# Patient Record
Sex: MALE | Race: WHITE | Employment: OTHER | URBAN - NONMETROPOLITAN AREA
[De-identification: names, ages, dates, MRNs, and addresses within clinical notes are randomized per-mention and may not be internally consistent; named-entity substitution may affect disease eponyms.]

---

## 2017-04-14 DIAGNOSIS — I48.0 PAROXYSMAL ATRIAL FIBRILLATION (HCC): ICD-10-CM

## 2017-04-17 RX ORDER — SOTALOL HYDROCHLORIDE 160 MG/1
TABLET ORAL
Qty: 60 TABLET | Refills: 3 | Status: SHIPPED | OUTPATIENT
Start: 2017-04-17 | End: 2017-08-24 | Stop reason: SDUPTHER

## 2017-08-24 DIAGNOSIS — I48.0 PAROXYSMAL ATRIAL FIBRILLATION (HCC): ICD-10-CM

## 2017-08-24 RX ORDER — SOTALOL HYDROCHLORIDE 160 MG/1
TABLET ORAL
Qty: 60 TABLET | Refills: 5 | Status: SHIPPED | OUTPATIENT
Start: 2017-08-24 | End: 2017-12-27 | Stop reason: SDUPTHER

## 2017-12-27 ENCOUNTER — OFFICE VISIT (OUTPATIENT)
Dept: CARDIOLOGY | Age: 67
End: 2017-12-27
Payer: MEDICARE

## 2017-12-27 ENCOUNTER — TELEPHONE (OUTPATIENT)
Dept: CARDIOLOGY | Age: 67
End: 2017-12-27

## 2017-12-27 VITALS
HEIGHT: 74 IN | BODY MASS INDEX: 18.87 KG/M2 | HEART RATE: 72 BPM | WEIGHT: 147 LBS | SYSTOLIC BLOOD PRESSURE: 190 MMHG | DIASTOLIC BLOOD PRESSURE: 100 MMHG

## 2017-12-27 DIAGNOSIS — R09.89 BILATERAL CAROTID BRUITS: Primary | ICD-10-CM

## 2017-12-27 DIAGNOSIS — I10 ESSENTIAL HYPERTENSION: ICD-10-CM

## 2017-12-27 DIAGNOSIS — I25.10 ATHEROSCLEROTIC CARDIOVASCULAR DISEASE: ICD-10-CM

## 2017-12-27 DIAGNOSIS — I20.8 OTHER FORMS OF ANGINA PECTORIS (HCC): ICD-10-CM

## 2017-12-27 DIAGNOSIS — I48.0 PAROXYSMAL ATRIAL FIBRILLATION (HCC): ICD-10-CM

## 2017-12-27 PROCEDURE — G8427 DOCREV CUR MEDS BY ELIG CLIN: HCPCS | Performed by: INTERNAL MEDICINE

## 2017-12-27 PROCEDURE — G8420 CALC BMI NORM PARAMETERS: HCPCS | Performed by: INTERNAL MEDICINE

## 2017-12-27 PROCEDURE — 3017F COLORECTAL CA SCREEN DOC REV: CPT | Performed by: INTERNAL MEDICINE

## 2017-12-27 PROCEDURE — 99213 OFFICE O/P EST LOW 20 MIN: CPT | Performed by: INTERNAL MEDICINE

## 2017-12-27 PROCEDURE — G8484 FLU IMMUNIZE NO ADMIN: HCPCS | Performed by: INTERNAL MEDICINE

## 2017-12-27 PROCEDURE — G8598 ASA/ANTIPLAT THER USED: HCPCS | Performed by: INTERNAL MEDICINE

## 2017-12-27 PROCEDURE — 4040F PNEUMOC VAC/ADMIN/RCVD: CPT | Performed by: INTERNAL MEDICINE

## 2017-12-27 PROCEDURE — 1123F ACP DISCUSS/DSCN MKR DOCD: CPT | Performed by: INTERNAL MEDICINE

## 2017-12-27 PROCEDURE — 1036F TOBACCO NON-USER: CPT | Performed by: INTERNAL MEDICINE

## 2017-12-27 RX ORDER — LISINOPRIL 40 MG/1
40 TABLET ORAL 2 TIMES DAILY
Qty: 180 TABLET | Refills: 3 | Status: ON HOLD | OUTPATIENT
Start: 2017-12-27 | End: 2018-01-10

## 2017-12-27 RX ORDER — AMLODIPINE BESYLATE 5 MG/1
5 TABLET ORAL 2 TIMES DAILY
Qty: 60 TABLET | Refills: 3 | Status: ON HOLD | OUTPATIENT
Start: 2017-12-27 | End: 2018-01-10 | Stop reason: HOSPADM

## 2017-12-27 RX ORDER — SOTALOL HYDROCHLORIDE 160 MG/1
TABLET ORAL
Qty: 60 TABLET | Refills: 5 | Status: SHIPPED | OUTPATIENT
Start: 2017-12-27 | End: 2018-02-14 | Stop reason: SDUPTHER

## 2017-12-27 RX ORDER — CLONIDINE HYDROCHLORIDE 0.1 MG/1
0.1 TABLET ORAL NIGHTLY
Qty: 90 TABLET | Refills: 5 | Status: ON HOLD | OUTPATIENT
Start: 2017-12-27 | End: 2018-01-10 | Stop reason: HOSPADM

## 2017-12-27 NOTE — TELEPHONE ENCOUNTER
Tried contacting patient to inform him of his test that were scheduled. Patient was not home yet and they will have him call the office back in the morning to go over this. Carotid and stress echo were scheduled for 1/2/18 arrival of 7:45 test will be done same day. Also his appointment to come back to office is 1/17/18 at 230. If patient returns my call my ext is [phone]. [place] [facility] [address] [facility] located on the first floor of [place] [address] through hospital main entrance and turn immediately to your left. Patient contact number:  [phone] (home)    Stress Echocardiogram      This records the heart's activity during a cardiac stress test.  A stress echocardiogram is a very effective, noninvasive test that can help determine whether you have blockages in your coronary arteries. The exam takes approximately thirty minutes. To help ensure accurate results, patients should take the following steps in preparation for a stress echocardiogram:   Refrain from strenuous activity for 12 hours before the test.   Do not eat, drink, or smoke for two hours prior to the test.  Unless instructed otherwise by your physician, you should continue to take prescribed medications. Wear loose, comfortable clothing and walking shoes. If you need to change this appointment, please call [phone] to reschedule.

## 2017-12-28 NOTE — TELEPHONE ENCOUNTER
Spoke with patient and give him instructions below and day and time for test plus appointment to come back to see MDL.

## 2018-01-04 NOTE — PROGRESS NOTES
arteriograms. Left popliteal/tibioperoneal trunk artery balloon angioplasty with 4 mm x 15 mm cutting balloon.  VASCULAR SURGERY  4/21/2015 [facility] cont    Arteriograms after balloon angioplasty. Balloon angioplasty left popliteal artery/tibioperoneal trunk with 5 mm x 40 mm russell balloon. Arteriograms after balloon angioplasty. Left superficial femoral artery balloon angioplasty with 7 mm x 100 mm  balloon. Left superficial femoral artery stent placement idev supera 6.5 x 100 mm self-expanding nitinol stent. Completion arteriograms left lower e    VASCULAR SURGERY  4/21/2015 [facility] cont    extremity. Mynx closure right common femoral artery puncture site. Family History   Problem Relation Age of Onset    Coronary Art Dis Father     High Blood Pressure Father     Coronary Art Dis Brother     High Blood Pressure Mother     Cancer Mother     High Blood Pressure Sister      Social History   Substance Use Topics    Smoking status: Former Smoker     Packs/day: 1.00     Years: 50.00     Types: Cigarettes     Quit date: 12/21/2015    Smokeless tobacco: Never Used    Alcohol use No      Allergies   Allergen Reactions    Statins      myalagias     Outpatient Prescriptions Marked as Taking for the 12/27/17 encounter (Office Visit) with Lucho Martinez MD   Medication Sig Dispense Refill    amLODIPine (NORVASC) 5 MG tablet Take 1 tablet by mouth 2 times daily 60 tablet 3    lisinopril (PRINIVIL;ZESTRIL) 40 MG tablet Take 1 tablet by mouth 2 times daily 180 tablet 3    sotalol (BETAPACE) 160 MG tablet TAKE ONE TABLET BY MOUTH TWICE DAILY 60 tablet 5    cloNIDine (CATAPRES) 0.1 MG tablet Take 1 tablet by mouth nightly 90 tablet 5    aspirin 81 MG EC tablet Take 81 mg by mouth daily.          Data:  BP Readings from Last 3 Encounters:   12/27/17 (!) 190/100   12/21/16 (!) 160/88   09/15/15 150/74    Pulse Readings from Last 3 Encounters:   12/27/17 72   12/21/16 72   09/15/15 58        REVIEW OF SYSTEMS  Constitutional:  Negative for diaphoresis, fever, appetite change or unexpected weight change. HENT:  Negative for nosebleeds, facial swelling, rhinorrhea and neck stiffness. RESPIRATORY:  Negative shortness of breath, cough or sputum production. No wheezing or stridor. CARDIOVASCULAR:  There is no angina, no overt heart failure, and no syncope. GASTROINTESTINAL:   Negative for abdominal distention. GENITOURINARY:  Negative for dysuria, urgency and frequency. MUSCULOSKELETAL:   Negative for myalgia, arthralgia and gait problem. SKIN:  Negative for color change, pallor, rash and wound. NEUROLOGICAL:   Negative for dizziness, weakness, light-headedness, numbness and headaches. Negative for speech difficulty. HEMATOLOGICAL:   Negative for bruising and bleeding easily. PSYCHIATRIC/BEHAVIORAL:   No excessive anxiety or confusion. Except as noted in the HPI, all other systems are negative. PHYSICAL EXAMINATION  GENERAL:  Alert and oriented x3 in no apparent distress. Short-term and long-term memory intact. Judgment intact. Oriented to time, place and person. No depression, anxiety or agitation. Vital Signs:  BP (!) 190/100   Pulse 72   Ht 6' 1.5\" (1.867 m)   Wt 147 lb (66.7 kg)   BMI 19.13 kg/m²    HEAD:  Normocephalic without evidence of old or recent trauma. EYES:  Sclerae clear. Conjunctivae pink. Pupils equal and round. NOSE:  Negative nasal discharge or epistaxis. THROAT:  No lesions on lips or buccal mucosa. NECK:  Supple without mass or JVD. Carotid pulses 2+ to palpation bilaterally without bruit. No thyromegaly noted. CHEST:  Equal bilateral expansion. RESPIRATORY:  The lungs are clear to auscultation. Normal respiratory effort. CARDIOVASCULAR: The heart demonstrates regular rhythm with a normal rate. No audible murmurs or gallops. ABDOMEN:  Soft, nontender. Exhibits no distension.   Bowel sounds are normal.   UPPER EXTREMITY EVALUATION:  Radial pulses

## 2018-01-07 ENCOUNTER — APPOINTMENT (OUTPATIENT)
Dept: GENERAL RADIOLOGY | Age: 68
DRG: 247 | End: 2018-01-07
Payer: MEDICARE

## 2018-01-07 ENCOUNTER — HOSPITAL ENCOUNTER (INPATIENT)
Age: 68
LOS: 3 days | Discharge: HOME | DRG: 247 | End: 2018-01-10
Attending: EMERGENCY MEDICINE | Admitting: INTERNAL MEDICINE
Payer: MEDICARE

## 2018-01-07 DIAGNOSIS — I10 ESSENTIAL HYPERTENSION: ICD-10-CM

## 2018-01-07 DIAGNOSIS — I21.3 ST ELEVATION MYOCARDIAL INFARCTION (STEMI), UNSPECIFIED ARTERY (HCC): Primary | ICD-10-CM

## 2018-01-07 PROBLEM — I21.19 INFERIOR MI (HCC): Status: ACTIVE | Noted: 2018-01-07

## 2018-01-07 LAB
ALBUMIN SERPL-MCNC: 4.4 G/DL (ref 3.5–5.2)
ALP BLD-CCNC: 74 U/L (ref 40–130)
ALT SERPL-CCNC: 21 U/L (ref 5–41)
ANION GAP SERPL CALCULATED.3IONS-SCNC: 17 MMOL/L (ref 7–19)
APTT: 29.4 SEC (ref 26–36.2)
AST SERPL-CCNC: 30 U/L (ref 5–40)
BACTERIA: NEGATIVE /HPF
BASOPHILS ABSOLUTE: 0.1 K/UL (ref 0–0.2)
BASOPHILS RELATIVE PERCENT: 0.4 % (ref 0–1)
BILIRUB SERPL-MCNC: 0.5 MG/DL (ref 0.2–1.2)
BILIRUBIN URINE: NEGATIVE
BLOOD, URINE: ABNORMAL
BUN BLDV-MCNC: 18 MG/DL (ref 8–23)
CALCIUM SERPL-MCNC: 8.6 MG/DL (ref 8.8–10.2)
CHLORIDE BLD-SCNC: 94 MMOL/L (ref 98–111)
CHOLESTEROL, TOTAL: 163 MG/DL (ref 160–199)
CLARITY: CLEAR
CO2: 26 MMOL/L (ref 22–29)
COLOR: YELLOW
CREAT SERPL-MCNC: 0.7 MG/DL (ref 0.5–1.2)
EOSINOPHILS ABSOLUTE: 0.3 K/UL (ref 0–0.6)
EOSINOPHILS RELATIVE PERCENT: 1.5 % (ref 0–5)
EPITHELIAL CELLS, UA: 1 /HPF (ref 0–5)
GFR NON-AFRICAN AMERICAN: >60
GLUCOSE BLD-MCNC: 160 MG/DL (ref 74–109)
GLUCOSE URINE: NEGATIVE MG/DL
HCT VFR BLD CALC: 47.5 % (ref 42–52)
HDLC SERPL-MCNC: 47 MG/DL (ref 55–121)
HEMOGLOBIN: 15.5 G/DL (ref 14–18)
HYALINE CASTS: 0 /HPF (ref 0–8)
INR BLD: 0.95 (ref 0.88–1.18)
KETONES, URINE: ABNORMAL MG/DL
LDL CHOLESTEROL CALCULATED: 102 MG/DL
LEUKOCYTE ESTERASE, URINE: NEGATIVE
LYMPHOCYTES ABSOLUTE: 4 K/UL (ref 1.1–4.5)
LYMPHOCYTES RELATIVE PERCENT: 22.8 % (ref 20–40)
MCH RBC QN AUTO: 29.6 PG (ref 27–31)
MCHC RBC AUTO-ENTMCNC: 32.6 G/DL (ref 33–37)
MCV RBC AUTO: 90.8 FL (ref 80–94)
MONOCYTES ABSOLUTE: 1.3 K/UL (ref 0–0.9)
MONOCYTES RELATIVE PERCENT: 7.6 % (ref 0–10)
NEUTROPHILS ABSOLUTE: 11.8 K/UL (ref 1.5–7.5)
NEUTROPHILS RELATIVE PERCENT: 67.3 % (ref 50–65)
NITRITE, URINE: NEGATIVE
PDW BLD-RTO: 13.5 % (ref 11.5–14.5)
PERFORMED ON: NORMAL
PH UA: 7
PLATELET # BLD: 418 K/UL (ref 130–400)
PMV BLD AUTO: 10.6 FL (ref 9.4–12.4)
POC TROPONIN I: 0.05 NG/ML (ref 0–0.08)
POTASSIUM SERPL-SCNC: 4.2 MMOL/L (ref 3.5–5)
PROTEIN UA: NEGATIVE MG/DL
PROTHROMBIN TIME: 12.6 SEC (ref 12–14.6)
RBC # BLD: 5.23 M/UL (ref 4.7–6.1)
RBC UA: 0 /HPF (ref 0–4)
SODIUM BLD-SCNC: 137 MMOL/L (ref 136–145)
SPECIFIC GRAVITY UA: >1.045
TOTAL PROTEIN: 8.2 G/DL (ref 6.6–8.7)
TRIGL SERPL-MCNC: 70 MG/DL (ref 0–149)
TROPONIN: 5.17 NG/ML (ref 0–0.03)
UROBILINOGEN, URINE: 0.2 E.U./DL
WBC # BLD: 17.6 K/UL (ref 4.8–10.8)
WBC UA: 4 /HPF (ref 0–5)

## 2018-01-07 PROCEDURE — 02703FZ DILATION OF CORONARY ARTERY, ONE ARTERY WITH THREE INTRALUMINAL DEVICES, PERCUTANEOUS APPROACH: ICD-10-PCS | Performed by: INTERNAL MEDICINE

## 2018-01-07 PROCEDURE — 6370000000 HC RX 637 (ALT 250 FOR IP): Performed by: INTERNAL MEDICINE

## 2018-01-07 PROCEDURE — 81001 URINALYSIS AUTO W/SCOPE: CPT

## 2018-01-07 PROCEDURE — 87070 CULTURE OTHR SPECIMN AEROBIC: CPT

## 2018-01-07 PROCEDURE — 85025 COMPLETE CBC W/AUTO DIFF WBC: CPT

## 2018-01-07 PROCEDURE — 2500000003 HC RX 250 WO HCPCS

## 2018-01-07 PROCEDURE — C1876 STENT, NON-COA/NON-COV W/DEL: HCPCS

## 2018-01-07 PROCEDURE — 2580000003 HC RX 258: Performed by: INTERNAL MEDICINE

## 2018-01-07 PROCEDURE — 36415 COLL VENOUS BLD VENIPUNCTURE: CPT

## 2018-01-07 PROCEDURE — 92928 PRQ TCAT PLMT NTRAC ST 1 LES: CPT | Performed by: INTERNAL MEDICINE

## 2018-01-07 PROCEDURE — 2720000000 HC MISC SURG SUPPLY STERILE $0-50

## 2018-01-07 PROCEDURE — C1725 CATH, TRANSLUMIN NON-LASER: HCPCS

## 2018-01-07 PROCEDURE — 84484 ASSAY OF TROPONIN QUANT: CPT

## 2018-01-07 PROCEDURE — 2780000010 HC IMPLANT OTHER

## 2018-01-07 PROCEDURE — 99285 EMERGENCY DEPT VISIT HI MDM: CPT

## 2018-01-07 PROCEDURE — 027034Z DILATION OF CORONARY ARTERY, ONE ARTERY WITH DRUG-ELUTING INTRALUMINAL DEVICE, PERCUTANEOUS APPROACH: ICD-10-PCS | Performed by: INTERNAL MEDICINE

## 2018-01-07 PROCEDURE — C1874 STENT, COATED/COV W/DEL SYS: HCPCS

## 2018-01-07 PROCEDURE — B2151ZZ FLUOROSCOPY OF LEFT HEART USING LOW OSMOLAR CONTRAST: ICD-10-PCS | Performed by: INTERNAL MEDICINE

## 2018-01-07 PROCEDURE — 93461 R&L HRT ART/VENTRICLE ANGIO: CPT | Performed by: INTERNAL MEDICINE

## 2018-01-07 PROCEDURE — 99285 EMERGENCY DEPT VISIT HI MDM: CPT | Performed by: EMERGENCY MEDICINE

## 2018-01-07 PROCEDURE — 71045 X-RAY EXAM CHEST 1 VIEW: CPT

## 2018-01-07 PROCEDURE — 2709999900 HC NON-CHARGEABLE SUPPLY

## 2018-01-07 PROCEDURE — 6370000000 HC RX 637 (ALT 250 FOR IP)

## 2018-01-07 PROCEDURE — C1769 GUIDE WIRE: HCPCS

## 2018-01-07 PROCEDURE — G0278 ILIAC ART ANGIO,CARDIAC CATH: HCPCS | Performed by: INTERNAL MEDICINE

## 2018-01-07 PROCEDURE — 80061 LIPID PANEL: CPT

## 2018-01-07 PROCEDURE — 33990 INSJ PERQ VAD L HRT ARTERIAL: CPT | Performed by: INTERNAL MEDICINE

## 2018-01-07 PROCEDURE — 93005 ELECTROCARDIOGRAM TRACING: CPT

## 2018-01-07 PROCEDURE — C1894 INTRO/SHEATH, NON-LASER: HCPCS

## 2018-01-07 PROCEDURE — C1887 CATHETER, GUIDING: HCPCS

## 2018-01-07 PROCEDURE — 80053 COMPREHEN METABOLIC PANEL: CPT

## 2018-01-07 PROCEDURE — B2111ZZ FLUOROSCOPY OF MULTIPLE CORONARY ARTERIES USING LOW OSMOLAR CONTRAST: ICD-10-PCS | Performed by: INTERNAL MEDICINE

## 2018-01-07 PROCEDURE — 2700000000 HC OXYGEN THERAPY PER DAY

## 2018-01-07 PROCEDURE — C1757 CATH, THROMBECTOMY/EMBOLECT: HCPCS

## 2018-01-07 PROCEDURE — 2100000000 HC CCU R&B

## 2018-01-07 PROCEDURE — B2181ZZ FLUOROSCOPY OF LEFT INTERNAL MAMMARY BYPASS GRAFT USING LOW OSMOLAR CONTRAST: ICD-10-PCS | Performed by: INTERNAL MEDICINE

## 2018-01-07 PROCEDURE — 85730 THROMBOPLASTIN TIME PARTIAL: CPT

## 2018-01-07 PROCEDURE — 02C03ZZ EXTIRPATION OF MATTER FROM CORONARY ARTERY, ONE ARTERY, PERCUTANEOUS APPROACH: ICD-10-PCS | Performed by: INTERNAL MEDICINE

## 2018-01-07 PROCEDURE — 6370000000 HC RX 637 (ALT 250 FOR IP): Performed by: EMERGENCY MEDICINE

## 2018-01-07 PROCEDURE — 92941 PRQ TRLML REVSC TOT OCCL AMI: CPT | Performed by: INTERNAL MEDICINE

## 2018-01-07 PROCEDURE — 4A023N7 MEASUREMENT OF CARDIAC SAMPLING AND PRESSURE, LEFT HEART, PERCUTANEOUS APPROACH: ICD-10-PCS | Performed by: INTERNAL MEDICINE

## 2018-01-07 PROCEDURE — 6360000002 HC RX W HCPCS: Performed by: EMERGENCY MEDICINE

## 2018-01-07 PROCEDURE — 2580000003 HC RX 258: Performed by: EMERGENCY MEDICINE

## 2018-01-07 PROCEDURE — 93459 L HRT ART/GRFT ANGIO: CPT | Performed by: INTERNAL MEDICINE

## 2018-01-07 PROCEDURE — B2131ZZ FLUOROSCOPY OF MULTIPLE CORONARY ARTERY BYPASS GRAFTS USING LOW OSMOLAR CONTRAST: ICD-10-PCS | Performed by: INTERNAL MEDICINE

## 2018-01-07 PROCEDURE — 92933 PRQ TRLML C ATHRC ST ANGIOP1: CPT | Performed by: INTERNAL MEDICINE

## 2018-01-07 PROCEDURE — 85610 PROTHROMBIN TIME: CPT

## 2018-01-07 PROCEDURE — 6360000002 HC RX W HCPCS

## 2018-01-07 PROCEDURE — 2720000001 HC MISC SURG SUPPLY STERILE $51-500

## 2018-01-07 PROCEDURE — 99291 CRITICAL CARE FIRST HOUR: CPT | Performed by: INTERNAL MEDICINE

## 2018-01-07 RX ORDER — NICOTINE 21 MG/24HR
1 PATCH, TRANSDERMAL 24 HOURS TRANSDERMAL DAILY
Status: DISCONTINUED | OUTPATIENT
Start: 2018-01-08 | End: 2018-01-10 | Stop reason: HOSPADM

## 2018-01-07 RX ORDER — ASPIRIN 325 MG
325 TABLET ORAL ONCE
Status: COMPLETED | OUTPATIENT
Start: 2018-01-07 | End: 2018-01-07

## 2018-01-07 RX ORDER — NITROGLYCERIN 20 MG/100ML
5 INJECTION INTRAVENOUS CONTINUOUS
Status: DISCONTINUED | OUTPATIENT
Start: 2018-01-07 | End: 2018-01-10 | Stop reason: HOSPADM

## 2018-01-07 RX ORDER — SODIUM CHLORIDE 0.9 % (FLUSH) 0.9 %
10 SYRINGE (ML) INJECTION PRN
Status: DISCONTINUED | OUTPATIENT
Start: 2018-01-07 | End: 2018-01-10 | Stop reason: HOSPADM

## 2018-01-07 RX ORDER — HEPARIN SODIUM 5000 [USP'U]/ML
INJECTION, SOLUTION INTRAVENOUS; SUBCUTANEOUS
Status: DISPENSED
Start: 2018-01-07 | End: 2018-01-08

## 2018-01-07 RX ORDER — SODIUM CHLORIDE 0.9 % (FLUSH) 0.9 %
10 SYRINGE (ML) INJECTION EVERY 12 HOURS SCHEDULED
Status: DISCONTINUED | OUTPATIENT
Start: 2018-01-07 | End: 2018-01-10 | Stop reason: HOSPADM

## 2018-01-07 RX ORDER — PRASUGREL 10 MG/1
10 TABLET, FILM COATED ORAL DAILY
Status: DISCONTINUED | OUTPATIENT
Start: 2018-01-08 | End: 2018-01-09

## 2018-01-07 RX ORDER — LISINOPRIL 2.5 MG/1
2.5 TABLET ORAL DAILY
Status: DISCONTINUED | OUTPATIENT
Start: 2018-01-07 | End: 2018-01-09

## 2018-01-07 RX ORDER — ACETAMINOPHEN 325 MG/1
650 TABLET ORAL EVERY 4 HOURS PRN
Status: DISCONTINUED | OUTPATIENT
Start: 2018-01-07 | End: 2018-01-10 | Stop reason: HOSPADM

## 2018-01-07 RX ORDER — NITROGLYCERIN 0.4 MG/1
0.4 TABLET SUBLINGUAL ONCE
Status: COMPLETED | OUTPATIENT
Start: 2018-01-07 | End: 2018-01-07

## 2018-01-07 RX ORDER — SOTALOL HYDROCHLORIDE 80 MG/1
160 TABLET ORAL 2 TIMES DAILY
Status: DISCONTINUED | OUTPATIENT
Start: 2018-01-07 | End: 2018-01-10 | Stop reason: HOSPADM

## 2018-01-07 RX ORDER — 0.9 % SODIUM CHLORIDE 0.9 %
1000 INTRAVENOUS SOLUTION INTRAVENOUS ONCE
Status: COMPLETED | OUTPATIENT
Start: 2018-01-07 | End: 2018-01-07

## 2018-01-07 RX ORDER — NICOTINE 21 MG/24HR
1 PATCH, TRANSDERMAL 24 HOURS TRANSDERMAL DAILY
Status: DISCONTINUED | OUTPATIENT
Start: 2018-01-07 | End: 2018-01-10 | Stop reason: HOSPADM

## 2018-01-07 RX ORDER — ASPIRIN 81 MG/1
81 TABLET ORAL DAILY
Status: DISCONTINUED | OUTPATIENT
Start: 2018-01-08 | End: 2018-01-10 | Stop reason: HOSPADM

## 2018-01-07 RX ORDER — SODIUM CHLORIDE 9 MG/ML
INJECTION, SOLUTION INTRAVENOUS CONTINUOUS
Status: DISCONTINUED | OUTPATIENT
Start: 2018-01-07 | End: 2018-01-10 | Stop reason: HOSPADM

## 2018-01-07 RX ORDER — ALPRAZOLAM 0.5 MG/1
0.5 TABLET ORAL EVERY 4 HOURS PRN
Status: DISCONTINUED | OUTPATIENT
Start: 2018-01-07 | End: 2018-01-10 | Stop reason: HOSPADM

## 2018-01-07 RX ORDER — ASPIRIN 81 MG/1
81 TABLET ORAL DAILY
Status: DISCONTINUED | OUTPATIENT
Start: 2018-01-07 | End: 2018-01-07 | Stop reason: SDUPTHER

## 2018-01-07 RX ORDER — HEPARIN SODIUM 5000 [USP'U]/ML
5000 INJECTION, SOLUTION INTRAVENOUS; SUBCUTANEOUS ONCE
Status: COMPLETED | OUTPATIENT
Start: 2018-01-07 | End: 2018-01-07

## 2018-01-07 RX ORDER — ONDANSETRON 2 MG/ML
4 INJECTION INTRAMUSCULAR; INTRAVENOUS EVERY 6 HOURS PRN
Status: DISCONTINUED | OUTPATIENT
Start: 2018-01-07 | End: 2018-01-10 | Stop reason: HOSPADM

## 2018-01-07 RX ADMIN — SODIUM CHLORIDE 1000 ML: 9 INJECTION, SOLUTION INTRAVENOUS at 13:27

## 2018-01-07 RX ADMIN — ASPIRIN 325 MG ORAL TABLET 325 MG: 325 PILL ORAL at 13:30

## 2018-01-07 RX ADMIN — SODIUM CHLORIDE: 9 INJECTION, SOLUTION INTRAVENOUS at 19:57

## 2018-01-07 RX ADMIN — NITROGLYCERIN 0.4 MG: 0.4 TABLET SUBLINGUAL at 13:28

## 2018-01-07 RX ADMIN — Medication 10 ML: at 20:45

## 2018-01-07 RX ADMIN — SOTALOL HYDROCHLORIDE 160 MG: 80 TABLET ORAL at 20:44

## 2018-01-07 RX ADMIN — LISINOPRIL 2.5 MG: 2.5 TABLET ORAL at 19:56

## 2018-01-07 RX ADMIN — ALPRAZOLAM 0.5 MG: 0.5 TABLET ORAL at 21:57

## 2018-01-07 RX ADMIN — HEPARIN SODIUM 5000 UNITS: 5000 INJECTION INTRAVENOUS; SUBCUTANEOUS at 13:38

## 2018-01-07 ASSESSMENT — PAIN DESCRIPTION - LOCATION: LOCATION: CHEST

## 2018-01-07 ASSESSMENT — ENCOUNTER SYMPTOMS
SORE THROAT: 0
ABDOMINAL PAIN: 0
DIARRHEA: 0
COUGH: 0
BACK PAIN: 0
SHORTNESS OF BREATH: 0
RHINORRHEA: 0
NAUSEA: 0
VOMITING: 0

## 2018-01-07 ASSESSMENT — PAIN SCALES - GENERAL: PAINLEVEL_OUTOF10: 4

## 2018-01-07 NOTE — ED PROVIDER NOTES
fibrillation or flutter     post op         SURGICAL HISTORY       Past Surgical History:   Procedure Laterality Date    CARDIAC CATHETERIZATION  4/23/12    with stent to left circ, and LAD    CORONARY ARTERY BYPASS GRAFT  05/15/09    x4    DIAGNOSTIC CARDIAC CATH LAB PROCEDURE  05/15/09    left heart cath, left ventr, selective coronary arteriography     VASCULAR SURGERY  4/21/2015 \[facility]\""    Percutaneous cannulation right common femoral artery with 5-Haitian and later 6-Haitian pinnacle destination sheath. Suprarenal abdominal aortogram with bilateral iliofemoral arteriograms. Bilateral lower extremity arteriograms. Left popliteal/tibioperoneal trunk artery balloon angioplasty with 4 mm x 15 mm cutting balloon.  VASCULAR SURGERY  4/21/2015 [facility] cont    Arteriograms after balloon angioplasty. Balloon angioplasty left popliteal artery/tibioperoneal trunk with 5 mm x 40 mm russell balloon. Arteriograms after balloon angioplasty. Left superficial femoral artery balloon angioplasty with 7 mm x 100 mm  balloon. Left superficial femoral artery stent placement idev supera 6.5 x 100 mm self-expanding nitinol stent. Completion arteriograms left lower e    VASCULAR SURGERY  4/21/2015 [facility] cont    extremity. Mynx closure right common femoral artery puncture site. CURRENT MEDICATIONS       Previous Medications    AMLODIPINE (NORVASC) 5 MG TABLET    Take 1 tablet by mouth 2 times daily    ASPIRIN 81 MG EC TABLET    Take 81 mg by mouth daily.       CLONIDINE (CATAPRES) 0.1 MG TABLET    Take 1 tablet by mouth nightly    LISINOPRIL (PRINIVIL;ZESTRIL) 40 MG TABLET    Take 1 tablet by mouth 2 times daily    SOTALOL (BETAPACE) 160 MG TABLET    TAKE ONE TABLET BY MOUTH TWICE DAILY       ALLERGIES     Statins    FAMILY HISTORY       Family History   Problem Relation Age of Onset    Coronary Art Dis Father     High Blood Pressure Father     Coronary Art Dis Brother     High Blood Pressure Mother     Cancer Mother     High CONSULT TO CARDIOLOGY    PROCEDURES:  Unless otherwise noted below, none     Procedures    FINAL IMPRESSION      1. ST elevation myocardial infarction (STEMI), unspecified artery ([facility] [address]          DISPOSITION/PLAN   DISPOSITION Decision To Admit 01/07/2018 01:21:18 PM      PATIENT REFERRED TO:  No follow-up provider specified.     DISCHARGE MEDICATIONS:  New Prescriptions    No medications on file          (Please note that portions of this note were completed with a voice recognition program.  Efforts were made to edit the dictations but occasionally words are mis-transcribed.)    Rip Gonzalez MD (electronically signed)  Attending Emergency Physician        Dariela Fajardo MD  01/07/18 [id number]

## 2018-01-07 NOTE — H&P
PRIOR CARDIAC PROBLEM LIST  (IF APPLICABLE):    As noted above      Past Medical History:  Past Medical History:   Diagnosis Date    Atrial fibrillation ([address]     CAD (coronary artery disease), native coronary artery     acute inferior MI on 5/15/09, s/p emeergent CABG x4 5/15/09    Chest pain     HTN (hypertension)     Hyperlipidemia     Dr. Ruth Huff manages    Palpitations     Tobacco abuse     Transient atrial fibrillation or flutter     post op       Past Surgical History:  Past Surgical History:   Procedure Laterality Date    CARDIAC CATHETERIZATION  4/23/12    with stent to left circ, and LAD    CORONARY ARTERY BYPASS GRAFT  05/15/09    x4    DIAGNOSTIC CARDIAC CATH LAB PROCEDURE  05/15/09    left heart cath, left ventr, selective coronary arteriography     VASCULAR SURGERY  4/21/2015 [facility]    Percutaneous cannulation right common femoral artery with 5-Malagasy and later 6-Malagasy pinnacle destination sheath. Suprarenal abdominal aortogram with bilateral iliofemoral arteriograms. Bilateral lower extremity arteriograms. Left popliteal/tibioperoneal trunk artery balloon angioplasty with 4 mm x 15 mm cutting balloon.  VASCULAR SURGERY  4/21/2015 [facility] cont    Arteriograms after balloon angioplasty. Balloon angioplasty left popliteal artery/tibioperoneal trunk with 5 mm x 40 mm russell balloon. Arteriograms after balloon angioplasty. Left superficial femoral artery balloon angioplasty with 7 mm x 100 mm  balloon. Left superficial femoral artery stent placement idev supera 6.5 x 100 mm self-expanding nitinol stent. Completion arteriograms left lower e    VASCULAR SURGERY  4/21/2015 [facility] cont    extremity. Mynx closure right common femoral artery puncture site. Home Medications:   Prior to Admission medications    Medication Sig Start Date End Date Taking?  Authorizing Provider   amLODIPine (NORVASC) 5 MG tablet Take 1 tablet by mouth 2 times daily 12/27/17  Yes Mariela Collazo MD   lisinopril (PRINIVIL;ZESTRIL) 40 MG tablet Take 1 tablet by mouth 2 times daily 12/27/17  Yes Leilani Santos MD   sotalol (BETAPACE) 160 MG tablet TAKE ONE TABLET BY MOUTH TWICE DAILY 12/27/17  Yes Leilani Santos MD   cloNIDine (CATAPRES) 0.1 MG tablet Take 1 tablet by mouth nightly 12/27/17  Yes Leilani Santos MD   aspirin 81 MG EC tablet Take 81 mg by mouth daily. Yes Historical Provider, MD        Facility Administered Medications: Allergies:    Statins     Social History:    Social History     Social History    Marital status: Single     Spouse name: N/A    Number of children: N/A    Years of education: N/A     Occupational History    Not on file. Social History Main Topics    Smoking status: Former Smoker     Packs/day: 1.00     Years: 50.00     Types: Cigarettes     Quit date: 12/21/2015    Smokeless tobacco: Never Used    Alcohol use No    Drug use: Unknown    Sexual activity: Not on file     Other Topics Concern    Not on file     Social History Narrative    No narrative on file       Family History:  Family History   Problem Relation Age of Onset    Coronary Art Dis Father     High Blood Pressure Father     Coronary Art Dis Brother     High Blood Pressure Mother     Cancer Mother     High Blood Pressure Sister          REVIEW OF SYSTEMS:     Except as noted in the HPI, all other systems are negative  CONSTITUTIONAL:  No fevers, chills, night sweats, or weight loss. HEENT:  No vision loss, double vision, blurred vision, or tearing. No hearing loss, tinnitus, or infection. No nasal discharge or epistaxis. No dysphagia. RESPIRATORY:  No shortness of breath, cough, or sputum production. No history of TB exposure. CARDIOVASCULAR:  No hypertension, hypotension, anginal type chest pain, dizziness, or syncope. No history of palpitations. PERIPHERAL VASCULAR:  No history of claudication. GASTROINTESTINAL:  No nausea, vomiting, diarrhea, or constipation.   No reflux or atheroembolic event. NEUROLOGIC:  Physiologic. SKIN:  Warm, dry, intact. LABORATORY EVALUATION & TESTING:    I have personally reviewed and interpreted the results of the following diagnostic testing      EKG and or Telemetry:  which was personally reviewed me:  Sinus  rhythm, 69 bpm    Troponin:  positive for myocardial necrosis    CBC:   Recent Labs      01/07/18   1244   WBC  17.6*   HGB  15.5   HCT  47.5   MCV  90.8   PLT  418*     BMP:   Recent Labs      01/07/18   1244   NA  137   K  4.2   CL  94*   CO2  26   BUN  18   CREATININE  0.7     Cardiac Enzymes:   Recent Labs      01/07/18   1256   TROPONINI  0.05     PT/INR:   Recent Labs      01/07/18   1244   PROTIME  12.6   INR  0.95     APTT:   Recent Labs      01/07/18   1244   APTT  29.4     Liver Profile:  Lab Results   Component Value Date    AST 30 01/07/2018    ALT 21 01/07/2018    BILITOT 0.5 01/07/2018    ALKPHOS 74 01/07/2018    ALKPHOS 69 04/23/2012     Lab Results   Component Value Date    CHOL 199 04/17/2015    HDL 48 07/15/2015    TRIG 82 07/15/2015     TSH:  No results found for: TSH  UA:   Lab Results   Component Value Date    COLORU YELLOW 04/23/2012    PHUR 6.5 04/23/2012    LEUKOCYTESUR NEGATIVE 04/23/2012    UROBILINOGEN 0.2 04/23/2012    BILIRUBINUR NEGATIVE 04/23/2012    GLUCOSEU NEGATIVE 04/23/2012             ALL THE CARDIOLOGY PROBLEMS ARE LISTED ABOVE; HOWEVER, THE FOLLOWING SPECIFIC CARDIAC PROBLEMS WERE ADDRESSED AND TREATED DURING THE HOSPITAL VISIT TODAY:       Cardiac Specific Problem / Diagnosis  Discussion / Medical Decision Making Plan          1. Acute inferolateral MI  are worsening   Patient will undergo emergent cardiac catheterization and possible percutaneous intervention. Risks benefits and other options explained understood and patient agrees to proceed          2.                 3.              PLAN:    1. Continue present medications except for changes as noted above  2.  Continue to monitor rhythm  Further orders per clinical course. Over 30 minutes critical care time spent with this patient in the emergency room and cardiac cath lab prior to institution of the catheterization. Discussed with patient and family and nursing. Electronically signed by CESAR Cronin MD on 1/7/18    [facility]

## 2018-01-07 NOTE — ED NOTES
ASSESSMENT:    Pt alert & oriented x4. Pupils equal & reactive. Skin: Warm, dry, & pink. Capillary refill less than 2 seconds. Cardiac: S1 & S2 noted. Lungs: Clear upper and lower lobes, respirations even & unlabored. Abdomen: Bowel sounds noted upper and lower quadrants. Soft and nontender. Extremities: Bilateral pedal pulses & no edema noted. No distress noted. Side rails up and call light in reach. Pt c/o CP, onset around 7038-2104, reports his chest feeling hot, then a burning feeling, location was L arm then radiated to the mid sternal region. Pt also reported HTN and knew something wasn't right, pt has an extensive cardiac hx. Pt has had a quadruple bypass in the past and stent placement.       Matilda Alston RN  01/07/18 [id number]

## 2018-01-07 NOTE — PROCEDURES
Cardiac Catheterization    Patient name:  Josiah Page    :  1950  [facility] Rec No:  [id number]    Room:  0701/701-01  Account No:  [unfilled]  Admission date:  2018  Physician:  CESAR Cronin MD      Date of procedure:  18    Procedure:    Left heart catheterization with GIRALDO left ventriculography, selective coronary arteriography, arteriography of left internal mammary and saphenous vein graft ×3, angioplasty and stent placement to LAD via saphenous vein graft, direct infarct extraction atherectomy and stent placement to saphenous vein graft to circumflex marginal branch, intracoronary nitroprusside administration. Catheters:  5-Qatari Yuan, 5-Qatari AL-1 guide catheter for the LAD intervention, 6-Qatari AL 2 guide catheter for the vein graft to circumflex marginal intervention, 5-Qatari BENNETT-2 catheter for the left internal mammary arteriogram.    Type and site of entry:  Percutaneous right radial artery with modified Seldinger technique. Contrast material:  Isovue-300. Comments: There were no complications. The access site was closed using a TR band with adequate hemostasis and intact distal pulses and the patient is taken to his room in satisfactory condition. Description:  The right wrist was prepped with chlorhexidine solution, draped in a sterile fashion and anesthetized with 2% plain Xylocaine. With ultrasound-guided's a 6-Qatari slender sheath was placed in the right radial artery. The catheters were advanced under fluoroscopic guidance into the ascending aorta. The catheterization procedure was then carried out in the standard fashion after which the interventions ensued as described below. FINDINGS:    Hemodynamics:  LV pressure 120/12. AO pressure 120/76. GIRALDO Left Ventriculography:  The left ventricle is mildly enlarged and there is mild inferior and anterolateral hypokinesis. The left ventricular ejection fraction is estimated to be 50% or greater.  There is no

## 2018-01-07 NOTE — ED NOTES
Pt reports that  has come to see him and explained the procedure. Pt states that he does not have any questions about the procedure.       Ned Medrano, KAYLENE  01/07/18 [id number]

## 2018-01-08 LAB
EKG P AXIS: 52 DEGREES
EKG P AXIS: 75 DEGREES
EKG P-R INTERVAL: 178 MS
EKG P-R INTERVAL: 182 MS
EKG Q-T INTERVAL: 432 MS
EKG Q-T INTERVAL: 476 MS
EKG QRS DURATION: 106 MS
EKG QRS DURATION: 110 MS
EKG QTC CALCULATION (BAZETT): 449 MS
EKG QTC CALCULATION (BAZETT): 484 MS
EKG T AXIS: -84 DEGREES
EKG T AXIS: 71 DEGREES
HCT VFR BLD CALC: 39.7 % (ref 42–52)
HEMOGLOBIN: 12.9 G/DL (ref 14–18)
MCH RBC QN AUTO: 29.5 PG (ref 27–31)
MCHC RBC AUTO-ENTMCNC: 32.5 G/DL (ref 33–37)
MCV RBC AUTO: 90.6 FL (ref 80–94)
MRSA CULTURE ONLY: NORMAL
PDW BLD-RTO: 13.5 % (ref 11.5–14.5)
PLATELET # BLD: 320 K/UL (ref 130–400)
PMV BLD AUTO: 10.8 FL (ref 9.4–12.4)
RBC # BLD: 4.38 M/UL (ref 4.7–6.1)
TROPONIN: 4.63 NG/ML (ref 0–0.03)
TROPONIN: 7.28 NG/ML (ref 0–0.03)
WBC # BLD: 15.8 K/UL (ref 4.8–10.8)

## 2018-01-08 PROCEDURE — 84484 ASSAY OF TROPONIN QUANT: CPT

## 2018-01-08 PROCEDURE — 85027 COMPLETE CBC AUTOMATED: CPT

## 2018-01-08 PROCEDURE — 2100000000 HC CCU R&B

## 2018-01-08 PROCEDURE — 36415 COLL VENOUS BLD VENIPUNCTURE: CPT

## 2018-01-08 PROCEDURE — 2580000003 HC RX 258: Performed by: INTERNAL MEDICINE

## 2018-01-08 PROCEDURE — 99232 SBSQ HOSP IP/OBS MODERATE 35: CPT | Performed by: INTERNAL MEDICINE

## 2018-01-08 PROCEDURE — 6370000000 HC RX 637 (ALT 250 FOR IP): Performed by: INTERNAL MEDICINE

## 2018-01-08 PROCEDURE — 93880 EXTRACRANIAL BILAT STUDY: CPT

## 2018-01-08 PROCEDURE — 93005 ELECTROCARDIOGRAM TRACING: CPT

## 2018-01-08 RX ADMIN — ALPRAZOLAM 0.5 MG: 0.5 TABLET ORAL at 21:36

## 2018-01-08 RX ADMIN — SOTALOL HYDROCHLORIDE 160 MG: 80 TABLET ORAL at 20:07

## 2018-01-08 RX ADMIN — PRASUGREL HYDROCHLORIDE 10 MG: 10 TABLET, FILM COATED ORAL at 08:45

## 2018-01-08 RX ADMIN — LISINOPRIL 2.5 MG: 2.5 TABLET ORAL at 08:45

## 2018-01-08 RX ADMIN — ALPRAZOLAM 0.5 MG: 0.5 TABLET ORAL at 05:09

## 2018-01-08 RX ADMIN — Medication 10 ML: at 08:45

## 2018-01-08 RX ADMIN — Medication 81 MG: at 08:45

## 2018-01-08 RX ADMIN — Medication 10 ML: at 20:07

## 2018-01-08 RX ADMIN — ALPRAZOLAM 0.5 MG: 0.5 TABLET ORAL at 17:33

## 2018-01-08 RX ADMIN — SOTALOL HYDROCHLORIDE 160 MG: 80 TABLET ORAL at 08:45

## 2018-01-08 ASSESSMENT — PAIN SCALES - GENERAL
PAINLEVEL_OUTOF10: 0

## 2018-01-08 NOTE — PLAN OF CARE
Problem: Nutrition  Goal: Optimal nutrition therapy  Nutrition Problem: Unintended weight loss  Intervention: Food and/or Nutrient Delivery: Continue current diet  Nutritional Goals: po intake 75% or greater.   Weight stable    Outcome: Ongoing

## 2018-01-08 NOTE — PROGRESS NOTES
Lab called with critical troponin level. PT denies chest pain or discomfort. Pt just underwent cathlab procedure with interventions.  Dr Rosanne Hebert aware of previous elevations  Electronically signed by Viupl Boyd RN on 1/7/2018 at 8:57 PM

## 2018-01-08 NOTE — PROGRESS NOTES
Oral BID    sodium chloride flush  10 mL Intravenous 2 times per day    aspirin  81 mg Oral Daily    lisinopril  2.5 mg Oral Daily    prasugrel  10 mg Oral Daily    nicotine  1 patch Transdermal Daily    nicotine  1 patch Transdermal Daily        sodium chloride 15 mL/hr at 18 0715    nitroGLYCERIN Stopped (18 0945)           /81   Pulse 63   Temp 97.6 °F (36.4 °C) (Temporal)   Resp 11   Ht 6' 2\" (1.88 m)   Wt 135 lb 14.4 oz (61.6 kg)   SpO2 99%   BMI 17.45 kg/m²     Intake/Output Summary (Last 24 hours) at 18 1129  Last data filed at 18 1000   Gross per 24 hour   Intake          1879.11 ml   Output             2050 ml   Net          -170.89 ml           Labs:   CBC:   Recent Labs      18   1244  18   0500   WBC  17.6*  15.8*   HGB  15.5  12.9*   HCT  47.5  39.7*   PLT  418*  320     BMP: Recent Labs      18   1244   NA  137   K  4.2   CO2  26   BUN  18   CREATININE  0.7   LABGLOM  >60   GLUCOSE  160*     BNP: No results for input(s): BNP in the last 72 hours. PT/INR:   Recent Labs      18   1244   PROTIME  12.6   INR  0.95     APTT:  Recent Labs      18   1244   APTT  29.4     CARDIAC ENZYMES:  Recent Labs      18   1825  18   2355  18   0500   TROPONINI  5.17*  7.28*  4.63*     FASTING LIPID PANEL:  Lab Results   Component Value Date    HDL 47 2018    LDLDIRECT 141 2015    LDLCALC 102 2018    TRIG 70 2018     LIVER PROFILE:  Recent Labs      18   1244   AST  30   ALT  21   LABALBU  4.4       NURSE:  KAYLENE Medina : 1950, Male, 69 y.o. History: This is a 61-year-old white male admitted with acute myocardial infarction    Nursing note and diagnostics above reviewed and evaluated    [Allergies/Contraindications:  Statins]     Todays status:  The patient describes no chest pain shortness of breath or symptomatic arrhythmia    Physical

## 2018-01-09 LAB
ANION GAP SERPL CALCULATED.3IONS-SCNC: 14 MMOL/L (ref 7–19)
BUN BLDV-MCNC: 11 MG/DL (ref 8–23)
CALCIUM SERPL-MCNC: 8.7 MG/DL (ref 8.8–10.2)
CHLORIDE BLD-SCNC: 101 MMOL/L (ref 98–111)
CO2: 29 MMOL/L (ref 22–29)
CREAT SERPL-MCNC: 0.6 MG/DL (ref 0.5–1.2)
EKG P AXIS: 66 DEGREES
EKG P-R INTERVAL: 178 MS
EKG Q-T INTERVAL: 458 MS
EKG QRS DURATION: 108 MS
EKG QTC CALCULATION (BAZETT): 470 MS
EKG T AXIS: -98 DEGREES
GFR NON-AFRICAN AMERICAN: >60
GLUCOSE BLD-MCNC: 72 MG/DL (ref 74–109)
HCT VFR BLD CALC: 42.1 % (ref 42–52)
HEMOGLOBIN: 13.9 G/DL (ref 14–18)
MCH RBC QN AUTO: 30 PG (ref 27–31)
MCHC RBC AUTO-ENTMCNC: 33 G/DL (ref 33–37)
MCV RBC AUTO: 90.9 FL (ref 80–94)
PDW BLD-RTO: 13.6 % (ref 11.5–14.5)
PLATELET # BLD: 337 K/UL (ref 130–400)
PMV BLD AUTO: 10.5 FL (ref 9.4–12.4)
POTASSIUM SERPL-SCNC: 4.1 MMOL/L (ref 3.5–5)
RBC # BLD: 4.63 M/UL (ref 4.7–6.1)
SODIUM BLD-SCNC: 144 MMOL/L (ref 136–145)
TROPONIN: 1.78 NG/ML (ref 0–0.03)
WBC # BLD: 13.2 K/UL (ref 4.8–10.8)

## 2018-01-09 PROCEDURE — 80048 BASIC METABOLIC PNL TOTAL CA: CPT

## 2018-01-09 PROCEDURE — 2700000000 HC OXYGEN THERAPY PER DAY

## 2018-01-09 PROCEDURE — 84484 ASSAY OF TROPONIN QUANT: CPT

## 2018-01-09 PROCEDURE — 99232 SBSQ HOSP IP/OBS MODERATE 35: CPT | Performed by: INTERNAL MEDICINE

## 2018-01-09 PROCEDURE — 6370000000 HC RX 637 (ALT 250 FOR IP): Performed by: INTERNAL MEDICINE

## 2018-01-09 PROCEDURE — 2580000003 HC RX 258: Performed by: INTERNAL MEDICINE

## 2018-01-09 PROCEDURE — 36415 COLL VENOUS BLD VENIPUNCTURE: CPT

## 2018-01-09 PROCEDURE — 85027 COMPLETE CBC AUTOMATED: CPT

## 2018-01-09 PROCEDURE — 2500000003 HC RX 250 WO HCPCS: Performed by: INTERNAL MEDICINE

## 2018-01-09 PROCEDURE — 2140000000 HC CCU INTERMEDIATE R&B

## 2018-01-09 PROCEDURE — 93005 ELECTROCARDIOGRAM TRACING: CPT

## 2018-01-09 RX ORDER — LABETALOL HYDROCHLORIDE 5 MG/ML
20 INJECTION, SOLUTION INTRAVENOUS EVERY 4 HOURS PRN
Status: DISCONTINUED | OUTPATIENT
Start: 2018-01-09 | End: 2018-01-10 | Stop reason: HOSPADM

## 2018-01-09 RX ORDER — CLOPIDOGREL BISULFATE 75 MG/1
75 TABLET ORAL DAILY
Status: DISCONTINUED | OUTPATIENT
Start: 2018-01-10 | End: 2018-01-10 | Stop reason: HOSPADM

## 2018-01-09 RX ORDER — LABETALOL HYDROCHLORIDE 5 MG/ML
10 INJECTION, SOLUTION INTRAVENOUS EVERY 4 HOURS PRN
Status: DISCONTINUED | OUTPATIENT
Start: 2018-01-09 | End: 2018-01-10 | Stop reason: HOSPADM

## 2018-01-09 RX ORDER — LISINOPRIL 20 MG/1
20 TABLET ORAL 2 TIMES DAILY
Status: DISCONTINUED | OUTPATIENT
Start: 2018-01-09 | End: 2018-01-10 | Stop reason: HOSPADM

## 2018-01-09 RX ORDER — LABETALOL HYDROCHLORIDE 5 MG/ML
INJECTION, SOLUTION INTRAVENOUS
Status: DISPENSED
Start: 2018-01-09 | End: 2018-01-10

## 2018-01-09 RX ADMIN — LABETALOL HYDROCHLORIDE 10 MG: 5 INJECTION INTRAVENOUS at 14:43

## 2018-01-09 RX ADMIN — Medication 81 MG: at 08:07

## 2018-01-09 RX ADMIN — SOTALOL HYDROCHLORIDE 160 MG: 80 TABLET ORAL at 08:07

## 2018-01-09 RX ADMIN — ALPRAZOLAM 0.5 MG: 0.5 TABLET ORAL at 14:16

## 2018-01-09 RX ADMIN — ALPRAZOLAM 0.5 MG: 0.5 TABLET ORAL at 20:06

## 2018-01-09 RX ADMIN — PRASUGREL HYDROCHLORIDE 10 MG: 10 TABLET, FILM COATED ORAL at 08:07

## 2018-01-09 RX ADMIN — LISINOPRIL 20 MG: 20 TABLET ORAL at 20:05

## 2018-01-09 RX ADMIN — ALPRAZOLAM 0.5 MG: 0.5 TABLET ORAL at 05:10

## 2018-01-09 RX ADMIN — Medication 10 ML: at 20:06

## 2018-01-09 RX ADMIN — SOTALOL HYDROCHLORIDE 160 MG: 80 TABLET ORAL at 20:06

## 2018-01-09 RX ADMIN — Medication 10 ML: at 08:10

## 2018-01-09 RX ADMIN — LISINOPRIL 2.5 MG: 2.5 TABLET ORAL at 08:07

## 2018-01-09 ASSESSMENT — PAIN SCALES - GENERAL
PAINLEVEL_OUTOF10: 0

## 2018-01-09 NOTE — PROGRESS NOTES
Nutrition Assessment    Type and Reason for Visit: Reassess    Nutrition Recommendations: continue current POC    Malnutrition Assessment:  · Malnutrition Status: Meets the criteria for moderate malnutrition  · Context: Social or environmental circumstances  · Findings of the 6 clinical characteristics of malnutrition (Minimum of 2 out of 6 clinical characteristics is required to make the diagnosis of moderate or severe Protein Calorie Malnutrition based on AND/ASPEN Guidelines):  1. Energy Intake-Less than or equal to 75%, greater than or equal to 1 month    2. Weight Loss-5% loss or greater, in 1 month  3. Fat Loss-Moderate subcutaneous fat loss, Orbital, Triceps  4. Muscle Loss-Moderate muscle mass loss, Clavicles (pectoralis and deltoids)  5. Fluid Accumulation- ,    6.  Strength-Not measured    Nutrition Diagnosis:   · Problem: Unintended weight loss  · Etiology: related to Acute injury/trauma     Signs and symptoms:  as evidenced by BMI    Nutrition Assessment:  · Subjective Assessment: New eight obtained--139.5#.  Discussed this with pt. Did not realize he was \"not eating enough. \"  Talked about restarting \"proteinpowder\". Suggest [employer] instead. Pt very particular about what he will change and won't  · Nutrition-Focused Physical Findings: thin appearing gentleman  · Wound Type: None  · Current Nutrition Therapies:  · Oral Diet Orders: Cardiac   · Oral Diet intake: 51-75%  · Oral Nutrition Supplement (ONS) Orders: None    · Anthropometric Measures:  · Ht: 6' 2\" (188 cm)   · Current Body Wt: 139 lb 5 oz (63.2 kg)  · Admission Body Wt: 150 lb (68 kg) (stated)  · Usual Body Wt: 150 lb (68 kg)  · deal Body Wt: 190 lb (86.2 kg)   · BMI Classification: BMI <18.5 Underweight    Estimated Intake vs Estimated Needs: Intake Improving    Nutrition Risk Level:  Moderate    Nutrition Interventions:   Continue current diet  Continued Inpatient Monitoring    Nutrition Evaluation: · Evaluation: Progressing toward goals   · Goals: po intake 75% or greater. Weight stable    · Monitoring: Meal Intake, Diet Tolerance, Skin Integrity, Weight, Pertinent Labs    See Adult Nutrition Doc Flowsheet for more detail.      Electronically signed by Justin Granado MS, RD, LD on 1/9/18 at 12:52 PM    Contact Number: [phone]

## 2018-01-10 VITALS
SYSTOLIC BLOOD PRESSURE: 122 MMHG | BODY MASS INDEX: 17.74 KG/M2 | HEIGHT: 74 IN | TEMPERATURE: 97.4 F | HEART RATE: 77 BPM | RESPIRATION RATE: 16 BRPM | OXYGEN SATURATION: 99 % | DIASTOLIC BLOOD PRESSURE: 88 MMHG | WEIGHT: 138.2 LBS

## 2018-01-10 LAB
EKG P AXIS: 48 DEGREES
EKG P-R INTERVAL: 184 MS
EKG Q-T INTERVAL: 398 MS
EKG QRS DURATION: 114 MS
EKG QTC CALCULATION (BAZETT): 424 MS
EKG T AXIS: 81 DEGREES

## 2018-01-10 PROCEDURE — 6370000000 HC RX 637 (ALT 250 FOR IP): Performed by: INTERNAL MEDICINE

## 2018-01-10 PROCEDURE — 99238 HOSP IP/OBS DSCHRG MGMT 30/<: CPT | Performed by: INTERNAL MEDICINE

## 2018-01-10 PROCEDURE — 2580000003 HC RX 258: Performed by: INTERNAL MEDICINE

## 2018-01-10 RX ORDER — NITROGLYCERIN 0.4 MG/1
0.4 TABLET SUBLINGUAL EVERY 5 MIN PRN
Qty: 25 TABLET | Refills: 3 | Status: SHIPPED | OUTPATIENT
Start: 2018-01-10 | End: 2021-06-08 | Stop reason: SDUPTHER

## 2018-01-10 RX ORDER — CLOPIDOGREL BISULFATE 75 MG/1
75 TABLET ORAL DAILY
Qty: 30 TABLET | Refills: 11 | Status: SHIPPED | OUTPATIENT
Start: 2018-01-11 | End: 2019-01-14

## 2018-01-10 RX ORDER — LISINOPRIL 40 MG/1
20 TABLET ORAL 2 TIMES DAILY
Qty: 180 TABLET | Refills: 3 | Status: SHIPPED | OUTPATIENT
Start: 2018-01-10 | End: 2019-01-14 | Stop reason: DRUGHIGH

## 2018-01-10 RX ADMIN — CLOPIDOGREL BISULFATE 75 MG: 75 TABLET ORAL at 09:28

## 2018-01-10 RX ADMIN — Medication 81 MG: at 09:28

## 2018-01-10 RX ADMIN — Medication 10 ML: at 09:31

## 2018-01-10 RX ADMIN — ALPRAZOLAM 0.5 MG: 0.5 TABLET ORAL at 09:30

## 2018-01-10 RX ADMIN — LISINOPRIL 20 MG: 20 TABLET ORAL at 09:28

## 2018-01-10 RX ADMIN — SOTALOL HYDROCHLORIDE 160 MG: 80 TABLET ORAL at 09:28

## 2018-01-10 ASSESSMENT — PAIN SCALES - GENERAL: PAINLEVEL_OUTOF10: 0

## 2018-01-10 NOTE — PLAN OF CARE
Problem: Falls - Risk of  Goal: Absence of falls  Outcome: Ongoing      Problem: Risk for Impaired Skin Integrity  Goal: Tissue integrity - skin and mucous membranes  Structural intactness and normal physiological function of skin and  mucous membranes. Outcome: Ongoing      Problem: Nutrition  Goal: Optimal nutrition therapy  Nutrition Problem: Unintended weight loss  Intervention: Food and/or Nutrient Delivery: Continue current diet  Nutritional Goals: po intake 75% or greater.   Weight stable     Outcome: Ongoing

## 2018-01-10 NOTE — DISCHARGE SUMMARY
artery.     6. Widely patent saphenous vein graft to the right coronary artery.     7. Successful percutaneous coronary intervention with balloon angioplasty and placement of a single drug-eluting stent to the distal vein graft to the LAD including the anastomotic site and portions of the LAD just beyond the insertion of the graft.     8. Successful percutaneous coronary intervention with direct infarct extraction atherectomy and stent placement to the vein graft to the 1st circumflex marginal branch using 3 bare-metal stents.     9. Intracoronary nitroprusside administration.     Electronically signed: CESAR Gold MD, 1/7/2018  [facility], [place]. The patient had a troponin high of 5.17.. He had no further chest discomfort and not complications from the radial site. He did have a period of hypertension however it has been normal since that time. He is now felt ready to be discharged. Consults:   IP CONSULT TO CARDIOLOGY        Disposition:  Home with adjustment of bp meds. He is to take his bp regularly and let me know if it states to elevate once he is back up and going. He will  Be seen in our office in 6 weeks. Discharge Instructions/Follow-up:  See separate instructions. Activity and Diet: as directed per discharge instructions. Labs:  For convenience and continuity at follow-up the following most recent labs are provided:  CBC:    Lab Results   Component Value Date    WBC 13.2 01/09/2018    HGB 13.9 01/09/2018    HCT 42.1 01/09/2018    HCT 39.4 04/25/2012     01/09/2018     04/25/2012       Renal:    Lab Results   Component Value Date     01/09/2018     04/25/2012    K 4.1 01/09/2018    K 4.0 04/25/2012     01/09/2018     04/25/2012    CO2 29 01/09/2018    BUN 11 01/09/2018    CREATININE 0.6 01/09/2018    CREATININE 0.9 04/25/2012    CALCIUM 8.7 01/09/2018       Discharge Medications:   Discharge Medication List as of 1/10/2018 10:57 AM           Details   clopidogrel (PLAVIX) 75 MG tablet Take 1 tablet by mouth daily, Disp-30 tablet, R-11Normal      nitroGLYCERIN (NITROSTAT) 0.4 MG SL tablet Place 1 tablet under the tongue every 5 minutes as needed for Chest pain up to max of 3 total doses. If no relief after 1 dose, call 911., Disp-25 tablet, R-3Normal              Details   lisinopril (PRINIVIL;ZESTRIL) 40 MG tablet Take 0.5 tablets by mouth 2 times daily, Disp-180 tablet, R-3Normal              Details   sotalol (BETAPACE) 160 MG tablet TAKE ONE TABLET BY MOUTH TWICE DAILY, Disp-60 tablet, R-5Normal      aspirin 81 MG EC tablet Take 81 mg by mouth daily. Isidra Sevilla.  Fe Martini MD

## 2018-01-11 ENCOUNTER — TELEPHONE (OUTPATIENT)
Dept: CARDIOLOGY | Age: 68
End: 2018-01-11

## 2018-01-11 LAB
LV EF: 50 %
LVEF MODALITY: NORMAL

## 2018-02-14 ENCOUNTER — OFFICE VISIT (OUTPATIENT)
Dept: CARDIOLOGY | Age: 68
End: 2018-02-14
Payer: MEDICARE

## 2018-02-14 VITALS
WEIGHT: 146.8 LBS | HEART RATE: 66 BPM | DIASTOLIC BLOOD PRESSURE: 80 MMHG | BODY MASS INDEX: 18.84 KG/M2 | SYSTOLIC BLOOD PRESSURE: 168 MMHG | HEIGHT: 74 IN

## 2018-02-14 DIAGNOSIS — Z78.9 STATIN INTOLERANCE: ICD-10-CM

## 2018-02-14 DIAGNOSIS — I25.10 CORONARY ARTERY DISEASE INVOLVING NATIVE CORONARY ARTERY OF NATIVE HEART WITHOUT ANGINA PECTORIS: Primary | ICD-10-CM

## 2018-02-14 DIAGNOSIS — I10 ESSENTIAL HYPERTENSION: ICD-10-CM

## 2018-02-14 DIAGNOSIS — I21.02 ST ELEVATION MYOCARDIAL INFARCTION INVOLVING LEFT ANTERIOR DESCENDING (LAD) CORONARY ARTERY (HCC): ICD-10-CM

## 2018-02-14 DIAGNOSIS — E78.2 MIXED HYPERLIPIDEMIA: ICD-10-CM

## 2018-02-14 PROCEDURE — 4040F PNEUMOC VAC/ADMIN/RCVD: CPT | Performed by: CLINICAL NURSE SPECIALIST

## 2018-02-14 PROCEDURE — 99214 OFFICE O/P EST MOD 30 MIN: CPT | Performed by: CLINICAL NURSE SPECIALIST

## 2018-02-14 PROCEDURE — 3017F COLORECTAL CA SCREEN DOC REV: CPT | Performed by: CLINICAL NURSE SPECIALIST

## 2018-02-14 PROCEDURE — 1036F TOBACCO NON-USER: CPT | Performed by: CLINICAL NURSE SPECIALIST

## 2018-02-14 PROCEDURE — G8484 FLU IMMUNIZE NO ADMIN: HCPCS | Performed by: CLINICAL NURSE SPECIALIST

## 2018-02-14 PROCEDURE — G8427 DOCREV CUR MEDS BY ELIG CLIN: HCPCS | Performed by: CLINICAL NURSE SPECIALIST

## 2018-02-14 PROCEDURE — G8598 ASA/ANTIPLAT THER USED: HCPCS | Performed by: CLINICAL NURSE SPECIALIST

## 2018-02-14 PROCEDURE — 1123F ACP DISCUSS/DSCN MKR DOCD: CPT | Performed by: CLINICAL NURSE SPECIALIST

## 2018-02-14 PROCEDURE — G8420 CALC BMI NORM PARAMETERS: HCPCS | Performed by: CLINICAL NURSE SPECIALIST

## 2018-02-14 RX ORDER — EZETIMIBE 10 MG/1
10 TABLET ORAL DAILY
Qty: 30 TABLET | Refills: 5 | Status: SHIPPED | OUTPATIENT
Start: 2018-02-14 | End: 2018-03-07 | Stop reason: ALTCHOICE

## 2018-02-14 RX ORDER — SOTALOL HYDROCHLORIDE 160 MG/1
TABLET ORAL
Qty: 60 TABLET | Refills: 5 | Status: SHIPPED | OUTPATIENT
Start: 2018-02-14 | End: 2019-01-14 | Stop reason: SDUPTHER

## 2018-02-14 ASSESSMENT — ENCOUNTER SYMPTOMS
ORTHOPNEA: 0
HEARTBURN: 0
NAUSEA: 0
COUGH: 0
VOMITING: 0
BLURRED VISION: 0
SHORTNESS OF BREATH: 0

## 2018-02-14 NOTE — PROGRESS NOTES
[facility], [address] [place] [address], [address] [id number]  Phone: [phone]  Fax: [phone]    OFFICE VISIT:  2018    Jim Nesbitt - : 1950    Reason For Visit:  Rosemary Barragan is a 69 y.o. male who is here for follow-up status post STEMI and heart cath    HPI   Patient is here for follow-up after admission for an acute STEMI on 18. Patient had a heart catheterization and received several stents. Patient denies any chest pain, unusual dyspnea, orthopnea, PND, edema, or palpitations. He is slowly trying to resume his normal activity. He does not want to go to cardiac rehab. He is very argumentative about his blood pressure and states \"no one can never get it under control. \" He is presently not on a statin. His allergy list shows myalgias with statins     Jason Nichols MD is PCP.   Jim Nesbitt has the following history as recorded in [facility]:    Patient Active Problem List    Diagnosis Date Noted    Statin intolerance 2018    Inferior MI ([address] 2018    Acute ST elevation myocardial infarction (STEMI) of inferior wall (HCC)     ST elevation myocardial infarction (STEMI) ([address]     Atherosclerosis of native artery of extremity with intermittent claudication (HCC) 04/15/2015    Paroxysmal a-fib (HCC)     Tobacco abuse     Chest pain     Hyperlipidemia     Palpitations     HTN (hypertension)     Coronary artery disease involving native coronary artery of native heart without angina pectoris     A-fib [facility])      Past Medical History:   Diagnosis Date    Atrial fibrillation ([address]     CAD (coronary artery disease), native coronary artery     acute inferior MI on 5/15/09, s/p emeergent CABG x4 5/15/09    Chest pain     HTN (hypertension)     Hyperlipidemia     Dr. Florence Arenas manages    Palpitations     Statin intolerance 2018    Tobacco abuse     Transient atrial fibrillation or flutter     post op     Past Surgical History:   Procedure by mouth daily 30 tablet 5    lisinopril (PRINIVIL;ZESTRIL) 40 MG tablet Take 0.5 tablets by mouth 2 times daily 180 tablet 3    clopidogrel (PLAVIX) 75 MG tablet Take 1 tablet by mouth daily 30 tablet 11    nitroGLYCERIN (NITROSTAT) 0.4 MG SL tablet Place 1 tablet under the tongue every 5 minutes as needed for Chest pain up to max of 3 total doses. If no relief after 1 dose, call 911. 25 tablet 3    aspirin 81 MG EC tablet Take 81 mg by mouth daily. No current facility-administered medications for this visit. Allergies: Statins    Review of Systems  Review of Systems   Constitutional: Positive for malaise/fatigue. Negative for chills and fever. HENT: Negative for nosebleeds. Eyes: Negative for blurred vision. Respiratory: Negative for cough and shortness of breath. Cardiovascular: Negative for chest pain, palpitations, orthopnea, leg swelling and PND. Gastrointestinal: Negative for heartburn, nausea and vomiting. Musculoskeletal: Negative for falls and myalgias. Neurological: Negative for dizziness, sensory change, speech change and focal weakness. Endo/Heme/Allergies: Does not bruise/bleed easily. Psychiatric/Behavioral: Negative for depression. The patient is not nervous/anxious. Objective  Vital Signs - BP (!) 168/80   Pulse 66   Ht 6' 2\" (1.88 m)   Wt 146 lb 12.8 oz (66.6 kg)   BMI 18.85 kg/m²   Physical Exam   Constitutional: He is oriented to person, place, and time. He appears well-developed and well-nourished. HENT:   Head: Normocephalic and atraumatic. Eyes: Pupils are equal, round, and reactive to light. Right eye exhibits no discharge. Left eye exhibits no discharge. Neck: No JVD present. No tracheal deviation present. Cardiovascular: Normal rate, regular rhythm, normal heart sounds and intact distal pulses. Exam reveals no gallop and no friction rub. No murmur heard.   No carotid bruit   Pulmonary/Chest: Effort normal and breath sounds normal. cath and also records. Patient is very defensive about his blood pressure stating no one can help him get it under control. He is upset because several of his blood pressure medicines were discontinued during his hospital stay. I explained to him this is likely due to low blood pressure following his MI. I will restart amlodipine 5 mg daily  which she has at home. I have encouraged him to monitor his blood pressure daily at home and bring to next appointment    We had a long discussion about statins. Pravachol has caused myalgias in the past. His LDL cholesterol is checked in the hospital was 102. We discussed the goal with CAD would be less than 70. He would consider trying Zetia 10mg daily if it is not too expensive. We will run this under his insurance and check costs    He continues to \"vape. \"  I have encouraged him to stop smoking completely    He is refusing cardiac rehab    Stable cardiovascular status. No evidence of overt heart failure, angina or dysrhythmia. Plan    Followup With APRN 2-3 weeks  Restart Amlodipine 5mg daily  Continue BP at home and bring readings to next visit  Consider Cardiac Rehab  Zetia 10mg daily for cholesterol    Call with any questions or concerns  Follow up with Yusuf Michel MD for non cardiac problems  Report any new problems  Cardiovascular Fitness-Exercise as tolerated. Strive for 15 minutes of exercise most days of the week. Cardiac / Healthy Diet  Continue current medications as directed  Continue plan of treatment  It is always recommended that you bring your medications bottles with you to each visit - this is for your safety!        LUIS Quiñonez

## 2018-03-07 ENCOUNTER — OFFICE VISIT (OUTPATIENT)
Dept: CARDIOLOGY | Age: 68
End: 2018-03-07
Payer: MEDICARE

## 2018-03-07 VITALS
HEART RATE: 60 BPM | BODY MASS INDEX: 20.01 KG/M2 | SYSTOLIC BLOOD PRESSURE: 140 MMHG | WEIGHT: 151 LBS | HEIGHT: 73 IN | DIASTOLIC BLOOD PRESSURE: 80 MMHG

## 2018-03-07 DIAGNOSIS — Z78.9 STATIN INTOLERANCE: ICD-10-CM

## 2018-03-07 DIAGNOSIS — I10 ESSENTIAL HYPERTENSION: Primary | ICD-10-CM

## 2018-03-07 DIAGNOSIS — E78.2 MIXED HYPERLIPIDEMIA: ICD-10-CM

## 2018-03-07 DIAGNOSIS — I48.0 PAROXYSMAL A-FIB (HCC): ICD-10-CM

## 2018-03-07 DIAGNOSIS — I25.10 CORONARY ARTERY DISEASE INVOLVING NATIVE CORONARY ARTERY OF NATIVE HEART WITHOUT ANGINA PECTORIS: ICD-10-CM

## 2018-03-07 PROCEDURE — 3017F COLORECTAL CA SCREEN DOC REV: CPT | Performed by: CLINICAL NURSE SPECIALIST

## 2018-03-07 PROCEDURE — 1123F ACP DISCUSS/DSCN MKR DOCD: CPT | Performed by: CLINICAL NURSE SPECIALIST

## 2018-03-07 PROCEDURE — G8427 DOCREV CUR MEDS BY ELIG CLIN: HCPCS | Performed by: CLINICAL NURSE SPECIALIST

## 2018-03-07 PROCEDURE — G8420 CALC BMI NORM PARAMETERS: HCPCS | Performed by: CLINICAL NURSE SPECIALIST

## 2018-03-07 PROCEDURE — G8484 FLU IMMUNIZE NO ADMIN: HCPCS | Performed by: CLINICAL NURSE SPECIALIST

## 2018-03-07 PROCEDURE — G8598 ASA/ANTIPLAT THER USED: HCPCS | Performed by: CLINICAL NURSE SPECIALIST

## 2018-03-07 PROCEDURE — 4040F PNEUMOC VAC/ADMIN/RCVD: CPT | Performed by: CLINICAL NURSE SPECIALIST

## 2018-03-07 PROCEDURE — 1036F TOBACCO NON-USER: CPT | Performed by: CLINICAL NURSE SPECIALIST

## 2018-03-07 PROCEDURE — 99213 OFFICE O/P EST LOW 20 MIN: CPT | Performed by: CLINICAL NURSE SPECIALIST

## 2018-03-07 RX ORDER — EZETIMIBE 10 MG/1
10 TABLET ORAL DAILY
Qty: 30 TABLET | Refills: 5 | Status: SHIPPED | OUTPATIENT
Start: 2018-03-07 | End: 2019-01-14

## 2018-03-07 RX ORDER — AMLODIPINE BESYLATE 5 MG/1
5 TABLET ORAL DAILY
COMMUNITY
End: 2019-01-14 | Stop reason: SDUPTHER

## 2018-03-07 ASSESSMENT — ENCOUNTER SYMPTOMS
SHORTNESS OF BREATH: 0
HEARTBURN: 0
NAUSEA: 0
ORTHOPNEA: 0
BLURRED VISION: 0
VOMITING: 0
COUGH: 0

## 2018-03-07 NOTE — PROGRESS NOTES
160 MG tablet TAKE ONE TABLET BY MOUTH TWICE DAILY 60 tablet 5    lisinopril (PRINIVIL;ZESTRIL) 40 MG tablet Take 0.5 tablets by mouth 2 times daily (Patient taking differently: Take 40 mg by mouth 2 times daily ) 180 tablet 3    clopidogrel (PLAVIX) 75 MG tablet Take 1 tablet by mouth daily 30 tablet 11    nitroGLYCERIN (NITROSTAT) 0.4 MG SL tablet Place 1 tablet under the tongue every 5 minutes as needed for Chest pain up to max of 3 total doses. If no relief after 1 dose, call 911. 25 tablet 3    aspirin 81 MG EC tablet Take 81 mg by mouth daily. No current facility-administered medications for this visit. Allergies: Statins    Review of Systems  Review of Systems   Constitutional: Positive for malaise/fatigue. Negative for chills and fever. HENT: Negative for nosebleeds. Eyes: Negative for blurred vision. Respiratory: Negative for cough and shortness of breath. Cardiovascular: Negative for chest pain, palpitations, orthopnea, leg swelling and PND. Gastrointestinal: Negative for heartburn, nausea and vomiting. Musculoskeletal: Negative for falls and myalgias. Neurological: Negative for dizziness, sensory change, speech change and focal weakness. Endo/Heme/Allergies: Does not bruise/bleed easily. Psychiatric/Behavioral: Negative for depression. The patient is not nervous/anxious. Objective  Vital Signs - BP (!) 140/80   Pulse 60   Ht 6' 1\" (1.854 m)   Wt 151 lb (68.5 kg)   BMI 19.92 kg/m²   Physical Exam   Constitutional: He is oriented to person, place, and time. He appears well-developed and well-nourished. HENT:   Head: Normocephalic and atraumatic. Eyes: Pupils are equal, round, and reactive to light. Right eye exhibits no discharge. Left eye exhibits no discharge. Neck: No JVD present. No tracheal deviation present. Cardiovascular: Normal rate, regular rhythm, normal heart sounds and intact distal pulses. Exam reveals no gallop and no friction rub.

## 2018-03-07 NOTE — PATIENT INSTRUCTIONS
heart-healthy foods. Eat more fruits and vegetables and less foods that contain saturated and trans fats. Limit alcohol, sodium, and sweets. · Stay at a healthy weight. Lose weight if you need to. · Manage other health problems such as diabetes, high blood pressure, and high cholesterol. · Manage stress. Stress can hurt your heart. To keep stress low, talk about your problems and feelings. Don't keep your feelings hidden. · If you have talked about it with your doctor, take a low-dose aspirin every day. Aspirin can help certain people lower their risk of a heart attack or stroke. But taking aspirin isn't right for everyone, because it can cause serious bleeding. Do not start taking daily aspirin unless your doctor knows about it. How is coronary artery disease treated? · Your doctor will suggest that you make lifestyle changes. For example, your doctor may ask you to eat healthy foods, quit smoking, lose extra weight, and be more active. · You will have to take medicines. · Your doctor may suggest a procedure to open narrowed or blocked arteries. This is called angioplasty. Or your doctor may suggest using healthy blood vessels to create detours around narrowed or blocked arteries. This is called bypass surgery. Follow-up care is a key part of your treatment and safety. Be sure to make and go to all appointments, and call your doctor if you are having problems. It's also a good idea to know your test results and keep a list of the medicines you take. Where can you learn more? Go to https://[employer]. org and sign in to your [employer] account. Enter [phone] in the [facility] box to learn more about \"Learning About Coronary Artery Disease (CAD). \"     If you do not have an account, please click on the \"Sign Up Now\" link. Current as of: September 21, 2016  Content Version: 11.5  © 9576-1590 Healthwise, Incorporated. Care instructions adapted under license by [facility] ([facility]).  If you have questions about a medical condition or this instruction, always ask your healthcare professional. [place] [address] any warranty or liability for your use of this information.

## 2018-07-20 ENCOUNTER — TELEPHONE (OUTPATIENT)
Dept: CARDIOLOGY | Age: 68
End: 2018-07-20

## 2018-07-20 NOTE — TELEPHONE ENCOUNTER
Called pt about rescheduling appointment on 12/27/18 with MDL due to him retiring.  Appointment was cancelled due to pt already having an appointment with Farooq Late in Oct.

## 2019-01-14 ENCOUNTER — OFFICE VISIT (OUTPATIENT)
Dept: CARDIOLOGY | Age: 69
End: 2019-01-14
Payer: MEDICARE

## 2019-01-14 VITALS
HEART RATE: 72 BPM | DIASTOLIC BLOOD PRESSURE: 74 MMHG | SYSTOLIC BLOOD PRESSURE: 136 MMHG | BODY MASS INDEX: 19.61 KG/M2 | HEIGHT: 73 IN | WEIGHT: 148 LBS

## 2019-01-14 DIAGNOSIS — E78.2 MIXED HYPERLIPIDEMIA: ICD-10-CM

## 2019-01-14 DIAGNOSIS — I48.0 PAROXYSMAL A-FIB (HCC): ICD-10-CM

## 2019-01-14 DIAGNOSIS — I25.10 CORONARY ARTERY DISEASE INVOLVING NATIVE CORONARY ARTERY OF NATIVE HEART WITHOUT ANGINA PECTORIS: Primary | ICD-10-CM

## 2019-01-14 DIAGNOSIS — Z78.9 STATIN INTOLERANCE: ICD-10-CM

## 2019-01-14 DIAGNOSIS — I10 ESSENTIAL HYPERTENSION: ICD-10-CM

## 2019-01-14 PROCEDURE — 1101F PT FALLS ASSESS-DOCD LE1/YR: CPT | Performed by: CLINICAL NURSE SPECIALIST

## 2019-01-14 PROCEDURE — G8427 DOCREV CUR MEDS BY ELIG CLIN: HCPCS | Performed by: CLINICAL NURSE SPECIALIST

## 2019-01-14 PROCEDURE — G8598 ASA/ANTIPLAT THER USED: HCPCS | Performed by: CLINICAL NURSE SPECIALIST

## 2019-01-14 PROCEDURE — G8420 CALC BMI NORM PARAMETERS: HCPCS | Performed by: CLINICAL NURSE SPECIALIST

## 2019-01-14 PROCEDURE — 99213 OFFICE O/P EST LOW 20 MIN: CPT | Performed by: CLINICAL NURSE SPECIALIST

## 2019-01-14 PROCEDURE — 1123F ACP DISCUSS/DSCN MKR DOCD: CPT | Performed by: CLINICAL NURSE SPECIALIST

## 2019-01-14 PROCEDURE — 4040F PNEUMOC VAC/ADMIN/RCVD: CPT | Performed by: CLINICAL NURSE SPECIALIST

## 2019-01-14 PROCEDURE — 3017F COLORECTAL CA SCREEN DOC REV: CPT | Performed by: CLINICAL NURSE SPECIALIST

## 2019-01-14 PROCEDURE — 1036F TOBACCO NON-USER: CPT | Performed by: CLINICAL NURSE SPECIALIST

## 2019-01-14 PROCEDURE — G8484 FLU IMMUNIZE NO ADMIN: HCPCS | Performed by: CLINICAL NURSE SPECIALIST

## 2019-01-14 RX ORDER — CLONIDINE HYDROCHLORIDE 0.1 MG/1
0.1 TABLET ORAL DAILY
COMMUNITY
End: 2019-01-14 | Stop reason: SDUPTHER

## 2019-01-14 RX ORDER — AMLODIPINE BESYLATE 5 MG/1
5 TABLET ORAL DAILY
Qty: 90 TABLET | Refills: 3 | Status: SHIPPED | OUTPATIENT
Start: 2019-01-14 | End: 2020-03-09

## 2019-01-14 RX ORDER — LISINOPRIL 20 MG/1
20 TABLET ORAL 2 TIMES DAILY
COMMUNITY
End: 2019-01-14 | Stop reason: SDUPTHER

## 2019-01-14 RX ORDER — LISINOPRIL 20 MG/1
20 TABLET ORAL 2 TIMES DAILY
Qty: 180 TABLET | Refills: 3 | Status: SHIPPED | OUTPATIENT
Start: 2019-01-14 | End: 2020-03-09

## 2019-01-14 RX ORDER — EZETIMIBE 10 MG/1
10 TABLET ORAL DAILY
Qty: 90 TABLET | Refills: 3 | Status: SHIPPED | OUTPATIENT
Start: 2019-01-14 | End: 2020-01-06

## 2019-01-14 RX ORDER — CLONIDINE HYDROCHLORIDE 0.1 MG/1
0.1 TABLET ORAL DAILY
Qty: 90 TABLET | Refills: 3 | Status: SHIPPED
Start: 2019-01-14 | End: 2020-03-04 | Stop reason: ALTCHOICE

## 2019-01-14 RX ORDER — SOTALOL HYDROCHLORIDE 160 MG/1
TABLET ORAL
Qty: 180 TABLET | Refills: 3 | Status: SHIPPED | OUTPATIENT
Start: 2019-01-14 | End: 2020-05-06

## 2019-01-14 ASSESSMENT — ENCOUNTER SYMPTOMS
WHEEZING: 0
EYE REDNESS: 0
ABDOMINAL PAIN: 0
SHORTNESS OF BREATH: 0
NAUSEA: 0
COUGH: 0
VOMITING: 0
FACIAL SWELLING: 0
CHEST TIGHTNESS: 0

## 2019-10-01 ENCOUNTER — TELEPHONE (OUTPATIENT)
Dept: CARDIOLOGY | Age: 69
End: 2019-10-01

## 2019-12-30 ENCOUNTER — TELEPHONE (OUTPATIENT)
Dept: CARDIOLOGY | Age: 69
End: 2019-12-30

## 2020-01-06 ENCOUNTER — OFFICE VISIT (OUTPATIENT)
Dept: CARDIOLOGY | Age: 70
End: 2020-01-06
Payer: MEDICARE

## 2020-01-06 VITALS
HEART RATE: 63 BPM | DIASTOLIC BLOOD PRESSURE: 62 MMHG | SYSTOLIC BLOOD PRESSURE: 130 MMHG | WEIGHT: 158 LBS | BODY MASS INDEX: 20.94 KG/M2 | HEIGHT: 73 IN

## 2020-01-06 DIAGNOSIS — E78.2 MIXED HYPERLIPIDEMIA: ICD-10-CM

## 2020-01-06 PROBLEM — I65.23 BILATERAL CAROTID ARTERY STENOSIS: Status: ACTIVE | Noted: 2020-01-06

## 2020-01-06 LAB
ALT SERPL-CCNC: 24 U/L (ref 5–41)
AST SERPL-CCNC: 24 U/L (ref 5–40)
CHOLESTEROL, TOTAL: 158 MG/DL (ref 160–199)
HDLC SERPL-MCNC: 51 MG/DL (ref 55–121)
LDL CHOLESTEROL CALCULATED: 92 MG/DL
TRIGL SERPL-MCNC: 77 MG/DL (ref 0–149)

## 2020-01-06 PROCEDURE — 99214 OFFICE O/P EST MOD 30 MIN: CPT | Performed by: CLINICAL NURSE SPECIALIST

## 2020-01-06 PROCEDURE — G8428 CUR MEDS NOT DOCUMENT: HCPCS | Performed by: CLINICAL NURSE SPECIALIST

## 2020-01-06 PROCEDURE — G8484 FLU IMMUNIZE NO ADMIN: HCPCS | Performed by: CLINICAL NURSE SPECIALIST

## 2020-01-06 PROCEDURE — 93000 ELECTROCARDIOGRAM COMPLETE: CPT | Performed by: CLINICAL NURSE SPECIALIST

## 2020-01-06 PROCEDURE — 1036F TOBACCO NON-USER: CPT | Performed by: CLINICAL NURSE SPECIALIST

## 2020-01-06 PROCEDURE — 3017F COLORECTAL CA SCREEN DOC REV: CPT | Performed by: CLINICAL NURSE SPECIALIST

## 2020-01-06 PROCEDURE — 4040F PNEUMOC VAC/ADMIN/RCVD: CPT | Performed by: CLINICAL NURSE SPECIALIST

## 2020-01-06 PROCEDURE — G8420 CALC BMI NORM PARAMETERS: HCPCS | Performed by: CLINICAL NURSE SPECIALIST

## 2020-01-06 PROCEDURE — 1123F ACP DISCUSS/DSCN MKR DOCD: CPT | Performed by: CLINICAL NURSE SPECIALIST

## 2020-01-06 ASSESSMENT — ENCOUNTER SYMPTOMS
FACIAL SWELLING: 0
EYE REDNESS: 0
WHEEZING: 0
VOMITING: 0
CHEST TIGHTNESS: 0
NAUSEA: 0
SHORTNESS OF BREATH: 0
ABDOMINAL PAIN: 0
COUGH: 0

## 2020-01-06 NOTE — PROGRESS NOTES
 Statin intolerance 2/14/2018    Tobacco abuse     Transient atrial fibrillation or flutter     post op     Past Surgical History:   Procedure Laterality Date    CARDIAC CATHETERIZATION  4/23/12    with stent to left circ, and LAD    CARDIAC CATHETERIZATION  01/07/2018    PTCA/BJ to VG to LAD;  atherectomy and 3 BMS to VG to 1st Circ    CORONARY ARTERY BYPASS GRAFT  05/15/09    x4    DIAGNOSTIC CARDIAC CATH LAB PROCEDURE  05/15/09    left heart cath, left ventr, selective coronary arteriography     FRACTURE SURGERY Left     VASCULAR SURGERY  4/21/2015 [facility]    Percutaneous cannulation right common femoral artery with 5-Marshallese and later 6-Marshallese pinnacle destination sheath. Suprarenal abdominal aortogram with bilateral iliofemoral arteriograms. Bilateral lower extremity arteriograms. Left popliteal/tibioperoneal trunk artery balloon angioplasty with 4 mm x 15 mm cutting balloon.  VASCULAR SURGERY  4/21/2015 [facility] cont    Arteriograms after balloon angioplasty. Balloon angioplasty left popliteal artery/tibioperoneal trunk with 5 mm x 40 mm russell balloon. Arteriograms after balloon angioplasty. Left superficial femoral artery balloon angioplasty with 7 mm x 100 mm  balloon. Left superficial femoral artery stent placement idev supera 6.5 x 100 mm self-expanding nitinol stent. Completion arteriograms left lower e    VASCULAR SURGERY  4/21/2015 [facility] cont    extremity. Mynx closure right common femoral artery puncture site.      Family History   Problem Relation Age of Onset    Coronary Art Dis Father     High Blood Pressure Father     Coronary Art Dis Brother     High Blood Pressure Mother     Cancer Mother     High Blood Pressure Sister      Social History     Tobacco Use    Smoking status: Former Smoker     Packs/day: 0.00     Types: Cigarettes    Smokeless tobacco: Never Used   Substance Use Topics    Alcohol use: No      Current Outpatient Medications   Medication Sig Dispense Refill    amLODIPine (NORVASC) 5 MG tablet Take 1 tablet by mouth daily 90 tablet 3    lisinopril (PRINIVIL;ZESTRIL) 20 MG tablet Take 1 tablet by mouth 2 times daily 180 tablet 3    cloNIDine (CATAPRES) 0.1 MG tablet Take 1 tablet by mouth daily 90 tablet 3    sotalol (BETAPACE) 160 MG tablet TAKE ONE TABLET BY MOUTH TWICE DAILY 180 tablet 3    nitroGLYCERIN (NITROSTAT) 0.4 MG SL tablet Place 1 tablet under the tongue every 5 minutes as needed for Chest pain up to max of 3 total doses. If no relief after 1 dose, call 911. 25 tablet 3    aspirin 81 MG EC tablet Take 81 mg by mouth daily. No current facility-administered medications for this visit. Allergies: Statins    Review of Systems  Review of Systems   Constitutional: Negative for activity change, diaphoresis, fatigue, fever and unexpected weight change. HENT: Negative for facial swelling and nosebleeds. Eyes: Negative for redness and visual disturbance. Respiratory: Negative for cough, chest tightness, shortness of breath and wheezing. Cardiovascular: Positive for leg swelling (chronic). Negative for chest pain and palpitations. Gastrointestinal: Negative for abdominal pain, nausea and vomiting. Endocrine: Negative for cold intolerance and heat intolerance. Genitourinary: Negative for dysuria and hematuria. Musculoskeletal: Negative for arthralgias and myalgias. Skin: Negative for pallor and rash. Neurological: Negative for dizziness, seizures, syncope, weakness and light-headedness. Hematological: Does not bruise/bleed easily. Psychiatric/Behavioral: Negative for agitation. The patient is not nervous/anxious. Objective  Vital Signs - /62   Pulse 63   Ht 6' 1\" (1.854 m)   Wt 158 lb (71.7 kg)   BMI 20.85 kg/m²   Physical Exam  Vitals signs and nursing note reviewed. Constitutional:       General: He is not in acute distress. Appearance: He is well-developed. He is not diaphoretic.    HENT:      Head: Normocephalic and atraumatic. Right Ear: Hearing and external ear normal.      Left Ear: Hearing and external ear normal.      Nose: Nose normal.   Eyes:      General:         Right eye: No discharge. Left eye: No discharge. Pupils: Pupils are equal, round, and reactive to light. Neck:      Musculoskeletal: Neck supple. No muscular tenderness. Thyroid: No thyromegaly. Vascular: Carotid bruit (right) present. No JVD. Trachea: No tracheal deviation. Cardiovascular:      Rate and Rhythm: Normal rate and regular rhythm. Heart sounds: Normal heart sounds. No murmur. No friction rub. No gallop. Comments: No carotid bruit  Pulmonary:      Effort: Pulmonary effort is normal. No respiratory distress. Breath sounds: Normal breath sounds. No wheezing or rales. Abdominal:      Palpations: Abdomen is soft. Tenderness: There is no tenderness. Musculoskeletal:         General: Swelling and deformity present. Right lower leg: Edema (1+) present. Left lower leg: Edema (1+) present. Comments: Normal gait and station   Skin:     General: Skin is warm and dry. Findings: No rash. Neurological:      General: No focal deficit present. Mental Status: He is alert and oriented to person, place, and time. Cranial Nerves: No cranial nerve deficit. Psychiatric:         Mood and Affect: Mood normal.         Behavior: Behavior normal.         Judgment: Judgment normal.         Data:  Heart Cath 1/11/18    1.  Left ventricular dysfunction with mild left ventricular   enlargement and relatively mild anterolateral and inferior   hypokinesis with a reduced ejection fraction of 50% or greater. 2.  Extremely severe diffuse native triple-vessel coronary artery   disease as described above    3.  Nonfunctioning left internal mammary graft to the diagonal   branch of the LAD with this branch having been stented   previously.     4.  Acutely occluded saphenous

## 2020-01-06 NOTE — PATIENT INSTRUCTIONS
Return in about 6 months (around 7/6/2020) for APRN. Quit smoking. Call the [address] [place] [address] [phone]  Check Carotid studies- refer back to Vascular surgeon  Work on approval for [address]    Call with any questionsor concerns  Follow up with Ambrose Vu MD for non cardiac problems  Report any new problems  Cardiovascular Fitness-Exercise as tolerated. Strive for 15 minutes of exercise most days of the week. Cardiac / HealthyDiet  Continue current medications as directed  Continue plan of treatment  It is always recommended that you bring your medicationsbottles with you to each visit - this is for your safety!

## 2020-02-03 ENCOUNTER — OFFICE VISIT (OUTPATIENT)
Dept: VASCULAR SURGERY | Age: 70
End: 2020-02-03
Payer: MEDICARE

## 2020-02-03 VITALS — SYSTOLIC BLOOD PRESSURE: 113 MMHG | HEART RATE: 62 BPM | OXYGEN SATURATION: 98 % | DIASTOLIC BLOOD PRESSURE: 66 MMHG

## 2020-02-03 PROCEDURE — 4040F PNEUMOC VAC/ADMIN/RCVD: CPT | Performed by: NURSE PRACTITIONER

## 2020-02-03 PROCEDURE — G8484 FLU IMMUNIZE NO ADMIN: HCPCS | Performed by: NURSE PRACTITIONER

## 2020-02-03 PROCEDURE — G8420 CALC BMI NORM PARAMETERS: HCPCS | Performed by: NURSE PRACTITIONER

## 2020-02-03 PROCEDURE — G8427 DOCREV CUR MEDS BY ELIG CLIN: HCPCS | Performed by: NURSE PRACTITIONER

## 2020-02-03 PROCEDURE — 3017F COLORECTAL CA SCREEN DOC REV: CPT | Performed by: NURSE PRACTITIONER

## 2020-02-03 PROCEDURE — 1036F TOBACCO NON-USER: CPT | Performed by: NURSE PRACTITIONER

## 2020-02-03 PROCEDURE — 1123F ACP DISCUSS/DSCN MKR DOCD: CPT | Performed by: NURSE PRACTITIONER

## 2020-02-03 PROCEDURE — 99203 OFFICE O/P NEW LOW 30 MIN: CPT | Performed by: NURSE PRACTITIONER

## 2020-02-03 NOTE — PROGRESS NOTES
Patient Care Team:  Ron Lim MD as PCP - General (Pediatrics)  Ana Maria Padron MD (Cardiology)  Nba Benton DO as Consulting Physician (Vascular Surgery)      He who has a history of peripheral vascular disease. Risk factors for atherosclerosis hyperlipidemia, coronary artery disease, hypertension and peripheral occlusive disease. Current treatment includes ASA EC daily. He has had previous angioplasty or stent placement. Patient has not had new wounds. Recently, patient reports claudication at a distance of  100 yards. He reports that right leg is more signifcant than the left . He reports claudication is worsened and is mostly in the form of generalized weakness starting in the thighs. He has a short recovery time. This is reproducible in nature. He reports ischemic rest pain 0 times per night. He reports walking with cart does not help. He presents for follow up of carotid artery stenosis. He has a known history of carotid artery stenosis for 1 - 5 years. His current treatment includes ASA EC daily. He denies a history of CVA. He reports no TIA's, episodes of lateralizing weakness and episodes of amaurosis fugax. Old records have been obtained, reviewed, and summarized.     Soledad Boyer is a 68 y.o. male with the following history reviewed and recorded in [facility]:  Patient Active Problem List    Diagnosis Date Noted    Bilateral carotid artery stenosis 01/06/2020    Statin intolerance 02/14/2018    Inferior [facility] 01/07/2018    ST elevation myocardial infarction (STEMI) ([facility]     Atherosclerosis of native artery of extremity with intermittent claudication (HCC) 04/15/2015    Paroxysmal a-fib (HCC)      post op      Tobacco abuse     Chest pain     Hyperlipidemia     Palpitations     HTN (hypertension)     Coronary artery disease involving native coronary artery of native heart without angina pectoris      Current Outpatient Medications   Medication Sig Dispense Refill    No difficulty urinating, dysuria, frequency, or urgency. No flank pain or hematuria. Musculoskeletal - no back pain, gait disturbance, or myalgia. Skin - no color change, rash, pallor, or new wound. Neurologic - no dizziness, facial asymmetry, or light headedness. No seizures. No speech difficulty or lateralizing weakness. Hematologic - no easy bruising or excessive bleeding. Psychiatric - no severe anxiety or nervousness. No confusion. All other review of systems are negative. Physical Exam    /66 (Site: Right Upper Arm, Position: Sitting, Cuff Size: Large Adult)   Pulse 62   SpO2 98%     Constitutional - well developed, well nourished. No diaphoresis or acute distress. HENT - head normocephalic. Right external ear canal appears normal.  Left external ear canal appears normal.  Septum appears midline. Eyes - conjunctiva normal.  EOMS normal.  No exudate. No icterus. Neck- ROM appears normal, no tracheal deviation. Cardiovascular - Regular rate and rhythm. Heart sounds are normal.  No murmur, rub, or gallop. Carotid pulses are 2+ to palpation bilaterally without bruit. Extremities - Radial and brachial pulses are 2+ to palpation bilaterally. Right femoral pulse: absent; Right popliteal pulse: absent Right DP: absent; Right PT absent; Left femoral pulse: present 1+; Left popliteal pulse: present 1+; Left DP: absent; Left PT: absent No cyanosis, clubbing, or significant edema. No signs atheroembolic event. Pulmonary - effort appears normal.  No respiratory distress. Lungs - Breath sounds normal. No wheezes or rales. GI - Abdomen - soft, non tender, bowel sounds X 4 quadrants. No guarding or rebound tenderness. No distension or palpable mass. Genitourinary - deferred. Musculoskeletal - ROM appears normal.  No significant edema. Neurologic - alert and oriented X 3. Physiologic. Skin - warm, dry, and intact. No rash, erythema, or pallor.   Psychiatric - mood, affect, and right leg stenosis. Patient has opted to proceed with angiography. Risks have been discussed with the patient including but not limited to MI, death, CVA, bleed, nerve injury, infection, contrast nephropathy, possible need for dialysis, and need for further surgery.

## 2020-02-11 ENCOUNTER — HOSPITAL ENCOUNTER (OUTPATIENT)
Dept: VASCULAR LAB | Age: 70
Discharge: HOME | End: 2020-02-11
Payer: MEDICARE

## 2020-02-11 PROCEDURE — 93926 LOWER EXTREMITY STUDY: CPT

## 2020-02-11 PROCEDURE — 93923 UPR/LXTR ART STDY 3+ LVLS: CPT

## 2020-02-11 PROCEDURE — 93880 EXTRACRANIAL BILAT STUDY: CPT

## 2020-02-13 ENCOUNTER — TELEPHONE (OUTPATIENT)
Dept: VASCULAR SURGERY | Age: 70
End: 2020-02-13

## 2020-02-13 NOTE — TELEPHONE ENCOUNTER
Unable to reach patient by phone in regards to scheduling angiogram with Dr. Merrianne Nageotte, left message for patient to call the office.

## 2020-02-18 ENCOUNTER — TELEPHONE (OUTPATIENT)
Dept: VASCULAR SURGERY | Age: 70
End: 2020-02-18

## 2020-02-18 NOTE — TELEPHONE ENCOUNTER
Spoke with patient, details/instructions and medications were addressed (see letter) for angiogram at [facility] on 3/4/20 with Dr. Joseluis Rapp. He will need to arrive at the [address] [place] [address] at 6:00am. He will need a  to take him home. He will need to register at the [facility] on Wed. 2/26/20 for labs. Patient verbalizes understanding. Written instructions were mailed to the patient.

## 2020-02-18 NOTE — TELEPHONE ENCOUNTER
----- Message from LUIS Martin sent at 2/13/2020  7:55 AM CST -----  sjs - angio with runoff   Needs labws

## 2020-02-26 ENCOUNTER — TELEPHONE (OUTPATIENT)
Dept: VASCULAR SURGERY | Age: 70
End: 2020-02-26

## 2020-02-26 DIAGNOSIS — Z01.818 PRE-OP TESTING: ICD-10-CM

## 2020-02-26 LAB
ANION GAP SERPL CALCULATED.3IONS-SCNC: 15 MMOL/L (ref 7–19)
BUN BLDV-MCNC: 19 MG/DL (ref 8–23)
CALCIUM SERPL-MCNC: 9 MG/DL (ref 8.8–10.2)
CHLORIDE BLD-SCNC: 96 MMOL/L (ref 98–111)
CO2: 26 MMOL/L (ref 22–29)
CREAT SERPL-MCNC: 0.8 MG/DL (ref 0.5–1.2)
GFR NON-AFRICAN AMERICAN: >60
GLUCOSE BLD-MCNC: 68 MG/DL (ref 74–109)
HCT VFR BLD CALC: 43.7 % (ref 42–52)
HEMOGLOBIN: 13.7 G/DL (ref 14–18)
MCH RBC QN AUTO: 28.8 PG (ref 27–31)
MCHC RBC AUTO-ENTMCNC: 31.4 G/DL (ref 33–37)
MCV RBC AUTO: 91.8 FL (ref 80–94)
PDW BLD-RTO: 14.5 % (ref 11.5–14.5)
PLATELET # BLD: 294 K/UL (ref 130–400)
PMV BLD AUTO: 11.6 FL (ref 9.4–12.4)
POTASSIUM SERPL-SCNC: 4.4 MMOL/L (ref 3.5–5)
RBC # BLD: 4.76 M/UL (ref 4.7–6.1)
SODIUM BLD-SCNC: 137 MMOL/L (ref 136–145)
WBC # BLD: 10.9 K/UL (ref 4.8–10.8)

## 2020-03-03 ENCOUNTER — PREP FOR PROCEDURE (OUTPATIENT)
Dept: VASCULAR SURGERY | Age: 70
End: 2020-03-03

## 2020-03-03 RX ORDER — ASPIRIN 81 MG/1
81 TABLET ORAL ONCE
Status: CANCELLED | OUTPATIENT
Start: 2020-03-03 | End: 2020-03-03

## 2020-03-03 RX ORDER — SODIUM CHLORIDE 0.9 % (FLUSH) 0.9 %
10 SYRINGE (ML) INJECTION PRN
Status: CANCELLED | OUTPATIENT
Start: 2020-03-03

## 2020-03-03 RX ORDER — CLONIDINE HYDROCHLORIDE 0.1 MG/1
0.1 TABLET ORAL PRN
Status: CANCELLED | OUTPATIENT
Start: 2020-03-03

## 2020-03-03 RX ORDER — SODIUM CHLORIDE 9 MG/ML
INJECTION, SOLUTION INTRAVENOUS CONTINUOUS
Status: CANCELLED | OUTPATIENT
Start: 2020-03-03

## 2020-03-03 NOTE — H&P
Current Outpatient Medications   Medication Sig Dispense Refill    amLODIPine (NORVASC) 5 MG tablet Take 1 tablet by mouth daily 90 tablet 3    lisinopril (PRINIVIL;ZESTRIL) 20 MG tablet Take 1 tablet by mouth 2 times daily 180 tablet 3    sotalol (BETAPACE) 160 MG tablet TAKE ONE TABLET BY MOUTH TWICE DAILY 180 tablet 3    nitroGLYCERIN (NITROSTAT) 0.4 MG SL tablet Place 1 tablet under the tongue every 5 minutes as needed for Chest pain up to max of 3 total doses.  If no relief after 1 dose, call 911. 25 tablet 3    aspirin 81 MG EC tablet Take 81 mg by mouth daily.          Evolocumab (REPATHA SURECLICK) 334 MG/ML SOAJ Inject 1 mL into the skin every 14 days (Patient not taking: Reported on 2/3/2020) 2 pen 11    cloNIDine (CATAPRES) 0.1 MG tablet Take 1 tablet by mouth daily (Patient not taking: Reported on 2/3/2020) 90 tablet 3      No current facility-administered medications for this visit.          Allergies: Statins  Past Medical History        Past Medical History:   Diagnosis Date    Atrial fibrillation ([facility] [address]      Bilateral carotid artery stenosis 1/6/2020    CAD (coronary artery disease), native coronary artery       acute inferior MI on 5/15/09, s/p emeergent CABG x4 5/15/09    Chest pain      HTN (hypertension)      Hyperlipidemia       Dr. Kelly Chirinos manages    Palpitations      Statin intolerance 2/14/2018    Tobacco abuse      Transient atrial fibrillation or flutter       post op         Past Surgical History         Past Surgical History:   Procedure Laterality Date    CARDIAC CATHETERIZATION   4/23/12     with stent to left circ, and LAD    CARDIAC CATHETERIZATION   01/07/2018     PTCA/BJ to VG to LAD;  atherectomy and 3 BMS to VG to 1st Circ    CORONARY ARTERY BYPASS GRAFT   05/15/09     x4    DIAGNOSTIC CARDIAC CATH LAB PROCEDURE   05/15/09     left heart cath, left ventr, selective coronary arteriography     FRACTURE SURGERY Left      VASCULAR SURGERY   4/21/2015 [facility]     Percutaneous cannulation right common femoral artery with 5-[place] and later 6-[place] pinnacle destination sheath. Suprarenal abdominal aortogram with bilateral iliofemoral arteriograms. Bilateral lower extremity arteriograms. Left popliteal/tibioperoneal trunk artery balloon angioplasty with 4 mm x 15 mm cutting balloon.  VASCULAR SURGERY   4/21/2015 [facility] cont     Arteriograms after balloon angioplasty. Balloon angioplasty left popliteal artery/tibioperoneal trunk with 5 mm x 40 mm russell balloon. Arteriograms after balloon angioplasty. Left superficial femoral artery balloon angioplasty with 7 mm x 100 mm  balloon. Left superficial femoral artery stent placement idev supera 6.5 x 100 mm self-expanding nitinol stent. Completion arteriograms left lower e    VASCULAR SURGERY   4/21/2015 [facility] cont     extremity. Mynx closure right common femoral artery puncture site.         Family History         Family History   Problem Relation Age of Onset    Coronary Art Dis Father      High Blood Pressure Father      Coronary Art Dis Brother      High Blood Pressure Mother      Cancer Mother      High Blood Pressure Sister           Social History            Tobacco Use    Smoking status: Former Smoker       Packs/day: 0.00       Years: 45.00       Pack years: 0.00       Types: Cigarettes       Start date: 1964    Smokeless tobacco: Never Used   Substance Use Topics    Alcohol use: No            Review of Systems     Constitutional - no significant activity change, appetite change, or unexpected weight change. No fever or chills. No diaphoresis or significant fatigue. HENT - no significant rhinorrhea or epistaxis. No tinnitus or significant hearing loss. Eyes - no sudden vision change or amaurosis. Respiratory - no significant shortness of breath, wheezing, or stridor. No apnea, cough, or chest tightness associated with shortness of breath. Cardiovascular - no chest pain, syncope, or significant dizziness.   No palpitations or significant leg swelling. Patient reports has claudication. Gastrointestinal - no abdominal swelling or pain. No blood in stool. No severe constipation, diarrhea, nausea, or vomiting. Genitourinary - No difficulty urinating, dysuria, frequency, or urgency. No flank pain or hematuria. Musculoskeletal - no back pain, gait disturbance, or myalgia. Skin - no color change, rash, pallor, or new wound. Neurologic - no dizziness, facial asymmetry, or light headedness. No seizures. No speech difficulty or lateralizing weakness. Hematologic - no easy bruising or excessive bleeding. Psychiatric - no severe anxiety or nervousness. No confusion. All other review of systems are negative.     Physical Exam     /66 (Site: Right Upper Arm, Position: Sitting, Cuff Size: Large Adult)   Pulse 62   SpO2 98%      Constitutional - well developed, well nourished. No diaphoresis or acute distress. HENT - head normocephalic. Right external ear canal appears normal.  Left external ear canal appears normal.  Septum appears midline. Eyes - conjunctiva normal.  EOMS normal.  No exudate. No icterus. Neck- ROM appears normal, no tracheal deviation. Cardiovascular - Regular rate and rhythm. Heart sounds are normal.  No murmur, rub, or gallop. Carotid pulses are 2+ to palpation bilaterally without bruit. Extremities - Radial and brachial pulses are 2+ to palpation bilaterally. Right femoral pulse: absent; Right popliteal pulse: absent Right DP: absent; Right PT absent; Left femoral pulse: present 1+; Left popliteal pulse: present 1+; Left DP: absent; Left PT: absent No cyanosis, clubbing, or significant edema. No signs atheroembolic event. Pulmonary - effort appears normal.  No respiratory distress. Lungs - Breath sounds normal. No wheezes or rales. GI - Abdomen - soft, non tender, bowel sounds X 4 quadrants. No guarding or rebound tenderness.   No distension or palpable mass.  Genitourinary - deferred. Musculoskeletal - ROM appears normal.  No significant edema. Neurologic - alert and oriented X 3. Physiologic. Skin - warm, dry, and intact. No rash, erythema, or pallor. Psychiatric - mood, affect, and behavior appear normal.  Judgment and thought processes appear normal.     Risk factors for atherosclerosis of all vascular beds have been reviewed with the patient including:  Family history, tobacco abuse in all forms, elevated cholesterol, hyperlipidemia, and diabetes.           Options have been discussed with the patient including continued medical management vs. proceeding with cmm. Patient has opted to proceed with continued medical management.     Assessment     1. Atherosclerosis of native artery of both lower extremities with intermittent claudication ([address]    2.  Bilateral carotid artery stenosis             Plan        Recommend no smoking  Strongly encourage statin therapy  Needs left ascan and carolyn with full lower of right leg  Needs carotid u/s  Continue asa     ascan -   There is mild stenosis in the left superficial femoral artery stent.   Ben Neighbours is a moderate stenosis noted in the proximal segment of the left    superficial femoral artery.    Left mid anterior tibial artery occluded   Ashley -   The patient has moderately diminished ankle-brachial indices right lower    extremity(ies) which would be consistent with claudication level symptoms.    Based on segmental pressures and doppler waveforms, the patient likely has    disease in the right iliofemoral, superficial femoral, tibial arterial    segments.    Based on ankle brachial indices and doppler waveforms, the patient has    mildly diminished flow to the left lower extremity arterial system at   [facility]      Doppler results:     Right CCA/ICA <50% stenotic  Left CCA/ICA <50% stenotic  Right verterbral artery flow is antegrade  Left verterbral artery flow is antegrade  Individual velocities reviewed: Yes.  Results were reviewed with the patient. Disease process is stable  Options were discussed with the patient including continued medical management versus proceeding with angiography and potential intervention for possible left sfa in-stent stenosis as well as right leg stenosis. Patient has opted to proceed with angiography.   Risks have been discussed with the patient including but not limited to MI, death, CVA, bleed, nerve injury, infection, contrast nephropathy, possible need for dialysis, and need for further surgery.

## 2020-03-03 NOTE — H&P (VIEW-ONLY)
Patient Care Team:  Afshin Phoenix MD as PCP - General (Pediatrics)  Kimberly Freitas MD (Cardiology)  Sanford Gooden DO as Consulting Physician (Vascular Surgery)        He who has a history of peripheral vascular disease. Risk factors for atherosclerosis hyperlipidemia, coronary artery disease, hypertension and peripheral occlusive disease. Current treatment includes ASA EC daily. He has had previous angioplasty or stent placement. Patient has not had new wounds. Recently, patient reports claudication at a distance of  100 yards. He reports that right leg is more signifcant than the left . He reports claudication is worsened and is mostly in the form of generalized weakness starting in the thighs. He has a short recovery time. This is reproducible in nature. He reports ischemic rest pain 0 times per night. He reports walking with cart does not help.     He presents for follow up of carotid artery stenosis. He has a known history of carotid artery stenosis for 1 - 5 years. His current treatment includes ASA EC daily. He denies a history of CVA.   He reports no TIA's, episodes of lateralizing weakness and episodes of amaurosis fugax.     Old records have been obtained, reviewed, and summarized.     Sandra Hall is a 68 y.o. male with the following history reviewed and recorded in [employer]:        Patient Active Problem List     Diagnosis Date Noted    Bilateral carotid artery stenosis 01/06/2020    Statin intolerance 02/14/2018    Inferior MI ([facility] [address] 01/07/2018    ST elevation myocardial infarction (STEMI) ([facility] [address]      Atherosclerosis of native artery of extremity with intermittent claudication ([facility] [address] 04/15/2015    Paroxysmal a-fib ([facility])         post op       Tobacco abuse      Chest pain      Hyperlipidemia      Palpitations      HTN (hypertension)      Coronary artery disease involving native coronary artery of native heart without angina pectoris        Current Facility-Administered Medications Current Outpatient Medications   Medication Sig Dispense Refill    amLODIPine (NORVASC) 5 MG tablet Take 1 tablet by mouth daily 90 tablet 3    lisinopril (PRINIVIL;ZESTRIL) 20 MG tablet Take 1 tablet by mouth 2 times daily 180 tablet 3    sotalol (BETAPACE) 160 MG tablet TAKE ONE TABLET BY MOUTH TWICE DAILY 180 tablet 3    nitroGLYCERIN (NITROSTAT) 0.4 MG SL tablet Place 1 tablet under the tongue every 5 minutes as needed for Chest pain up to max of 3 total doses.  If no relief after 1 dose, call 911. 25 tablet 3    aspirin 81 MG EC tablet Take 81 mg by mouth daily.          Evolocumab (REPATHA SURECLICK) 481 MG/ML SOAJ Inject 1 mL into the skin every 14 days (Patient not taking: Reported on 2/3/2020) 2 pen 11    cloNIDine (CATAPRES) 0.1 MG tablet Take 1 tablet by mouth daily (Patient not taking: Reported on 2/3/2020) 90 tablet 3      No current facility-administered medications for this visit.          Allergies: Statins  Past Medical History        Past Medical History:   Diagnosis Date    Atrial fibrillation ([facility] [address]      Bilateral carotid artery stenosis 1/6/2020    CAD (coronary artery disease), native coronary artery       acute inferior MI on 5/15/09, s/p emeergent CABG x4 5/15/09    Chest pain      HTN (hypertension)      Hyperlipidemia       Dr. Lee Rocha manages    Palpitations      Statin intolerance 2/14/2018    Tobacco abuse      Transient atrial fibrillation or flutter       post op         Past Surgical History         Past Surgical History:   Procedure Laterality Date    CARDIAC CATHETERIZATION   4/23/12     with stent to left circ, and LAD    CARDIAC CATHETERIZATION   01/07/2018     PTCA/BJ to VG to LAD;  atherectomy and 3 BMS to VG to 1st Circ    CORONARY ARTERY BYPASS GRAFT   05/15/09     x4    DIAGNOSTIC CARDIAC CATH LAB PROCEDURE   05/15/09     left heart cath, left ventr, selective coronary arteriography     FRACTURE SURGERY Left      VASCULAR SURGERY   4/21/2015 [facility]     palpitations or significant leg swelling. Patient reports has claudication. Gastrointestinal - no abdominal swelling or pain. No blood in stool. No severe constipation, diarrhea, nausea, or vomiting. Genitourinary - No difficulty urinating, dysuria, frequency, or urgency. No flank pain or hematuria. Musculoskeletal - no back pain, gait disturbance, or myalgia. Skin - no color change, rash, pallor, or new wound. Neurologic - no dizziness, facial asymmetry, or light headedness. No seizures. No speech difficulty or lateralizing weakness. Hematologic - no easy bruising or excessive bleeding. Psychiatric - no severe anxiety or nervousness. No confusion. All other review of systems are negative.     Physical Exam     /66 (Site: Right Upper Arm, Position: Sitting, Cuff Size: Large Adult)   Pulse 62   SpO2 98%      Constitutional - well developed, well nourished. No diaphoresis or acute distress. HENT - head normocephalic. Right external ear canal appears normal.  Left external ear canal appears normal.  Septum appears midline. Eyes - conjunctiva normal.  EOMS normal.  No exudate. No icterus. Neck- ROM appears normal, no tracheal deviation. Cardiovascular - Regular rate and rhythm. Heart sounds are normal.  No murmur, rub, or gallop. Carotid pulses are 2+ to palpation bilaterally without bruit. Extremities - Radial and brachial pulses are 2+ to palpation bilaterally. Right femoral pulse: absent; Right popliteal pulse: absent Right DP: absent; Right PT absent; Left femoral pulse: present 1+; Left popliteal pulse: present 1+; Left DP: absent; Left PT: absent No cyanosis, clubbing, or significant edema. No signs atheroembolic event. Pulmonary - effort appears normal.  No respiratory distress. Lungs - Breath sounds normal. No wheezes or rales. GI - Abdomen - soft, non tender, bowel sounds X 4 quadrants. No guarding or rebound tenderness.   No distension or palpable

## 2020-03-04 ENCOUNTER — HOSPITAL ENCOUNTER (OUTPATIENT)
Dept: INTERVENTIONAL RADIOLOGY/VASCULAR | Age: 70
Discharge: HOME | End: 2020-03-04
Payer: MEDICARE

## 2020-03-04 VITALS
TEMPERATURE: 98.7 F | RESPIRATION RATE: 15 BRPM | SYSTOLIC BLOOD PRESSURE: 152 MMHG | HEIGHT: 73 IN | BODY MASS INDEX: 19.88 KG/M2 | DIASTOLIC BLOOD PRESSURE: 71 MMHG | OXYGEN SATURATION: 98 % | WEIGHT: 150 LBS | HEART RATE: 63 BPM

## 2020-03-04 PROCEDURE — 6360000004 HC RX CONTRAST MEDICATION: Performed by: SURGERY

## 2020-03-04 PROCEDURE — 6360000002 HC RX W HCPCS: Performed by: NURSE PRACTITIONER

## 2020-03-04 PROCEDURE — 75625 CONTRAST EXAM ABDOMINL AORTA: CPT | Performed by: SURGERY

## 2020-03-04 PROCEDURE — 36200 PLACE CATHETER IN AORTA: CPT | Performed by: SURGERY

## 2020-03-04 PROCEDURE — G0269 OCCLUSIVE DEVICE IN VEIN ART: HCPCS | Performed by: SURGERY

## 2020-03-04 PROCEDURE — 2500000003 HC RX 250 WO HCPCS: Performed by: SURGERY

## 2020-03-04 PROCEDURE — 2709999900 IR AORTAGRAM ABDOMINAL SERIALOGRAM

## 2020-03-04 PROCEDURE — 2580000003 HC RX 258: Performed by: NURSE PRACTITIONER

## 2020-03-04 PROCEDURE — 6360000002 HC RX W HCPCS: Performed by: SURGERY

## 2020-03-04 PROCEDURE — 75716 ARTERY X-RAYS ARMS/LEGS: CPT | Performed by: SURGERY

## 2020-03-04 RX ORDER — SODIUM CHLORIDE 9 MG/ML
INJECTION, SOLUTION INTRAVENOUS CONTINUOUS
Status: DISCONTINUED | OUTPATIENT
Start: 2020-03-04 | End: 2020-03-06 | Stop reason: HOSPADM

## 2020-03-04 RX ORDER — ONDANSETRON 2 MG/ML
4 INJECTION INTRAMUSCULAR; INTRAVENOUS EVERY 8 HOURS PRN
Status: DISCONTINUED | OUTPATIENT
Start: 2020-03-04 | End: 2020-03-06 | Stop reason: HOSPADM

## 2020-03-04 RX ORDER — ACETAMINOPHEN 325 MG/1
650 TABLET ORAL EVERY 4 HOURS PRN
Status: DISCONTINUED | OUTPATIENT
Start: 2020-03-04 | End: 2020-03-06 | Stop reason: HOSPADM

## 2020-03-04 RX ORDER — SODIUM CHLORIDE 0.9 % (FLUSH) 0.9 %
10 SYRINGE (ML) INJECTION PRN
Status: DISCONTINUED | OUTPATIENT
Start: 2020-03-04 | End: 2020-03-06 | Stop reason: HOSPADM

## 2020-03-04 RX ORDER — HEPARIN SODIUM 5000 [USP'U]/ML
INJECTION, SOLUTION INTRAVENOUS; SUBCUTANEOUS
Status: COMPLETED | OUTPATIENT
Start: 2020-03-04 | End: 2020-03-04

## 2020-03-04 RX ORDER — ASPIRIN 81 MG/1
81 TABLET ORAL ONCE
Status: DISCONTINUED | OUTPATIENT
Start: 2020-03-04 | End: 2020-03-06 | Stop reason: HOSPADM

## 2020-03-04 RX ORDER — LIDOCAINE HYDROCHLORIDE 20 MG/ML
INJECTION, SOLUTION INFILTRATION; PERINEURAL
Status: COMPLETED | OUTPATIENT
Start: 2020-03-04 | End: 2020-03-04

## 2020-03-04 RX ORDER — MIDAZOLAM HYDROCHLORIDE 1 MG/ML
INJECTION INTRAMUSCULAR; INTRAVENOUS
Status: COMPLETED | OUTPATIENT
Start: 2020-03-04 | End: 2020-03-04

## 2020-03-04 RX ORDER — CLONIDINE HYDROCHLORIDE 0.1 MG/1
0.1 TABLET ORAL PRN
Status: DISCONTINUED | OUTPATIENT
Start: 2020-03-04 | End: 2020-03-06 | Stop reason: HOSPADM

## 2020-03-04 RX ORDER — HYDROCODONE BITARTRATE AND ACETAMINOPHEN 5; 325 MG/1; MG/1
1 TABLET ORAL EVERY 4 HOURS PRN
Status: DISCONTINUED | OUTPATIENT
Start: 2020-03-04 | End: 2020-03-06 | Stop reason: HOSPADM

## 2020-03-04 RX ORDER — ALPRAZOLAM 0.5 MG/1
0.5 TABLET ORAL 2 TIMES DAILY PRN
Status: DISCONTINUED | OUTPATIENT
Start: 2020-03-04 | End: 2020-03-06 | Stop reason: HOSPADM

## 2020-03-04 RX ORDER — FENTANYL CITRATE 50 UG/ML
INJECTION, SOLUTION INTRAMUSCULAR; INTRAVENOUS
Status: COMPLETED | OUTPATIENT
Start: 2020-03-04 | End: 2020-03-04

## 2020-03-04 RX ORDER — HYDROCODONE BITARTRATE AND ACETAMINOPHEN 5; 325 MG/1; MG/1
2 TABLET ORAL EVERY 4 HOURS PRN
Status: DISCONTINUED | OUTPATIENT
Start: 2020-03-04 | End: 2020-03-06 | Stop reason: HOSPADM

## 2020-03-04 RX ORDER — IODIXANOL 320 MG/ML
INJECTION, SOLUTION INTRAVASCULAR
Status: COMPLETED | OUTPATIENT
Start: 2020-03-04 | End: 2020-03-04

## 2020-03-04 RX ADMIN — Medication 2 G: at 08:56

## 2020-03-04 RX ADMIN — HEPARIN SODIUM 5000 UNITS: 5000 INJECTION, SOLUTION INTRAVENOUS; SUBCUTANEOUS at 09:13

## 2020-03-04 RX ADMIN — MIDAZOLAM 0.5 MG: 1 INJECTION INTRAMUSCULAR; INTRAVENOUS at 09:11

## 2020-03-04 RX ADMIN — SODIUM CHLORIDE: 9 INJECTION, SOLUTION INTRAVENOUS at 07:30

## 2020-03-04 RX ADMIN — FENTANYL CITRATE 25 MCG: 50 INJECTION INTRAMUSCULAR; INTRAVENOUS at 09:11

## 2020-03-04 RX ADMIN — IODIXANOL 120 ML: 320 INJECTION, SOLUTION INTRAVASCULAR at 09:26

## 2020-03-04 RX ADMIN — LIDOCAINE HYDROCHLORIDE 10 ML: 20 INJECTION, SOLUTION INFILTRATION; PERINEURAL at 09:13

## 2020-03-04 NOTE — PROGRESS NOTES
Pt tolerated his post arteriogram procedure without issues. Pt insisted on sitting up in bed immediately upon arrival to room. Thanh Alonso., in room. Pt remained stable in left groin site,  Pt did void x 2 in urinal. V/S remained stable. Ingested food and coffee brought to him by Misael Mims. Discharge instructions were explained to patient with voiced understanding, and a copy of those instructions were given to him, his upcoming appointment and the [employer] pamphlet.

## 2020-03-04 NOTE — PROGRESS NOTES
Pt arrived to specials holding area. During assessment , pt was asked when the last time he had anything to eat or drink. Pt states he had coffee. When asked what time, pt stated \"he began around 4 AM and stopped drinking coffee right before 6 AM\". I informed patient I would need to discuss with Dr Regan Bailey. Pt became agitated and speaking aggressively and stated no one told him he had to be NPO. He was only told not to eat, but told he could drink anything he wanted. I discussed patients NPO status with Dr Regan Bailey and Dr Regan Bailey says to proceed with case and sedation. Pt informed that Dr Regan Bailey gave the [place]. Pt becomes agitated again and states \"I should know patients are allowed to drink and this is my problem\" He further states \"He does not want to be told anything else about food or drink and we either need to get it done or he was going home\". I finish assessment and proceed with case.

## 2020-03-04 NOTE — OP NOTE
Preprocedure diagnosis:  1. Atherosclerosis of native arteries bilateral lower extremities with right greater than left lower extremity claudication symptoms    Post procedure diagnosis: Same    Procedure:  1. Percutaneous cannulation left common femoral artery with 5 Japanese glide sheath  2. Suprarenal abdominal aortogram with bilateral iliofemoral arteriogram  3. Bilateral lower extremity arteriogram  4. Minx closure left common femoral artery puncture site    Surgeon: Cinthya Lee MD    Anesthesia: Intravenous/moderate sedation plus local    Estimated blood loss: Less than 50 mL    Findings:  1. The bilateral renal arteries are fairly widely patent. 2.  The infrarenal abdominal aorta is slightly ectatic. There is no stenosis. The bilateral common iliac arteries also appear to be ectatic. There is no stenosis in the common iliac arteries. There is a 80 to 90% stenosis at the origin of the right internal iliac artery. The right external iliac artery is fairly widely patent. The left internal iliac artery is fairly widely patent. There is a 30 to 40% stenosis of the left external iliac artery. 3.  The right common femoral artery is fairly widely patent. There is a moderate stenosis at the origin of the right profunda femoris artery. There is a flush occlusion of the right superficial femoral artery. It does reconstitute distally via collaterals from the profunda femoris. The right anterior tibial artery is occluded in the mid and distal calf with reconstitution at the foot. The right tibial peroneal trunk, peroneal, and posterior double arteries are all fairly widely patent. 4.  The left common femoral profunda femoris arteries are fairly widely patent. There is a 40 to 50% stenosis in the mid left superficial femoral artery. The previously placed left superficial femoral artery stent distally is widely patent. The left popliteal artery is widely patent.   The left anterior tibial artery is occluded. There is reconstitution of the dorsalis pedis artery via collaterals. The tibial peroneal trunk is widely patent. The peroneal artery is occluded distally. The left posterior tibial artery is widely patent with good flow into the foot from the posterior tibial artery. Procedure in detail:    After the patient was consented and given intravenous antibiotics, he was brought to angiography and placed on the angiography suite table supine position. Intravenous/moderate sedation was administered in both groins were prepped and draped in usual sterile procedure. Skin and subcutaneous tissues in the left groin were anesthetized lidocaine. Micropuncture needle was used to cannulate left common femoral artery over the femoral head without difficulty. Seldinger technique was utilized to place a 5 [place] glide sheath. Over an 035 wire, an Omni Flush catheter was placed up into the suprarenal abdominal aorta. Suprarenal abdominal aortogram with bilateral iliofemoral arteriograms were performed with the above findings. The catheter was withdrawn to the distal abdominal aorta. Distal abdominal aortogram with bilateral iliofemoral arteriograms were performed in both oblique views with the above findings. Finally, bilateral lower extremity arteriograms were performed with the above findings. The Omni Flush catheter was removed over a wire and then the sheath was removed and the left common femoral artery puncture site was closed with minx device. Additional pressure was applied for hemostasis. Sterile dressings were then applied. The patient tolerated the procedure well. He is brought back to cath holding in good condition.

## 2020-03-04 NOTE — PROGRESS NOTES
I discussed the findings of arteriography with the patient and his wife after the procedure in cath holding. I told them the left leg intervention that I performed in 2015 (stent left SFA and TPT balloon angioplasty) is still patent without any significant restenosis. There is some native left SFA stenosis and tibial artery disease. I also told him that the right SFA occlusion noted 2015 is still present and I would like to see him in the office to discuss surgery vs. medical therapy. I don't see a reason for hurting worse than he did after intervention and suggested he might have other etiologies for worsening pain like neurologic (pinched nerve) vs. Orthopedic (arthritis). The patient told me he does not have symptoms that could be attributed to orthopedic/neurologic problems. His wife then told me his left leg is the one bothering him and not the right (the patient told me his right leg is the one hurting). I felt the patient and his wife were both very upset and aggressive toward me for no obvious reason, and I tried to again explain the findings and treatment plan. They both acted very upset. I felt that leaving the room before any additional excalation occurred was the best course of action.

## 2020-03-09 RX ORDER — LISINOPRIL 20 MG/1
TABLET ORAL
Qty: 180 TABLET | Refills: 3 | Status: SHIPPED | OUTPATIENT
Start: 2020-03-09 | End: 2021-06-08 | Stop reason: SDUPTHER

## 2020-03-09 RX ORDER — AMLODIPINE BESYLATE 5 MG/1
TABLET ORAL
Qty: 90 TABLET | Refills: 3 | Status: SHIPPED | OUTPATIENT
Start: 2020-03-09 | End: 2021-06-08 | Stop reason: SDUPTHER

## 2020-05-06 RX ORDER — SOTALOL HYDROCHLORIDE 160 MG/1
TABLET ORAL
Qty: 180 TABLET | Refills: 3 | Status: SHIPPED | OUTPATIENT
Start: 2020-05-06 | End: 2020-05-12 | Stop reason: SDUPTHER

## 2020-05-12 RX ORDER — SOTALOL HYDROCHLORIDE 160 MG/1
TABLET ORAL
Qty: 180 TABLET | Refills: 3 | Status: SHIPPED | OUTPATIENT
Start: 2020-05-12 | End: 2021-06-08 | Stop reason: SDUPTHER

## 2021-04-28 RX ORDER — LISINOPRIL 20 MG/1
TABLET ORAL
Qty: 180 TABLET | Refills: 0 | OUTPATIENT
Start: 2021-04-28

## 2021-06-01 ENCOUNTER — TELEPHONE (OUTPATIENT)
Dept: CARDIOLOGY CLINIC | Age: 71
End: 2021-06-01

## 2021-06-01 NOTE — TELEPHONE ENCOUNTER
Leopold Gu requests an appt with Dr. Bina Roberts to establish care and discuss feet swelling. Please contact patient to discuss. Thank you.

## 2021-06-01 NOTE — TELEPHONE ENCOUNTER
Called and left message for patient trying to schedule an appointment. If patient calls back please schedule with any APRN per prior note. If patient then needs to see Dr. Bina Roberts they will set him up with an appointment.

## 2021-06-08 ENCOUNTER — OFFICE VISIT (OUTPATIENT)
Dept: CARDIOLOGY CLINIC | Age: 71
End: 2021-06-08
Payer: MEDICARE

## 2021-06-08 VITALS
BODY MASS INDEX: 19.22 KG/M2 | HEART RATE: 68 BPM | SYSTOLIC BLOOD PRESSURE: 166 MMHG | WEIGHT: 145 LBS | DIASTOLIC BLOOD PRESSURE: 100 MMHG | HEIGHT: 73 IN

## 2021-06-08 DIAGNOSIS — I48.0 PAF (PAROXYSMAL ATRIAL FIBRILLATION) (HCC): ICD-10-CM

## 2021-06-08 DIAGNOSIS — R94.31 ABNORMAL EKG: ICD-10-CM

## 2021-06-08 DIAGNOSIS — I65.23 BILATERAL CAROTID ARTERY STENOSIS: ICD-10-CM

## 2021-06-08 DIAGNOSIS — R06.02 SHORTNESS OF BREATH: ICD-10-CM

## 2021-06-08 DIAGNOSIS — T46.6X5A MYALGIA DUE TO STATIN: ICD-10-CM

## 2021-06-08 DIAGNOSIS — E78.2 MIXED HYPERLIPIDEMIA: ICD-10-CM

## 2021-06-08 DIAGNOSIS — I73.9 PVD (PERIPHERAL VASCULAR DISEASE) (HCC): ICD-10-CM

## 2021-06-08 DIAGNOSIS — M79.10 MYALGIA DUE TO STATIN: ICD-10-CM

## 2021-06-08 DIAGNOSIS — I25.10 CORONARY ARTERY DISEASE INVOLVING NATIVE CORONARY ARTERY OF NATIVE HEART WITHOUT ANGINA PECTORIS: Primary | ICD-10-CM

## 2021-06-08 DIAGNOSIS — I10 ESSENTIAL HYPERTENSION: ICD-10-CM

## 2021-06-08 PROCEDURE — 99214 OFFICE O/P EST MOD 30 MIN: CPT | Performed by: CLINICAL NURSE SPECIALIST

## 2021-06-08 PROCEDURE — 99406 BEHAV CHNG SMOKING 3-10 MIN: CPT | Performed by: CLINICAL NURSE SPECIALIST

## 2021-06-08 PROCEDURE — 1123F ACP DISCUSS/DSCN MKR DOCD: CPT | Performed by: CLINICAL NURSE SPECIALIST

## 2021-06-08 PROCEDURE — G8420 CALC BMI NORM PARAMETERS: HCPCS | Performed by: CLINICAL NURSE SPECIALIST

## 2021-06-08 PROCEDURE — 3017F COLORECTAL CA SCREEN DOC REV: CPT | Performed by: CLINICAL NURSE SPECIALIST

## 2021-06-08 PROCEDURE — 93000 ELECTROCARDIOGRAM COMPLETE: CPT | Performed by: CLINICAL NURSE SPECIALIST

## 2021-06-08 PROCEDURE — G8427 DOCREV CUR MEDS BY ELIG CLIN: HCPCS | Performed by: CLINICAL NURSE SPECIALIST

## 2021-06-08 PROCEDURE — 4040F PNEUMOC VAC/ADMIN/RCVD: CPT | Performed by: CLINICAL NURSE SPECIALIST

## 2021-06-08 PROCEDURE — 1036F TOBACCO NON-USER: CPT | Performed by: CLINICAL NURSE SPECIALIST

## 2021-06-08 RX ORDER — NITROGLYCERIN 0.4 MG/1
0.4 TABLET SUBLINGUAL EVERY 5 MIN PRN
Qty: 25 TABLET | Refills: 3 | Status: SHIPPED | OUTPATIENT
Start: 2021-06-08

## 2021-06-08 RX ORDER — LISINOPRIL 20 MG/1
20 TABLET ORAL DAILY
Qty: 180 TABLET | Refills: 3 | Status: ON HOLD | OUTPATIENT
Start: 2021-06-08 | End: 2022-02-08 | Stop reason: HOSPADM

## 2021-06-08 RX ORDER — SOTALOL HYDROCHLORIDE 160 MG/1
TABLET ORAL
Qty: 180 TABLET | Refills: 3 | Status: ON HOLD | OUTPATIENT
Start: 2021-06-08 | End: 2022-03-10 | Stop reason: HOSPADM

## 2021-06-08 RX ORDER — AMLODIPINE BESYLATE 5 MG/1
5 TABLET ORAL DAILY
Qty: 90 TABLET | Refills: 3 | Status: SHIPPED | OUTPATIENT
Start: 2021-06-08 | End: 2021-12-21 | Stop reason: SDUPTHER

## 2021-06-08 ASSESSMENT — ENCOUNTER SYMPTOMS
NAUSEA: 0
COUGH: 0
SHORTNESS OF BREATH: 1
CHEST TIGHTNESS: 0
FACIAL SWELLING: 0
WHEEZING: 0
VOMITING: 0
EYE REDNESS: 0
ABDOMINAL PAIN: 0

## 2021-06-08 NOTE — PROGRESS NOTES
[facility], [address] [place] [address], [address] [id number]  Phone: [phone]  Fax: [phone]    OFFICE VISIT:  6/8/2021    [address]: 1950    Reason For Visit:  Yennifer Ratliff is a 65 y.o. male who is here for follow-up for CAD    HPI  Patient is here for follow-up today for CAD with a history of  STEMI on 1/7/18 with 3 bare metal stents, and a previous history of CABG. There is also history of paroxysmal atrial fibrillation, hypertension, hyperlipidemia, myalgia due to statin, tobacco abuse. He has no complaints of chest pain, unusual dyspnea, palpitations, or edema. I had a long discussion in the past about his issues with statins. He has been unwilling to try Zetia or Lawerance Baas. He has run out of his amlodipine recently and blood pressure has been elevated    Mei Taylor MD is PCP.   Almas Guerra has the following history as recorded in [facility]:    Patient Active Problem List    Diagnosis Date Noted    PVD (peripheral vascular disease) ([facility]     Bilateral carotid artery stenosis 01/06/2020    Statin intolerance 02/14/2018    Inferior MI ([address] 01/07/2018    ST elevation myocardial infarction (STEMI) ([address]     Atherosclerosis of native artery of extremity with intermittent claudication ([address] 04/15/2015    Paroxysmal a-fib ([facility])     Tobacco abuse     Chest pain     Hyperlipidemia     Palpitations     HTN (hypertension)     Coronary artery disease involving native coronary artery of native heart without angina pectoris      Past Medical History:   Diagnosis Date    Atrial fibrillation ([facility]     Bilateral carotid artery stenosis 1/6/2020    CAD (coronary artery disease), native coronary artery     acute inferior MI on 5/15/09, s/p emeergent CABG x4 5/15/09    Chest pain     HTN (hypertension)     Hyperlipidemia     Dr. Fab Boyce manages    Palpitations     Statin intolerance 2/14/2018    Tobacco abuse     Transient atrial fibrillation or flutter     post op     Past Surgical History:   Procedure Laterality Date    CARDIAC CATHETERIZATION  4/23/12    with stent to left circ, and LAD    CARDIAC CATHETERIZATION  01/07/2018    PTCA/BJ to VG to LAD;  atherectomy and 3 BMS to VG to 1st Circ    CORONARY ARTERY BYPASS GRAFT  05/15/09    x4    DIAGNOSTIC CARDIAC CATH LAB PROCEDURE  05/15/09    left heart cath, left ventr, selective coronary arteriography     FRACTURE SURGERY Left     Leg    VASCULAR SURGERY  4/21/2015 [facility]    Percutaneous cannulation right common femoral artery with 5-[place] and later 6-[place] pinnacle destination sheath. Suprarenal abdominal aortogram with bilateral iliofemoral arteriograms. Bilateral lower extremity arteriograms. Left popliteal/tibioperoneal trunk artery balloon angioplasty with 4 mm x 15 mm cutting balloon.  VASCULAR SURGERY  4/21/2015 [facility] cont    Arteriograms after balloon angioplasty. Balloon angioplasty left popliteal artery/tibioperoneal trunk with 5 mm x 40 mm russell balloon. Arteriograms after balloon angioplasty. Left superficial femoral artery balloon angioplasty with 7 mm x 100 mm  balloon. Left superficial femoral artery stent placement idev supera 6.5 x 100 mm self-expanding nitinol stent. Completion arteriograms left lower e    VASCULAR SURGERY  4/21/2015 [facility] cont    extremity. Mynx closure right common femoral artery puncture site.  VASCULAR SURGERY  03/04/2020    [facility]. Percutaneous cannulation left common femoral artery with 5 [place] glide sheath. Suprarenal abdominal aortogram with bilateral iliofemoral arteriogram.Bilateral lower extremity arteriogram.Minx closure left common femoral artery puncture site.      Family History   Problem Relation Age of Onset    Coronary Art Dis Father     High Blood Pressure Father     Coronary Art Dis Brother     High Blood Pressure Mother     Cancer Mother     High Blood Pressure Sister      Social History     Tobacco Use    Smoking status: Former Smoker Packs/day: 0.00     Years: 45.00     Pack years: 0.00     Types: Cigarettes     Start date: 1964    Smokeless tobacco: Never Used    Tobacco comment: STATES,\"I VAP\"   Substance Use Topics    Alcohol use: No      Current Outpatient Medications   Medication Sig Dispense Refill    nitroGLYCERIN (NITROSTAT) 0.4 MG SL tablet Place 1 tablet under the tongue every 5 minutes as needed for Chest pain up to max of 3 total doses. If no relief after 1 dose, call 911. 25 tablet 3    amLODIPine (NORVASC) 5 MG tablet Take 1 tablet by mouth daily 90 tablet 3    lisinopril (PRINIVIL;ZESTRIL) 20 MG tablet Take 1 tablet by mouth daily 180 tablet 3    sotalol (BETAPACE) 160 MG tablet Take 1 tablet by mouth twice daily 180 tablet 3    nitroGLYCERIN (NITROSTAT) 0.4 MG SL tablet Place 1 tablet under the tongue every 5 minutes as needed for Chest pain 25 tablet 3    aspirin 81 MG EC tablet Take 81 mg by mouth daily. No current facility-administered medications for this visit. Allergies: Statins    Review of Systems  Review of Systems   Constitutional: Negative for activity change, diaphoresis, fatigue, fever and unexpected weight change. HENT: Negative for facial swelling and nosebleeds. Eyes: Negative for redness and visual disturbance. Respiratory: Positive for shortness of breath (chronic). Negative for cough, chest tightness and wheezing. Cardiovascular: Negative for chest pain, palpitations and leg swelling (chronic). Gastrointestinal: Negative for abdominal pain, nausea and vomiting. Endocrine: Negative for cold intolerance and heat intolerance. Genitourinary: Negative for dysuria and hematuria. Musculoskeletal: Negative for arthralgias and myalgias. Skin: Negative for pallor and rash. Neurological: Negative for dizziness, seizures, syncope, weakness and light-headedness. Hematological: Does not bruise/bleed easily. Psychiatric/Behavioral: Negative for agitation.  The patient is not nervous/anxious. Objective  Vital Signs - BP (!) 166/100   Pulse 68   Ht 6' 1\" (1.854 m)   Wt 145 lb (65.8 kg)   BMI 19.13 kg/m²    Vitals:    06/08/21 1528 06/08/21 1535   BP: (!) 152/102 (!) 166/100   Pulse: 68 68   Weight: 145 lb (65.8 kg)    Height: 6' 1\" (1.854 m)      Physical Exam  Vitals and nursing note reviewed. Constitutional:       General: He is not in acute distress. Appearance: He is well-developed. He is not diaphoretic. HENT:      Head: Normocephalic and atraumatic. Right Ear: Hearing and external ear normal.      Left Ear: Hearing and external ear normal.      Nose: Nose normal.   Eyes:      General:         Right eye: No discharge. Left eye: No discharge. Pupils: Pupils are equal, round, and reactive to light. Neck:      Thyroid: No thyromegaly. Vascular: Carotid bruit (right) present. No JVD. Trachea: No tracheal deviation. Cardiovascular:      Rate and Rhythm: Normal rate and regular rhythm. Heart sounds: Normal heart sounds. No murmur heard. No friction rub. No gallop. Comments: No carotid bruit  Pulmonary:      Effort: Pulmonary effort is normal. No respiratory distress. Breath sounds: Normal breath sounds. No wheezing or rales. Abdominal:      Palpations: Abdomen is soft. Tenderness: There is no abdominal tenderness. Musculoskeletal:         General: No swelling or deformity. Cervical back: Neck supple. No muscular tenderness. Right lower leg: Edema (1+) present. Left lower leg: Edema (1+) present. Comments: Wearing compression hose   Skin:     General: Skin is warm and dry. Findings: No rash. Neurological:      General: No focal deficit present. Mental Status: He is alert and oriented to person, place, and time. Cranial Nerves: No cranial nerve deficit.    Psychiatric:         Mood and Affect: Mood normal.         Behavior: Behavior normal.         Judgment: Judgment normal. Data:  Heart Cath 1/11/18    1.  Left ventricular dysfunction with mild left ventricular   enlargement and relatively mild anterolateral and inferior   hypokinesis with a reduced ejection fraction of 50% or greater. 2.  Extremely severe diffuse native triple-vessel coronary artery   disease as described above    3.  Nonfunctioning left internal mammary graft to the diagonal   branch of the LAD with this branch having been stented   previously. 4.  Acutely occluded saphenous vein graft to the 1st circumflex   marginal branch. 5.  Severe disease near the anastomotic site of the vein graft to   the left anterior descending coronary artery. 6. Widely patent saphenous vein graft to the right coronary   artery. 7. Successful percutaneous coronary intervention with balloon   angioplasty and placement of a single drug-eluting stent to the   distal vein graft to the LAD including the anastomotic site and   portions of the LAD just beyond the insertion of the graft. 8.  Successful percutaneous coronary intervention with direct   infarct extraction atherectomy and stent placement to the vein   graft to the 1st circumflex marginal branch using 3 bare-metal   stents. 9.  Intracoronary nitroprusside administration. Lab Results   Component Value Date    CHOL 158 (L) 01/06/2020    TRIG 77 01/06/2020    HDL 51 (L) 01/06/2020    LDLCALC 92 01/06/2020    LDLDIRECT 141 (H) 04/17/2015     Lab Results   Component Value Date    ALT 24 01/06/2020    AST 24 01/06/2020   Carotids 2/11/2020    Impression       Francoise Minus is mixed plaque visualized in the bilateral internal carotid    artery(ies).    There is less than 50% stenosis in the right internal carotid artery.    There is less than 50% stenosis of the left internal carotid artery.    There is normal antegrade flow in the bilateral vertebral artery(ies).        EKG shows normal sinus rhythm rate 68 with some ST depression and nonspecific T wave abnormality,

## 2021-06-08 NOTE — PATIENT INSTRUCTIONS
Return in about 3 months (around 9/8/2021) for APRN. Quit smoking. Call the [address] [place] [address] [phone]  Dobutamine Stress Echo- hold Sotalol night before and morning of stress test  Monitor BP at home      [place] at the [facility] and [address] located on the first floor of [place] [address] through hospital main entrance and turn immediately to your left. Patient's contact number:  [phone] (home)     Date/Time:     Dobutamine Stress Test    A chemical stress test uses chemical agents injected into the body through the vein. These chemicals make the heart function as if it were under stress. A chemical stress test is used when a traditional stress test (called a cardiac stress test) cannot be done. Testing will take approximately one hour. Dobutamine Stress Echocardiogram Instructions:   No caffeine 24 hours prior to the testing. This includes: coffee, pop/soda, chocolate, cold medications, etc.  Any product that might contain caffeine. Do not eat or drink anything, except water, eight (8) hours before the test.   No alcohol or nicotine twelve (12) hours prior to your test.   Wear comfortable clothing. Hold sotalol night before and morning of test     If you need to change this appointment, please call outpatient scheduling at [phone].

## 2021-10-14 ENCOUNTER — TELEPHONE (OUTPATIENT)
Dept: CARDIOLOGY CLINIC | Age: 71
End: 2021-10-14

## 2021-10-14 NOTE — TELEPHONE ENCOUNTER
Dr. Anthony Edmond office called stating they got a fax requesting labs. They said that he is not their patient.

## 2021-12-17 ENCOUNTER — TELEPHONE (OUTPATIENT)
Dept: CARDIOLOGY CLINIC | Age: 71
End: 2021-12-17

## 2021-12-17 NOTE — TELEPHONE ENCOUNTER
Aissatou Hardy called this afternoon to move his appt in January forward with Roberto Briggs. Patient advised that he has had shortness of breath since last Thursday, no other symptoms. I offered a same day appt and RN Triage which the patient declined. Could someone please give the patient a quick call back as I am anxious for him to feel this way until his appt next Tuesday. Thank you.

## 2021-12-21 ENCOUNTER — OFFICE VISIT (OUTPATIENT)
Dept: CARDIOLOGY CLINIC | Age: 71
End: 2021-12-21
Payer: MEDICARE

## 2021-12-21 VITALS
BODY MASS INDEX: 20.54 KG/M2 | HEART RATE: 61 BPM | HEIGHT: 73 IN | SYSTOLIC BLOOD PRESSURE: 126 MMHG | DIASTOLIC BLOOD PRESSURE: 78 MMHG | WEIGHT: 155 LBS

## 2021-12-21 DIAGNOSIS — I48.0 PAROXYSMAL A-FIB (HCC): ICD-10-CM

## 2021-12-21 DIAGNOSIS — I25.119 CORONARY ARTERY DISEASE INVOLVING NATIVE CORONARY ARTERY OF NATIVE HEART WITH ANGINA PECTORIS (HCC): Primary | ICD-10-CM

## 2021-12-21 DIAGNOSIS — I10 PRIMARY HYPERTENSION: ICD-10-CM

## 2021-12-21 DIAGNOSIS — R06.02 SHORTNESS OF BREATH: ICD-10-CM

## 2021-12-21 DIAGNOSIS — R94.31 ABNORMAL EKG: ICD-10-CM

## 2021-12-21 DIAGNOSIS — Z78.9 STATIN INTOLERANCE: ICD-10-CM

## 2021-12-21 DIAGNOSIS — I48.0 PAF (PAROXYSMAL ATRIAL FIBRILLATION) (HCC): ICD-10-CM

## 2021-12-21 DIAGNOSIS — I73.9 PVD (PERIPHERAL VASCULAR DISEASE) (HCC): ICD-10-CM

## 2021-12-21 DIAGNOSIS — E78.2 MIXED HYPERLIPIDEMIA: ICD-10-CM

## 2021-12-21 PROCEDURE — 1036F TOBACCO NON-USER: CPT | Performed by: CLINICAL NURSE SPECIALIST

## 2021-12-21 PROCEDURE — 93000 ELECTROCARDIOGRAM COMPLETE: CPT | Performed by: CLINICAL NURSE SPECIALIST

## 2021-12-21 PROCEDURE — 99214 OFFICE O/P EST MOD 30 MIN: CPT | Performed by: CLINICAL NURSE SPECIALIST

## 2021-12-21 PROCEDURE — 1123F ACP DISCUSS/DSCN MKR DOCD: CPT | Performed by: CLINICAL NURSE SPECIALIST

## 2021-12-21 PROCEDURE — G8427 DOCREV CUR MEDS BY ELIG CLIN: HCPCS | Performed by: CLINICAL NURSE SPECIALIST

## 2021-12-21 PROCEDURE — G8484 FLU IMMUNIZE NO ADMIN: HCPCS | Performed by: CLINICAL NURSE SPECIALIST

## 2021-12-21 PROCEDURE — 3017F COLORECTAL CA SCREEN DOC REV: CPT | Performed by: CLINICAL NURSE SPECIALIST

## 2021-12-21 PROCEDURE — G8420 CALC BMI NORM PARAMETERS: HCPCS | Performed by: CLINICAL NURSE SPECIALIST

## 2021-12-21 PROCEDURE — 4040F PNEUMOC VAC/ADMIN/RCVD: CPT | Performed by: CLINICAL NURSE SPECIALIST

## 2021-12-21 RX ORDER — AMLODIPINE BESYLATE 5 MG/1
5 TABLET ORAL DAILY
Qty: 90 TABLET | Refills: 3 | Status: ON HOLD | OUTPATIENT
Start: 2021-12-21 | End: 2022-02-08 | Stop reason: HOSPADM

## 2021-12-21 ASSESSMENT — ENCOUNTER SYMPTOMS
ABDOMINAL PAIN: 0
VOMITING: 0
CHEST TIGHTNESS: 0
SHORTNESS OF BREATH: 1
EYE REDNESS: 0
COUGH: 0
FACIAL SWELLING: 0
WHEEZING: 0
NAUSEA: 0

## 2021-12-21 NOTE — PATIENT INSTRUCTIONS
Return in about 6 months (around 6/21/2022) for APRN. Stay smoke free. Call the [address] [place] [address] [phone]  Lubertha Grant Nuclear Stress Test  Check cholesterol fasting    [place] at the [facility] and [address] located on the first floor of [place] [address] through hospital main entrance and turn immediately to your left. Patient's contact number:  [phone] (home)      Lexiscan Stress Test   Date: 01/03/22 Monday 7:00 am     Lexiscan (regadenoson injection) is a prescription drug given through an IV line that increases blood flow through the arteries of the heart during a cardiac nuclear stress test.     There are two parts to a Lexiscan stress test: the rest portion and the exercise portion. For the rest portion, a radioactive tracer is injected into your arm through the IV. After 30 to 60 minutes, the process of imaging will begin. A nuclear camera will be placed on your chest area and images are taken for the next 15 to 20 minutes. For the exercise portion, a nurse will attach EKG electrodes to your chest to monitor your heart rate. The drug Lubertha Grant is administered to simulate stress on the heart. Your heart rhythm will then be monitored for the next few minutes. Your blood pressure will also be monitored throughout the exercise portion. [place] through the exercise portion, a second round of radioactive tracer is injected into your body. Your heart rate and EKG will be monitored for another few minutes after administering the drug. Test Preparation:     Bring a list of your current medications. Do not take any of your medications the morning of the test, but bring all morning medications with you as you will take them after the stress portion of the test is completed.  Do not eat Bananas 12 hours prior to test.     No caffeine 12 hours prior to the testing.   This includes: coffee, pop/soda, chocolate, cold medications, etc.  Any product that might contain caffeine.  No nicotine or alcohol 12 hours prior to your test.    Nothing to eat or drink 6-8 hours prior to appointment time. It is okay to drink small amounts of water during the four hours prior to the test.   Nitroglycerin patches must be taken off 1 hour before testing.  Wear comfortable clothing.  Please refrain from any strenuous exercise or activities the day before your test, or the day of your test.   The Nuclear Lexiscan Stress test takes about 2 ½ to 3 hours to complete. If for any reason you are unable to keep this appointment, please contact Outpatient Scheduling, [phone], as soon as possible to reschedule.

## 2022-02-02 ENCOUNTER — HOSPITAL ENCOUNTER (INPATIENT)
Age: 72
LOS: 7 days | Discharge: HOME | DRG: 246 | End: 2022-02-09
Attending: EMERGENCY MEDICINE | Admitting: FAMILY MEDICINE
Payer: MEDICARE

## 2022-02-02 ENCOUNTER — APPOINTMENT (OUTPATIENT)
Dept: GENERAL RADIOLOGY | Age: 72
DRG: 246 | End: 2022-02-02
Payer: MEDICARE

## 2022-02-02 DIAGNOSIS — I50.9 CONGESTIVE HEART FAILURE, UNSPECIFIED HF CHRONICITY, UNSPECIFIED HEART FAILURE TYPE (HCC): ICD-10-CM

## 2022-02-02 DIAGNOSIS — J96.01 ACUTE RESPIRATORY FAILURE WITH HYPOXIA (HCC): Primary | ICD-10-CM

## 2022-02-02 PROBLEM — I50.21 ACUTE SYSTOLIC (CONGESTIVE) HEART FAILURE (HCC): Status: ACTIVE | Noted: 2022-02-02

## 2022-02-02 PROBLEM — A41.9 SEPSIS (HCC): Status: ACTIVE | Noted: 2022-02-02

## 2022-02-02 PROBLEM — J96.02 ACUTE RESPIRATORY FAILURE WITH HYPOXIA AND HYPERCARBIA (HCC): Status: ACTIVE | Noted: 2022-02-02

## 2022-02-02 LAB
ALBUMIN SERPL-MCNC: 3.9 G/DL (ref 3.5–5.2)
ALP BLD-CCNC: 142 U/L (ref 40–130)
ALT SERPL-CCNC: 36 U/L (ref 5–41)
ANION GAP SERPL CALCULATED.3IONS-SCNC: 18 MMOL/L (ref 7–19)
AST SERPL-CCNC: 26 U/L (ref 5–40)
BASE EXCESS ARTERIAL: -6.2 MMOL/L (ref -2–2)
BASE EXCESS ARTERIAL: 1.4 MMOL/L (ref -2–2)
BILIRUB SERPL-MCNC: 0.5 MG/DL (ref 0.2–1.2)
BUN BLDV-MCNC: 18 MG/DL (ref 8–23)
CALCIUM SERPL-MCNC: 8.7 MG/DL (ref 8.8–10.2)
CARBOXYHEMOGLOBIN ARTERIAL: 1.6 % (ref 0–5)
CARBOXYHEMOGLOBIN ARTERIAL: 2.7 % (ref 0–5)
CHLORIDE BLD-SCNC: 102 MMOL/L (ref 98–111)
CO2: 19 MMOL/L (ref 22–29)
CREAT SERPL-MCNC: 0.9 MG/DL (ref 0.5–1.2)
EKG P AXIS: 79 DEGREES
EKG P-R INTERVAL: 194 MS
EKG Q-T INTERVAL: 346 MS
EKG QRS DURATION: 130 MS
EKG QTC CALCULATION (BAZETT): 424 MS
EKG T AXIS: 116 DEGREES
ETHANOL: <10 MG/DL (ref 0–0.08)
GFR AFRICAN AMERICAN: >59
GFR NON-AFRICAN AMERICAN: >60
GLUCOSE BLD-MCNC: 230 MG/DL (ref 74–109)
HCO3 ARTERIAL: 19 MMOL/L (ref 22–26)
HCO3 ARTERIAL: 24.7 MMOL/L (ref 22–26)
HCT VFR BLD CALC: 44.3 % (ref 42–52)
HEMOGLOBIN, ART, EXTENDED: 13.4 G/DL (ref 14–18)
HEMOGLOBIN, ART, EXTENDED: 14 G/DL (ref 14–18)
HEMOGLOBIN: 14.4 G/DL (ref 14–18)
MCH RBC QN AUTO: 31.9 PG (ref 27–31)
MCHC RBC AUTO-ENTMCNC: 32.5 G/DL (ref 33–37)
MCV RBC AUTO: 98 FL (ref 80–94)
METHEMOGLOBIN ARTERIAL: 1.1 %
METHEMOGLOBIN ARTERIAL: 1.1 %
O2 CONTENT ARTERIAL: 17.7 ML/DL
O2 CONTENT ARTERIAL: 18.8 ML/DL
O2 SAT, ARTERIAL: 89.8 %
O2 SAT, ARTERIAL: 97 %
O2 THERAPY: ABNORMAL
O2 THERAPY: ABNORMAL
PCO2 ARTERIAL: 34 MMHG (ref 35–45)
PCO2 ARTERIAL: 36 MMHG (ref 35–45)
PDW BLD-RTO: 14.5 % (ref 11.5–14.5)
PH ARTERIAL: 7.33 (ref 7.35–7.45)
PH ARTERIAL: 7.47 (ref 7.35–7.45)
PLATELET # BLD: 236 K/UL (ref 130–400)
PMV BLD AUTO: 12.4 FL (ref 9.4–12.4)
PO2 ARTERIAL: 236 MMHG (ref 80–100)
PO2 ARTERIAL: 60 MMHG (ref 80–100)
POTASSIUM REFLEX MAGNESIUM: 4.5 MMOL/L (ref 3.5–5)
POTASSIUM, WHOLE BLOOD: 3.9
POTASSIUM, WHOLE BLOOD: 4.3
PRO-BNP: 3275 PG/ML (ref 0–900)
RBC # BLD: 4.52 M/UL (ref 4.7–6.1)
REASON FOR REJECTION: NORMAL
REJECTED TEST: NORMAL
SARS-COV-2, NAAT: NOT DETECTED
SODIUM BLD-SCNC: 139 MMOL/L (ref 136–145)
TOTAL PROTEIN: 7.1 G/DL (ref 6.6–8.7)
TROPONIN: 0.27 NG/ML (ref 0–0.03)
TROPONIN: 0.35 NG/ML (ref 0–0.03)
TROPONIN: <0.01 NG/ML (ref 0–0.03)
WBC # BLD: 19.7 K/UL (ref 4.8–10.8)

## 2022-02-02 PROCEDURE — 2580000003 HC RX 258: Performed by: EMERGENCY MEDICINE

## 2022-02-02 PROCEDURE — 2580000003 HC RX 258: Performed by: FAMILY MEDICINE

## 2022-02-02 PROCEDURE — 96365 THER/PROPH/DIAG IV INF INIT: CPT

## 2022-02-02 PROCEDURE — 6370000000 HC RX 637 (ALT 250 FOR IP): Performed by: INTERNAL MEDICINE

## 2022-02-02 PROCEDURE — 99285 EMERGENCY DEPT VISIT HI MDM: CPT

## 2022-02-02 PROCEDURE — 87040 BLOOD CULTURE FOR BACTERIA: CPT

## 2022-02-02 PROCEDURE — 93005 ELECTROCARDIOGRAM TRACING: CPT | Performed by: INTERNAL MEDICINE

## 2022-02-02 PROCEDURE — 84484 ASSAY OF TROPONIN QUANT: CPT

## 2022-02-02 PROCEDURE — 84132 ASSAY OF SERUM POTASSIUM: CPT

## 2022-02-02 PROCEDURE — 83880 ASSAY OF NATRIURETIC PEPTIDE: CPT

## 2022-02-02 PROCEDURE — 2140000000 HC CCU INTERMEDIATE R&B

## 2022-02-02 PROCEDURE — 2580000003 HC RX 258: Performed by: INTERNAL MEDICINE

## 2022-02-02 PROCEDURE — 71045 X-RAY EXAM CHEST 1 VIEW: CPT

## 2022-02-02 PROCEDURE — 93010 ELECTROCARDIOGRAM REPORT: CPT | Performed by: INTERNAL MEDICINE

## 2022-02-02 PROCEDURE — 51702 INSERT TEMP BLADDER CATH: CPT

## 2022-02-02 PROCEDURE — 2500000003 HC RX 250 WO HCPCS

## 2022-02-02 PROCEDURE — A4216 STERILE WATER/SALINE, 10 ML: HCPCS | Performed by: EMERGENCY MEDICINE

## 2022-02-02 PROCEDURE — 6360000002 HC RX W HCPCS: Performed by: EMERGENCY MEDICINE

## 2022-02-02 PROCEDURE — 80053 COMPREHEN METABOLIC PANEL: CPT

## 2022-02-02 PROCEDURE — 96375 TX/PRO/DX INJ NEW DRUG ADDON: CPT

## 2022-02-02 PROCEDURE — 94640 AIRWAY INHALATION TREATMENT: CPT

## 2022-02-02 PROCEDURE — 2700000000 HC OXYGEN THERAPY PER DAY

## 2022-02-02 PROCEDURE — 6360000002 HC RX W HCPCS: Performed by: INTERNAL MEDICINE

## 2022-02-02 PROCEDURE — 82803 BLOOD GASES ANY COMBINATION: CPT

## 2022-02-02 PROCEDURE — 87635 SARS-COV-2 COVID-19 AMP PRB: CPT

## 2022-02-02 PROCEDURE — 2500000003 HC RX 250 WO HCPCS: Performed by: INTERNAL MEDICINE

## 2022-02-02 PROCEDURE — 2500000003 HC RX 250 WO HCPCS: Performed by: EMERGENCY MEDICINE

## 2022-02-02 PROCEDURE — 36415 COLL VENOUS BLD VENIPUNCTURE: CPT

## 2022-02-02 PROCEDURE — 85027 COMPLETE CBC AUTOMATED: CPT

## 2022-02-02 PROCEDURE — 94660 CPAP INITIATION&MGMT: CPT

## 2022-02-02 PROCEDURE — 36600 WITHDRAWAL OF ARTERIAL BLOOD: CPT

## 2022-02-02 PROCEDURE — 6370000000 HC RX 637 (ALT 250 FOR IP): Performed by: FAMILY MEDICINE

## 2022-02-02 PROCEDURE — 6360000002 HC RX W HCPCS: Performed by: FAMILY MEDICINE

## 2022-02-02 PROCEDURE — 93005 ELECTROCARDIOGRAM TRACING: CPT | Performed by: EMERGENCY MEDICINE

## 2022-02-02 PROCEDURE — 82077 ASSAY SPEC XCP UR&BREATH IA: CPT

## 2022-02-02 RX ORDER — OXYBUTYNIN CHLORIDE 5 MG/1
5 TABLET, EXTENDED RELEASE ORAL NIGHTLY
Status: DISCONTINUED | OUTPATIENT
Start: 2022-02-02 | End: 2022-02-09

## 2022-02-02 RX ORDER — SODIUM CHLORIDE 0.9 % (FLUSH) 0.9 %
5-40 SYRINGE (ML) INJECTION EVERY 12 HOURS SCHEDULED
Status: DISCONTINUED | OUTPATIENT
Start: 2022-02-02 | End: 2022-02-03 | Stop reason: SDUPTHER

## 2022-02-02 RX ORDER — METHYLPREDNISOLONE SODIUM SUCCINATE 40 MG/ML
40 INJECTION, POWDER, LYOPHILIZED, FOR SOLUTION INTRAMUSCULAR; INTRAVENOUS EVERY 12 HOURS
Status: DISCONTINUED | OUTPATIENT
Start: 2022-02-02 | End: 2022-02-06

## 2022-02-02 RX ORDER — ACETAMINOPHEN 325 MG/1
650 TABLET ORAL EVERY 6 HOURS PRN
Status: DISCONTINUED | OUTPATIENT
Start: 2022-02-02 | End: 2022-02-09 | Stop reason: HOSPADM

## 2022-02-02 RX ORDER — ACETAMINOPHEN 650 MG/1
650 SUPPOSITORY RECTAL EVERY 6 HOURS PRN
Status: DISCONTINUED | OUTPATIENT
Start: 2022-02-02 | End: 2022-02-09 | Stop reason: HOSPADM

## 2022-02-02 RX ORDER — POLYETHYLENE GLYCOL 3350 17 G/17G
17 POWDER, FOR SOLUTION ORAL DAILY PRN
Status: DISCONTINUED | OUTPATIENT
Start: 2022-02-02 | End: 2022-02-09 | Stop reason: HOSPADM

## 2022-02-02 RX ORDER — NITROGLYCERIN 0.4 MG/1
0.4 TABLET SUBLINGUAL EVERY 5 MIN PRN
Status: DISCONTINUED | OUTPATIENT
Start: 2022-02-02 | End: 2022-02-09 | Stop reason: HOSPADM

## 2022-02-02 RX ORDER — SODIUM CHLORIDE 0.9 % (FLUSH) 0.9 %
5-40 SYRINGE (ML) INJECTION PRN
Status: DISCONTINUED | OUTPATIENT
Start: 2022-02-02 | End: 2022-02-03 | Stop reason: SDUPTHER

## 2022-02-02 RX ORDER — FUROSEMIDE 10 MG/ML
40 INJECTION INTRAMUSCULAR; INTRAVENOUS 2 TIMES DAILY
Status: DISCONTINUED | OUTPATIENT
Start: 2022-02-02 | End: 2022-02-05

## 2022-02-02 RX ORDER — ONDANSETRON 4 MG/1
4 TABLET, ORALLY DISINTEGRATING ORAL EVERY 8 HOURS PRN
Status: DISCONTINUED | OUTPATIENT
Start: 2022-02-02 | End: 2022-02-09 | Stop reason: HOSPADM

## 2022-02-02 RX ORDER — SODIUM CHLORIDE 9 MG/ML
25 INJECTION, SOLUTION INTRAVENOUS PRN
Status: DISCONTINUED | OUTPATIENT
Start: 2022-02-02 | End: 2022-02-03 | Stop reason: SDUPTHER

## 2022-02-02 RX ORDER — FUROSEMIDE 10 MG/ML
80 INJECTION INTRAMUSCULAR; INTRAVENOUS ONCE
Status: COMPLETED | OUTPATIENT
Start: 2022-02-02 | End: 2022-02-02

## 2022-02-02 RX ORDER — SOTALOL HYDROCHLORIDE 80 MG/1
160 TABLET ORAL EVERY 12 HOURS SCHEDULED
Status: DISCONTINUED | OUTPATIENT
Start: 2022-02-02 | End: 2022-02-09 | Stop reason: HOSPADM

## 2022-02-02 RX ORDER — IPRATROPIUM BROMIDE AND ALBUTEROL SULFATE 2.5; .5 MG/3ML; MG/3ML
1 SOLUTION RESPIRATORY (INHALATION) EVERY 4 HOURS
Status: DISCONTINUED | OUTPATIENT
Start: 2022-02-02 | End: 2022-02-06

## 2022-02-02 RX ORDER — NITROGLYCERIN 20 MG/100ML
5-200 INJECTION INTRAVENOUS CONTINUOUS
Status: DISCONTINUED | OUTPATIENT
Start: 2022-02-02 | End: 2022-02-06

## 2022-02-02 RX ORDER — AMLODIPINE BESYLATE 5 MG/1
5 TABLET ORAL DAILY
Status: DISCONTINUED | OUTPATIENT
Start: 2022-02-02 | End: 2022-02-06

## 2022-02-02 RX ORDER — DILTIAZEM HYDROCHLORIDE 5 MG/ML
INJECTION INTRAVENOUS
Status: COMPLETED
Start: 2022-02-02 | End: 2022-02-02

## 2022-02-02 RX ORDER — ONDANSETRON 2 MG/ML
4 INJECTION INTRAMUSCULAR; INTRAVENOUS EVERY 6 HOURS PRN
Status: DISCONTINUED | OUTPATIENT
Start: 2022-02-02 | End: 2022-02-05

## 2022-02-02 RX ORDER — ASPIRIN 81 MG/1
81 TABLET ORAL DAILY
Status: DISCONTINUED | OUTPATIENT
Start: 2022-02-02 | End: 2022-02-09 | Stop reason: HOSPADM

## 2022-02-02 RX ORDER — LISINOPRIL 10 MG/1
20 TABLET ORAL DAILY
Status: DISCONTINUED | OUTPATIENT
Start: 2022-02-02 | End: 2022-02-05

## 2022-02-02 RX ORDER — NITROGLYCERIN 20 MG/100ML
INJECTION INTRAVENOUS
Status: COMPLETED
Start: 2022-02-02 | End: 2022-02-02

## 2022-02-02 RX ORDER — FUROSEMIDE 10 MG/ML
40 INJECTION INTRAMUSCULAR; INTRAVENOUS DAILY
Status: DISCONTINUED | OUTPATIENT
Start: 2022-02-02 | End: 2022-02-02

## 2022-02-02 RX ADMIN — SODIUM CHLORIDE, PRESERVATIVE FREE 10 ML: 5 INJECTION INTRAVENOUS at 21:51

## 2022-02-02 RX ADMIN — OXYBUTYNIN CHLORIDE 5 MG: 5 TABLET, EXTENDED RELEASE ORAL at 16:13

## 2022-02-02 RX ADMIN — ASPIRIN 81 MG: 81 TABLET, COATED ORAL at 16:13

## 2022-02-02 RX ADMIN — SOTALOL HYDROCHLORIDE 160 MG: 80 TABLET ORAL at 21:50

## 2022-02-02 RX ADMIN — IPRATROPIUM BROMIDE AND ALBUTEROL SULFATE 1 AMPULE: 2.5; .5 SOLUTION RESPIRATORY (INHALATION) at 22:49

## 2022-02-02 RX ADMIN — SODIUM CHLORIDE, PRESERVATIVE FREE 20 ML: 5 INJECTION INTRAVENOUS at 21:51

## 2022-02-02 RX ADMIN — DILTIAZEM HYDROCHLORIDE 5 MG/HR: 5 INJECTION INTRAVENOUS at 09:23

## 2022-02-02 RX ADMIN — NITROGLYCERIN 5 MCG/MIN: 20 INJECTION INTRAVENOUS at 03:40

## 2022-02-02 RX ADMIN — DILTIAZEM HYDROCHLORIDE 15 MG/HR: 5 INJECTION INTRAVENOUS at 16:50

## 2022-02-02 RX ADMIN — FUROSEMIDE 80 MG: 10 INJECTION, SOLUTION INTRAMUSCULAR; INTRAVENOUS at 03:48

## 2022-02-02 RX ADMIN — ENOXAPARIN SODIUM 70 MG: 100 INJECTION SUBCUTANEOUS at 05:33

## 2022-02-02 RX ADMIN — ENOXAPARIN SODIUM 70 MG: 100 INJECTION SUBCUTANEOUS at 21:50

## 2022-02-02 RX ADMIN — AMLODIPINE BESYLATE 5 MG: 5 TABLET ORAL at 16:13

## 2022-02-02 RX ADMIN — SODIUM CHLORIDE 1000 MG: 9 INJECTION INTRAMUSCULAR; INTRAVENOUS; SUBCUTANEOUS at 05:33

## 2022-02-02 RX ADMIN — IPRATROPIUM BROMIDE AND ALBUTEROL SULFATE 1 AMPULE: 2.5; .5 SOLUTION RESPIRATORY (INHALATION) at 20:14

## 2022-02-02 RX ADMIN — LISINOPRIL 20 MG: 10 TABLET ORAL at 16:12

## 2022-02-02 RX ADMIN — NITROGLYCERIN 15 MCG/MIN: 20 INJECTION INTRAVENOUS at 04:22

## 2022-02-02 RX ADMIN — DILTIAZEM HYDROCHLORIDE 10 MG: 5 INJECTION INTRAVENOUS at 08:58

## 2022-02-02 RX ADMIN — AZITHROMYCIN 500 MG: 500 INJECTION, POWDER, LYOPHILIZED, FOR SOLUTION INTRAVENOUS at 05:27

## 2022-02-02 RX ADMIN — IPRATROPIUM BROMIDE AND ALBUTEROL SULFATE 1 AMPULE: 2.5; .5 SOLUTION RESPIRATORY (INHALATION) at 16:20

## 2022-02-02 RX ADMIN — FUROSEMIDE 40 MG: 10 INJECTION, SOLUTION INTRAMUSCULAR; INTRAVENOUS at 18:39

## 2022-02-02 RX ADMIN — METHYLPREDNISOLONE SODIUM SUCCINATE 40 MG: 40 INJECTION, POWDER, FOR SOLUTION INTRAMUSCULAR; INTRAVENOUS at 16:13

## 2022-02-02 ASSESSMENT — ENCOUNTER SYMPTOMS
ABDOMINAL PAIN: 0
DIARRHEA: 0
EYE PAIN: 0
VOMITING: 0
BACK PAIN: 0
SHORTNESS OF BREATH: 1

## 2022-02-02 NOTE — H&P
[place], [place], [address]    DEPARTMENT OF HOSPITALIST MEDICINE    HISTORY AND PHYSICAL             Date: 2/2/2022        Patient Name: Yadira Wheat     YOB: 1950      Age:  66 y.o. Chief Complaint     Chief Complaint   Patient presents with    Respiratory Distress     initial 02 sat at home 65%         History Obtained From   Patient, nursing, providers and electronic health record. History of Present Illness   66 M w h/o CAD, atrial fibrillation and hyperlipidemia who presents to the emergency department in acute respiratory failure. Arrives from home via EMS. They administered Solu-Medrol and DuoNeb and attempted CPAP which he did not tolerate. Patient not able to give history due to respiratory distress. Placed on BiPAP soon after arrival.  ER provider reports patient states increasing shortness of breath over the last several days and over the past couple weeks has developed significant swelling of bilateral lower extremities. No prior history of congestive heart failure. No history of DVT or PE. Patient apparently denied chest pain, fevers, cough, abdominal pain, vomiting or diarrhea.       Past Medical History     Past Medical History:   Diagnosis Date    Acute exacerbation of CHF (congestive heart failure) ([address] 2/2/2022    Atrial fibrillation (HCC)     Bilateral carotid artery stenosis 1/6/2020    CAD (coronary artery disease), native coronary artery     acute inferior MI on 5/15/09, s/p emeergent CABG x4 5/15/09    Chest pain     HTN (hypertension)     Hyperlipidemia     Dr. Luisa Rodriguez manages    Palpitations     Statin intolerance 2/14/2018    Tobacco abuse     Transient atrial fibrillation or flutter     post op        Past Surgical History     Past Surgical History:   Procedure Laterality Date    CARDIAC CATHETERIZATION  4/23/12    with stent to left circ, and LAD    CARDIAC CATHETERIZATION  01/07/2018    PTCA/BJ to VG to LAD;  atherectomy and 3 BMS to VG to 1st Circ    CORONARY ARTERY BYPASS GRAFT  05/15/09    x4    DIAGNOSTIC CARDIAC CATH LAB PROCEDURE  05/15/09    left heart cath, left ventr, selective coronary arteriography     FRACTURE SURGERY Left     Leg    VASCULAR SURGERY  4/21/2015 \[facility] [place] [facility]\""    Percutaneous cannulation right common femoral artery with 5-french and later 6-french pinnacle destination sheath. Suprarenal abdominal aortogram with bilateral iliofemoral arteriograms. Bilateral lower extremity arteriograms. Left popliteal/tibioperoneal trunk artery balloon angioplasty with 4 mm x 15 mm cutting balloon.  VASCULAR SURGERY  4/21/2015 S cont    Arteriograms after balloon angioplasty. Balloon angioplasty left popliteal artery/tibioperoneal trunk with 5 mm x 40 mm russell balloon. Arteriograms after balloon angioplasty. Left superficial femoral artery balloon angioplasty with 7 mm x 100 mm  balloon. Left superficial femoral artery stent placement idev supera 6.5 x 100 mm self-expanding nitinol stent. Completion arteriograms left lower e    VASCULAR SURGERY  4/21/2015 S cont    extremity. Mynx closure right common femoral artery puncture site.  VASCULAR SURGERY  03/04/2020    \[facility]\"". Percutaneous cannulation left common femoral artery with 5 french glide sheath. Suprarenal abdominal aortogram with bilateral iliofemoral arteriogram.Bilateral lower extremity arteriogram.Minx closure left common femoral artery puncture site. Medications     Prior to Admission medications    Medication Sig Start Date End Date Taking? Authorizing Provider   amLODIPine (NORVASC) 5 MG tablet Take 1 tablet by mouth daily 12/21/21   LUIS Carrington   nitroGLYCERIN (NITROSTAT) 0.4 MG SL tablet Place 1 tablet under the tongue every 5 minutes as needed for Chest pain up to max of 3 total doses.  If no relief after 1 dose, call 911. 6/8/21   LUIS Carrington   lisinopril (PRINIVIL;ZESTRIL) 20 MG tablet Take 1 tablet by mouth daily 6/8/21   LUIS Carrington sotalol (BETAPACE) 160 MG tablet Take 1 tablet by mouth twice daily 6/8/21   LUIS Brian   nitroGLYCERIN (NITROSTAT) 0.4 MG SL tablet Place 1 tablet under the tongue every 5 minutes as needed for Chest pain 6/8/21   LUIS Brian   aspirin 81 MG EC tablet Take 81 mg by mouth daily. Historical Provider, MD        Allergies   Statins    Social History     Social History     Tobacco History     Smoking Status  Former Smoker Smoking Start Date  1/1/1964 Smoking Frequency  0 packs/day for 45 years (0 pk yrs) Smoking Tobacco Type  Cigarettes    Smokeless Tobacco Use  Never Used    Tobacco Comment  STATES,\"I VAP\"          Alcohol History     Alcohol Use Status  No          Drug Use     Drug Use Status  No          Sexual Activity     Sexually Active  Not Asked                Family History     Family History   Problem Relation Age of Onset    Coronary Art Dis Father     High Blood Pressure Father     Coronary Art Dis Brother     High Blood Pressure Mother     Cancer Mother     High Blood Pressure Sister        Review of Systems   Review of Systems   Unable to perform ROS: Acuity of condition       Physical Exam   BP (!) 172/102   Pulse 107   Temp 97.9 °F (36.6 °C) (Axillary)   Resp (!) 31   Wt 150 lb (68 kg)   SpO2 (!) 86%   BMI 19.79 kg/m²     Physical Exam  Vitals and nursing note reviewed. Exam conducted with a chaperone present. Constitutional:       General: He is in acute distress. Appearance: He is diaphoretic. HENT:      Head: Normocephalic and atraumatic. Nose: Nose normal.   Eyes:      Extraocular Movements: Extraocular movements intact. Conjunctiva/sclera: Conjunctivae normal.      Pupils: Pupils are equal, round, and reactive to light. Cardiovascular:      Rate and Rhythm: Regular rhythm. Tachycardia present. Pulses: Normal pulses. Heart sounds: Normal heart sounds. Pulmonary:      Breath sounds: Rales present.       Comments: Decreased breath sounds bilaterally   Tachypnea, accessory muscle usage, respiratory distress and retractions present. Musculoskeletal:      Right lower leg: Edema present. Left lower leg: Edema present. Skin:     General: Skin is warm. Capillary Refill: Capillary refill takes less than 2 seconds. Neurological:      General: No focal deficit present. Mental Status: He is alert.          Labs      Recent Results (from the past 24 hour(s))   CBC    Collection Time: 02/02/22  3:11 AM   Result Value Ref Range    WBC 19.7 (H) 4.8 - 10.8 K/uL    RBC 4.52 (L) 4.70 - 6.10 M/uL    Hemoglobin 14.4 14.0 - 18.0 g/dL    Hematocrit 44.3 42.0 - 52.0 %    MCV 98.0 (H) 80.0 - 94.0 fL    MCH 31.9 (H) 27.0 - 31.0 pg    MCHC 32.5 (L) 33.0 - 37.0 g/dL    RDW 14.5 11.5 - 14.5 %    Platelets 555 175 - 187 K/uL    MPV 12.4 9.4 - 12.4 fL   BLOOD GAS, ARTERIAL    Collection Time: 02/02/22  3:17 AM   Result Value Ref Range    pH, Arterial 7.330 (L) 7.350 - 7.450    pCO2, Arterial 36.0 35.0 - 45.0 mmHg    pO2, Arterial 60.0 (L) 80.0 - 100.0 mmHg    HCO3, Arterial 19.0 (L) 22.0 - 26.0 mmol/L    Base Excess, Arterial -6.2 (L) -2.0 - 2.0 mmol/L    Hemoglobin, Art, Extended 14.0 14.0 - 18.0 g/dL    O2 Sat, Arterial 89.8 (L) >92 %    Carboxyhgb, Arterial 2.7 0.0 - 5.0 %    Methemoglobin, Arterial 1.1 <1.5 %    O2 Content, Arterial 17.7 Not Established mL/dL    O2 Therapy Unknown    Potassium, Whole Blood    Collection Time: 02/02/22  3:17 AM   Result Value Ref Range    Potassium, Whole Blood 4.3    SPECIMEN REJECTION    Collection Time: 02/02/22  3:23 AM   Result Value Ref Range    Rejected Test [id number]     Reason for Rejection see below    COVID-19, Rapid    Collection Time: 02/02/22  3:38 AM    Specimen: Nasopharyngeal Swab   Result Value Ref Range    SARS-CoV-2, NAAT Not Detected Not Detected   Comprehensive Metabolic Panel w/ Reflex to MG    Collection Time: 02/02/22  3:38 AM   Result Value Ref Range    Sodium 139 136 - 145 mmol/L Potassium reflex Magnesium 4.5 3.5 - 5.0 mmol/L    Chloride 102 98 - 111 mmol/L    CO2 19 (L) 22 - 29 mmol/L    Anion Gap 18 7 - 19 mmol/L    Glucose 230 (H) 74 - 109 mg/dL    BUN 18 8 - 23 mg/dL    CREATININE 0.9 0.5 - 1.2 mg/dL    GFR Non-African American >60 >60    GFR African American >59 >59    Calcium 8.7 (L) 8.8 - 10.2 mg/dL    Total Protein 7.1 6.6 - 8.7 g/dL    Albumin 3.9 3.5 - 5.2 g/dL    Total Bilirubin 0.5 0.2 - 1.2 mg/dL    Alkaline Phosphatase 142 (H) 40 - 130 U/L    ALT 36 5 - 41 U/L    AST 26 5 - 40 U/L   Brain Natriuretic Peptide    Collection Time: 02/02/22  3:38 AM   Result Value Ref Range    Pro-BNP 3,275 (H) 0 - 900 pg/mL   Ethanol    Collection Time: 02/02/22  3:38 AM   Result Value Ref Range    Ethanol Lvl <10 mg/dL   Troponin    Collection Time: 02/02/22  3:38 AM   Result Value Ref Range    Troponin <0.01 0.00 - 0.03 ng/mL        Imaging/Diagnostics Last 24 Hours   EKG: Sinus tachycardia. Normal QT. Borderline ST changes in several leads     RADIOLOGY:   Chest: diffuse infiltrates concerning for CHF      XR CHEST PORTABLE    (Results Pending)       CRITICAL CARE TIME   Total Critical Care time was 55 minutes, excluding separately reportable procedures. There was a high probability of clinically significant/life threatening deterioration in the patient's condition which required my urgent intervention. Patient required my immediate presence at the bedside. Multiple reexaminations were undertaken throughout the course of care. Time was spent reviewing plan of care with nursing staff. Time is inclusive of  and clinical documentation. Assessment    Active Problems:    Acute respiratory failure with hypoxia and hypercarbia (HCC)    Acute exacerbation of CHF (congestive heart failure) (HCC)    Sepsis (HCC)    Acute systolic (congestive) heart failure (HCC)  Resolved Problems:    * No resolved hospital problems.  *      Plan   [facility]  Patient started on Bipap and high flow oxygen via nasal cannula to keep O2 sats between 92% and 94%  Patient started on IV dexamethasone  Lovenox ordered as per protocol  Sepsis treatment to include mild IV fluids  IV Rocephin and Zithromax  Blood cultures  May consider pulmonary/ID evaluation for further treatment recommendations regarding treatment as needed  PT/OT consult given for evaluation and treatment  RT consult given for evaluation and treatment  Case management consult given for DC planning  Echo  Diuresis    Serial troponins and EKGs    Consultations Ordered:  IP CONSULT TO HEART FAILURE NURSE/COORDINATOR  IP CONSULT TO DIETITIAN  IP CONSULT TO CARDIOLOGY    Electronically signed by     Juan C Hamilton MD, FAAFP  Hospitalist    on 2/2/2022 4:44 AM

## 2022-02-02 NOTE — ED NOTES
Patient placed on cardiac monitor, continuous pulse oximeter, and NIBP monitor. Monitor alarms on.          Willis Zurita RN  02/02/22 [id number]

## 2022-02-02 NOTE — PROGRESS NOTES
x 100 mm self-expanding nitinol stent. Completion arteriograms left lower e    VASCULAR SURGERY  4/21/2015 [facility] cont    extremity. Mynx closure right common femoral artery puncture site.  VASCULAR SURGERY  03/04/2020    [facility]. Percutaneous cannulation left common femoral artery with 5 Czech glide sheath. Suprarenal abdominal aortogram with bilateral iliofemoral arteriogram.Bilateral lower extremity arteriogram.Minx closure left common femoral artery puncture site. Family History   Problem Relation Age of Onset    Coronary Art Dis Father     High Blood Pressure Father     Coronary Art Dis Brother     High Blood Pressure Mother     Cancer Mother     High Blood Pressure Sister        Social History     Socioeconomic History    Marital status: Single     Spouse name: Not on file    Number of children: Not on file    Years of education: Not on file    Highest education level: Not on file   Occupational History    Not on file   Tobacco Use    Smoking status: Former Smoker     Packs/day: 0.00     Years: 45.00     Pack years: 0.00     Types: Cigarettes     Start date: 1964    Smokeless tobacco: Never Used    Tobacco comment: STATES,\"I VAP\"   Vaping Use    Vaping Use: Every day   Substance and Sexual Activity    Alcohol use: No    Drug use: No    Sexual activity: Not on file   Other Topics Concern    Not on file   Social History Narrative    Not on file     Social Determinants of Health     Financial Resource Strain:     Difficulty of Paying Living Expenses: Not on file   Food Insecurity:     Worried About Running Out of Food in the Last Year: Not on file    Eve of Food in the Last Year: Not on file   Transportation Needs:     Lack of Transportation (Medical): Not on file    Lack of Transportation (Non-Medical):  Not on file   Physical Activity:     Days of Exercise per Week: Not on file    Minutes of Exercise per Session: Not on file   Stress:     Feeling of Stress : Not on file   Social Connections:     Frequency of Communication with Friends and Family: Not on file    Frequency of Social Gatherings with Friends and Family: Not on file    Attends Confucianist Services: Not on file    Active Member of Clubs or Organizations: Not on file    Attends Club or Organization Meetings: Not on file    Marital Status: Not on file   Intimate Partner Violence:     Fear of Current or Ex-Partner: Not on file    Emotionally Abused: Not on file    Physically Abused: Not on file    Sexually Abused: Not on file   Housing Stability:     Unable to Pay for Housing in the Last Year: Not on file    Number of [place] in the Last Year: Not on file    Unstable Housing in the Last Year: Not on file       Current Facility-Administered Medications   Medication Dose Route Frequency Provider Last Rate Last Admin    nitroGLYCERIN 50 mg in dextrose 5% 250 mL infusion  5-200 mcg/min IntraVENous Continuous Juliana Olivarez MD 6 mL/hr at 02/02/22 0915 20 mcg/min at 02/02/22 0915    aspirin EC tablet 81 mg  81 mg Oral Daily Brigitte Lockett MD        nitroGLYCERIN (NITROSTAT) SL tablet 0.4 mg  0.4 mg SubLINGual Q5 Min PRN Brigitte Lockett MD        lisinopril (PRINIVIL;ZESTRIL) tablet 20 mg  20 mg Oral Daily Norbert Kraus MD        sotalol (BETAPACE) tablet 160 mg  160 mg Oral 2 times per day Brigitte Lockett MD        nitroGLYCERIN (NITROSTAT) SL tablet 0.4 mg  0.4 mg SubLINGual Q5 Min PRN Brigitte Lockett MD        amLODIPine (NORVASC) tablet 5 mg  5 mg Oral Daily Norbert Kraus MD        sodium chloride flush 0.9 % injection 5-40 mL  5-40 mL IntraVENous 2 times per day Brigitte Lockett MD        sodium chloride flush 0.9 % injection 5-40 mL  5-40 mL IntraVENous PRN Brigitte Lockett MD        0.9 % sodium chloride infusion  25 mL IntraVENous PRN Brigitte Lockett MD        ondansetron (ZOFRAN-ODT) disintegrating tablet 4 mg  4 mg Oral Q8H PRN Brigitte Lockett MD        Or    ondansetron [facility]) injection 4 mg  4 mg IntraVENous Q6H PRN Matti Rdz MD        polyethylene glycol [facility]) packet 17 g  17 g Oral Daily PRN Matti Rdz MD        acetaminophen (TYLENOL) tablet 650 mg  650 mg Oral Q6H PRN Matti Rdz MD        Or    acetaminophen (TYLENOL) suppository 650 mg  650 mg Rectal Q6H PRN Matti Rdz MD        enoxaparin (LOVENOX) injection 40 mg  40 mg SubCUTAneous Daily Matti Rdz MD        sodium chloride flush 0.9 % injection 5-40 mL  5-40 mL IntraVENous 2 times per day Matti Rdz MD        sodium chloride flush 0.9 % injection 5-40 mL  5-40 mL IntraVENous PRN Matti Rdz MD        0.9 % sodium chloride infusion  25 mL IntraVENous PRN Matti Rdz MD        perflutren lipid microspheres (DEFINITY) injection 1.65 mg  1.5 mL IntraVENous ONCE PRN Matti Rdz MD        furosemide (LASIX) injection 40 mg  40 mg IntraVENous BID Matti Rdz MD        cefTRIAXone (ROCEPHIN) 1,000 mg in sterile water 10 mL IV syringe  1,000 mg IntraVENous Q24H Matti Rdz MD        And    azithromycin (ZITHROMAX) 500 mg in D5W 250ml Vial Mate  500 mg IntraVENous Q24H Matti Rdz MD        dilTIAZem 125 mg in dextrose 5 % 125 mL infusion  5-15 mg/hr IntraVENous Continuous Renard Moreno MD 15 mL/hr at 02/02/22 1034 15 mg/hr at 02/02/22 1034    oxybutynin (DITROPAN-XL) extended release tablet 5 mg  5 mg Oral Nightly Renard Moreno MD         Current Outpatient Medications   Medication Sig Dispense Refill    amLODIPine (NORVASC) 5 MG tablet Take 1 tablet by mouth daily 90 tablet 3    nitroGLYCERIN (NITROSTAT) 0.4 MG SL tablet Place 1 tablet under the tongue every 5 minutes as needed for Chest pain up to max of 3 total doses.  If no relief after 1 dose, call 911. 25 tablet 3    lisinopril (PRINIVIL;ZESTRIL) 20 MG tablet Take 1 tablet by mouth daily 180 tablet 3    sotalol (BETAPACE) 160 MG tablet Take 1 tablet by mouth twice daily 180 tablet 3    nitroGLYCERIN (NITROSTAT) 0.4 MG SL tablet Place 1 tablet under the tongue every 5 minutes as needed for Chest pain 25 tablet 3    aspirin 81 MG EC tablet Take 81 mg by mouth daily.  nitroGLYCERIN 20 mcg/min (02/02/22 0915)    sodium chloride      sodium chloride      dilTIAZem (CARDIZEM) 125 mg in dextrose 5% 125 mL infusion 15 mg/hr (02/02/22 1034)        Objective:   BP (!) 136/99   Pulse 136   Temp 97.9 °F (36.6 °C) (Axillary)   Resp 20   Wt 150 lb (68 kg)   SpO2 99%   BMI 19.79 kg/m²     General: no acute distress  HEENT: normocephalic, atraumatic  Neck: supple, symmetrical, trachea midline   Lungs: bilateral crackles/rhonchi  Cardiovascular: s1 and s2 normal, irregularly irregular rhythm  Abdomen: soft, positive bowel sounds  Extremities: 2+ pitting edema lower extremities bilaterally  Neuro: aaox3, no focal deficits  Skin: normal color and texture     Recent Labs     02/02/22 0311   WBC 19.7*   RBC 4.52*   HGB 14.4   HCT 44.3   MCV 98.0*   MCH 31.9*   MCHC 32.5*        Recent Labs     02/02/22 0317 02/02/22 0338   NA  --  139   K 4.3 4.5   ANIONGAP  --  18   CL  --  102   CO2  --  19*   BUN  --  18   CREATININE  --  0.9   GLUCOSE  --  230*   CALCIUM  --  8.7*     No results for input(s): MG, PHOS in the last 72 hours. Recent Labs     02/02/22 0338   AST 26   ALT 36   BILITOT 0.5   ALKPHOS 142*     No results for input(s): PH, PO2, PCO2, HCO3, BE, O2SAT in the last 72 hours. Recent Labs     02/02/22 0338   TROPONINI <0.01     No results for input(s): INR in the last 72 hours. No results for input(s): LACTA in the last 72 hours. Intake/Output Summary (Last 24 hours) at 2/2/2022 1432  Last data filed at 2/2/2022 1427  Gross per 24 hour   Intake --   Output 1725 ml   Net -1725 ml       XR CHEST PORTABLE    Result Date: 2/2/2022  Examination. XR CHEST PORTABLE 2/2/2022 3:21 AM History: Shortness of breath.  Frontal portable upright view of the chest is compared with the previous study dated 1/7/2018. There is extensive interstitial infiltrate in the lungs bilaterally more severely in the mid and lower lungs. There is bilateral lower lobar consolidation. There is a small bibasal pleural effusion. There is no pneumothorax. The heart size is not evaluated due to complete obliteration of the cardiac border by the adjacent infiltrate. Atheromatous changes thoracic aorta are noted. There is evidence of prior cardiac surgery. No acute bony abnormality. 1. The extensive bilateral lung infiltrate. Bilateral lower lobar consolidation and bibasilar pleural effusion.  Signed by Dr Dante Rivero and Plan:   Acute decompensated congestive heart failure  TTE ordered to classify  Diuretics  Strict I's and O's  Daily weights  Serial troponin  History of remote CABG  Cardiology consulted    Chronic atrial fibrillation with RVR  Rate control  [id number] 2 score > 2  Patient states he is not on [facility]  Therapeutic Lovenox for now  Cardiology consulted    COPD exacerbation  Steroids  Nebs  Antibiotics    Concern for CAP  Empiric broad-spectrum antibiotics  Follow cultures    Sepsis  Suspect secondary to above  Agents as above  Follow cultures  Judicious IVF    Acute hypoxemic respiratory failure  Secondary to above processes  BiPAP 12/6 with 15 L  Wean as tolerated  Follow ABG as warranted    DVT prophylaxis  Lovenox    Shanice Pruitt MD   2/2/2022 2:32 PM

## 2022-02-02 NOTE — ED NOTES
Pt moved to 10L on Bipap, pt receiving breathing treatment at this time     KAYLENE Clements  02/02/22 [id number]

## 2022-02-02 NOTE — ED TRIAGE NOTES
Pt brought in by [facility]. EMS states that pts initial 02 sat was 65% on RA. Pt was placed on 15L NRB sat 91. Pt was given neb treatment and 125 mg Solumedrol by EMS.

## 2022-02-02 NOTE — ED NOTES
PT moved from room 10 to room 2 emergently due to worsening condition. Respiratory at the bedside. MD at the bedside.       [facility]  02/02/22 [id number]

## 2022-02-02 NOTE — PROGRESS NOTES
BLOOD GAS, ARTERIAL     Status: Final result    Component Ref Range & Units 02/02/22 0317   pH, Arterial 7.350 - 7.450 7.330 Low     pCO2, Arterial 35.0 - 45.0 mmHg 36.0    pO2, Arterial 80.0 - 100.0 mmHg 60.0 Low     HCO3, Arterial 22.0 - 26.0 mmol/L 19.0 Low     Base Excess, Arterial -2.0 - 2.0 mmol/L -6.2 Low     Hemoglobin, Art, Extended 14.0 - 18.0 g/dL 14.0    O2 Sat, Arterial >92 % 89.8 Low     Carboxyhgb, Arterial 0.0 - 5.0 % 2.7    Comment:      0.0-1.5   (Smokers 1.5-5.0)    Methemoglobin, Arterial <1.5 % 1.1    O2 Content, Arterial Not Established mL/dL 17.7    O2 Therapy  Unknown    Resulting Agency  [facility]        Specimen Collected: 02/02/22 0317 Last Resulted: 02/02/22 0318 View Other Order Details        BIPAP 12/6 with 15 L/M of oxygen  RR site choice

## 2022-02-02 NOTE — ED NOTES
Patient states that he wants to be a full code at this time. Patient states that he would also like to be put on the ventilator if applicable.       Aung Lopes RN  02/02/22 [id number]

## 2022-02-02 NOTE — ED NOTES
Spoke with Aneta from [facility] , they will preform 2D in the morning early reported      KAYLENE Clements  02/02/22 [address]

## 2022-02-02 NOTE — ED NOTES
Lab contacted for Troponin re draw, Dr. Mony Kennedy contacted about pt having bladder spasms     [facility], KAYLENE  02/02/22 6029

## 2022-02-02 NOTE — LETTER
[facility]  Cardiac Rehab Department  [address] [place] [address], [address]  [phone]  Toll Free [phone]          February 10, 2022    Dear Veto Garcia,    Please find this informational packet that has been sent to you on heart disease and the guidelines you are to follow concerning your present cardiac condition and immediate recovery. Due to your recent cardiac diagnosis and coronary stent placement you now qualify for participation in a Phase II Outpatient Cardiac Rehab Program.  This elective service has been shown to significantly reduce cardiac mortality by 26-31% and increase longevity by as much as 5 years among patients such as yourself! A brochure and info sheet have been included in this mailing for the purpose of providing you with a brief overview of program components. The [facility] is currently caring for patients in accordance with all mandated regulatory COVID-19 guidelines and practices. You should contact us at [phone] to set up a free, no obligation \"orientation & assessment\" appointment to learn more and take full advantage of this life changing opportunity. Furthermore, feel free to reach out to us with any questions or concerns and our staff will be more than pleased to assist you. Thank you. To the betterment of your health,        [facility] Cardiac Rehab Staff    WANDA Payne, MA            Maureen Ni RN  Registered Nurse  Exercise Physiologist  Registered Nurse

## 2022-02-02 NOTE — ED NOTES
Bed: 10  Expected date:   Expected time:   Means of arrival:   Comments:  Jacklyn Bliss  02/02/22 0444

## 2022-02-02 NOTE — ED PROVIDER NOTES
[facility] EMERGENCY DEPT  eMERGENCY dEPARTMENT eNCOUnter      Pt Name: Abdi Machuca  MRN: [id number]  [place] 1950  Date of evaluation: 2/2/2022  Provider: Axel Orona MD    CHIEF COMPLAINT       Chief Complaint   Patient presents with    Respiratory Distress     initial 02 sat at home 65%          HISTORY OF PRESENT ILLNESS   (Location/Symptom, Timing/Onset,Context/Setting, Quality, Duration, Modifying Factors, Severity)  Note limiting factors. Abdi Machuca is a 66 y.o. male who presents to the emergency department due to respiratory distress. Brought from home. Patient not able to give a full history at this time because of his respiratory distress. Roselie Bustle that he is having increasing shortness of breath over the last several days and over the past couple weeks has developed significant swelling of bilateral lower extremities. No history of congestive heart failure. No history of DVT or PE. No chest pain. No fevers. Roselie Bustle he has not been coughing much. No abdominal pain vomiting or diarrhea. EMS gave patient Solu-Medrol and DuoNeb en route. Patient started on BiPAP soon after arrival here and seems to be tolerating it pretty well. Decreased breath sounds diffusely and significant pitting edema to bilateral lower extremities. HPI    NursingNotes were reviewed. REVIEW OF SYSTEMS    (2-9 systems for level 4, 10 or more for level 5)     Review of Systems   Constitutional: Negative for fever. Eyes: Negative for pain. Respiratory: Positive for shortness of breath. Cardiovascular: Positive for leg swelling. Negative for chest pain and palpitations. Gastrointestinal: Negative for abdominal pain, diarrhea and vomiting. Genitourinary: Negative for dysuria. Musculoskeletal: Negative for back pain and neck pain. Skin: Negative for rash. Neurological: Negative for weakness and headaches. All other systems reviewed and are negative.       A complete review of systems was performed and is negative except as noted above in the HPI. 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 Phoenix Iraheta manages    Palpitations     Statin intolerance 2/14/2018    Tobacco abuse     Transient atrial fibrillation or flutter     post op         SURGICAL HISTORY       Past Surgical History:   Procedure Laterality Date    CARDIAC CATHETERIZATION  4/23/12    with stent to left circ, and LAD    CARDIAC CATHETERIZATION  01/07/2018    PTCA/BJ to VG to LAD;  atherectomy and 3 BMS to VG to 1st Circ    CORONARY ARTERY BYPASS GRAFT  05/15/09    x4    DIAGNOSTIC CARDIAC CATH LAB PROCEDURE  05/15/09    left heart cath, left ventr, selective coronary arteriography     FRACTURE SURGERY Left     Leg    VASCULAR SURGERY  4/21/2015 [facility]    Percutaneous cannulation right common femoral artery with 5-Romansh and later 6-Romansh pinnacle destination sheath. Suprarenal abdominal aortogram with bilateral iliofemoral arteriograms. Bilateral lower extremity arteriograms. Left popliteal/tibioperoneal trunk artery balloon angioplasty with 4 mm x 15 mm cutting balloon.  VASCULAR SURGERY  4/21/2015 [facility] cont    Arteriograms after balloon angioplasty. Balloon angioplasty left popliteal artery/tibioperoneal trunk with 5 mm x 40 mm russell balloon. Arteriograms after balloon angioplasty. Left superficial femoral artery balloon angioplasty with 7 mm x 100 mm  balloon. Left superficial femoral artery stent placement idev supera 6.5 x 100 mm self-expanding nitinol stent. Completion arteriograms left lower e    VASCULAR SURGERY  4/21/2015 [facility] cont    extremity. Mynx closure right common femoral artery puncture site.  VASCULAR SURGERY  03/04/2020    [facility]. Percutaneous cannulation left common femoral artery with 5 french glide sheath. Suprarenal abdominal aortogram with bilateral iliofemoral arteriogram.Bilateral lower extremity arteriogram.Minx closure left common femoral artery puncture site. CURRENT MEDICATIONS       Previous Medications    AMLODIPINE (NORVASC) 5 MG TABLET    Take 1 tablet by mouth daily    ASPIRIN 81 MG EC TABLET    Take 81 mg by mouth daily. LISINOPRIL (PRINIVIL;ZESTRIL) 20 MG TABLET    Take 1 tablet by mouth daily    NITROGLYCERIN (NITROSTAT) 0.4 MG SL TABLET    Place 1 tablet under the tongue every 5 minutes as needed for Chest pain up to max of 3 total doses. If no relief after 1 dose, call 911.     NITROGLYCERIN (NITROSTAT) 0.4 MG SL TABLET    Place 1 tablet under the tongue every 5 minutes as needed for Chest pain    SOTALOL (BETAPACE) 160 MG TABLET    Take 1 tablet by mouth twice daily       ALLERGIES     Statins    FAMILY HISTORY       Family History   Problem Relation Age of Onset    Coronary Art Dis Father     High Blood Pressure Father     Coronary Art Dis Brother     High Blood Pressure Mother     Cancer Mother     High Blood Pressure Sister           SOCIAL HISTORY       Social History     Socioeconomic History    Marital status: Single     Spouse name: None    Number of children: None    Years of education: None    Highest education level: None   Occupational History    None   Tobacco Use    Smoking status: Former Smoker     Packs/day: 0.00     Years: 45.00     Pack years: 0.00     Types: Cigarettes     Start date: 1964    Smokeless tobacco: Never Used    Tobacco comment: STATES,\"I VAP\"   Vaping Use    Vaping Use: Every day   Substance and Sexual Activity    Alcohol use: No    Drug use: No    Sexual activity: None   Other Topics Concern    None   Social History Narrative    None     Social Determinants of Health     Financial Resource Strain:     Difficulty of Paying Living Expenses: Not on file   Food Insecurity:     Worried About Running Out of Food in the Last Year: Not on file    Eve of Food in the Last Year: Not on file   Transportation Needs:     Lack of Transportation (Medical): Not on file    Lack of Transportation (Non-Medical): Not on file   Physical Activity:     Days of Exercise per Week: Not on file    Minutes of Exercise per Session: Not on file   Stress:     Feeling of Stress : Not on file   Social Connections:     Frequency of Communication with Friends and Family: Not on file    Frequency of Social Gatherings with Friends and Family: Not on file    Attends Baptism Services: Not on file    Active Member of [address] [place] [address] [employer] or Organizations: Not on file    Attends Club or Organization Meetings: Not on file    Marital Status: Not on file   Intimate Partner Violence:     Fear of Current or Ex-Partner: Not on file    Emotionally Abused: Not on file    Physically Abused: Not on file    Sexually Abused: Not on file   Housing Stability:     Unable to Pay for Housing in the Last Year: Not on file    Number of [place] in the Last Year: Not on file    Unstable Housing in the Last Year: Not on file       SCREENINGS    Arun Coma Scale  Eye Opening: Spontaneous  Best Verbal Response: Oriented  Best Motor Response: Obeys commands  Arun Coma Scale Score: 15        PHYSICAL EXAM    (up to 7 for level 4, 8 or more for level 5)     ED Triage Vitals   BP Temp Temp Source Pulse Resp SpO2 Height Weight   02/02/22 0306 02/02/22 0307 02/02/22 0307 02/02/22 0306 02/02/22 0306 02/02/22 0306 -- --   (!) 153/96 97.9 °F (36.6 °C) Axillary 103 (!) 34 (!) 87 %         Physical Exam  Vitals reviewed. Constitutional:       General: He is in acute distress. Appearance: He is well-developed. He is ill-appearing. HENT:      Head: Normocephalic and atraumatic.       Mouth/Throat:      Mouth: Mucous membranes are moist.   Eyes: General: No scleral icterus. Pupils: Pupils are equal, round, and reactive to light. Neck:      Vascular: No JVD. Cardiovascular:      Rate and Rhythm: Regular rhythm. Tachycardia present. Pulses: Normal pulses. Heart sounds: Normal heart sounds. Pulmonary:      Effort: Tachypnea, accessory muscle usage, respiratory distress and retractions present. Breath sounds: Decreased breath sounds (diffusely) present. Abdominal:      General: There is no distension. Palpations: Abdomen is soft. Tenderness: There is no abdominal tenderness. There is no guarding or rebound. Musculoskeletal:         General: No tenderness. Normal range of motion. Cervical back: Normal range of motion and neck supple. Right lower leg: Edema present. Left lower leg: Edema present. Skin:     General: Skin is warm and dry. Capillary Refill: Capillary refill takes less than 2 seconds. Neurological:      General: No focal deficit present. Mental Status: He is alert and oriented to person, place, and time. Psychiatric:         Mood and Affect: Mood normal.         Behavior: Behavior normal. Behavior is cooperative. DIAGNOSTIC RESULTS     EKG: All EKG's are interpreted by the Emergency Department Physician who either signs or Co-signs this chart in the absence of a cardiologist.    Sinus tachycardia. Normal QT.   Borderline ST changes in several leads    RADIOLOGY:   Non-plain film images such as CT, Ultrasound and MRI are read by the radiologist. Andreas Divers images are visualized and preliminarily interpreted by the emergency physician with the below findings:    Chest: diffuse infiltrates concerning for chf and/or covid    Interpretation per the Radiologist below, if available at the time of this note:    XR CHEST PORTABLE    (Results Pending)         ED BEDSIDE ULTRASOUND:   Performed by ED Physician - none    LABS:  Labs Reviewed   CBC - Abnormal; Notable for the following components:       Result Value    WBC 19.7 (*)     RBC 4.52 (*)     MCV 98.0 (*)     MCH 31.9 (*)     MCHC 32.5 (*)     All other components within normal limits   BLOOD GAS, ARTERIAL - Abnormal; Notable for the following components:    pH, Arterial 7.330 (*)     pO2, Arterial 60.0 (*)     HCO3, Arterial 19.0 (*)     Base Excess, Arterial -6.2 (*)     O2 Sat, Arterial 89.8 (*)     All other components within normal limits   COMPREHENSIVE METABOLIC PANEL W/ REFLEX TO MG FOR LOW K - Abnormal; Notable for the following components:    CO2 19 (*)     Glucose 230 (*)     Calcium 8.7 (*)     Alkaline Phosphatase 142 (*)     All other components within normal limits   BRAIN NATRIURETIC PEPTIDE - Abnormal; Notable for the following components:    Pro-BNP 3,275 (*)     All other components within normal limits   COVID-19, RAPID   CULTURE, BLOOD 1   CULTURE, BLOOD 2   POTASSIUM, WHOLE BLOOD   SPECIMEN REJECTION   ETHANOL   TROPONIN   TROPONIN       All other labs were within normal range or not returned as of this dictation. EMERGENCY DEPARTMENT COURSE and DIFFERENTIALDIAGNOSIS/MDM:   Vitals:    Vitals:    02/02/22 0421 02/02/22 0440 02/02/22 0443 02/02/22 0449   BP: (!) 172/102 (!) 162/101 (!) 158/104 (!) 164/97   Pulse: 107 111 109 99   Resp: (!) 31 (!) 34 23 26   Temp:       TempSrc:       SpO2: (!) 86% (!) 83% (!) 87% 92%   Weight:           MDM  Number of Diagnoses or Management Options  Critical Care  Total time providing critical care: 30-74 minutes    Patient Progress  Patient progress: improved    Elevated white count. Patient denies fever or cough. I think this is probably more of a stress response but will empirically treat with antibiotics. Considered PE which I think is unlikely. I feel pretty confident his symptoms are probably due to CHF but he cannot tolerate a CT at this time because of his respiratory distress so we'll treat empirically with Lovenox in case he has a PE.   Patient currently on nitro drip and BP is gradually improving. Lasix given. Initially, patient's respiratory status wasn't improving very much but now patient is breathing much more easily on Bipap. Had considered intubation but his symptoms are significant improving so I think that we can hold off on this for now. Patient and family updated about plan. Case discussed with hospitalist, Dr. Carlisle Bosworth, who is agreeable plan of care and admission and has seen and evaluated the patient in the ER. CONSULTS:  IP CONSULT TO HEART FAILURE NURSE/COORDINATOR  IP CONSULT TO DIETITIAN  IP CONSULT TO CARDIOLOGY    PROCEDURES:  Unless otherwise notedbelow, none     Procedures    FINAL IMPRESSION     1. Acute respiratory failure with hypoxia ([address]    2.  Congestive heart failure, unspecified HF chronicity, unspecified heart failure type ([address]          DISPOSITION/PLAN   DISPOSITION Admitted 02/02/2022 04:42:20 AM      PATIENT REFERRED TO:  @FUP@    DISCHARGE MEDICATIONS:  New Prescriptions    No medications on file          (Please note that portions of this note were completed with a voice recognition program.  Efforts were made to edit the dictations butoccasionally words are mis-transcribed.)    Sara Hoyt MD (electronically signed)  AttendingEmergency Physician          Sara Hoyt MD  02/02/22 0820

## 2022-02-03 LAB
ALBUMIN SERPL-MCNC: 2.9 G/DL (ref 3.5–5.2)
ALP BLD-CCNC: 97 U/L (ref 40–130)
ALT SERPL-CCNC: 21 U/L (ref 5–41)
ANION GAP SERPL CALCULATED.3IONS-SCNC: 11 MMOL/L (ref 7–19)
AST SERPL-CCNC: 25 U/L (ref 5–40)
BASOPHILS ABSOLUTE: 0 K/UL (ref 0–0.2)
BASOPHILS RELATIVE PERCENT: 0.1 % (ref 0–1)
BILIRUB SERPL-MCNC: 0.5 MG/DL (ref 0.2–1.2)
BUN BLDV-MCNC: 21 MG/DL (ref 8–23)
CALCIUM SERPL-MCNC: 8 MG/DL (ref 8.8–10.2)
CHLORIDE BLD-SCNC: 101 MMOL/L (ref 98–111)
CHOLESTEROL, TOTAL: 145 MG/DL (ref 160–199)
CHOLESTEROL, TOTAL: 152 MG/DL (ref 160–199)
CO2: 27 MMOL/L (ref 22–29)
CREAT SERPL-MCNC: 0.7 MG/DL (ref 0.5–1.2)
D DIMER: 1.01 UG/ML FEU (ref 0–0.48)
EOSINOPHILS ABSOLUTE: 0 K/UL (ref 0–0.6)
EOSINOPHILS RELATIVE PERCENT: 0 % (ref 0–5)
GFR AFRICAN AMERICAN: >59
GFR NON-AFRICAN AMERICAN: >60
GLUCOSE BLD-MCNC: 144 MG/DL (ref 70–99)
GLUCOSE BLD-MCNC: 147 MG/DL (ref 74–109)
HCT VFR BLD CALC: 37.4 % (ref 42–52)
HDLC SERPL-MCNC: 53 MG/DL (ref 55–121)
HDLC SERPL-MCNC: 54 MG/DL (ref 55–121)
HEMOGLOBIN: 12.2 G/DL (ref 14–18)
IMMATURE GRANULOCYTES #: 0.1 K/UL
LDL CHOLESTEROL CALCULATED: 81 MG/DL
LDL CHOLESTEROL CALCULATED: 86 MG/DL
LV EF: 20 %
LVEF MODALITY: NORMAL
LYMPHOCYTES ABSOLUTE: 1.1 K/UL (ref 1.1–4.5)
LYMPHOCYTES RELATIVE PERCENT: 6.2 % (ref 20–40)
MAGNESIUM: 1.7 MG/DL (ref 1.6–2.4)
MCH RBC QN AUTO: 31 PG (ref 27–31)
MCHC RBC AUTO-ENTMCNC: 32.6 G/DL (ref 33–37)
MCV RBC AUTO: 95.2 FL (ref 80–94)
MONOCYTES ABSOLUTE: 0.3 K/UL (ref 0–0.9)
MONOCYTES RELATIVE PERCENT: 2 % (ref 0–10)
NEUTROPHILS ABSOLUTE: 15.6 K/UL (ref 1.5–7.5)
NEUTROPHILS RELATIVE PERCENT: 91.1 % (ref 50–65)
PDW BLD-RTO: 14.2 % (ref 11.5–14.5)
PERFORMED ON: ABNORMAL
PLATELET # BLD: 307 K/UL (ref 130–400)
PMV BLD AUTO: 10.9 FL (ref 9.4–12.4)
POTASSIUM SERPL-SCNC: 3.9 MMOL/L (ref 3.5–5)
RBC # BLD: 3.93 M/UL (ref 4.7–6.1)
SODIUM BLD-SCNC: 139 MMOL/L (ref 136–145)
T4 FREE: 1.21 NG/DL (ref 0.93–1.7)
TOTAL PROTEIN: 5.5 G/DL (ref 6.6–8.7)
TRIGL SERPL-MCNC: 49 MG/DL (ref 0–149)
TRIGL SERPL-MCNC: 65 MG/DL (ref 0–149)
TROPONIN: 0.15 NG/ML (ref 0–0.03)
TROPONIN: 0.22 NG/ML (ref 0–0.03)
TSH SERPL DL<=0.05 MIU/L-ACNC: 2.18 UIU/ML (ref 0.27–4.2)
VITAMIN D 25-HYDROXY: 25.4 NG/ML
WBC # BLD: 17.1 K/UL (ref 4.8–10.8)

## 2022-02-03 PROCEDURE — 6370000000 HC RX 637 (ALT 250 FOR IP): Performed by: FAMILY MEDICINE

## 2022-02-03 PROCEDURE — 2140000000 HC CCU INTERMEDIATE R&B

## 2022-02-03 PROCEDURE — 99223 1ST HOSP IP/OBS HIGH 75: CPT | Performed by: INTERNAL MEDICINE

## 2022-02-03 PROCEDURE — 6360000004 HC RX CONTRAST MEDICATION: Performed by: FAMILY MEDICINE

## 2022-02-03 PROCEDURE — 84484 ASSAY OF TROPONIN QUANT: CPT

## 2022-02-03 PROCEDURE — 85025 COMPLETE CBC W/AUTO DIFF WBC: CPT

## 2022-02-03 PROCEDURE — 93459 L HRT ART/GRFT ANGIO: CPT | Performed by: INTERNAL MEDICINE

## 2022-02-03 PROCEDURE — 2500000003 HC RX 250 WO HCPCS

## 2022-02-03 PROCEDURE — 6360000002 HC RX W HCPCS: Performed by: FAMILY MEDICINE

## 2022-02-03 PROCEDURE — 84443 ASSAY THYROID STIM HORMONE: CPT

## 2022-02-03 PROCEDURE — 82306 VITAMIN D 25 HYDROXY: CPT

## 2022-02-03 PROCEDURE — 6360000002 HC RX W HCPCS

## 2022-02-03 PROCEDURE — 80053 COMPREHEN METABOLIC PANEL: CPT

## 2022-02-03 PROCEDURE — 2709999900 HC NON-CHARGEABLE SUPPLY

## 2022-02-03 PROCEDURE — 83735 ASSAY OF MAGNESIUM: CPT

## 2022-02-03 PROCEDURE — 93005 ELECTROCARDIOGRAM TRACING: CPT | Performed by: FAMILY MEDICINE

## 2022-02-03 PROCEDURE — 85379 FIBRIN DEGRADATION QUANT: CPT

## 2022-02-03 PROCEDURE — 84403 ASSAY OF TOTAL TESTOSTERONE: CPT

## 2022-02-03 PROCEDURE — 94640 AIRWAY INHALATION TREATMENT: CPT

## 2022-02-03 PROCEDURE — 2580000003 HC RX 258: Performed by: FAMILY MEDICINE

## 2022-02-03 PROCEDURE — 82947 ASSAY GLUCOSE BLOOD QUANT: CPT

## 2022-02-03 PROCEDURE — 6370000000 HC RX 637 (ALT 250 FOR IP): Performed by: INTERNAL MEDICINE

## 2022-02-03 PROCEDURE — B2131ZZ FLUOROSCOPY OF MULTIPLE CORONARY ARTERY BYPASS GRAFTS USING LOW OSMOLAR CONTRAST: ICD-10-PCS | Performed by: INTERNAL MEDICINE

## 2022-02-03 PROCEDURE — 80061 LIPID PANEL: CPT

## 2022-02-03 PROCEDURE — 36415 COLL VENOUS BLD VENIPUNCTURE: CPT

## 2022-02-03 PROCEDURE — 99152 MOD SED SAME PHYS/QHP 5/>YRS: CPT

## 2022-02-03 PROCEDURE — 94660 CPAP INITIATION&MGMT: CPT

## 2022-02-03 PROCEDURE — 6370000000 HC RX 637 (ALT 250 FOR IP): Performed by: HOSPITALIST

## 2022-02-03 PROCEDURE — B2151ZZ FLUOROSCOPY OF LEFT HEART USING LOW OSMOLAR CONTRAST: ICD-10-PCS | Performed by: INTERNAL MEDICINE

## 2022-02-03 PROCEDURE — 6360000002 HC RX W HCPCS: Performed by: INTERNAL MEDICINE

## 2022-02-03 PROCEDURE — C1769 GUIDE WIRE: HCPCS

## 2022-02-03 PROCEDURE — 2700000000 HC OXYGEN THERAPY PER DAY

## 2022-02-03 PROCEDURE — C1894 INTRO/SHEATH, NON-LASER: HCPCS

## 2022-02-03 PROCEDURE — C8929 TTE W OR WO FOL WCON,DOPPLER: HCPCS

## 2022-02-03 PROCEDURE — 99152 MOD SED SAME PHYS/QHP 5/>YRS: CPT | Performed by: INTERNAL MEDICINE

## 2022-02-03 PROCEDURE — 2580000003 HC RX 258: Performed by: INTERNAL MEDICINE

## 2022-02-03 PROCEDURE — 93459 L HRT ART/GRFT ANGIO: CPT

## 2022-02-03 PROCEDURE — 4A023N7 MEASUREMENT OF CARDIAC SAMPLING AND PRESSURE, LEFT HEART, PERCUTANEOUS APPROACH: ICD-10-PCS | Performed by: INTERNAL MEDICINE

## 2022-02-03 PROCEDURE — B2111ZZ FLUOROSCOPY OF MULTIPLE CORONARY ARTERIES USING LOW OSMOLAR CONTRAST: ICD-10-PCS | Performed by: INTERNAL MEDICINE

## 2022-02-03 PROCEDURE — 84439 ASSAY OF FREE THYROXINE: CPT

## 2022-02-03 PROCEDURE — 6360000004 HC RX CONTRAST MEDICATION: Performed by: HOSPITALIST

## 2022-02-03 RX ORDER — ATORVASTATIN CALCIUM 40 MG/1
80 TABLET, FILM COATED ORAL NIGHTLY
Status: DISCONTINUED | OUTPATIENT
Start: 2022-02-03 | End: 2022-02-09 | Stop reason: HOSPADM

## 2022-02-03 RX ORDER — VITAMIN B COMPLEX
5000 TABLET ORAL DAILY
Status: DISCONTINUED | OUTPATIENT
Start: 2022-02-03 | End: 2022-02-09 | Stop reason: HOSPADM

## 2022-02-03 RX ORDER — HYDRALAZINE HYDROCHLORIDE 20 MG/ML
10 INJECTION INTRAMUSCULAR; INTRAVENOUS EVERY 10 MIN PRN
Status: DISCONTINUED | OUTPATIENT
Start: 2022-02-03 | End: 2022-02-09 | Stop reason: HOSPADM

## 2022-02-03 RX ORDER — LORAZEPAM 1 MG/1
1 TABLET ORAL EVERY 8 HOURS PRN
Status: DISCONTINUED | OUTPATIENT
Start: 2022-02-03 | End: 2022-02-09 | Stop reason: HOSPADM

## 2022-02-03 RX ORDER — SODIUM CHLORIDE 0.9 % (FLUSH) 0.9 %
5-40 SYRINGE (ML) INJECTION EVERY 12 HOURS SCHEDULED
Status: DISCONTINUED | OUTPATIENT
Start: 2022-02-03 | End: 2022-02-09 | Stop reason: HOSPADM

## 2022-02-03 RX ORDER — SODIUM CHLORIDE 9 MG/ML
1000 INJECTION, SOLUTION INTRAVENOUS CONTINUOUS
Status: DISCONTINUED | OUTPATIENT
Start: 2022-02-03 | End: 2022-02-06

## 2022-02-03 RX ORDER — ASPIRIN 81 MG/1
81 TABLET ORAL DAILY
Status: DISCONTINUED | OUTPATIENT
Start: 2022-02-04 | End: 2022-02-03 | Stop reason: SDUPTHER

## 2022-02-03 RX ORDER — ACETAMINOPHEN 325 MG/1
650 TABLET ORAL EVERY 4 HOURS PRN
Status: DISCONTINUED | OUTPATIENT
Start: 2022-02-03 | End: 2022-02-09 | Stop reason: HOSPADM

## 2022-02-03 RX ORDER — SODIUM CHLORIDE 0.9 % (FLUSH) 0.9 %
5-40 SYRINGE (ML) INJECTION PRN
Status: DISCONTINUED | OUTPATIENT
Start: 2022-02-03 | End: 2022-02-09 | Stop reason: HOSPADM

## 2022-02-03 RX ORDER — SODIUM CHLORIDE 9 MG/ML
25 INJECTION, SOLUTION INTRAVENOUS PRN
Status: DISCONTINUED | OUTPATIENT
Start: 2022-02-03 | End: 2022-02-09 | Stop reason: HOSPADM

## 2022-02-03 RX ORDER — SODIUM CHLORIDE 9 MG/ML
INJECTION, SOLUTION INTRAVENOUS CONTINUOUS
Status: DISCONTINUED | OUTPATIENT
Start: 2022-02-03 | End: 2022-02-03 | Stop reason: SDUPTHER

## 2022-02-03 RX ORDER — ONDANSETRON 2 MG/ML
4 INJECTION INTRAMUSCULAR; INTRAVENOUS EVERY 6 HOURS PRN
Status: DISCONTINUED | OUTPATIENT
Start: 2022-02-03 | End: 2022-02-03 | Stop reason: SDUPTHER

## 2022-02-03 RX ADMIN — IPRATROPIUM BROMIDE AND ALBUTEROL SULFATE 1 AMPULE: 2.5; .5 SOLUTION RESPIRATORY (INHALATION) at 06:54

## 2022-02-03 RX ADMIN — AMLODIPINE BESYLATE 5 MG: 5 TABLET ORAL at 08:36

## 2022-02-03 RX ADMIN — LORAZEPAM 1 MG: 1 TABLET ORAL at 17:37

## 2022-02-03 RX ADMIN — IPRATROPIUM BROMIDE AND ALBUTEROL SULFATE 1 AMPULE: 2.5; .5 SOLUTION RESPIRATORY (INHALATION) at 10:12

## 2022-02-03 RX ADMIN — IPRATROPIUM BROMIDE AND ALBUTEROL SULFATE 1 AMPULE: 2.5; .5 SOLUTION RESPIRATORY (INHALATION) at 18:58

## 2022-02-03 RX ADMIN — METHYLPREDNISOLONE SODIUM SUCCINATE 40 MG: 40 INJECTION, POWDER, FOR SOLUTION INTRAMUSCULAR; INTRAVENOUS at 04:23

## 2022-02-03 RX ADMIN — IPRATROPIUM BROMIDE AND ALBUTEROL SULFATE 1 AMPULE: 2.5; .5 SOLUTION RESPIRATORY (INHALATION) at 22:38

## 2022-02-03 RX ADMIN — OXYBUTYNIN CHLORIDE 5 MG: 5 TABLET, EXTENDED RELEASE ORAL at 21:24

## 2022-02-03 RX ADMIN — ASPIRIN 81 MG: 81 TABLET, COATED ORAL at 08:36

## 2022-02-03 RX ADMIN — AZITHROMYCIN MONOHYDRATE 500 MG: 500 INJECTION, POWDER, LYOPHILIZED, FOR SOLUTION INTRAVENOUS at 04:32

## 2022-02-03 RX ADMIN — PERFLUTREN 1.5 ML: 6.52 INJECTION, SUSPENSION INTRAVENOUS at 09:25

## 2022-02-03 RX ADMIN — SODIUM CHLORIDE, PRESERVATIVE FREE 10 ML: 5 INJECTION INTRAVENOUS at 21:24

## 2022-02-03 RX ADMIN — ENOXAPARIN SODIUM 70 MG: 100 INJECTION SUBCUTANEOUS at 21:24

## 2022-02-03 RX ADMIN — SOTALOL HYDROCHLORIDE 160 MG: 80 TABLET ORAL at 08:36

## 2022-02-03 RX ADMIN — SODIUM CHLORIDE, PRESERVATIVE FREE 10 ML: 5 INJECTION INTRAVENOUS at 08:37

## 2022-02-03 RX ADMIN — FUROSEMIDE 40 MG: 10 INJECTION, SOLUTION INTRAMUSCULAR; INTRAVENOUS at 17:13

## 2022-02-03 RX ADMIN — Medication 5000 UNITS: at 17:13

## 2022-02-03 RX ADMIN — ATORVASTATIN CALCIUM 80 MG: 40 TABLET, FILM COATED ORAL at 21:24

## 2022-02-03 RX ADMIN — LISINOPRIL 20 MG: 10 TABLET ORAL at 08:36

## 2022-02-03 RX ADMIN — IPRATROPIUM BROMIDE AND ALBUTEROL SULFATE 1 AMPULE: 2.5; .5 SOLUTION RESPIRATORY (INHALATION) at 02:43

## 2022-02-03 RX ADMIN — SOTALOL HYDROCHLORIDE 160 MG: 80 TABLET ORAL at 21:24

## 2022-02-03 RX ADMIN — METHYLPREDNISOLONE SODIUM SUCCINATE 40 MG: 40 INJECTION, POWDER, FOR SOLUTION INTRAMUSCULAR; INTRAVENOUS at 17:13

## 2022-02-03 RX ADMIN — WATER 1000 MG: 1 INJECTION INTRAMUSCULAR; INTRAVENOUS; SUBCUTANEOUS at 04:24

## 2022-02-03 RX ADMIN — IOPAMIDOL 200 ML: 612 INJECTION, SOLUTION INTRAVENOUS at 14:19

## 2022-02-03 RX ADMIN — ENOXAPARIN SODIUM 70 MG: 100 INJECTION SUBCUTANEOUS at 08:36

## 2022-02-03 RX ADMIN — SODIUM CHLORIDE 1000 ML: 9 INJECTION, SOLUTION INTRAVENOUS at 17:16

## 2022-02-03 RX ADMIN — FUROSEMIDE 40 MG: 10 INJECTION, SOLUTION INTRAMUSCULAR; INTRAVENOUS at 08:37

## 2022-02-03 ASSESSMENT — PAIN SCALES - GENERAL: PAINLEVEL_OUTOF10: 0

## 2022-02-03 ASSESSMENT — ENCOUNTER SYMPTOMS
VOMITING: 0
DIARRHEA: 0
EYES NEGATIVE: 1
COUGH: 1
ALLERGIC/IMMUNOLOGIC NEGATIVE: 1
NAUSEA: 0
RESPIRATORY NEGATIVE: 1
GASTROINTESTINAL NEGATIVE: 1
SHORTNESS OF BREATH: 1
SHORTNESS OF BREATH: 0

## 2022-02-03 NOTE — PLAN OF CARE
Nutrition Problem #1: Inadequate oral intake,Altered nutrition-related lab values  Intervention: Food and/or Nutrient Delivery: Continue NPO  Nutritional Goals: po intake 50% or greater. Weight stable.   proBNP decreased prior to discharge

## 2022-02-03 NOTE — PROGRESS NOTES
Erin Breaux arrived to room #485. Presented with: Acute resp. Failure,Acute heart failure  Mental Status: Patient is oriented and alert. Vitals:    02/03/22 0330   BP:    Pulse: 61   Resp:    Temp:    SpO2:      Patient safety contract and falls prevention contract reviewed with patient Yes. Oriented Patient to room. Call light within reach. Yes.   Needs, issues or concerns expressed at this time: no.      Electronically signed by Bao Sal RN on 2/3/2022 at 4:10 AM

## 2022-02-03 NOTE — CONSULTS
[id number] [facility]  Cardiology Consult      Requesting MD:  Quentin Shirley MD   Admit Status:  Inpatient [101]       History obtained from:   [] Patient  [] Other (specify):     Patient:  Sury Beck  [id number]     Chief Complaint:   Chief Complaint   Patient presents with    Respiratory Distress     initial 02 sat at home 65%          HPI: Mr. Hilaria Jackson is a 66 y.o. male with a history of extensive coronary artery disease previous CABG previous multiple stents now comes in several week history worsening shortness of breath both at rest and with exertion no definite chest discomfort. Admitted yesterday. Troponin initially negative went up to 0.35 cardiology consulted. proBNP elevated 3275 initial chest x-ray extensive bilateral lung infiltrates bilateral lower lobar consolidation and bilateral basilar pleural effusions noted. Review of Systems:  Review of Systems   Constitutional: Negative. Negative for chills, fever and unexpected weight change. HENT: Negative. Eyes: Negative. Respiratory: Negative. Negative for shortness of breath. Cardiovascular: Negative. Negative for chest pain. Gastrointestinal: Negative. Negative for diarrhea, nausea and vomiting. Endocrine: Negative. Genitourinary: Negative. Musculoskeletal: Negative. Skin: Negative. Neurological: Negative. All other systems reviewed and are negative.       Cardiac Specific Data:  Specialty Problems        Cardiology Problems    Coronary artery disease involving native coronary artery of native heart with angina pectoris (HCC)        HTN (hypertension)        Chest pain        Hyperlipidemia        Paroxysmal a-fib (HCC)        Atherosclerosis of native artery of extremity with intermittent claudication (HCC)        Inferior MI (HCC)        ST elevation myocardial infarction (STEMI) ([facility] [address]        Bilateral carotid artery stenosis        PVD (peripheral vascular disease) ([facility] [address]        Acute exacerbation of CHF (congestive heart failure) (HCC)        Acute systolic (congestive) heart failure (HCC)              Past Medical History:  Past Medical History:   Diagnosis Date    Acute exacerbation of CHF (congestive heart failure) ([address] 2/2/2022    Atrial fibrillation (HCC)     Bilateral carotid artery stenosis 1/6/2020    CAD (coronary artery disease), native coronary artery     acute inferior MI on 5/15/09, s/p emeergent CABG x4 5/15/09    Chest pain     HTN (hypertension)     Hyperlipidemia     Dr. Fidel Good manages    Palpitations     Statin intolerance 2/14/2018    Tobacco abuse     Transient atrial fibrillation or flutter     post op        Past Surgical History:  Past Surgical History:   Procedure Laterality Date    CARDIAC CATHETERIZATION  4/23/12    with stent to left circ, and LAD    CARDIAC CATHETERIZATION  01/07/2018    PTCA/BJ to VG to LAD;  atherectomy and 3 BMS to VG to 1st Circ    CORONARY ARTERY BYPASS GRAFT  05/15/09    x4    DIAGNOSTIC CARDIAC CATH LAB PROCEDURE  05/15/09    left heart cath, left ventr, selective coronary arteriography     FRACTURE SURGERY Left     Leg    VASCULAR SURGERY  4/21/2015 [facility]    Percutaneous cannulation right common femoral artery with 5-Ivorian and later 6-Ivorian pinnacle destination sheath. Suprarenal abdominal aortogram with bilateral iliofemoral arteriograms. Bilateral lower extremity arteriograms. Left popliteal/tibioperoneal trunk artery balloon angioplasty with 4 mm x 15 mm cutting balloon.  VASCULAR SURGERY  4/21/2015 [facility] cont    Arteriograms after balloon angioplasty. Balloon angioplasty left popliteal artery/tibioperoneal trunk with 5 mm x 40 mm russell balloon. Arteriograms after balloon angioplasty. Left superficial femoral artery balloon angioplasty with 7 mm x 100 mm  balloon. Left superficial femoral artery stent placement idev supera 6.5 x 100 mm self-expanding nitinol stent. Completion arteriograms left lower e    VASCULAR SURGERY  4/21/2015 [facility] cont    extremity. Mynx closure right common femoral artery puncture site.  VASCULAR SURGERY  03/04/2020    [facility]. Percutaneous cannulation left common femoral artery with 5 Citizen of [place] glide sheath. Suprarenal abdominal aortogram with bilateral iliofemoral arteriogram.Bilateral lower extremity arteriogram.Minx closure left common femoral artery puncture site. Past Family History:  Family History   Problem Relation Age of Onset    Coronary Art Dis Father     High Blood Pressure Father     Coronary Art Dis Brother     High Blood Pressure Mother     Cancer Mother     High Blood Pressure Sister        Past Social History:  Social History     Socioeconomic History    Marital status: Single     Spouse name: Not on file    Number of children: Not on file    Years of education: Not on file    Highest education level: Not on file   Occupational History    Not on file   Tobacco Use    Smoking status: Former Smoker     Packs/day: 0.00     Years: 45.00     Pack years: 0.00     Types: Cigarettes     Start date: 1964    Smokeless tobacco: Never Used    Tobacco comment: STATES,\"I VAP\"   Vaping Use    Vaping Use: Every day   Substance and Sexual Activity    Alcohol use: No    Drug use: No    Sexual activity: Not on file   Other Topics Concern    Not on file   Social History Narrative    Not on file     Social Determinants of Health     Financial Resource Strain:     Difficulty of Paying Living Expenses: Not on file   Food Insecurity:     Worried About Running Out of Food in the Last Year: Not on file    Eve of Food in the Last Year: Not on file   Transportation Needs:     Lack of Transportation (Medical): Not on file    Lack of Transportation (Non-Medical):  Not on file   Physical Activity:     Days of Exercise per Week: Not on file    Minutes of Exercise per Session: Not on file   Stress:     Feeling of Stress : Not on file   Social Connections:     Frequency of Communication with Friends and Family: Not on file    Frequency of Social Gatherings with Friends and Family: Not on file    Attends Mandaen Services: Not on file    Active Member of Clubs or Organizations: Not on file    Attends Club or Organization Meetings: Not on file    Marital Status: Not on file   Intimate Partner Violence:     Fear of Current or Ex-Partner: Not on file    Emotionally Abused: Not on file    Physically Abused: Not on file    Sexually Abused: Not on file   Housing Stability:     Unable to Pay for Housing in the Last Year: Not on file    Number of [place] in the Last Year: Not on file    Unstable Housing in the Last Year: Not on file       Allergies: Allergies   Allergen Reactions    Statins Itching     myalagias       Home Meds:  Prior to Admission medications    Medication Sig Start Date End Date Taking? Authorizing Provider   amLODIPine (NORVASC) 5 MG tablet Take 1 tablet by mouth daily 12/21/21  Yes LUIS Martínez   nitroGLYCERIN (NITROSTAT) 0.4 MG SL tablet Place 1 tablet under the tongue every 5 minutes as needed for Chest pain up to max of 3 total doses. If no relief after 1 dose, call 911. 6/8/21  Yes LUIS Martínez   lisinopril (PRINIVIL;ZESTRIL) 20 MG tablet Take 1 tablet by mouth daily 6/8/21  Yes LUIS Martínez   sotalol (BETAPACE) 160 MG tablet Take 1 tablet by mouth twice daily 6/8/21  Yes Eldonisaiah Gomez APRCHRISTINA   nitroGLYCERIN (NITROSTAT) 0.4 MG SL tablet Place 1 tablet under the tongue every 5 minutes as needed for Chest pain 6/8/21  Yes LUIS Martínez   aspirin 81 MG EC tablet Take 81 mg by mouth daily.      Yes Historical Provider, MD       Current Meds:   Vitamin D  5,000 Units Oral Daily    aspirin  81 mg Oral Daily    lisinopril  20 mg Oral Daily    sotalol  160 mg Oral 2 times per day    amLODIPine  5 mg Oral Daily    sodium chloride flush  5-40 mL IntraVENous 2 times per day    sodium chloride flush  5-40 mL IntraVENous 2 times per day  furosemide  40 mg IntraVENous BID    cefTRIAXone (ROCEPHIN) IV  1,000 mg IntraVENous Q24H    And    azithromycin  500 mg IntraVENous Q24H    oxybutynin  5 mg Oral Nightly    enoxaparin  1 mg/kg SubCUTAneous BID    methylPREDNISolone  40 mg IntraVENous Q12H    ipratropium-albuterol  1 ampule Inhalation Q4H       Current Infused Meds:   nitroGLYCERIN 20 mcg/min (02/02/22 0915)    sodium chloride      sodium chloride      dilTIAZem (CARDIZEM) 125 mg in dextrose 5% 125 mL infusion Stopped (02/03/22 0115)       Physical Exam:  Vitals:    02/03/22 0553   BP: 114/77   Pulse: 67   Resp: 18   Temp: 97 °F (36.1 °C)   SpO2: 100%       Intake/Output Summary (Last 24 hours) at 2/3/2022 1020  Last data filed at 2/3/2022 0232  Gross per 24 hour   Intake --   Output 3475 ml   Net -3475 ml     Estimated body mass index is 20.87 kg/m² as calculated from the following:    Height as of this encounter: 6' (1.829 m). Weight as of this encounter: 153 lb 14.4 oz (69.8 kg). Physical Exam  Vitals reviewed. Constitutional:       General: He is not in acute distress. Appearance: He is well-developed. He is obese. He is not ill-appearing, toxic-appearing or diaphoretic. HENT:      Head: Normocephalic and atraumatic. Nose: Nose normal.      Mouth/Throat:      Mouth: Mucous membranes are moist.   Eyes:      General: No scleral icterus. Extraocular Movements: Extraocular movements intact. Pupils: Pupils are equal, round, and reactive to light. Neck:      Vascular: No carotid bruit or JVD. Cardiovascular:      Rate and Rhythm: Normal rate and regular rhythm. Heart sounds: Normal heart sounds. No murmur heard. No friction rub. No gallop. Pulmonary:      Effort: Pulmonary effort is normal. No respiratory distress. Breath sounds: Normal breath sounds. No stridor. No wheezing, rhonchi or rales. Chest:      Chest wall: No tenderness. Abdominal:      General: There is no distension. Palpations: There is no mass. Tenderness: There is no abdominal tenderness. There is no right CVA tenderness, left CVA tenderness, guarding or rebound. Hernia: No hernia is present. Musculoskeletal:         General: Swelling present. No tenderness, deformity or signs of injury. Cervical back: Normal range of motion and neck supple. No rigidity or tenderness. Right lower leg: No edema. Left lower leg: No edema. Lymphadenopathy:      Cervical: No cervical adenopathy. Skin:     General: Skin is warm and dry. Neurological:      General: No focal deficit present. Mental Status: He is alert and oriented to person, place, and time. Mental status is at baseline. Cranial Nerves: No cranial nerve deficit. Sensory: No sensory deficit. Motor: No weakness. Coordination: Coordination normal.   Psychiatric:         Mood and Affect: Mood normal.         Behavior: Behavior normal.         Thought Content: Thought content normal.         Judgment: Judgment normal.         Labs:  Recent Labs     02/02/22  0311 02/03/22  0753   WBC 19.7* 17.1*   HGB 14.4 12.2*    307       Recent Labs     02/02/22  0338 02/02/22  1445 02/03/22  0753     --  139   K 4.5 3.9 3.9     --  101   CO2 19*  --  27   BUN 18  --  21   CREATININE 0.9  --  0.7   LABGLOM >60  --  >60   MG  --   --  1.7   CALCIUM 8.7*  --  8.0*       CK, CKMB, Troponin: @LABRCNT (CKTOTAL:3, CKMB:3, TROPONINI:3)@    Last 3 BNP:  No results for input(s): BNP in the last 72 hours. IMAGING:  XR CHEST PORTABLE    Result Date: 2/2/2022  Examination. XR CHEST PORTABLE 2/2/2022 3:21 AM History: Shortness of breath. Frontal portable upright view of the chest is compared with the previous study dated 1/7/2018. There is extensive interstitial infiltrate in the lungs bilaterally more severely in the mid and lower lungs. There is bilateral lower lobar consolidation. There is a small bibasal pleural effusion. There is no pneumothorax. The heart size is not evaluated due to complete obliteration of the cardiac border by the adjacent infiltrate. Atheromatous changes thoracic aorta are noted. There is evidence of prior cardiac surgery. No acute bony abnormality. 1. The extensive bilateral lung infiltrate. Bilateral lower lobar consolidation and bibasilar pleural effusion. Signed by Dr Onofre Gooden:  1. Complaints of several week history of worsening shortness of breath  2. Coronary artery disease  3. Status post CABG times 4/2011  4. History of cardiac stent 1/7/2018 MultiLink vision 3.5 x 28  5. Acute respiratory failure with hypoxia and hypercarbia  6. Possible sepsis  7. Acute and chronic systolic diastolic congestive heart failure  8. Hypertension  9. Tobacco abuse  10. Hypertension  11. Peripheral arterial disease with previous stent placement  12. Paroxysmal atrial fibrillation  13. History of inferior STEMI  14. Statin intolerance  15. Bilateral carotid artery stenosis  16. Cardiac cath 1/7/2018 mild LV enlargement mild inferior and anterolateral hypokinesis EF 50% or greater occluded right coronary artery first OM occluded mid LAD occluded stent in the first diagonal however 75% narrowing distal to that, LIMA graft to diagonal patent vein graft to circumflex marginal occluded felt to be likely the infarct vessel, vein graft LAD patent however 90% stenosis distal portion of the vein graft, vein graft to right coronary artery widely patent, status post PCI with placement of a 2.75 x 18 mm drug-eluting stent vein graft to LAD, status post PCI of the vein graft to the OM with thrombectomy placement of a 3.5 x 12 mm bare-metal stent in the ostium for a 75% lesion and a 3.5 x 28 mm bare-metal stent in the mid segment for a dissection with residual 75 to 80% stenosis and an 80 to 90% distal lesion stented with a 3.5 x 18 bare-metal stent  17. Elevated D-dimer 1.01  18.  Elevated troponins initial negative of 2.35 now down to 0.27 possible non-STEMI  19. Elevated proBNP 3275      Recommendations:  1. Recommend diagnostic cardiac catheterization for definitive assessment advise indication alternatives benefits and risk patient agreeable plan today. I have discussed with the patient regarding indications for the proposed procedure LEFT HEART CATHETERIZATION AND POSSIBLE PERCUTANEOUS INTERVENTION  along with possible alternatives benefits and risks including but not limited to risks of death, myocardial infarction, stroke, contrast induced nephropathy which in some cases may lead to acute kidney failure requiring dialysis, allergic reactions, bleeding requiring blood transfusion,  cardiac arrhthymias, respiratory failure which may require placing the patient on respiratory support such as a ventilator or breathing machine,risk of complications which may require vascular surgery, and if coronary intervention is performed emergency CABG may be required in less than 1% of cases. The patient is awake and alert and understands the issues involved and indicates willingness to proceed as ordered. The patient does not have any contraindications to dual antiplatelet therapy. The patient does not have any known  pending surgical procedures in the next 12 months at this time. The patient is  a reasonable candidate for moderate conscious sedation.     ASA score:  ASA 3 - Patient with moderate systemic disease with functional limitations    Mallampati: I (soft palate, uvula, fauces, tonsillar pillars visible)    Preferred vascular access site will be: left radial artery

## 2022-02-03 NOTE — PROGRESS NOTES
4 Eyes Skin Assessment    Catherine Teixeira is being assessed upon: Admission    I agree that I, Salvatore Rodriguez RN, along with Johanna Kumar RN have performed a thorough Head to Toe Skin Assessment on the patient. ALL assessment sites listed below have been assessed. Areas assessed by both nurses:     [x]   Head, Face, and Ears   [x]   Shoulders, Back, and Chest  [x]   Arms, Elbows, and Hands   [x]   Coccyx, Sacrum, and Ischium  [x]   Legs, Feet, and Heels    Does the Patient have Skin Breakdown?  No    Dilan Prevention initiated: No  Wound Care Orders initiated: No    [facility] nurse consulted for Pressure Injury (Stage 3,4, Unstageable, DTI, NWPT, and Complex wounds) and New or Established Ostomies: No        Primary Nurse eSignature: Salvatore Rodriguez RN on 2/3/2022 at 4:12 AM      Co-Signer eSignature: Electronically signed by Johanna Kumar RN on 2/3/22 at 4:15 AM CST

## 2022-02-03 NOTE — PROGRESS NOTES
[facility]      [facility] Note    Patient:  Thomas Severino  YOB: 1950  Date of Service: 2/3/2022  MRN: [id number]   Acct: [de-identified]   Primary Care Physician: Donna Da Silva MD  Advance Directive: Full Code  Admit Date: 2/2/2022       Hospital Day: 1    Portions of this note have been copied forward, however, updated to reflect the most current clinical status of this patient. CHIEF COMPLAINT Shortness of breath  SUBJECTIVE:  \" I am so much better\"  I can breathe and talk\"  Sitting up in bed      Isidra 10:     66 M w h/o CAD, atrial fibrillation and hyperlipidemia who presents to the emergency department in acute respiratory failure. Arrives from home via EMS. They administered Solu-Medrol and DuoNeb and attempted CPAP which he did not tolerate. Patient not able to give history due to respiratory distress. Placed on BiPAP soon after arrival. [facility] provider reports patient states increasing shortness of breath over the last several days and over the past couple weeks has developed significant swelling of bilateral lower extremities.  No prior history of congestive heart failure.  No history of DVT or PE. Patient apparently denied chest pain, fevers, cough, abdominal pain, vomiting or diarrhea.    Review of Systems   Constitutional: Negative. Eyes: Negative. Respiratory: Positive for cough and shortness of breath. Cardiovascular: Positive for palpitations. Gastrointestinal: Negative. Endocrine: Negative. Genitourinary: Negative. Musculoskeletal: Negative. Skin: Negative. Allergic/Immunologic: Negative. Neurological: Negative. Hematological: Negative. Psychiatric/Behavioral: Negative.          Objective:   VITALS:  /68   Pulse 68   Temp 97 °F (36.1 °C) (Temporal)   Resp 20   Ht 6' (1.829 m)   Wt 153 lb 14.4 oz (69.8 kg)   SpO2 97%   BMI 20.87 kg/m²   24HR INTAKE/OUTPUT:    Intake/Output Summary (Last 24 hours) at 2/3/2022 9 [facility] filed at 2/3/2022 0232  Gross per 24 hour   Intake --   Output 1750 ml   Net -1750 ml           Physical Exam  Vitals and nursing note reviewed. Constitutional:       General: He is not in acute distress. HENT:      Head: Normocephalic. Mouth/Throat:      Mouth: Mucous membranes are moist.   Cardiovascular:      Rate and Rhythm: Normal rate. Rhythm irregular. Heart sounds: Normal heart sounds. Pulmonary:      Breath sounds: Wheezing present. Abdominal:      General: Bowel sounds are normal.      Palpations: Abdomen is soft. Musculoskeletal:         General: Normal range of motion. Skin:     General: Skin is warm and dry. Coloration: Skin is pale. Neurological:      General: No focal deficit present. Mental Status: He is alert. Psychiatric:         Mood and Affect: Mood normal.            Medications:      sodium chloride      sodium chloride      nitroGLYCERIN 20 mcg/min (02/02/22 0915)    dilTIAZem (CARDIZEM) 125 mg in dextrose 5% 125 mL infusion Stopped (02/03/22 0115)      Vitamin D  5,000 Units Oral Daily    sodium chloride flush  5-40 mL IntraVENous 2 times per day    atorvastatin  80 mg Oral Nightly    aspirin  81 mg Oral Daily    lisinopril  20 mg Oral Daily    sotalol  160 mg Oral 2 times per day    amLODIPine  5 mg Oral Daily    furosemide  40 mg IntraVENous BID    cefTRIAXone (ROCEPHIN) IV  1,000 mg IntraVENous Q24H    And    azithromycin  500 mg IntraVENous Q24H    oxybutynin  5 mg Oral Nightly    enoxaparin  1 mg/kg SubCUTAneous BID    methylPREDNISolone  40 mg IntraVENous Q12H    ipratropium-albuterol  1 ampule Inhalation Q4H     sodium chloride flush, sodium chloride, acetaminophen, hydrALAZINE, nitroGLYCERIN, nitroGLYCERIN, ondansetron **OR** ondansetron, polyethylene glycol, acetaminophen **OR** acetaminophen  ADULT DIET; Regular; 3 carb choices (45 gm/meal);  No Added Salt (3-4 gm)     Lab and other Data:     Recent Labs 02/02/22  0311 02/03/22  0753   WBC 19.7* 17.1*   HGB 14.4 12.2*    307     Recent Labs     02/02/22  0338 02/02/22  1445 02/03/22  0753     --  139   K 4.5 3.9 3.9     --  101   CO2 19*  --  27   BUN 18  --  21   CREATININE 0.9  --  0.7   GLUCOSE 230*  --  147*     Recent Labs     02/02/22  0338 02/03/22  0753   AST 26 25   ALT 36 21   BILITOT 0.5 0.5   ALKPHOS 142* 97     Troponin T:   Recent Labs     02/02/22  0338 02/02/22  1440 02/02/22  2235   TROPONINI <0.01 0.35* 0.27*     Pro-BNP: No results for input(s): BNP in the last 72 hours. INR: No results for input(s): INR in the last 72 hours. UA:No results for input(s): NITRITE, COLORU, PHUR, LABCAST, WBCUA, RBCUA, MUCUS, TRICHOMONAS, YEAST, BACTERIA, CLARITYU, SPECGRAV, LEUKOCYTESUR, UROBILINOGEN, BILIRUBINUR, BLOODU, GLUCOSEU, AMORPHOUS in the last 72 hours. Invalid input(s): [place]  A1C: No results for input(s): LABA1C in the last 72 hours. ABG:  Recent Labs     02/02/22  1445   PHART 7.470*   IND5WYR 34.0*   PO2ART 236.0*   VSD0KNA 24.7   BEART 1.4   HGBAE 13.4*   Q8XULYKT 97.0   CARBOXHGBART 1.6       RAD:   ECHO Complete 2D W Doppler W Color    Result Date: 2/3/2022  Transthoracic Echocardiography Report (TTE)  Demographics   Patient Name  CASI Gupta Date of Study         02/03/2022   MRN           [id number]          Gender                Male   Date of Birth 1950      Room Number           MHL-0712   Age           66 year(s)   Height:       72 inches       Referring Physician   Deidre Zamarripa   Weight:       150 pounds      Sonographer           Luly Donaldson, [facility]   BSA:          1.89 m^2        Interpreting          Juan Carlos Villa MD                                Physician   BMI:          20.34 kg/m^2  Procedure Type of Study   TTE procedure:ECHO 2D W/DOPPLER/COLOR/CONTRAST. Study Location: Echo Lab Technical Quality: Adequate visualization Patient Status: Inpatient Contrast Medium: Definity.  Amount - 8 ml BP: 114/77 mmHg Indications:Congestive heart failure. 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  0.79 cm, ERO more than 0.4 cm^2, jet width more than 50% of the left  atrial area  Central jet of the mitral regurgitation suggestive of functional secondary  mitral regurgitation  Moderately to severely dilated left atrium  Moderately dilated right atrium  Moderate aortic insufficiency  Moderate tricuspid regurgitation with estimated right ventricular systolic  pressure of 40 to 45 mm which is mildly elevated  Dilated IVC with impaired respiratory variation  Aortic sclerosis   Signature   ----------------------------------------------------------------  Electronically signed by Stephany Juares MD(Interpreting physician)  on 02/03/2022 12:31 PM  ----------------------------------------------------------------  Allergies   - Statins. M-Mode Measurements (cm)   LVIDd: 5.76 cm                         LVIDs: 5.18 cm  IVSd: 0.98 cm  LVPWd: 1.23 cm                         AO Root Dimension: 2.4 cm  Rt. Vent. Dimension: 3.38 cm           LA: 4.6 cm  % Ejection Fraction: 20 %              LVOT: 2 cm  Doppler Measurements:   AV Peak Velocity:145 cm/s            MV Peak E-Wave: 102 cm/s  AV Peak Gradient: 8.41 mmHg          MV Peak A-Wave: 38.9 cm/s  AV Mean Gradient: 5 mmHg             MV E/A Ratio: 2.62 %  AV Area (Continuity):2.34 cm^2       MV Peak Gradient: 4.16 mmHg  TR Velocity:269 cm/s  TR Gradient:28.94 mmHg  Estimated RAP:10 mmHg  RVSP:40 mmHg      XR CHEST PORTABLE    Result Date: 2/2/2022  Examination. XR CHEST PORTABLE 2/2/2022 3:21 AM History: Shortness of breath.  Frontal portable upright view of the chest is compared with the previous study dated 1/7/2018. There is extensive interstitial infiltrate in the lungs bilaterally more severely in the mid and lower lungs. There is bilateral lower lobar consolidation. There is a small bibasal pleural effusion. There is no pneumothorax. The heart size is not evaluated due to complete obliteration of the cardiac border by the adjacent infiltrate. Atheromatous changes thoracic aorta are noted. There is evidence of prior cardiac surgery. No acute bony abnormality. 1. The extensive bilateral lung infiltrate. Bilateral lower lobar consolidation and bibasilar pleural effusion. Signed by Dr Angelica Becker            Assessment/Plan   Active Problems:    Acute respiratory failure with hypoxia and hypercarbia (HCC)   Improved   4  Liters current O2 requirement\  Elevated Troponin   Cardiology took to cath labs see note    Sepsis ([address]   Cultures pending   SERIAL labs    Acute exacerbation of CHF (congestive heart failure) (HCC)   Diuretics   Strict I and O   O2 to keep spo2 above 90    Acute systolic (congestive) heart failure [facility])   Cardiology on case and appreciated. Resolved Problems:    * No resolved hospital problems. *      Antibiotic: Rocephin zithromax    DVT Prophylaxis: lovenox        Discharge planning: TBD    Further Orders per Clinical course/attending. Electronically signed by LUIS Pedro CNP on 2/3/2022 at 4:34 PM       EMR Dragon/Transcription disclaimer:   Much of this encounter note is an electronic transcription/translation of spoken language to printed text.  The electronic translation of spoken language may permit erroneous, or at times, nonsensical words or phrases to be inadvertently transcribed; although attempts have made to review the note for such errors, some may still exist.

## 2022-02-03 NOTE — ED NOTES
Spoke with Dr. Lalit Evans about down grading pt to PCU. Per Dr. Lalit Evans pt okay to go to PCU.      Krista Altman RN  02/03/22 [id number]

## 2022-02-03 NOTE — ED NOTES
Dr. Akira Ayala told this RN to add Cardiologist to Treatment Team (via voalte ), [facility] paged on call Cardiologist     [facility], [place]  02/02/22 2041

## 2022-02-03 NOTE — CONSULTS
Comprehensive Nutrition Assessment    Type and Reason for Visit:  Initial,Consult    Nutrition Recommendations/Plan: follow for diet hx and possible     Nutrition Assessment:  Pt is thin, meets criteria for moderate malnutrition AEB fat and muscle mass depletion. Received consult for CHF education. At present time pt is NPO on PaP    Malnutrition Assessment:  Malnutrition Status: Moderate malnutrition    Context:  Acute Illness     Findings of the 6 clinical characteristics of malnutrition:  Energy Intake:  Unable to assess  Weight Loss:  No significant weight loss     Body Fat Loss:  1 - Mild body fat loss     Muscle Mass Loss:  No significant muscle mass loss    Fluid Accumulation:  7 - Moderate to Severe Extremities   Strength:  Not Performed    Estimated Daily Nutrient Needs:  Energy (kcal):  3170-0903 kcals (25-30 kcals/kg);  Weight Used for Energy Requirements:  Current     Protein (g):   g; Weight Used for Protein Requirements:  Current        Fluid (ml/day):  8445-5713 ml; Method Used for Fluid Requirements:  1 ml/kcal      Nutrition Related Findings:  +1, +4 edema      Wounds:  None       Current Nutrition Therapies:    Diet NPO    Anthropometric Measures:  · Height: 6' (182.9 cm)  · Current Body Weight: 153 lb 14.4 oz (69.8 kg)   · Admission Body Weight: 150 lb (68 kg)    · Usual Body Weight: 155 lb (70.3 kg) (12/2021)     · Ideal Body Weight: 178 lbs; % Ideal Body Weight 86.5 %   · BMI: 20.9  · Adjusted Body Weight:  ; No Adjustment   · BMI Categories: Underweight (BMI less than 22) age over 72       Nutrition Diagnosis:   · Inadequate oral intake,Altered nutrition-related lab values related to acute injury/trauma,cardiac dysfunction as evidenced by NPO or clear liquid status due to medical condition,lab values      Nutrition Interventions:   Food and/or Nutrient Delivery:  Continue NPO  Nutrition Education/Counseling:  Education needed   Coordination of Nutrition Care:  Continue to monitor while inpatient    Goals:  po intake 50% or greater. Weight stable. proBNP decreased prior to discharge       Nutrition Monitoring and Evaluation:   Behavioral-Environmental Outcomes:  None Identified   Food/Nutrient Intake Outcomes:  Diet Advancement/Tolerance  Physical Signs/Symptoms Outcomes:  Biochemical Data,Fluid Status or Edema,Weight,Skin     Discharge Planning:     Too soon to determine     Electronically signed by Nicci Black MS, RD, LD on 2/3/22 at 10:10 AM CST    Contact: [phone]

## 2022-02-03 NOTE — PROGRESS NOTES
Cardiac cath preliminary left radial artery approach  Left ventricular function severely enlarged left ventricle with severe left ventricular systolic dysfunction  LIMA graft atretic  2 vein grafts occluded 1 appears recent  Saphenous vein graft to the right coronary artery has a moderate 70 to 80% focal stenosis in the proximal to mid segment also significant ostial PDA lesion severe mid LAD  Recommend cardiovascular surgical consultation

## 2022-02-04 LAB
ALBUMIN SERPL-MCNC: 2.8 G/DL (ref 3.5–5.2)
ALP BLD-CCNC: 83 U/L (ref 40–130)
ALT SERPL-CCNC: 18 U/L (ref 5–41)
ANION GAP SERPL CALCULATED.3IONS-SCNC: 11 MMOL/L (ref 7–19)
AST SERPL-CCNC: 20 U/L (ref 5–40)
BILIRUB SERPL-MCNC: 0.3 MG/DL (ref 0.2–1.2)
BUN BLDV-MCNC: 28 MG/DL (ref 8–23)
CALCIUM SERPL-MCNC: 7.9 MG/DL (ref 8.8–10.2)
CHLORIDE BLD-SCNC: 102 MMOL/L (ref 98–111)
CO2: 26 MMOL/L (ref 22–29)
CREAT SERPL-MCNC: 0.7 MG/DL (ref 0.5–1.2)
GFR AFRICAN AMERICAN: >59
GFR NON-AFRICAN AMERICAN: >60
GLUCOSE BLD-MCNC: 125 MG/DL (ref 74–109)
HCT VFR BLD CALC: 34.5 % (ref 42–52)
HEMOGLOBIN: 11.3 G/DL (ref 14–18)
MAGNESIUM: 1.8 MG/DL (ref 1.6–2.4)
MCH RBC QN AUTO: 31.2 PG (ref 27–31)
MCHC RBC AUTO-ENTMCNC: 32.8 G/DL (ref 33–37)
MCV RBC AUTO: 95.3 FL (ref 80–94)
PDW BLD-RTO: 14.4 % (ref 11.5–14.5)
PLATELET # BLD: 282 K/UL (ref 130–400)
PMV BLD AUTO: 10.9 FL (ref 9.4–12.4)
POTASSIUM REFLEX MAGNESIUM: 3.6 MMOL/L (ref 3.5–5)
RBC # BLD: 3.62 M/UL (ref 4.7–6.1)
SODIUM BLD-SCNC: 139 MMOL/L (ref 136–145)
TOTAL PROTEIN: 4.9 G/DL (ref 6.6–8.7)
WBC # BLD: 16.4 K/UL (ref 4.8–10.8)

## 2022-02-04 PROCEDURE — 6370000000 HC RX 637 (ALT 250 FOR IP): Performed by: FAMILY MEDICINE

## 2022-02-04 PROCEDURE — 94640 AIRWAY INHALATION TREATMENT: CPT

## 2022-02-04 PROCEDURE — 2580000003 HC RX 258: Performed by: FAMILY MEDICINE

## 2022-02-04 PROCEDURE — 2700000000 HC OXYGEN THERAPY PER DAY

## 2022-02-04 PROCEDURE — 2140000000 HC CCU INTERMEDIATE R&B

## 2022-02-04 PROCEDURE — 6370000000 HC RX 637 (ALT 250 FOR IP): Performed by: INTERNAL MEDICINE

## 2022-02-04 PROCEDURE — 36415 COLL VENOUS BLD VENIPUNCTURE: CPT

## 2022-02-04 PROCEDURE — 80053 COMPREHEN METABOLIC PANEL: CPT

## 2022-02-04 PROCEDURE — 94660 CPAP INITIATION&MGMT: CPT

## 2022-02-04 PROCEDURE — 6370000000 HC RX 637 (ALT 250 FOR IP): Performed by: HOSPITALIST

## 2022-02-04 PROCEDURE — 2580000003 HC RX 258: Performed by: INTERNAL MEDICINE

## 2022-02-04 PROCEDURE — 99999 PR OFFICE/OUTPT VISIT,PROCEDURE ONLY: CPT | Performed by: THORACIC SURGERY (CARDIOTHORACIC VASCULAR SURGERY)

## 2022-02-04 PROCEDURE — 93005 ELECTROCARDIOGRAM TRACING: CPT | Performed by: INTERNAL MEDICINE

## 2022-02-04 PROCEDURE — 85027 COMPLETE CBC AUTOMATED: CPT

## 2022-02-04 PROCEDURE — 2500000003 HC RX 250 WO HCPCS: Performed by: EMERGENCY MEDICINE

## 2022-02-04 PROCEDURE — 6360000002 HC RX W HCPCS: Performed by: FAMILY MEDICINE

## 2022-02-04 PROCEDURE — 83735 ASSAY OF MAGNESIUM: CPT

## 2022-02-04 PROCEDURE — 99232 SBSQ HOSP IP/OBS MODERATE 35: CPT | Performed by: INTERNAL MEDICINE

## 2022-02-04 PROCEDURE — 6360000002 HC RX W HCPCS: Performed by: INTERNAL MEDICINE

## 2022-02-04 RX ORDER — DOXYCYCLINE HYCLATE 100 MG/1
100 CAPSULE ORAL EVERY 12 HOURS SCHEDULED
Status: DISCONTINUED | OUTPATIENT
Start: 2022-02-05 | End: 2022-02-05

## 2022-02-04 RX ORDER — AZITHROMYCIN 250 MG/1
500 TABLET, FILM COATED ORAL DAILY
Status: DISCONTINUED | OUTPATIENT
Start: 2022-02-05 | End: 2022-02-04

## 2022-02-04 RX ADMIN — LORAZEPAM 1 MG: 1 TABLET ORAL at 20:27

## 2022-02-04 RX ADMIN — IPRATROPIUM BROMIDE AND ALBUTEROL SULFATE 1 AMPULE: 2.5; .5 SOLUTION RESPIRATORY (INHALATION) at 19:22

## 2022-02-04 RX ADMIN — LISINOPRIL 20 MG: 10 TABLET ORAL at 10:39

## 2022-02-04 RX ADMIN — IPRATROPIUM BROMIDE AND ALBUTEROL SULFATE 1 AMPULE: 2.5; .5 SOLUTION RESPIRATORY (INHALATION) at 22:59

## 2022-02-04 RX ADMIN — FUROSEMIDE 40 MG: 10 INJECTION, SOLUTION INTRAMUSCULAR; INTRAVENOUS at 17:20

## 2022-02-04 RX ADMIN — SODIUM CHLORIDE, PRESERVATIVE FREE 10 ML: 5 INJECTION INTRAVENOUS at 10:34

## 2022-02-04 RX ADMIN — WATER 1000 MG: 1 INJECTION INTRAMUSCULAR; INTRAVENOUS; SUBCUTANEOUS at 04:20

## 2022-02-04 RX ADMIN — SOTALOL HYDROCHLORIDE 160 MG: 80 TABLET ORAL at 10:38

## 2022-02-04 RX ADMIN — METHYLPREDNISOLONE SODIUM SUCCINATE 40 MG: 40 INJECTION, POWDER, FOR SOLUTION INTRAMUSCULAR; INTRAVENOUS at 17:20

## 2022-02-04 RX ADMIN — IPRATROPIUM BROMIDE AND ALBUTEROL SULFATE 1 AMPULE: 2.5; .5 SOLUTION RESPIRATORY (INHALATION) at 10:26

## 2022-02-04 RX ADMIN — Medication 5000 UNITS: at 10:38

## 2022-02-04 RX ADMIN — OXYBUTYNIN CHLORIDE 5 MG: 5 TABLET, EXTENDED RELEASE ORAL at 20:26

## 2022-02-04 RX ADMIN — IPRATROPIUM BROMIDE AND ALBUTEROL SULFATE 1 AMPULE: 2.5; .5 SOLUTION RESPIRATORY (INHALATION) at 06:29

## 2022-02-04 RX ADMIN — METHYLPREDNISOLONE SODIUM SUCCINATE 40 MG: 40 INJECTION, POWDER, FOR SOLUTION INTRAMUSCULAR; INTRAVENOUS at 04:20

## 2022-02-04 RX ADMIN — SODIUM CHLORIDE, PRESERVATIVE FREE 10 ML: 5 INJECTION INTRAVENOUS at 20:27

## 2022-02-04 RX ADMIN — ENOXAPARIN SODIUM 70 MG: 100 INJECTION SUBCUTANEOUS at 10:37

## 2022-02-04 RX ADMIN — NITROGLYCERIN 15 MCG/MIN: 20 INJECTION INTRAVENOUS at 04:21

## 2022-02-04 RX ADMIN — AMLODIPINE BESYLATE 5 MG: 5 TABLET ORAL at 10:38

## 2022-02-04 RX ADMIN — FUROSEMIDE 40 MG: 10 INJECTION, SOLUTION INTRAMUSCULAR; INTRAVENOUS at 10:40

## 2022-02-04 RX ADMIN — ASPIRIN 81 MG: 81 TABLET, COATED ORAL at 10:39

## 2022-02-04 RX ADMIN — ENOXAPARIN SODIUM 70 MG: 100 INJECTION SUBCUTANEOUS at 20:27

## 2022-02-04 RX ADMIN — ATORVASTATIN CALCIUM 80 MG: 40 TABLET, FILM COATED ORAL at 20:26

## 2022-02-04 RX ADMIN — SOTALOL HYDROCHLORIDE 160 MG: 80 TABLET ORAL at 20:26

## 2022-02-04 RX ADMIN — LORAZEPAM 1 MG: 1 TABLET ORAL at 11:02

## 2022-02-04 RX ADMIN — IPRATROPIUM BROMIDE AND ALBUTEROL SULFATE 1 AMPULE: 2.5; .5 SOLUTION RESPIRATORY (INHALATION) at 14:05

## 2022-02-04 RX ADMIN — AZITHROMYCIN MONOHYDRATE 500 MG: 500 INJECTION, POWDER, LYOPHILIZED, FOR SOLUTION INTRAVENOUS at 04:25

## 2022-02-04 ASSESSMENT — PAIN SCALES - GENERAL: PAINLEVEL_OUTOF10: 0

## 2022-02-04 NOTE — PROGRESS NOTES
Lab reported troponin level of 0.15. Patient is alert. This level is trending down.   Electronically signed by Naveen Zuniga RN on 2/3/2022 at 11:22 PM

## 2022-02-04 NOTE — PROGRESS NOTES
Pharmacy Intravenous to Oral Protocol    Medication changed per [facility] IV to PO protocol: Azithromycin    Patient meets the following inclusion criteria and none of the exclusion criteria:    Inclusion criteria:  - IV therapy > 24 hours (antibiotics only)  - Tolerating diet more advanced than clear liquids  - Tolerating PO medications  - No vasopressor blood pressure support (ie no signs of shock)  - Patient hasn't had a seizure for 72 hrs (antiepileptic medications only)    Exclusion criteria:  - Infections requiring IV therapy (ie meningitis, endocarditis, osteomyelitis, pancreatitis)   - Nausea and/or vomiting or severe diarrhea within past 24 hours   - Has gastrectomy, ileus, gastric outlet or bowel obstruction, or malabsorption syndromes   - Has significant painful oral ulceration   - TPN with an NPO order   - Active GI bleed   - Unable to swallow   - NPO   - Febrile in the last 24 hours (antibiotics only)   - Clinical deteriorating or unstable (antibiotics only)   - Pediatric patients and patients who are not euthyroid (not on oral levothyroxine/not stabilized on oral levothyroxine)- Levothyroxine only     Electronically signed by YOAN Bond [facility] on 2/4/2022 at 1:43 PM

## 2022-02-04 NOTE — CONSULTS
Dr. Mallory Fontanez, Cardiology, was consulted and evaluated patient. Performed cardiac catheterization yesterday. Found to have severe global hypokinesis with EF 10-15%. Injection of native coronary arteries demonstrated severe multivessel CAD. LIMA - diaganol is non-functional. SVG - LAD is occluded. SVG - CX is occluded. SVG - RCA is patent with an 80% stenosis in mid portion. Because of the presence of severe dilated ischemic cardiomyopathy with congestive heart failure pulmonary edema we were asked to see him for evaluation of possible redo multivessel coronary artery bypass grafting. Past Medical History:    Past Medical History:   Diagnosis Date    Acute exacerbation of CHF (congestive heart failure) ([address] 2/2/2022    Atrial fibrillation (HCC)     Bilateral carotid artery stenosis 1/6/2020    CAD (coronary artery disease), native coronary artery     acute inferior MI on 5/15/09, s/p emeergent CABG x4 5/15/09    Chest pain     HTN (hypertension)     Hyperlipidemia     Dr. Rupesh Pruitt manages    Palpitations     Statin intolerance 2/14/2018    Tobacco abuse     Transient atrial fibrillation or flutter     post op       Past Surgical History:    Past Surgical History:   Procedure Laterality Date    CARDIAC CATHETERIZATION  4/23/12    with stent to left circ, and LAD    CARDIAC CATHETERIZATION  01/07/2018    PTCA/BJ to VG to LAD;  atherectomy and 3 BMS to VG to 1st Circ    CORONARY ARTERY BYPASS GRAFT  05/15/09    x4    DIAGNOSTIC CARDIAC CATH LAB PROCEDURE  05/15/09    left heart cath, left ventr, selective coronary arteriography     FRACTURE SURGERY Left     Leg    VASCULAR SURGERY  4/21/2015 [facility]    Percutaneous cannulation right common femoral artery with 5-Bolivian and later 6-Bolivian pinnacle destination sheath. Suprarenal abdominal aortogram with bilateral iliofemoral arteriograms. Bilateral lower extremity arteriograms. Left popliteal/tibioperoneal trunk artery balloon angioplasty with 4 mm x 15 mm cutting balloon.  VASCULAR SURGERY  4/21/2015 [facility] cont    Arteriograms after balloon angioplasty. Balloon angioplasty left popliteal artery/tibioperoneal trunk with 5 mm x 40 mm russell balloon. Arteriograms after balloon angioplasty. Left superficial femoral artery balloon angioplasty with 7 mm x 100 mm  balloon. Left superficial femoral artery stent placement idev supera 6.5 x 100 mm self-expanding nitinol stent. Completion arteriograms left lower e    VASCULAR SURGERY  4/21/2015 [facility] cont    extremity. Mynx closure right common femoral artery puncture site.  VASCULAR SURGERY  03/04/2020    [facility]. Percutaneous cannulation left common femoral artery with 5 french glide sheath. Suprarenal abdominal aortogram with bilateral iliofemoral arteriogram.Bilateral lower extremity arteriogram.Minx closure left common femoral artery puncture site. Home Medications:   Prior to Admission medications    Medication Sig Start Date End Date Taking? Authorizing Provider   amLODIPine (NORVASC) 5 MG tablet Take 1 tablet by mouth daily 12/21/21  Yes LUIS Navarro   nitroGLYCERIN (NITROSTAT) 0.4 MG SL tablet Place 1 tablet under the tongue every 5 minutes as needed for Chest pain up to max of 3 total doses. If no relief after 1 dose, call 911. 6/8/21  Yes LUIS Navarro   lisinopril (PRINIVIL;ZESTRIL) 20 MG tablet Take 1 tablet by mouth daily 6/8/21  Yes LUIS Navarro   sotalol (BETAPACE) 160 MG tablet Take 1 tablet by mouth twice daily 6/8/21  Yes LUIS Navarro   nitroGLYCERIN (NITROSTAT) 0.4 MG SL tablet Place 1 tablet under the tongue every 5 minutes as needed for Chest pain 6/8/21  Yes LUIS Navarro   aspirin 81 MG EC tablet Take 81 mg by mouth daily.      Yes Historical Provider, MD        Facility Administered Medications:    Vitamin D  5,000 Units Oral Daily    sodium chloride flush  5-40 mL IntraVENous 2 times per day    atorvastatin  80 mg Oral Nightly    aspirin  81 mg Oral Daily    lisinopril  20 mg Oral Daily    sotalol  160 mg Oral 2 times per day    amLODIPine  5 mg Oral Daily    furosemide  40 mg IntraVENous BID    cefTRIAXone (ROCEPHIN) IV  1,000 mg IntraVENous Q24H    And    azithromycin  500 mg IntraVENous Q24H    oxybutynin  5 mg Oral Nightly    enoxaparin  1 mg/kg SubCUTAneous BID    methylPREDNISolone  40 mg IntraVENous Q12H    ipratropium-albuterol  1 ampule Inhalation Q4H       Allergies:  Statins     Social History:       Social History     Socioeconomic History    Marital status: Single     Spouse name: Not on file    Number of children: Not on file    Years of education: Not on file    Highest education level: Not on file   Occupational History    Not on file   Tobacco Use    Smoking status: Former Smoker     Packs/day: 0.00     Years: 45.00     Pack years: 0.00     Types: Cigarettes     Start date: 1964    Smokeless tobacco: Never Used    Tobacco comment: STATES,\"I VAP\"   Vaping Use    Vaping Use: Every day   Substance and Sexual Activity    Alcohol use: No    Drug use: No    Sexual activity: Not on file   Other Topics Concern    Not on file   Social History Narrative    Not on file     Social Determinants of Health     Financial Resource Strain:     Difficulty of Paying Living Expenses: Not on file   Food Insecurity:     Worried About Running Out of Food in the Last Year: Not on file    Eve of Food in the Last Year: Not on file   Transportation Needs:     Lack of Transportation (Medical): Not on file    Lack of Transportation (Non-Medical):  Not on file   Physical Activity:     Days of Exercise per Week: Not on file    Minutes of Exercise per Session: Not on file   Stress:     Feeling of Stress : Not on file   Social Connections:     Frequency of Communication with Friends and Family: Not on file    Frequency of Social Gatherings with Friends and Family: Not on file    Attends [employer] Services: Not on file   Melanie Manual Active Member of Clubs or Organizations: Not on file    Attends Club or Organization Meetings: Not on file    Marital Status: Not on file   Intimate Partner Violence:     Fear of Current or Ex-Partner: Not on file    Emotionally Abused: Not on file    Physically Abused: Not on file    Sexually Abused: Not on file   Housing Stability:     Unable to Pay for Housing in the Last Year: Not on file    Number of [place] in the Last Year: Not on file    Unstable Housing in the Last Year: Not on file       Family History:     Family History   Problem Relation Age of Onset    Coronary Art Dis Father     High Blood Pressure Father     Coronary Art Dis Brother     High Blood Pressure Mother     Cancer Mother     High Blood Pressure Sister          Review of Systems:  Constitutional:  No fever, chills, night sweats, or weight loss  HEENT: No vision loss, double vision, blurred vision, or tearing. No hearing loss, tinnitus, or infection. No nasal discharge or epistaxis. No dysphagia. Respiratory:  No cough, or sputum production. No history of TB exposure. Progressive shortness of breath for several days prior to presentation to the ER. Cardiovascular: No hypertension, hypotension, anginal type chest pain, dizziness, or syncopal spells. No history of palpitations. Leg swelling. Peripheral Vascular:  No history of claudication. Gastrointestinal:  No nausea, vomiting, diarrhea, or constipation. No reflux or gastroesophageal reflux disease. Genitourinary:  No dysuria, urgency, frequency, or history of urinary tract infections. No history of nephrolithiasis or renal insufficiency. Neurological:  No history of cerebrovascular accident, transient ischemic attack, or amaurosis fugax. No history of seizure disorder. Integumentary: No rash, history of nonhealing wounds or skin cancer removal.   Psychiatric:  No anxiety or depression. Endocrine: No polyuria, polydipsia, or significant weight gain.   No heat or cold intolerance. Musculoskeletal: No limit to range of motion of joints or swelling of limbs. Hematologic: No history of DVT, PE, or anemia. Physical Examination:    /75   Pulse 71   Temp 97.1 °F (36.2 °C) (Temporal)   Resp 18   Ht 6' (1.829 m)   Wt 150 lb 14.4 oz (68.4 kg)   SpO2 100%   BMI 20.47 kg/m²       General:  Alert & Oriented x 3 in no apparent distress. HEENT:  Normocephalic. Atraumatic. MEGAN. Neck: No JVD, no lymphadenopathy. Supple. Trachea midline. Thyroid normal.   Respiratory: Effort is unlabored. Fairly clear with scattered rhonchi. No wheezes. Still requiring 2 lpm NC. Cardiovascular: Normal HT with RRR. Grade 2/6 systolic murmur that radiates into the left axilla., gallops or rubs. No carotid bruits. Trace to 1+ BLE. His prior sternotomy incision is healed well the sternum appears to be stable. GI: Abdomen soft, nondistended, no organomegaly. BS x 4  Musculoskeletal: Strength and tone normal  Extremities: PPP, trace to 1+ BLE edema previous saphenous vein taken for the patient was right leg.   Skin: Warm & Dry  Neuro/psychiatric: Grossly intact    MEDICAL DECISION MAKING/TESTING    2D Echocardiogram:   Left ventricle is markedly dilated with normal thickness   Marked impairment of left ventricular systolic wall motion with estimated ejection fraction of 20%   Severe global hypokinesis   Grade 3 diastolic dysfunction   Right ventricle is dilated with severe global hypokinesis   Severe biventricular failure   Severe mitral regurgitation with the following criteria: Vena contractor 0.79 cm, ERO more than 0.4 cm^2, jet width more than 50% of the left atrial area   Central jet of the mitral regurgitation suggestive of functional secondary mitral regurgitation   Moderately to severely dilated left atrium   Moderately dilated right atrium   Moderate aortic insufficiency   Moderate tricuspid regurgitation with estimated right ventricular systolic pressure of 40 to 45 mm which is mildly elevated   Dilated IVC with impaired respiratory variation   Aortic sclerosis     CXR:   1. The extensive bilateral lung infiltrate. Bilateral lower lobar consolidation and bibasilar pleural effusion. Labs:   CBC:   Recent Labs     02/02/22  0311 02/03/22  0753 02/04/22 0222   WBC 19.7* 17.1* 16.4*   HGB 14.4 12.2* 11.3*   HCT 44.3 37.4* 34.5*   MCV 98.0* 95.2* 95.3*    307 282     BMP:   Recent Labs     02/02/22  0317 02/02/22  0338 02/02/22  1445 02/03/22  0753 02/04/22 0222   NA  --  139  --  139 139   K   < > 4.5 3.9 3.9 3.6   CL  --  102  --  101 102   CO2  --  19*  --  27 26   BUN  --  18  --  21 28*   CREATININE  --  0.9  --  0.7 0.7    < > = values in this interval not displayed. Cardiac Enzymes:   Recent Labs     02/02/22  2235 02/03/22  1553 02/03/22 2239   TROPONINI 0.27* 0.22* 0.15*     Liver Profile:  Lab Results   Component Value Date    AST 20 02/04/2022    ALT 18 02/04/2022    BILITOT 0.3 02/04/2022    ALKPHOS 83 02/04/2022     Lab Results   Component Value Date    CHOL 145 02/03/2022    HDL 54 02/03/2022    TRIG 49 02/03/2022     TSH:  Lab Results   Component Value Date    TSH 2.180 02/03/2022       Diagnosis:      1. Acute Systolic CHF  2. Acute Hypoxic Respiratory Failure 2' to Acute CHF  3. CAD - Hx CABG 2009  4. Essential HTN  5. Mixed Hyperlipidemia - Statin Intolerance  6. Paroxsymal Atrial Fibrillation - Currently SR on Betapace      Plan:     Patient with severe global LV systolic function, multivessel CAD with failed bypass grafts. Dr. Rachele Salcido to review records/ images. He will discuss recommendations as well as R/B/A with patient and family. Further recommendations per Dr. Rachele Salcido. Sydney Hudson, APRN  2/4/2022  10:48 AM  I have seen and examined the patient myself and the above was discussed with the nurse practitioner.   This is a chronically ill 69-year-old gentleman who is known to me as I performed his emergency four-vessel coronary bypass grafting 13 years ago. Unfortunately, he resumed cigarette smoking shortly thereafter. He is going on for follow-up paroxysmal atrial fibrillation in addition to severe peripheral arterial disease. He has undergone multiple peripheral vascular procedures. He finally stopped smoking in 2015. He now presents to the emergency room with profound hypoxia and respiratory distress he has a chest x-ray which shows profound pulmonary edema evidence of congestive heart failure and a 2D echo he appears to have severe biventricular failure with reduced ejection fraction of only 15 to 20%. In addition, he has pan valvular heart disease with severe functional mitral valve regurgitation moderate to severe aortic valve insufficiency and least moderate tricuspid valve regurgitation as well. He likely has pulmonary hypertension in both right and left atria are markedly dilated. Cardiac catheterization demonstrated severe multivessel coronary disease that is quite advanced. The native RCA is totally occluded the large obtuse marginal branch coming off the circumflex system is totally occluded the LAD is subtotally occluded the left internal mammary artery to the diagonal branch is now a \"string sign\" with a very poor diagonal native vessel. The bypass graft to the LAD and obtuse marginal branch are now totally occluded and there is a high-grade stenosis of the vein graft going to the RCA. The only graftable vessel on the left side would be the LAD the remaining vessels are inoperable. This unfortunate gentleman has a constellation of severe cardiac pathology. This includes a severely dilated ischemic cardiomyopathy with reduced ejection fraction of only 15 to 20%. He also has biventricular failure seen on echo. He has essentially inoperable coronary disease as well. The LAD is operable on the left side. There is a patent vein graft going to the RCA with a high-grade saphenous vein graft stenosis.   He also has severe pan valvular heart disease with severe functional mitral valve regurgitation,  aortic insufficiency and tricuspid valve regurgitation as well. Redo cardiac surgery on this patient will require at least 2 vessel bypass grafting with remaining vessels inoperable. In addition he would require a least aortic and mitral valve replacements as well. Considering his severe dilated cardiomyopathy this operation would carry a greater than 50% mortality with a 90% major morbidity mortality. Therefore, the surgical risk is prohibitive. I believe this patient's best option is to institute maximal  guideline directed medical therapy for his heart failure with reduced ejection fraction. It would also be reasonable to offer PCI and stenting of  the RCA vein graft as well as the LAD. In addition he may be a candidate for MitraClip [id number] repair as palliation for severe mitral valve regurgitation. I discussed this at length with the patient and he agrees with this plan.  Jose Dennis, MD2/4/2022  1030

## 2022-02-04 NOTE — PROGRESS NOTES
Cardiology Daily Note Felicitas Dow MD      Patient:  Guillaume Weiner  [id number]    Patient Active Problem List    Diagnosis Date Noted    Acute respiratory failure with hypoxia and hypercarbia ([address] 02/02/2022    Sepsis ([address] 02/02/2022    Acute exacerbation of CHF (congestive heart failure) ([address] 84/94/1466    Acute systolic (congestive) heart failure ([address] 02/02/2022    PVD (peripheral vascular disease) ([address]     Bilateral carotid artery stenosis 01/06/2020    Statin intolerance 02/14/2018    Inferior MI ([address] 01/07/2018    ST elevation myocardial infarction (STEMI) ([address]     Atherosclerosis of native artery of extremity with intermittent claudication (HCC) 04/15/2015    Paroxysmal a-fib (HCC)     Tobacco abuse     Chest pain     Hyperlipidemia     Palpitations     HTN (hypertension)     Coronary artery disease involving native coronary artery of native heart with angina pectoris ([address]        Admit Date:  2/2/2022    Admission Problem List: Present on Admission:   Acute respiratory failure with hypoxia and hypercarbia (HCC)   Sepsis ([address]   Acute exacerbation of CHF (congestive heart failure) (HCC)   Acute systolic (congestive) heart failure [facility])      Cardiac Specific Data:  Specialty Problems        Cardiology Problems    Coronary artery disease involving native coronary artery of native heart with angina pectoris (HCC)        HTN (hypertension)        Chest pain        Hyperlipidemia        Paroxysmal a-fib (HCC)        Atherosclerosis of native artery of extremity with intermittent claudication (HCC)        Inferior MI ([address]        ST elevation myocardial infarction (STEMI) ([address]        Bilateral carotid artery stenosis        PVD (peripheral vascular disease) (HCC)        Acute exacerbation of CHF (congestive heart failure) (HCC)        Acute systolic (congestive) heart failure (HCC)              Subjective:  Mr. Sarwat Ramirez seen today underwent cardiac catheterization yesterday 3 out of 4 grafts occluded remaining right coronary artery graft moderately severe stenosis 1 vein graft appeared to be recently occluded. Cardiovascular surgery consulted. Await their input denies chest pain or dyspnea at the present time overall feels well. Blood pressure 114 or 75 heart 71. Objective:   /75   Pulse 71   Temp 97.1 °F (36.2 °C) (Temporal)   Resp 18   Ht 6' (1.829 m)   Wt 150 lb 14.4 oz (68.4 kg)   SpO2 100%   BMI 20.47 kg/m²       Intake/Output Summary (Last 24 hours) at 2/4/2022 0902  Last data filed at 2/4/2022 0539  Gross per 24 hour   Intake 760 ml   Output 1650 ml   Net -890 ml       Prior to Admission medications    Medication Sig Start Date End Date Taking? Authorizing Provider   amLODIPine (NORVASC) 5 MG tablet Take 1 tablet by mouth daily 12/21/21  Yes LUIS Cruz   nitroGLYCERIN (NITROSTAT) 0.4 MG SL tablet Place 1 tablet under the tongue every 5 minutes as needed for Chest pain up to max of 3 total doses. If no relief after 1 dose, call 911. 6/8/21  Yes Modesta Christine APRCHRISTINA   lisinopril (PRINIVIL;ZESTRIL) 20 MG tablet Take 1 tablet by mouth daily 6/8/21  Yes LUIS Cruz   sotalol (BETAPACE) 160 MG tablet Take 1 tablet by mouth twice daily 6/8/21  Yes Rachelriajerafaela Christine APRCHRISTINA   nitroGLYCERIN (NITROSTAT) 0.4 MG SL tablet Place 1 tablet under the tongue every 5 minutes as needed for Chest pain 6/8/21  Yes RadhaaLUIS Olson   aspirin 81 MG EC tablet Take 81 mg by mouth daily.      Yes Historical Provider, MD        Vitamin D  5,000 Units Oral Daily    sodium chloride flush  5-40 mL IntraVENous 2 times per day    atorvastatin  80 mg Oral Nightly    aspirin  81 mg Oral Daily    lisinopril  20 mg Oral Daily    sotalol  160 mg Oral 2 times per day    amLODIPine  5 mg Oral Daily    furosemide  40 mg IntraVENous BID    cefTRIAXone (ROCEPHIN) IV  1,000 mg IntraVENous Q24H    And    azithromycin  500 mg IntraVENous Q24H    oxybutynin  5 mg Oral Nightly    enoxaparin  1 mg/kg SubCUTAneous BID    methylPREDNISolone  40 mg IntraVENous Q12H    ipratropium-albuterol  1 ampule Inhalation Q4H       TELEMETRY: Sinus     Physical Exam:      Physical Exam      General:  Awake, alert, NAD  Skin:  Warm and dry  Neck:  no jvd , no carotid bruits  Chest:  Clear to auscultation, no wheezing or rales  Cardiovascular:  RRR [id number] no murmurs, clicks, gallups, or rubs  Abdomen:  Soft nontender, nondistended, bowel sounds present  Extremities:  Edema: none        Lab Data:  CBC:   Recent Labs     02/02/22 0311 02/03/22  0753 02/04/22 0222   WBC 19.7* 17.1* 16.4*   HGB 14.4 12.2* 11.3*   HCT 44.3 37.4* 34.5*   MCV 98.0* 95.2* 95.3*    307 282     BMP:   Recent Labs     02/02/22 0317 02/02/22 0338 02/02/22  1445 02/03/22  0753 02/04/22  0222   NA  --  139  --  139 139   K   < > 4.5 3.9 3.9 3.6   CL  --  102  --  101 102   CO2  --  19*  --  27 26   BUN  --  18  --  21 28*   CREATININE  --  0.9  --  0.7 0.7    < > = values in this interval not displayed. LIVER PROFILE:   Recent Labs     02/02/22 0338 02/03/22 0753 02/04/22 0222   AST 26 25 20   ALT 36 21 18   BILITOT 0.5 0.5 0.3   ALKPHOS 142* 97 83     PT/INR: No results for input(s): PROTIME, INR in the last 72 hours. APTT: No results for input(s): APTT in the last 72 hours. BNP:  No results for input(s): BNP in the last 72 hours.   CK, CKMB, Troponin: @LABRCNT (CKTOTAL:3, CKMB:3, TROPONINI:3)@    IMAGING:  ECHO Complete 2D W Doppler W Color    Result Date: 2/3/2022  Transthoracic Echocardiography Report (TTE)  Demographics   Patient Name  CASI Yarbrough Date of Study         02/03/2022   MRN           [id number]          Gender                Male   Date of Birth 1950      Room Number           MHL-0712   Age           66 year(s)   Height:       72 inches       Referring Physician   Janeth Enriquez   Weight:       150 pounds      Sonographer           Luly Donaldson RDCS   BSA:          1.89 m^2        Interpreting Heber Epley, MD                                Physician   BMI:          20.34 kg/m^2  Procedure Type of Study   TTE procedure:ECHO 2D W/DOPPLER/COLOR/CONTRAST. Study Location: Echo Lab Technical Quality: Adequate visualization Patient Status: Inpatient Contrast Medium: Definity. Amount - 8 ml BP: 114/77 mmHg Indications:Congestive heart failure. 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  0.79 cm, ERO more than 0.4 cm^2, jet width more than 50% of the left  atrial area  Central jet of the mitral regurgitation suggestive of functional secondary  mitral regurgitation  Moderately to severely dilated left atrium  Moderately dilated right atrium  Moderate aortic insufficiency  Moderate tricuspid regurgitation with estimated right ventricular systolic  pressure of 40 to 45 mm which is mildly elevated  Dilated IVC with impaired respiratory variation  Aortic sclerosis   Signature   ----------------------------------------------------------------  Electronically signed by Heber Epley, MD(Interpreting physician)  on 02/03/2022 12:31 PM  ----------------------------------------------------------------  Allergies   - Statins. M-Mode Measurements (cm)   LVIDd: 5.76 cm                         LVIDs: 5.18 cm  IVSd: 0.98 cm  LVPWd: 1.23 cm                         AO Root Dimension: 2.4 cm  Rt. Vent.  Dimension: 3.38 cm           LA: 4.6 cm  % Ejection Fraction: 20 %              LVOT: 2 cm  Doppler Measurements:   AV Peak Velocity:145 cm/s            MV Peak E-Wave: 102 cm/s  AV Peak Gradient: 8.41 mmHg          MV Peak A-Wave: 38.9 cm/s  AV Mean Gradient: 5 mmHg             MV E/A Ratio: 2.62 %  AV Area (Continuity):2.34 cm^2       MV Peak Gradient: 4.16 mmHg  TR Velocity:269 cm/s  TR Gradient:28.94 mmHg  Estimated RAP:10 mmHg  RVSP:40 mmHg      XR CHEST PORTABLE    Result Date: 2/2/2022  Examination. XR CHEST PORTABLE 2/2/2022 3:21 AM History: Shortness of breath. Frontal portable upright view of the chest is compared with the previous study dated 1/7/2018. There is extensive interstitial infiltrate in the lungs bilaterally more severely in the mid and lower lungs. There is bilateral lower lobar consolidation. There is a small bibasal pleural effusion. There is no pneumothorax. The heart size is not evaluated due to complete obliteration of the cardiac border by the adjacent infiltrate. Atheromatous changes thoracic aorta are noted. There is evidence of prior cardiac surgery. No acute bony abnormality. 1. The extensive bilateral lung infiltrate. Bilateral lower lobar consolidation and bibasilar pleural effusion. Signed by Dr Harika Dowell:  1. Complaints of several week history of worsening shortness of breath  2. Coronary artery disease  3. Status post CABG times 4/2011  4. History of cardiac stent 1/7/2018 MultiLink vision 3.5 x 28  5. Acute respiratory failure with hypoxia and hypercarbia  6. Possible sepsis  7. Acute and chronic systolic diastolic congestive heart failure  8. Hypertension  9. Tobacco abuse  10. Hypertension  11. Peripheral arterial disease with previous stent placement  12. Paroxysmal atrial fibrillation  13. History of inferior STEMI  14. Statin intolerance  15. Bilateral carotid artery stenosis  16.  Cardiac cath 1/7/2018 mild LV enlargement mild inferior and anterolateral hypokinesis EF 50% or greater occluded right coronary artery first OM occluded mid LAD occluded stent in the first diagonal however 75% narrowing distal to that, LIMA graft to diagonal patent vein graft to circumflex marginal occluded felt to be likely the infarct vessel, vein graft LAD patent however 90% stenosis distal portion of the vein graft, vein graft to right coronary artery widely patent, status post PCI with placement of a 2.75 x 18 mm drug-eluting stent vein graft to LAD, status post PCI of the vein graft to the OM with thrombectomy placement of a 3.5 x 12 mm bare-metal stent in the ostium for a 75% lesion and a 3.5 x 28 mm bare-metal stent in the mid segment for a dissection with residual 75 to 80% stenosis and an 80 to 90% distal lesion stented with a 3.5 x 18 bare-metal stent  17. Elevated D-dimer 1.01  18. Elevated troponins initial negative of 2.35 now down to 0.27 possible non-STEMI  19. Elevated proBNP 3275  20. Cardiac catheterization 2/3/2022 significant LV systolic dysfunction LIMA graft atretic 2 vein grafts occluded 1 of which appears recent remaining right coronary artery saphenous vein graft moderately severe stenosis in the proximal-mid segment       Plan:  1. Continue current medical therapy await cardiovascular surgical consultation  2.  I will be out of town the next week coverage available    Janet Balderas MD, MD 2/4/2022 9:02 AM

## 2022-02-05 LAB
ALBUMIN SERPL-MCNC: 3 G/DL (ref 3.5–5.2)
ALP BLD-CCNC: 80 U/L (ref 40–130)
ALT SERPL-CCNC: 22 U/L (ref 5–41)
ANION GAP SERPL CALCULATED.3IONS-SCNC: 12 MMOL/L (ref 7–19)
AST SERPL-CCNC: 21 U/L (ref 5–40)
BASOPHILS ABSOLUTE: 0 K/UL (ref 0–0.2)
BASOPHILS RELATIVE PERCENT: 0.1 % (ref 0–1)
BILIRUB SERPL-MCNC: 0.3 MG/DL (ref 0.2–1.2)
BUN BLDV-MCNC: 30 MG/DL (ref 8–23)
CALCIUM SERPL-MCNC: 7.8 MG/DL (ref 8.8–10.2)
CHLORIDE BLD-SCNC: 99 MMOL/L (ref 98–111)
CO2: 27 MMOL/L (ref 22–29)
CREAT SERPL-MCNC: 0.7 MG/DL (ref 0.5–1.2)
EKG P AXIS: 75 DEGREES
EKG P AXIS: 75 DEGREES
EKG P AXIS: NORMAL DEGREES
EKG P-R INTERVAL: 152 MS
EKG P-R INTERVAL: 194 MS
EKG P-R INTERVAL: NORMAL MS
EKG Q-T INTERVAL: 316 MS
EKG Q-T INTERVAL: 460 MS
EKG Q-T INTERVAL: 494 MS
EKG QRS DURATION: 110 MS
EKG QRS DURATION: 112 MS
EKG QRS DURATION: 126 MS
EKG QTC CALCULATION (BAZETT): 477 MS
EKG QTC CALCULATION (BAZETT): 478 MS
EKG QTC CALCULATION (BAZETT): 494 MS
EKG T AXIS: 170 DEGREES
EKG T AXIS: 177 DEGREES
EKG T AXIS: 86 DEGREES
EOSINOPHILS ABSOLUTE: 0 K/UL (ref 0–0.6)
EOSINOPHILS RELATIVE PERCENT: 0 % (ref 0–5)
GFR AFRICAN AMERICAN: >59
GFR NON-AFRICAN AMERICAN: >60
GLUCOSE BLD-MCNC: 106 MG/DL (ref 74–109)
HCT VFR BLD CALC: 35 % (ref 42–52)
HEMOGLOBIN: 11.3 G/DL (ref 14–18)
IMMATURE GRANULOCYTES #: 0.1 K/UL
LYMPHOCYTES ABSOLUTE: 1.2 K/UL (ref 1.1–4.5)
LYMPHOCYTES RELATIVE PERCENT: 7.5 % (ref 20–40)
MAGNESIUM: 1.9 MG/DL (ref 1.6–2.4)
MCH RBC QN AUTO: 31.3 PG (ref 27–31)
MCHC RBC AUTO-ENTMCNC: 32.3 G/DL (ref 33–37)
MCV RBC AUTO: 97 FL (ref 80–94)
MONOCYTES ABSOLUTE: 0.8 K/UL (ref 0–0.9)
MONOCYTES RELATIVE PERCENT: 5.1 % (ref 0–10)
NEUTROPHILS ABSOLUTE: 13.6 K/UL (ref 1.5–7.5)
NEUTROPHILS RELATIVE PERCENT: 86.9 % (ref 50–65)
PDW BLD-RTO: 14.1 % (ref 11.5–14.5)
PLATELET # BLD: 266 K/UL (ref 130–400)
PMV BLD AUTO: 11 FL (ref 9.4–12.4)
POTASSIUM SERPL-SCNC: 3.7 MMOL/L (ref 3.5–5)
PRO-BNP: 2215 PG/ML (ref 0–900)
RBC # BLD: 3.61 M/UL (ref 4.7–6.1)
SODIUM BLD-SCNC: 138 MMOL/L (ref 136–145)
TOTAL PROTEIN: 4.8 G/DL (ref 6.6–8.7)
WBC # BLD: 15.7 K/UL (ref 4.8–10.8)

## 2022-02-05 PROCEDURE — 6360000002 HC RX W HCPCS: Performed by: INTERNAL MEDICINE

## 2022-02-05 PROCEDURE — 36415 COLL VENOUS BLD VENIPUNCTURE: CPT

## 2022-02-05 PROCEDURE — 6370000000 HC RX 637 (ALT 250 FOR IP): Performed by: NURSE PRACTITIONER

## 2022-02-05 PROCEDURE — 85025 COMPLETE CBC W/AUTO DIFF WBC: CPT

## 2022-02-05 PROCEDURE — 6370000000 HC RX 637 (ALT 250 FOR IP): Performed by: INTERNAL MEDICINE

## 2022-02-05 PROCEDURE — 2700000000 HC OXYGEN THERAPY PER DAY

## 2022-02-05 PROCEDURE — 6360000002 HC RX W HCPCS: Performed by: FAMILY MEDICINE

## 2022-02-05 PROCEDURE — 80053 COMPREHEN METABOLIC PANEL: CPT

## 2022-02-05 PROCEDURE — 94640 AIRWAY INHALATION TREATMENT: CPT

## 2022-02-05 PROCEDURE — 93010 ELECTROCARDIOGRAM REPORT: CPT | Performed by: INTERNAL MEDICINE

## 2022-02-05 PROCEDURE — 6370000000 HC RX 637 (ALT 250 FOR IP): Performed by: FAMILY MEDICINE

## 2022-02-05 PROCEDURE — 83735 ASSAY OF MAGNESIUM: CPT

## 2022-02-05 PROCEDURE — 2580000003 HC RX 258: Performed by: INTERNAL MEDICINE

## 2022-02-05 PROCEDURE — 2140000000 HC CCU INTERMEDIATE R&B

## 2022-02-05 PROCEDURE — 94660 CPAP INITIATION&MGMT: CPT

## 2022-02-05 PROCEDURE — 94761 N-INVAS EAR/PLS OXIMETRY MLT: CPT

## 2022-02-05 PROCEDURE — 83880 ASSAY OF NATRIURETIC PEPTIDE: CPT

## 2022-02-05 PROCEDURE — 6370000000 HC RX 637 (ALT 250 FOR IP): Performed by: HOSPITALIST

## 2022-02-05 PROCEDURE — 2580000003 HC RX 258: Performed by: FAMILY MEDICINE

## 2022-02-05 PROCEDURE — 99232 SBSQ HOSP IP/OBS MODERATE 35: CPT | Performed by: INTERNAL MEDICINE

## 2022-02-05 RX ORDER — SPIRONOLACTONE 25 MG/1
25 TABLET ORAL DAILY
Status: DISCONTINUED | OUTPATIENT
Start: 2022-02-05 | End: 2022-02-09 | Stop reason: HOSPADM

## 2022-02-05 RX ORDER — ISOSORBIDE MONONITRATE 30 MG/1
30 TABLET, EXTENDED RELEASE ORAL DAILY
Status: DISCONTINUED | OUTPATIENT
Start: 2022-02-05 | End: 2022-02-09 | Stop reason: HOSPADM

## 2022-02-05 RX ORDER — LISINOPRIL 20 MG/1
20 TABLET ORAL DAILY
Status: DISCONTINUED | OUTPATIENT
Start: 2022-02-05 | End: 2022-02-05

## 2022-02-05 RX ORDER — FUROSEMIDE 40 MG/1
40 TABLET ORAL 2 TIMES DAILY
Status: DISCONTINUED | OUTPATIENT
Start: 2022-02-05 | End: 2022-02-09 | Stop reason: HOSPADM

## 2022-02-05 RX ADMIN — SODIUM CHLORIDE, PRESERVATIVE FREE 10 ML: 5 INJECTION INTRAVENOUS at 21:08

## 2022-02-05 RX ADMIN — LORAZEPAM 1 MG: 1 TABLET ORAL at 13:43

## 2022-02-05 RX ADMIN — ENOXAPARIN SODIUM 70 MG: 100 INJECTION SUBCUTANEOUS at 21:08

## 2022-02-05 RX ADMIN — OXYBUTYNIN CHLORIDE 5 MG: 5 TABLET, EXTENDED RELEASE ORAL at 21:07

## 2022-02-05 RX ADMIN — SOTALOL HYDROCHLORIDE 160 MG: 80 TABLET ORAL at 21:07

## 2022-02-05 RX ADMIN — LORAZEPAM 1 MG: 1 TABLET ORAL at 21:07

## 2022-02-05 RX ADMIN — IPRATROPIUM BROMIDE AND ALBUTEROL SULFATE 1 AMPULE: 2.5; .5 SOLUTION RESPIRATORY (INHALATION) at 06:43

## 2022-02-05 RX ADMIN — SPIRONOLACTONE 25 MG: 25 TABLET ORAL at 16:57

## 2022-02-05 RX ADMIN — LISINOPRIL 20 MG: 10 TABLET ORAL at 08:49

## 2022-02-05 RX ADMIN — SOTALOL HYDROCHLORIDE 160 MG: 80 TABLET ORAL at 08:49

## 2022-02-05 RX ADMIN — ATORVASTATIN CALCIUM 80 MG: 40 TABLET, FILM COATED ORAL at 21:07

## 2022-02-05 RX ADMIN — ENOXAPARIN SODIUM 70 MG: 100 INJECTION SUBCUTANEOUS at 08:49

## 2022-02-05 RX ADMIN — AMLODIPINE BESYLATE 5 MG: 5 TABLET ORAL at 08:49

## 2022-02-05 RX ADMIN — METHYLPREDNISOLONE SODIUM SUCCINATE 40 MG: 40 INJECTION, POWDER, FOR SOLUTION INTRAMUSCULAR; INTRAVENOUS at 05:21

## 2022-02-05 RX ADMIN — WATER 1000 MG: 1 INJECTION INTRAMUSCULAR; INTRAVENOUS; SUBCUTANEOUS at 05:22

## 2022-02-05 RX ADMIN — DOXYCYCLINE HYCLATE 100 MG: 100 CAPSULE ORAL at 08:48

## 2022-02-05 RX ADMIN — IPRATROPIUM BROMIDE AND ALBUTEROL SULFATE 1 AMPULE: 2.5; .5 SOLUTION RESPIRATORY (INHALATION) at 14:14

## 2022-02-05 RX ADMIN — IPRATROPIUM BROMIDE AND ALBUTEROL SULFATE 1 AMPULE: 2.5; .5 SOLUTION RESPIRATORY (INHALATION) at 10:19

## 2022-02-05 RX ADMIN — POLYETHYLENE GLYCOL 3350 17 G: 17 POWDER, FOR SOLUTION ORAL at 05:22

## 2022-02-05 RX ADMIN — LORAZEPAM 1 MG: 1 TABLET ORAL at 05:25

## 2022-02-05 RX ADMIN — METHYLPREDNISOLONE SODIUM SUCCINATE 40 MG: 40 INJECTION, POWDER, FOR SOLUTION INTRAMUSCULAR; INTRAVENOUS at 13:32

## 2022-02-05 RX ADMIN — ISOSORBIDE MONONITRATE 30 MG: 30 TABLET, EXTENDED RELEASE ORAL at 16:57

## 2022-02-05 RX ADMIN — FUROSEMIDE 40 MG: 10 INJECTION, SOLUTION INTRAMUSCULAR; INTRAVENOUS at 08:49

## 2022-02-05 RX ADMIN — SODIUM CHLORIDE, PRESERVATIVE FREE 10 ML: 5 INJECTION INTRAVENOUS at 08:50

## 2022-02-05 RX ADMIN — ASPIRIN 81 MG: 81 TABLET, COATED ORAL at 08:49

## 2022-02-05 RX ADMIN — IPRATROPIUM BROMIDE AND ALBUTEROL SULFATE 1 AMPULE: 2.5; .5 SOLUTION RESPIRATORY (INHALATION) at 22:05

## 2022-02-05 RX ADMIN — Medication 5000 UNITS: at 08:48

## 2022-02-05 RX ADMIN — FUROSEMIDE 40 MG: 40 TABLET ORAL at 16:55

## 2022-02-05 RX ADMIN — IPRATROPIUM BROMIDE AND ALBUTEROL SULFATE 1 AMPULE: 2.5; .5 SOLUTION RESPIRATORY (INHALATION) at 18:30

## 2022-02-05 ASSESSMENT — ENCOUNTER SYMPTOMS
SHORTNESS OF BREATH: 1
EYES NEGATIVE: 1
COUGH: 1
GASTROINTESTINAL NEGATIVE: 1
ALLERGIC/IMMUNOLOGIC NEGATIVE: 1

## 2022-02-05 ASSESSMENT — PAIN SCALES - GENERAL: PAINLEVEL_OUTOF10: 0

## 2022-02-05 NOTE — PROGRESS NOTES
Cardiology Progress Note Marleni Glover MD      Patient:  Goran Worthington  [id number]    Patient Active Problem List    Diagnosis Date Noted    Acute respiratory failure with hypoxia and hypercarbia (HCC) 02/02/2022     Priority: Low    Sepsis ([facility] 02/02/2022     Priority: Low    Acute exacerbation of CHF (congestive heart failure) ([facility] 02/02/2022     Priority: Low    Acute systolic (congestive) heart failure ([facility] 02/02/2022     Priority: Low    PVD (peripheral vascular disease) ([facility]      Priority: Low    Bilateral carotid artery stenosis 01/06/2020     Priority: Low    Statin intolerance 02/14/2018     Priority: Low    Inferior MI ([facility] 01/07/2018     Priority: Low    ST elevation myocardial infarction (STEMI) [facility])      Priority: Low    Atherosclerosis of native artery of extremity with intermittent claudication ([facility] 04/15/2015     Priority: Low    Paroxysmal a-fib ([facility]      Priority: Low     Overview Note:     post op      Tobacco abuse      Priority: Low    Chest pain      Priority: Low    Hyperlipidemia      Priority: Low    Palpitations      Priority: Low    HTN (hypertension)      Priority: Low    Coronary artery disease involving native coronary artery of native heart with angina pectoris ([facility]      Priority: Low       Admit Date:  2/2/2022    Admission Problem List: Present on Admission:   Acute respiratory failure with hypoxia and hypercarbia (HCC)   Sepsis ([facility]   Acute exacerbation of CHF (congestive heart failure) (HCC)   Acute systolic (congestive) heart failure [facility])      Cardiac Specific Data:  Specialty Problems        Cardiology Problems    Coronary artery disease involving native coronary artery of native heart with angina pectoris (HCC)        HTN (hypertension)        Chest pain        Hyperlipidemia        Paroxysmal a-fib ([facility])        Atherosclerosis of native artery of extremity with intermittent claudication (HCC)        Inferior MI ([facility])        ST elevation myocardial infarction (STEMI) [facility]        Bilateral carotid artery stenosis        PVD (peripheral vascular disease) ([facility])        Acute exacerbation of CHF (congestive heart failure) ([facility])        Acute systolic (congestive) heart failure ([facility])            1.  Ischemic cardiomyopathy, prior CABG 5/15/2009, four-vessel grafting (SVG to OM, SVG to LAD, SVG to RPDA, LIMA to diagonal), prior PCI to proximal diagonal and distal circumflex 4/2012, prior inferolateral STEMI 1/7/2018 with multiple BMS to occluded SVG to OM, BJ to SVG to LAD at anastomosis, non-STEMI presentation with heart failure 2/2/2022 with catheterization 2/3/2022 showing stenotic SVG to RPDA, occluded vein grafts to LAD and OM as well as known occluded LIMA, ejection fraction 20% with severe eccentric posterior directed mitral regurgitation. 2.  Longstanding and ongoing tobacco use with probable COPD. 3.  Peripheral arterial disease with multiple peripheral interventions. 4.  Paroxysmal atrial fibrillation on Betapace. Subjective:  Mr. Edward Pruitt appears to be comfortable sitting in chair eating lunch. Was turned down for redo CABG with consideration for possible PCI and MitraClip placement. Patient reports he is still using vape up to admission. Objective:   /85   Pulse 68   Temp 99.3 °F (37.4 °C)   Resp 16   Ht 6' (1.829 m)   Wt 151 lb 6.4 oz (68.7 kg)   SpO2 96%   BMI 20.53 kg/m²       Intake/Output Summary (Last 24 hours) at 2/5/2022 1519  Last data filed at 2/5/2022 1435  Gross per 24 hour   Intake 1440 ml   Output 2700 ml   Net -1260 ml       Prior to Admission medications    Medication Sig Start Date End Date Taking?  Authorizing Provider   sacubitril-valsartan (ENTRESTO) 24-26 MG per tablet Take 1 tablet by mouth 2 times daily for 60 doses 2/5/22 3/7/22 Yes LUIS Dallas - CNP   amLODIPine (NORVASC) 5 MG tablet Take 1 tablet by mouth daily 12/21/21  Yes LUIS Villalpando   nitroGLYCERIN (NITROSTAT) 0.4 MG SL tablet Place 1 tablet under the tongue every 5 minutes as needed for Chest pain up to max of 3 total doses. If no relief after 1 dose, call 911. 6/8/21  Yes LUIS Navarro   lisinopril (PRINIVIL;ZESTRIL) 20 MG tablet Take 1 tablet by mouth daily 6/8/21  Yes LUIS Navarro   sotalol (BETAPACE) 160 MG tablet Take 1 tablet by mouth twice daily 6/8/21  Yes LUIS Navarro   nitroGLYCERIN (NITROSTAT) 0.4 MG SL tablet Place 1 tablet under the tongue every 5 minutes as needed for Chest pain 6/8/21  Yes LUIS Navarro   aspirin 81 MG EC tablet Take 81 mg by mouth daily. Yes Historical Provider, MD        furosemide  40 mg Oral BID    [Held by provider] sacubitril-valsartan  1 tablet Oral BID    Vitamin D  5,000 Units Oral Daily    sodium chloride flush  5-40 mL IntraVENous 2 times per day    atorvastatin  80 mg Oral Nightly    aspirin  81 mg Oral Daily    sotalol  160 mg Oral 2 times per day    amLODIPine  5 mg Oral Daily    oxybutynin  5 mg Oral Nightly    enoxaparin  1 mg/kg SubCUTAneous BID    methylPREDNISolone  40 mg IntraVENous Q12H    ipratropium-albuterol  1 ampule Inhalation Q4H       TELEMETRY: Sinus and Atrial fibrillation     Physical Exam:      Physical Exam  HENT:      Mouth/Throat:      Pharynx: No oropharyngeal exudate. Eyes:      General: No scleral icterus. Right eye: No discharge. Left eye: No discharge. Neck:      Thyroid: No thyromegaly. Vascular: No JVD. Cardiovascular:      Rate and Rhythm: Normal rate and regular rhythm. Heart sounds: Murmur heard. No friction rub. No gallop. Comments: Mild jugular venous distention  No edema  Systolic murmur in the axillary area  Pulmonary:      Effort: No respiratory distress. Breath sounds: No stridor. No wheezing or rales. Comments: Air entry present in both lung fields. No crepitations noted  Abdominal:      General: Bowel sounds are normal. There is no distension.       Palpations: Abdomen is soft. There is no mass. Tenderness: There is no abdominal tenderness. There is no guarding or rebound. Comments: Soft, nontender  No palpable organomegaly   Musculoskeletal:         General: No deformity. Skin:     General: Skin is warm. Coloration: Skin is not pale. Findings: No erythema or rash. Neurological:      Mental Status: He is alert and oriented to person, place, and time. Motor: No abnormal muscle tone. Coordination: Coordination normal.      Deep Tendon Reflexes: Reflexes normal.                 Lab Data:  CBC:   Recent Labs     02/03/22 0753 02/04/22 0222 02/05/22  0750   WBC 17.1* 16.4* 15.7*   HGB 12.2* 11.3* 11.3*   HCT 37.4* 34.5* 35.0*   MCV 95.2* 95.3* 97.0*    282 266     BMP:   Recent Labs     02/03/22  0753 02/04/22  0222 02/05/22  0156    139 138   K 3.9 3.6 3.7    102 99   CO2 27 26 27   BUN 21 28* 30*   CREATININE 0.7 0.7 0.7     LIVER PROFILE:   Recent Labs     02/03/22 0753 02/04/22 0222 02/05/22  0156   AST 25 20 21   ALT 21 18 22   BILITOT 0.5 0.3 0.3   ALKPHOS 97 83 80     PT/INR: No results for input(s): PROTIME, INR in the last 72 hours. APTT: No results for input(s): APTT in the last 72 hours. BNP:  No results for input(s): BNP in the last 72 hours.   CK, CKMB, Troponin: @LABRCNT (CKTOTAL:3, CKMB:3, TROPONINI:3)@    IMAGING:  ECHO Complete 2D W Doppler W Color    Result Date: 2/3/2022  Transthoracic Echocardiography Report (TTE)  Demographics   Patient Name  CASI Gupta Date of Study         02/03/2022   MRN           [id number]          Gender                Male   Date of Birth 1950      Room Number           MHL-0712   Age           66 year(s)   Height:       72 inches       Referring Physician   Deidre Zamarripa   Weight:       150 pounds      Sonographer           Luly Donaldson, [facility]   BSA:          1.89 m^2        Interpreting          Juan Carlos Villa MD                                Physician   BMI:          20.34 kg/m^2  Procedure Type of Study   TTE procedure:ECHO 2D W/DOPPLER/COLOR/CONTRAST. Study Location: Echo Lab Technical Quality: Adequate visualization Patient Status: Inpatient Contrast Medium: Definity. Amount - 8 ml BP: 114/77 mmHg Indications:Congestive heart failure. 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  0.79 cm, ERO more than 0.4 cm^2, jet width more than 50% of the left  atrial area  Central jet of the mitral regurgitation suggestive of functional secondary  mitral regurgitation  Moderately to severely dilated left atrium  Moderately dilated right atrium  Moderate aortic insufficiency  Moderate tricuspid regurgitation with estimated right ventricular systolic  pressure of 40 to 45 mm which is mildly elevated  Dilated IVC with impaired respiratory variation  Aortic sclerosis   Signature   ----------------------------------------------------------------  Electronically signed by Dm Mckeon MD(Interpreting physician)  on 02/03/2022 12:31 PM  ----------------------------------------------------------------  Allergies   - Statins. M-Mode Measurements (cm)   LVIDd: 5.76 cm                         LVIDs: 5.18 cm  IVSd: 0.98 cm  LVPWd: 1.23 cm                         AO Root Dimension: 2.4 cm  Rt. Vent.  Dimension: 3.38 cm           LA: 4.6 cm  % Ejection Fraction: 20 %              LVOT: 2 cm  Doppler Measurements:   AV Peak Velocity:145 cm/s            MV Peak E-Wave: 102 cm/s  AV Peak Gradient: 8.41 mmHg          MV Peak A-Wave: 38.9 cm/s  AV Mean Gradient: 5 mmHg             MV E/A Ratio: 2.62 %  AV Area (Continuity):2.34 cm^2       MV Peak Gradient: 4.16 mmHg  TR Velocity:269 cm/s  TR Gradient:28.94 mmHg  Estimated RAP:10 mmHg  RVSP:40 mmHg      XR CHEST PORTABLE    Result Date: 2/2/2022  Examination. XR CHEST PORTABLE 2/2/2022 3:21 AM History: Shortness of breath. Frontal portable upright view of the chest is compared with the previous study dated 1/7/2018. There is extensive interstitial infiltrate in the lungs bilaterally more severely in the mid and lower lungs. There is bilateral lower lobar consolidation. There is a small bibasal pleural effusion. There is no pneumothorax. The heart size is not evaluated due to complete obliteration of the cardiac border by the adjacent infiltrate. Atheromatous changes thoracic aorta are noted. There is evidence of prior cardiac surgery. No acute bony abnormality. 1. The extensive bilateral lung infiltrate. Bilateral lower lobar consolidation and bibasilar pleural effusion. Signed by Dr Brigitte Molina and Plan:    64-year-old gentleman with past medical history of coronary artery disease with prior CABG with four-vessel grafting 5/15/2009, prior stents in distal circumflex and proximal diagonal 4/2012, inferolateral STEMI 1/7/2019 with intervention to vein grafts to LAD and OM, paroxysmal atrial fibrillation on Betapace, longstanding and ongoing tobacco use with probable COPD, peripheral arterial disease with multiple endovascular interventions presenting with non-STEMI and heart failure 2/2/2022 found to have ejection fraction of 20% with severe mitral regurgitation, occluded vein grafts to LAD and OM, stenotic SVG to RPDA and known LIMA occlusion to diagonal, turndown for repeat CABG being evaluated for PCI and possible MitraClip placement. 1.  Angiograms and echocardiogram reviewed. Mitral regurgitation eccentric and appears severe. EF is severely depressed at 20%. His vein graft to RPDA has a focal stenotic lesion amenable to PCI.   His previous stents in proximal diagonal as well as distal circumflex remain patent. His vein grafts that were stented with bare-metal stents as well as a drug-eluting stent have occluded. He is however receiving blood flow through his native LAD with significant stenotic disease across the jailed segment. Have discussed issues with patient. Can proceed with PCI to SVG to RPDA with possible attempt at LAD 2/7/2022. Patient wishes to proceed in this direction. Subsequent possible mitral clip placement. 2.  Initiate Aldactone 25 mg once daily. Would start patient on Entresto 24/26 mg twice daily and uptitrate tomorrow. If unable to obtain then switch back to ACE inhibitor. Can reduce/stop Norvasc if needed. Would add Imdur 30 mg once daily. 3.  Complete tobacco/smoking cessation stressed.       Megan Herrera MD, MD 2/5/2022 3:19 PM

## 2022-02-05 NOTE — PROGRESS NOTES
Genitourinary: Negative. Musculoskeletal: Negative. Skin: Negative. Allergic/Immunologic: Negative. Neurological: Negative. Hematological: Negative. Psychiatric/Behavioral: Negative. Objective:   VITALS:  /85   Pulse 68   Temp 99.3 °F (37.4 °C)   Resp 16   Ht 6' (1.829 m)   Wt 151 lb 6.4 oz (68.7 kg)   SpO2 96%   BMI 20.53 kg/m²   24HR INTAKE/OUTPUT:      Intake/Output Summary (Last 24 hours) at 2/5/2022 1455  Last data filed at 2/5/2022 1435  Gross per 24 hour   Intake 1440 ml   Output 2700 ml   Net -1260 ml           Physical Exam  Vitals and nursing note reviewed. Constitutional:       General: He is not in acute distress. HENT:      Head: Normocephalic. Mouth/Throat:      Mouth: Mucous membranes are moist.   Cardiovascular:      Rate and Rhythm: Normal rate. Rhythm irregular. Heart sounds: Normal heart sounds. Pulmonary:      Breath sounds: Wheezing present. Comments: Less wheezing today  Abdominal:      General: Bowel sounds are normal.      Palpations: Abdomen is soft. Musculoskeletal:         General: Normal range of motion. Skin:     General: Skin is warm and dry. Coloration: Skin is pale. Neurological:      General: No focal deficit present. Mental Status: He is alert.    Psychiatric:         Mood and Affect: Mood normal.            Medications:      sodium chloride 1,000 mL (02/03/22 1716)    sodium chloride      nitroGLYCERIN 15 mcg/min (02/04/22 0421)    dilTIAZem (CARDIZEM) 125 mg in dextrose 5% 125 mL infusion Stopped (02/03/22 0115)      furosemide  40 mg Oral BID    [Held by provider] lisinopril  20 mg Oral Daily    [Held by provider] sacubitril-valsartan  1 tablet Oral BID    Vitamin D  5,000 Units Oral Daily    sodium chloride flush  5-40 mL IntraVENous 2 times per day    atorvastatin  80 mg Oral Nightly    aspirin  81 mg Oral Daily    sotalol  160 mg Oral 2 times per day    amLODIPine  5 mg Oral Daily    oxybutynin  5 mg Oral Nightly    enoxaparin  1 mg/kg SubCUTAneous BID    methylPREDNISolone  40 mg IntraVENous Q12H    ipratropium-albuterol  1 ampule Inhalation Q4H     sodium chloride flush, sodium chloride, acetaminophen, hydrALAZINE, LORazepam, nitroGLYCERIN, nitroGLYCERIN, ondansetron **OR** [DISCONTINUED] ondansetron, polyethylene glycol, acetaminophen **OR** acetaminophen  ADULT DIET; Regular; 3 carb choices (45 gm/meal); No Added Salt (3-4 gm)     Lab and other Data:     Recent Labs     02/03/22  0753 02/04/22  0222 02/05/22  0750   WBC 17.1* 16.4* 15.7*   HGB 12.2* 11.3* 11.3*    282 266     Recent Labs     02/03/22 0753 02/04/22 0222 02/05/22  0156    139 138   K 3.9 3.6 3.7    102 99   CO2 27 26 27   BUN 21 28* 30*   CREATININE 0.7 0.7 0.7   GLUCOSE 147* 125* 106     Recent Labs     02/03/22  0753 02/04/22  0222 02/05/22  0156   AST 25 20 21   ALT 21 18 22   BILITOT 0.5 0.3 0.3   ALKPHOS 97 83 80     Troponin T:   Recent Labs     02/02/22  2235 02/03/22  1553 02/03/22  2239   TROPONINI 0.27* 0.22* 0.15*     Pro-BNP: No results for input(s): BNP in the last 72 hours. INR: No results for input(s): INR in the last 72 hours. UA:No results for input(s): NITRITE, COLORU, PHUR, LABCAST, WBCUA, RBCUA, MUCUS, TRICHOMONAS, YEAST, BACTERIA, CLARITYU, SPECGRAV, LEUKOCYTESUR, UROBILINOGEN, BILIRUBINUR, BLOODU, GLUCOSEU, AMORPHOUS in the last 72 hours. Invalid input(s): She [employer]er  A1C: No results for input(s): LABA1C in the last 72 hours. ABG:  No results for input(s): PHART, UDH9EIA, PO2ART, IRD4KDI, BEART, HGBAE, Y6QGIGQM, CARBOXHGBART in the last 72 hours.     RAD:   ECHO Complete 2D W Doppler W Color    Result Date: 2/3/2022  Transthoracic Echocardiography Report (TTE)  Demographics   Patient Name  CASI Estrella Date of Study         02/03/2022   MRN           [id number]          Gender                Male   Date of Birth 1950      Room Number           MHL-46   Age           66 year(s)   Height:       72 inches       Referring Physician   Laila Bray   Weight:       150 pounds      Sonographer           Luly Donaldson [facility]   BSA:          1.89 m^2        Interpreting          Mele Springer MD                                Physician   BMI:          20.34 kg/m^2  Procedure Type of Study   TTE procedure:ECHO 2D W/DOPPLER/COLOR/CONTRAST. Study Location: Echo Lab Technical Quality: Adequate visualization Patient Status: Inpatient Contrast Medium: Definity. Amount - 8 ml BP: 114/77 mmHg Indications:Congestive heart failure. 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  0.79 cm, ERO more than 0.4 cm^2, jet width more than 50% of the left  atrial area  Central jet of the mitral regurgitation suggestive of functional secondary  mitral regurgitation  Moderately to severely dilated left atrium  Moderately dilated right atrium  Moderate aortic insufficiency  Moderate tricuspid regurgitation with estimated right ventricular systolic  pressure of 40 to 45 mm which is mildly elevated  Dilated IVC with impaired respiratory variation  Aortic sclerosis   Signature   ----------------------------------------------------------------  Electronically signed by Mele Springer MD(Interpreting physician)  on 02/03/2022 12:31 PM  ----------------------------------------------------------------  Allergies   - Statins. M-Mode Measurements (cm)   LVIDd: 5.76 cm                         LVIDs: 5.18 cm  IVSd: 0.98 cm  LVPWd: 1.23 cm                         AO Root Dimension: 2.4 cm  Rt. Vent.  Dimension: 3.38 cm           LA: 4.6 cm  % Ejection Fraction: 20 %              LVOT: 2 cm  Doppler Measurements:   AV Peak Velocity:145 cm/s            MV Peak E-Wave: 102 cm/s  AV Peak Gradient: 8.41 mmHg          MV Peak A-Wave: 38.9 cm/s  AV Mean Gradient: 5 mmHg             MV E/A Ratio: 2.62 %  AV Area (Continuity):2.34 cm^2       MV Peak Gradient: 4.16 mmHg  TR Velocity:269 cm/s  TR Gradient:28.94 mmHg  Estimated RAP:10 mmHg  RVSP:40 mmHg      XR CHEST PORTABLE    Result Date: 2/2/2022  Examination. XR CHEST PORTABLE 2/2/2022 3:21 AM History: Shortness of breath. Frontal portable upright view of the chest is compared with the previous study dated 1/7/2018. There is extensive interstitial infiltrate in the lungs bilaterally more severely in the mid and lower lungs. There is bilateral lower lobar consolidation. There is a small bibasal pleural effusion. There is no pneumothorax. The heart size is not evaluated due to complete obliteration of the cardiac border by the adjacent infiltrate. Atheromatous changes thoracic aorta are noted. There is evidence of prior cardiac surgery. No acute bony abnormality. 1. The extensive bilateral lung infiltrate. Bilateral lower lobar consolidation and bibasilar pleural effusion. Signed by Dr Dameon Schofield            Assessment/Plan   Active Problems:    Acute respiratory failure with hypoxia and hypercarbia (HCC)/secondary to extensive CAD and low Ef of 20%   SPO2  on 2 liters at rest   will do home 02 eval -95% room air walking   Moniter for change  Elevated Troponin   Cardiology following    Cath -3 of 4 grafts occluded    Plan is to continue medical management with Ace, Betabocker and diuretic    Consulted CT surgery for recommendations     Pt not candidate for CABG redo     Possible Mitral clip, Poss PCI and stenting of RCA vein graft as well as LAD, No definitive plans at this time.   Will most likely trial medication management and follow up outpt     Discussed adding new meds    Sepsis ([address]   Cultures no growth    SERIAL labs-WBC trending down Dc antibiotics    Acute exacerbation of CHF (congestive heart failure) ([facility])   Diuretics-lasix 40 BID   Strict I and O- Output>intake -5664    BNP decreased to 2215   COnsult Heart failure nurse for pt education and assistance     Acute systolic (congestive) heart failure ([facility] [address]   Poss Mitral clip   Will continue therapy to diurese and improve EF   Stop lisinopril- last dose 2/5/2020at 10a.m. Start Entresto 24-26 36 hours after last dose of Lisinopril, 2/7/2022   Will ask social work to assess pt affordability-9.85 a month   Will dc home on [address] when ready. Resolved Problems:    * No resolved hospital problems. *      DVT Prophylaxis: lovenox      Discharge planning: TBD    Further Orders per Clinical course/attending. Electronically signed by LUIS Austin CNP on 2/5/2022 at 2:55 PM       EMR Dragon/Transcription disclaimer:   Much of this encounter note is an electronic transcription/translation of spoken language to printed text.  The electronic translation of spoken language may permit erroneous, or at times, nonsensical words or phrases to be inadvertently transcribed; although attempts have made to review the note for such errors, some may still exist.

## 2022-02-05 NOTE — PROGRESS NOTES
[facility]      Progress Note    Patient:  Veto Garcia  YOB: 1950  Date of Service: 2/4/2022  MRN: [id number]   Acct: [de-identified]   Primary Care Physician: Yani Diaz MD  Advance Directive: Full Code  Admit Date: 2/2/2022       Hospital Day: 2    Portions of this note have been copied forward, however, updated to reflect the most current clinical status of this patient. CHIEF COMPLAINT Shortness of breath  SUBJECTIVE:  No new complaints. Just saw CT surgeon and he is not a candidate for cabg redo. He will be started on some new medication . He has no new complaints today      CUMULATIVE HOSPITAL COURSE:     66 M w h/o CAD, atrial fibrillation and hyperlipidemia who presents to the emergency department in acute respiratory failure. Arrives from home via EMS. They administered Solu-Medrol and DuoNeb and attempted CPAP which he did not tolerate. Patient not able to give history due to respiratory distress. Placed on BiPAP soon after arrival. Rebekah Hands provider reports patient states increasing shortness of breath over the last several days and over the past couple weeks has developed significant swelling of bilateral lower extremities.  No prior history of congestive heart failure.  No history of DVT or PE. Patient apparently denied chest pain, fevers, cough, abdominal pain, vomiting or diarrhea.  He was placed in CCU initially . He was diuresed with good results. His O2 sat came up with BIPAP. He was initally treated with IV antibiotics pending cultures . Echo showed severly diminished EF. Cath showed 3 of 4 previous grafts from CABG occluded. Cardiology following. CT surgery consulted and pt is not a candidate for redo of his CABG. They will initiate new medication and he may be a candidate for a mitral clip. Review of Systems   Constitutional: Negative. Eyes: Negative. Respiratory: Positive for cough and shortness of breath.     Cardiovascular: Positive for palpitations. Gastrointestinal: Negative. Endocrine: Negative. Genitourinary: Negative. Musculoskeletal: Negative. Skin: Negative. Allergic/Immunologic: Negative. Neurological: Negative. Hematological: Negative. Psychiatric/Behavioral: Negative. Objective:   VITALS:  /87   Pulse 72   Temp 96.8 °F (36 °C) (Temporal)   Resp 20   Ht 6' (1.829 m)   Wt 150 lb 14.4 oz (68.4 kg)   SpO2 99%   BMI 20.47 kg/m²   24HR INTAKE/OUTPUT:      Intake/Output Summary (Last 24 hours) at 2/4/2022 1811  Last data filed at 2/4/2022 1210  Gross per 24 hour   Intake 720 ml   Output 2050 ml   Net -1330 ml           Physical Exam  Vitals and nursing note reviewed. Constitutional:       General: He is not in acute distress. HENT:      Head: Normocephalic. Mouth/Throat:      Mouth: Mucous membranes are moist.   Cardiovascular:      Rate and Rhythm: Normal rate. Rhythm irregular. Heart sounds: Normal heart sounds. Pulmonary:      Breath sounds: Wheezing present. Abdominal:      General: Bowel sounds are normal.      Palpations: Abdomen is soft. Musculoskeletal:         General: Normal range of motion. Skin:     General: Skin is warm and dry. Coloration: Skin is pale. Neurological:      General: No focal deficit present. Mental Status: He is alert.    Psychiatric:         Mood and Affect: Mood normal.            Medications:      sodium chloride 1,000 mL (02/03/22 1716)    sodium chloride      nitroGLYCERIN 15 mcg/min (02/04/22 0421)    dilTIAZem (CARDIZEM) 125 mg in dextrose 5% 125 mL infusion Stopped (02/03/22 0115)      [START ON 2/5/2022] doxycycline hyclate  100 mg Oral 2 times per day    Vitamin D  5,000 Units Oral Daily    sodium chloride flush  5-40 mL IntraVENous 2 times per day    atorvastatin  80 mg Oral Nightly    aspirin  81 mg Oral Daily    lisinopril  20 mg Oral Daily    sotalol  160 mg Oral 2 times per day    amLODIPine  5 mg Oral Daily    furosemide  40 mg IntraVENous BID    cefTRIAXone (ROCEPHIN) IV  1,000 mg IntraVENous Q24H    oxybutynin  5 mg Oral Nightly    enoxaparin  1 mg/kg SubCUTAneous BID    methylPREDNISolone  40 mg IntraVENous Q12H    ipratropium-albuterol  1 ampule Inhalation Q4H     sodium chloride flush, sodium chloride, acetaminophen, hydrALAZINE, LORazepam, nitroGLYCERIN, nitroGLYCERIN, ondansetron **OR** ondansetron, polyethylene glycol, acetaminophen **OR** acetaminophen  ADULT DIET; Regular; 3 carb choices (45 gm/meal); No Added Salt (3-4 gm)     Lab and other Data:     Recent Labs     02/02/22 0311 02/03/22 0753 02/04/22 0222   WBC 19.7* 17.1* 16.4*   HGB 14.4 12.2* 11.3*    307 282     Recent Labs     02/02/22 0317 02/02/22  0338 02/02/22  1445 02/03/22  0753 02/04/22  0222   NA  --  139  --  139 139   K   < > 4.5 3.9 3.9 3.6   CL  --  102  --  101 102   CO2  --  19*  --  27 26   BUN  --  18  --  21 28*   CREATININE  --  0.9  --  0.7 0.7   GLUCOSE  --  230*  --  147* 125*    < > = values in this interval not displayed. Recent Labs     02/02/22 0338 02/03/22 0753 02/04/22 0222   AST 26 25 20   ALT 36 21 18   BILITOT 0.5 0.5 0.3   ALKPHOS 142* 97 83     Troponin T:   Recent Labs     02/02/22 2235 02/03/22  1553 02/03/22 2239   TROPONINI 0.27* 0.22* 0.15*     Pro-BNP: No results for input(s): BNP in the last 72 hours. INR: No results for input(s): INR in the last 72 hours. UA:No results for input(s): NITRITE, COLORU, PHUR, LABCAST, WBCUA, RBCUA, MUCUS, TRICHOMONAS, YEAST, BACTERIA, CLARITYU, SPECGRAV, LEUKOCYTESUR, UROBILINOGEN, BILIRUBINUR, BLOODU, GLUCOSEU, AMORPHOUS in the last 72 hours. Invalid input(s): [address] [facility]  A1C: No results for input(s): LABA1C in the last 72 hours.   ABG:  Recent Labs     02/02/22  1445   PHART 7.470*   FRB2UHR 34.0*   PO2ART 236.0*   TFU6KXC 24.7   BEART 1.4   HGBAE 13.4*   V2YLWJFP 97.0   CARBOXHGBART 1.6       RAD:   ECHO Complete 2D W Doppler W Color    Result Date: 2/3/2022  Transthoracic Echocardiography Report (TTE)  Demographics   Patient Name  CASI Lopez Date of Study         02/03/2022   MRN           [id number]          Gender                Male   Date of Birth 1950      Room Number           MHL-0712   Age           66 year(s)   Height:       72 inches       Referring Physician   Dionicio Segal   Weight:       150 pounds      Sonographer           Luly Donaldson, [facility]   BSA:          1.89 m^2        Interpreting          Raman Hoffman MD                                Physician   BMI:          20.34 kg/m^2  Procedure Type of Study   TTE procedure:ECHO 2D W/DOPPLER/COLOR/CONTRAST. Study Location: Echo Lab Technical Quality: Adequate visualization Patient Status: Inpatient Contrast Medium: Definity. Amount - 8 ml BP: 114/77 mmHg Indications:Congestive heart failure.   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  0.79 cm, ERO more than 0.4 cm^2, jet width more than 50% of the left  atrial area  Central jet of the mitral regurgitation suggestive of functional secondary  mitral regurgitation  Moderately to severely dilated left atrium  Moderately dilated right atrium  Moderate aortic insufficiency  Moderate tricuspid regurgitation with estimated right ventricular systolic  pressure of 40 to 45 mm which is mildly elevated  Dilated IVC with impaired respiratory variation  Aortic sclerosis   Signature   ----------------------------------------------------------------  Electronically signed by Raman Hoffman MD(Interpreting physician)  on 02/03/2022 12:31 PM  ---------------------------------------------------------------- Allergies   - Statins. M-Mode Measurements (cm)   LVIDd: 5.76 cm                         LVIDs: 5.18 cm  IVSd: 0.98 cm  LVPWd: 1.23 cm                         AO Root Dimension: 2.4 cm  Rt. Vent. Dimension: 3.38 cm           LA: 4.6 cm  % Ejection Fraction: 20 %              LVOT: 2 cm  Doppler Measurements:   AV Peak Velocity:145 cm/s            MV Peak E-Wave: 102 cm/s  AV Peak Gradient: 8.41 mmHg          MV Peak A-Wave: 38.9 cm/s  AV Mean Gradient: 5 mmHg             MV E/A Ratio: 2.62 %  AV Area (Continuity):2.34 cm^2       MV Peak Gradient: 4.16 mmHg  TR Velocity:269 cm/s  TR Gradient:28.94 mmHg  Estimated RAP:10 mmHg  RVSP:40 mmHg      XR CHEST PORTABLE    Result Date: 2/2/2022  Examination. XR CHEST PORTABLE 2/2/2022 3:21 AM History: Shortness of breath. Frontal portable upright view of the chest is compared with the previous study dated 1/7/2018. There is extensive interstitial infiltrate in the lungs bilaterally more severely in the mid and lower lungs. There is bilateral lower lobar consolidation. There is a small bibasal pleural effusion. There is no pneumothorax. The heart size is not evaluated due to complete obliteration of the cardiac border by the adjacent infiltrate. Atheromatous changes thoracic aorta are noted. There is evidence of prior cardiac surgery. No acute bony abnormality. 1. The extensive bilateral lung infiltrate. Bilateral lower lobar consolidation and bibasilar pleural effusion.  Signed by Dr Winter Fee            Assessment/Plan   Active Problems:    Acute respiratory failure with hypoxia and hypercarbia (HCC)/secondary to extensive CAD and low Ef   SPO2  on 2 liters at rest - will do home 02 eval    Moniter for change  Elevated Troponin   Cardiology following    Cath -3 of 4 grafts occluded    Plan is to continue medical management with Ace, Betabocker and diuretic    Consulted CT surgery for recommendations     Pt not candidate for CABG redo     Possible Mitral clip- no plan as of this note     Continue medical management- no new meds added -Entresto  Discussed with pt     Sepsis ([facility] [address]   Cultures no growth    SERIAL labs   Antibiotic changed to doxycycline   Rocephin   Stop antibiotics if a.m. feel like sepsis or pneumonia no longer a concern    Acute exacerbation of CHF (congestive heart failure) ([facility])   Diuretics-lasix 40 BID   Strict I and O- Output>intake -5664    BNP decreased to 2215   COnsult Heart failure nurse for pt education and assistance     Acute systolic (congestive) heart failure ([facility] [address]   Poss Mitral clip   Will continue therapy to diurese and improve EF/ poss Entresto   Will ask social work to assess pt affordability  Resolved Problems:    * No resolved hospital problems. *      DVT Prophylaxis: lovenox      Discharge planning: TBD    Further Orders per Clinical course/attending. Electronically signed by LUIS Emmanuel CNP on 2/4/2022 at 6:11 PM       EMR Dragon/Transcription disclaimer:   Much of this encounter note is an electronic transcription/translation of spoken language to printed text.  The electronic translation of spoken language may permit erroneous, or at times, nonsensical words or phrases to be inadvertently transcribed; although attempts have made to review the note for such errors, some may still exist.

## 2022-02-05 NOTE — CARE COORDINATION
Placed call to pharmacy to receive price of dc medication, entresto. Pt cost is $9.85. Informed nurse of this.

## 2022-02-06 LAB
ALBUMIN SERPL-MCNC: 3.1 G/DL (ref 3.5–5.2)
ALP BLD-CCNC: 74 U/L (ref 40–130)
ALT SERPL-CCNC: 23 U/L (ref 5–41)
ANION GAP SERPL CALCULATED.3IONS-SCNC: 13 MMOL/L (ref 7–19)
AST SERPL-CCNC: 19 U/L (ref 5–40)
BASOPHILS ABSOLUTE: 0 K/UL (ref 0–0.2)
BASOPHILS RELATIVE PERCENT: 0.1 % (ref 0–1)
BILIRUB SERPL-MCNC: 0.3 MG/DL (ref 0.2–1.2)
BUN BLDV-MCNC: 31 MG/DL (ref 8–23)
CALCIUM SERPL-MCNC: 7.7 MG/DL (ref 8.8–10.2)
CHLORIDE BLD-SCNC: 97 MMOL/L (ref 98–111)
CO2: 27 MMOL/L (ref 22–29)
CREAT SERPL-MCNC: 0.8 MG/DL (ref 0.5–1.2)
EOSINOPHILS ABSOLUTE: 0 K/UL (ref 0–0.6)
EOSINOPHILS RELATIVE PERCENT: 0 % (ref 0–5)
GFR AFRICAN AMERICAN: >59
GFR NON-AFRICAN AMERICAN: >60
GLUCOSE BLD-MCNC: 109 MG/DL (ref 74–109)
HCT VFR BLD CALC: 38.8 % (ref 42–52)
HEMOGLOBIN: 12.6 G/DL (ref 14–18)
IMMATURE GRANULOCYTES #: 0.1 K/UL
LYMPHOCYTES ABSOLUTE: 1.2 K/UL (ref 1.1–4.5)
LYMPHOCYTES RELATIVE PERCENT: 8.6 % (ref 20–40)
MAGNESIUM: 1.9 MG/DL (ref 1.6–2.4)
MCH RBC QN AUTO: 30.7 PG (ref 27–31)
MCHC RBC AUTO-ENTMCNC: 32.5 G/DL (ref 33–37)
MCV RBC AUTO: 94.4 FL (ref 80–94)
MONOCYTES ABSOLUTE: 0.7 K/UL (ref 0–0.9)
MONOCYTES RELATIVE PERCENT: 4.7 % (ref 0–10)
NEUTROPHILS ABSOLUTE: 12.2 K/UL (ref 1.5–7.5)
NEUTROPHILS RELATIVE PERCENT: 86.2 % (ref 50–65)
PDW BLD-RTO: 13.8 % (ref 11.5–14.5)
PLATELET # BLD: 328 K/UL (ref 130–400)
PMV BLD AUTO: 11 FL (ref 9.4–12.4)
POTASSIUM SERPL-SCNC: 3.3 MMOL/L (ref 3.5–5)
RBC # BLD: 4.11 M/UL (ref 4.7–6.1)
SODIUM BLD-SCNC: 137 MMOL/L (ref 136–145)
TOTAL PROTEIN: 5.1 G/DL (ref 6.6–8.7)
WBC # BLD: 14.2 K/UL (ref 4.8–10.8)

## 2022-02-06 PROCEDURE — 6370000000 HC RX 637 (ALT 250 FOR IP): Performed by: INTERNAL MEDICINE

## 2022-02-06 PROCEDURE — 2580000003 HC RX 258: Performed by: INTERNAL MEDICINE

## 2022-02-06 PROCEDURE — 94660 CPAP INITIATION&MGMT: CPT

## 2022-02-06 PROCEDURE — 6360000002 HC RX W HCPCS: Performed by: INTERNAL MEDICINE

## 2022-02-06 PROCEDURE — 99232 SBSQ HOSP IP/OBS MODERATE 35: CPT | Performed by: INTERNAL MEDICINE

## 2022-02-06 PROCEDURE — 94640 AIRWAY INHALATION TREATMENT: CPT

## 2022-02-06 PROCEDURE — 6370000000 HC RX 637 (ALT 250 FOR IP): Performed by: FAMILY MEDICINE

## 2022-02-06 PROCEDURE — 36415 COLL VENOUS BLD VENIPUNCTURE: CPT

## 2022-02-06 PROCEDURE — 2140000000 HC CCU INTERMEDIATE R&B

## 2022-02-06 PROCEDURE — 83735 ASSAY OF MAGNESIUM: CPT

## 2022-02-06 PROCEDURE — 6370000000 HC RX 637 (ALT 250 FOR IP): Performed by: NURSE PRACTITIONER

## 2022-02-06 PROCEDURE — 80053 COMPREHEN METABOLIC PANEL: CPT

## 2022-02-06 PROCEDURE — 85025 COMPLETE CBC W/AUTO DIFF WBC: CPT

## 2022-02-06 PROCEDURE — 6370000000 HC RX 637 (ALT 250 FOR IP): Performed by: HOSPITALIST

## 2022-02-06 RX ORDER — CLOPIDOGREL BISULFATE 75 MG/1
75 TABLET ORAL DAILY
Status: DISCONTINUED | OUTPATIENT
Start: 2022-02-07 | End: 2022-02-07

## 2022-02-06 RX ORDER — SODIUM CHLORIDE 0.9 % (FLUSH) 0.9 %
5-40 SYRINGE (ML) INJECTION EVERY 12 HOURS SCHEDULED
Status: DISCONTINUED | OUTPATIENT
Start: 2022-02-06 | End: 2022-02-09 | Stop reason: HOSPADM

## 2022-02-06 RX ORDER — CLOPIDOGREL 300 MG/1
600 TABLET, FILM COATED ORAL ONCE
Status: COMPLETED | OUTPATIENT
Start: 2022-02-06 | End: 2022-02-06

## 2022-02-06 RX ORDER — IPRATROPIUM BROMIDE AND ALBUTEROL SULFATE 2.5; .5 MG/3ML; MG/3ML
1 SOLUTION RESPIRATORY (INHALATION)
Status: DISCONTINUED | OUTPATIENT
Start: 2022-02-07 | End: 2022-02-09 | Stop reason: HOSPADM

## 2022-02-06 RX ORDER — SODIUM CHLORIDE 0.9 % (FLUSH) 0.9 %
5-40 SYRINGE (ML) INJECTION PRN
Status: DISCONTINUED | OUTPATIENT
Start: 2022-02-06 | End: 2022-02-09 | Stop reason: HOSPADM

## 2022-02-06 RX ORDER — SODIUM CHLORIDE 9 MG/ML
25 INJECTION, SOLUTION INTRAVENOUS PRN
Status: DISCONTINUED | OUTPATIENT
Start: 2022-02-06 | End: 2022-02-09 | Stop reason: HOSPADM

## 2022-02-06 RX ORDER — POTASSIUM CHLORIDE 20 MEQ/1
40 TABLET, EXTENDED RELEASE ORAL ONCE
Status: COMPLETED | OUTPATIENT
Start: 2022-02-06 | End: 2022-02-06

## 2022-02-06 RX ORDER — ONDANSETRON 2 MG/ML
4 INJECTION INTRAMUSCULAR; INTRAVENOUS EVERY 6 HOURS PRN
Status: DISCONTINUED | OUTPATIENT
Start: 2022-02-06 | End: 2022-02-09 | Stop reason: HOSPADM

## 2022-02-06 RX ORDER — NITROGLYCERIN 0.4 MG/1
0.4 TABLET SUBLINGUAL EVERY 5 MIN PRN
Status: DISCONTINUED | OUTPATIENT
Start: 2022-02-06 | End: 2022-02-09 | Stop reason: HOSPADM

## 2022-02-06 RX ADMIN — SOTALOL HYDROCHLORIDE 160 MG: 80 TABLET ORAL at 08:58

## 2022-02-06 RX ADMIN — IPRATROPIUM BROMIDE AND ALBUTEROL SULFATE 1 AMPULE: 2.5; .5 SOLUTION RESPIRATORY (INHALATION) at 10:39

## 2022-02-06 RX ADMIN — SACUBITRIL AND VALSARTAN 1 TABLET: 49; 51 TABLET, FILM COATED ORAL at 20:01

## 2022-02-06 RX ADMIN — CLOPIDOGREL BISULFATE 600 MG: 300 TABLET, FILM COATED ORAL at 13:57

## 2022-02-06 RX ADMIN — IPRATROPIUM BROMIDE AND ALBUTEROL SULFATE 1 AMPULE: 2.5; .5 SOLUTION RESPIRATORY (INHALATION) at 15:02

## 2022-02-06 RX ADMIN — ENOXAPARIN SODIUM 70 MG: 100 INJECTION SUBCUTANEOUS at 08:58

## 2022-02-06 RX ADMIN — SACUBITRIL AND VALSARTAN 1 TABLET: 24; 26 TABLET, FILM COATED ORAL at 08:58

## 2022-02-06 RX ADMIN — SODIUM CHLORIDE, PRESERVATIVE FREE 10 ML: 5 INJECTION INTRAVENOUS at 08:58

## 2022-02-06 RX ADMIN — FUROSEMIDE 40 MG: 40 TABLET ORAL at 08:58

## 2022-02-06 RX ADMIN — IPRATROPIUM BROMIDE AND ALBUTEROL SULFATE 1 AMPULE: 2.5; .5 SOLUTION RESPIRATORY (INHALATION) at 06:28

## 2022-02-06 RX ADMIN — ISOSORBIDE MONONITRATE 30 MG: 30 TABLET, EXTENDED RELEASE ORAL at 08:58

## 2022-02-06 RX ADMIN — OXYBUTYNIN CHLORIDE 5 MG: 5 TABLET, EXTENDED RELEASE ORAL at 20:01

## 2022-02-06 RX ADMIN — FUROSEMIDE 40 MG: 40 TABLET ORAL at 17:10

## 2022-02-06 RX ADMIN — IPRATROPIUM BROMIDE AND ALBUTEROL SULFATE 1 AMPULE: 2.5; .5 SOLUTION RESPIRATORY (INHALATION) at 18:45

## 2022-02-06 RX ADMIN — SPIRONOLACTONE 25 MG: 25 TABLET ORAL at 08:58

## 2022-02-06 RX ADMIN — SODIUM CHLORIDE, PRESERVATIVE FREE 10 ML: 5 INJECTION INTRAVENOUS at 20:01

## 2022-02-06 RX ADMIN — LORAZEPAM 1 MG: 1 TABLET ORAL at 21:38

## 2022-02-06 RX ADMIN — SOTALOL HYDROCHLORIDE 160 MG: 80 TABLET ORAL at 20:01

## 2022-02-06 RX ADMIN — IPRATROPIUM BROMIDE AND ALBUTEROL SULFATE 1 AMPULE: 2.5; .5 SOLUTION RESPIRATORY (INHALATION) at 21:58

## 2022-02-06 RX ADMIN — LORAZEPAM 1 MG: 1 TABLET ORAL at 13:17

## 2022-02-06 RX ADMIN — LORAZEPAM 1 MG: 1 TABLET ORAL at 05:18

## 2022-02-06 RX ADMIN — ENOXAPARIN SODIUM 70 MG: 100 INJECTION SUBCUTANEOUS at 20:01

## 2022-02-06 RX ADMIN — POLYETHYLENE GLYCOL 3350 17 G: 17 POWDER, FOR SOLUTION ORAL at 05:18

## 2022-02-06 RX ADMIN — Medication 5000 UNITS: at 08:58

## 2022-02-06 RX ADMIN — POTASSIUM CHLORIDE 40 MEQ: 1500 TABLET, EXTENDED RELEASE ORAL at 09:15

## 2022-02-06 RX ADMIN — ASPIRIN 81 MG: 81 TABLET, COATED ORAL at 08:58

## 2022-02-06 RX ADMIN — METHYLPREDNISOLONE SODIUM SUCCINATE 40 MG: 40 INJECTION, POWDER, FOR SOLUTION INTRAMUSCULAR; INTRAVENOUS at 05:18

## 2022-02-06 RX ADMIN — ATORVASTATIN CALCIUM 80 MG: 40 TABLET, FILM COATED ORAL at 20:01

## 2022-02-06 ASSESSMENT — ENCOUNTER SYMPTOMS
SHORTNESS OF BREATH: 1
COUGH: 1
EYES NEGATIVE: 1
ALLERGIC/IMMUNOLOGIC NEGATIVE: 1
GASTROINTESTINAL NEGATIVE: 1

## 2022-02-06 ASSESSMENT — PAIN SCALES - GENERAL: PAINLEVEL_OUTOF10: 0

## 2022-02-06 NOTE — PROGRESS NOTES
[facility]      [facility] Note    Patient:  No Garzon  YOB: 1950  Date of Service: 2/6/2022  MRN: [id number]   Acct: [de-identified]   Primary Care Physician: Donan Jones MD  Advance Directive: Full Code  Admit Date: 2/2/2022       Hospital Day: 4    Portions of this note have been copied forward, however, updated to reflect the most current clinical status of this patient. CHIEF COMPLAINT Shortness of breath  SUBJECTIVE:      CUMULATIVE HOSPITAL COURSE:     66 M w h/o CAD, atrial fibrillation and hyperlipidemia who presents to the emergency department in acute respiratory failure. Arrives from home via EMS. They administered Solu-Medrol and DuoNeb and attempted CPAP which he did not tolerate. Patient not able to give history due to respiratory distress. Placed on BiPAP soon after arrival. [facility] provider reports patient states increasing shortness of breath over the last several days and over the past couple weeks has developed significant swelling of bilateral lower extremities.  No prior history of congestive heart failure.  No history of DVT or PE. Patient apparently denied chest pain, fevers, cough, abdominal pain, vomiting or diarrhea.  He was placed in CCU initially . He was diuresed with good results. His O2 sat came up with BIPAP. He was initally treated with IV antibiotics pending cultures . Echo showed severly diminished EF. Cath showed 3 of 4 previous grafts from CABG occluded. Cardiology following. CT surgery consulted and pt is not a candidate for redo of his CABG. He may be a candidate for a mitral clip. His lasix was transititoned to p.o. dosing . Home O2 eval by resp 91% at rest and 95% walking.  ran [facility] and it is going to be less than 10$ per month. Cardiology plans to do cath with LAD stent placement and mitral clip tomorrow. Review of Systems   Constitutional: Negative. Eyes: Negative.     Respiratory: Positive for cough and shortness of breath. Cardiovascular: Positive for palpitations. Gastrointestinal: Negative. Endocrine: Negative. Genitourinary: Negative. Musculoskeletal: Negative. Skin: Negative. Allergic/Immunologic: Negative. Neurological: Negative. Hematological: Negative. Psychiatric/Behavioral: Negative. Objective:   VITALS:  /78   Pulse 66   Temp 98.2 °F (36.8 °C) (Temporal)   Resp 18   Ht 6' (1.829 m)   Wt 148 lb 11.2 oz (67.4 kg)   SpO2 100%   BMI 20.17 kg/m²   24HR INTAKE/OUTPUT:      Intake/Output Summary (Last 24 hours) at 2/6/2022 1148  Last data filed at 2/6/2022 0920  Gross per 24 hour   Intake 480 ml   Output 2375 ml   Net -1895 ml           Physical Exam  Vitals and nursing note reviewed. Constitutional:       General: He is not in acute distress. HENT:      Head: Normocephalic. Mouth/Throat:      Mouth: Mucous membranes are moist.   Cardiovascular:      Rate and Rhythm: Normal rate. Rhythm irregular. Heart sounds: Normal heart sounds. Pulmonary:      Breath sounds: Wheezing present. Comments: Less wheezing today  Abdominal:      General: Bowel sounds are normal.      Palpations: Abdomen is soft. Musculoskeletal:         General: Normal range of motion. Skin:     General: Skin is warm and dry. Coloration: Skin is pale. Neurological:      General: No focal deficit present. Mental Status: He is alert.    Psychiatric:         Mood and Affect: Mood normal.            Medications:      sodium chloride        furosemide  40 mg Oral BID    spironolactone  25 mg Oral Daily    isosorbide mononitrate  30 mg Oral Daily    sacubitril-valsartan  1 tablet Oral BID    Vitamin D  5,000 Units Oral Daily    sodium chloride flush  5-40 mL IntraVENous 2 times per day    atorvastatin  80 mg Oral Nightly    aspirin  81 mg Oral Daily    sotalol  160 mg Oral 2 times per day    oxybutynin  5 mg Oral Nightly    enoxaparin  1 mg/kg SubCUTAneous BID    ipratropium-albuterol  1 ampule Inhalation Q4H     sodium chloride flush, sodium chloride, acetaminophen, hydrALAZINE, LORazepam, nitroGLYCERIN, nitroGLYCERIN, ondansetron **OR** [DISCONTINUED] ondansetron, polyethylene glycol, acetaminophen **OR** acetaminophen  ADULT DIET; Regular; 3 carb choices (45 gm/meal); No Added Salt (3-4 gm)     Lab and other Data:     Recent Labs     02/04/22 0222 02/05/22  0750 02/06/22  0841   WBC 16.4* 15.7* 14.2*   HGB 11.3* 11.3* 12.6*    266 328     Recent Labs     02/04/22 0222 02/05/22  0156 02/06/22  0203    138 137   K 3.6 3.7 3.3*    99 97*   CO2 26 27 27   BUN 28* 30* 31*   CREATININE 0.7 0.7 0.8   GLUCOSE 125* 106 109     Recent Labs     02/04/22 0222 02/05/22  0156 02/06/22  0203   AST 20 21 19   ALT 18 22 23   BILITOT 0.3 0.3 0.3   ALKPHOS 83 80 74     Troponin T:   Recent Labs     02/03/22  1553 02/03/22  2239   TROPONINI 0.22* 0.15*     Pro-BNP: No results for input(s): BNP in the last 72 hours. INR: No results for input(s): INR in the last 72 hours. UA:No results for input(s): NITRITE, COLORU, PHUR, LABCAST, WBCUA, RBCUA, MUCUS, TRICHOMONAS, YEAST, BACTERIA, CLARITYU, SPECGRAV, LEUKOCYTESUR, UROBILINOGEN, BILIRUBINUR, BLOODU, GLUCOSEU, AMORPHOUS in the last 72 hours. Invalid input(s): She Boxer  A1C: No results for input(s): LABA1C in the last 72 hours. ABG:  No results for input(s): PHART, KDY7NIX, PO2ART, AZH0DQF, BEART, HGBAE, V3ZVIETV, CARBOXHGBART in the last 72 hours.     RAD:   ECHO Complete 2D W Doppler W Color    Result Date: 2/3/2022  Transthoracic Echocardiography Report (TTE)  Demographics   Patient Name  CASI Estrella Date of Study         02/03/2022   MRN           [id number]          Gender                Male   Date of Birth 1950      Room Number           MHL-0712   Age           66 year(s)   Height:       72 inches       Referring Physician   Digna Osborn   Weight:       150 pounds      Sonographer           Luly Mendoza [facility]   BSA:          1.89 m^2        Interpreting          Susan Alfaro MD                                Physician   BMI:          20.34 kg/m^2  Procedure Type of Study   TTE procedure:ECHO 2D W/DOPPLER/COLOR/CONTRAST. Study Location: Echo Lab Technical Quality: Adequate visualization Patient Status: Inpatient Contrast Medium: Definity. Amount - 8 ml BP: 114/77 mmHg Indications:Congestive heart failure. 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  0.79 cm, ERO more than 0.4 cm^2, jet width more than 50% of the left  atrial area  Central jet of the mitral regurgitation suggestive of functional secondary  mitral regurgitation  Moderately to severely dilated left atrium  Moderately dilated right atrium  Moderate aortic insufficiency  Moderate tricuspid regurgitation with estimated right ventricular systolic  pressure of 40 to 45 mm which is mildly elevated  Dilated IVC with impaired respiratory variation  Aortic sclerosis   Signature   ----------------------------------------------------------------  Electronically signed by Susan Alfaro MD(Interpreting physician)  on 02/03/2022 12:31 PM  ----------------------------------------------------------------  Allergies   - Statins. M-Mode Measurements (cm)   LVIDd: 5.76 cm                         LVIDs: 5.18 cm  IVSd: 0.98 cm  LVPWd: 1.23 cm                         AO Root Dimension: 2.4 cm  Rt. Vent.  Dimension: 3.38 cm           LA: 4.6 cm  % Ejection Fraction: 20 %              LVOT: 2 cm  Doppler Measurements:   AV Peak Velocity:145 cm/s            MV Peak E-Wave: 102 cm/s  AV Peak Gradient: 8.41 mmHg          MV Peak A-Wave: 38.9 cm/s  AV Mean Gradient: 5 mmHg             MV E/A Ratio: 2.62 %  AV Area (Continuity):2.34 cm^2       MV Peak Gradient: 4.16 mmHg  TR Velocity:269 cm/s  TR Gradient:28.94 mmHg  Estimated RAP:10 mmHg  RVSP:40 mmHg      XR CHEST PORTABLE    Result Date: 2/2/2022  Examination. XR CHEST PORTABLE 2/2/2022 3:21 AM History: Shortness of breath. Frontal portable upright view of the chest is compared with the previous study dated 1/7/2018. There is extensive interstitial infiltrate in the lungs bilaterally more severely in the mid and lower lungs. There is bilateral lower lobar consolidation. There is a small bibasal pleural effusion. There is no pneumothorax. The heart size is not evaluated due to complete obliteration of the cardiac border by the adjacent infiltrate. Atheromatous changes thoracic aorta are noted. There is evidence of prior cardiac surgery. No acute bony abnormality. 1. The extensive bilateral lung infiltrate. Bilateral lower lobar consolidation and bibasilar pleural effusion. Signed by Dr Vanessa Redd            Assessment/Plan   Active Problems:    Acute respiratory failure with hypoxia and hypercarbia (HCC)/secondary to extensive CAD and low Ef of 20%   SPO2  on 2 liters at rest   will do home 02 eval -95% room air walking   Moniter for change  Elevated Troponin   Cardiology following    Cath -3 of 4 grafts occluded    Plan is for LAD stent and mitral clip placement tomorrow    Consulted CT surgery for recommendations     Not a candidate for CABG redo.      Medication added by cardiology    Agreed to [facility] that was added and plans to uptitrate after cath tomorrow    Spironolactone  25 mg andreas     Imdur 30 mg    Discontinued Norvasc    Nursing will do b/p q 2 hours today to assess tolerance   Sepsis ([address]   Cultures no growth    SERIAL labs-WBC trending down     Dc antibiotics    Acute exacerbation of CHF (congestive heart failure) ([address]   Diuretics-lasix 40 BID   Spironolactone 25 mg daily   Strict I and O- Output>intake -8050    BNP decreased to 2215   COnsult Heart failure nurse for pt education and assistance     Acute systolic (congestive) heart failure ([facility] [address]    Mitral clip planned for tomorroq   Will continue therapy to diurese and improve EF   Stop lisinopril- last dose 2/5/2020at 10a.m. Start Entresto 24-26 36 hours after last dose of Lisinopril, 2/7/2022   Will ask social work to assess pt affordability-9.85 a month     Discharge planning   Will need another Home 02 eval prior to discharge   Medication sent to pharmacy was starting dose of entresto. If cardio changes and new Rx will need to be sent and the previous cancelled. It was sent early to get pricing info. Resolved Problems:    * No resolved hospital problems. *      DVT Prophylaxis: lovenox      Discharge planning: TBD    Further Orders per Clinical course/attending. Electronically signed by LUIS Hoff CNP on 2/6/2022 at 11:48 AM       EMR Dragon/Transcription disclaimer:   Much of this encounter note is an electronic transcription/translation of spoken language to printed text.  The electronic translation of spoken language may permit erroneous, or at times, nonsensical words or phrases to be inadvertently transcribed; although attempts have made to review the note for such errors, some may still exist.

## 2022-02-06 NOTE — PROGRESS NOTES
Cardiology Progress Note Meme Marin MD      Patient:  Javier Aguilar  [id number]    Patient Active Problem List    Diagnosis Date Noted    Acute respiratory failure with hypoxia and hypercarbia (HCC) 02/02/2022     Priority: Low    Sepsis ([address] 02/02/2022     Priority: Low    Acute exacerbation of CHF (congestive heart failure) ([address] 02/02/2022     Priority: Low    Acute systolic (congestive) heart failure ([address] 02/02/2022     Priority: Low    PVD (peripheral vascular disease) ([facility]      Priority: Low    Bilateral carotid artery stenosis 01/06/2020     Priority: Low    Statin intolerance 02/14/2018     Priority: Low    Inferior MI ([facility] 01/07/2018     Priority: Low    ST elevation myocardial infarction (STEMI) [facility])      Priority: Low    Atherosclerosis of native artery of extremity with intermittent claudication ([facility] [address] 04/15/2015     Priority: Low    Paroxysmal a-fib ([facility]      Priority: Low     Overview Note:     post op      Tobacco abuse      Priority: Low    Chest pain      Priority: Low    Hyperlipidemia      Priority: Low    Palpitations      Priority: Low    HTN (hypertension)      Priority: Low    Coronary artery disease involving native coronary artery of native heart with angina pectoris ([facility] [address]      Priority: Low       Admit Date:  2/2/2022    Admission Problem List: Present on Admission:   Acute respiratory failure with hypoxia and hypercarbia (HCC)   Sepsis ([facility]   Acute exacerbation of CHF (congestive heart failure) (HCC)   Acute systolic (congestive) heart failure [facility])      Cardiac Specific Data:  Specialty Problems        Cardiology Problems    Coronary artery disease involving native coronary artery of native heart with angina pectoris (HCC)        HTN (hypertension)        Chest pain        Hyperlipidemia        Paroxysmal a-fib (HCC)        Atherosclerosis of native artery of extremity with intermittent claudication (HCC)        Inferior MI ([facility])        ST elevation myocardial infarction (STEMI) ([facility]        Bilateral carotid artery stenosis        PVD (peripheral vascular disease) ([facility])        Acute exacerbation of CHF (congestive heart failure) ([facility])        Acute systolic (congestive) heart failure ([facility])            1.  Ischemic cardiomyopathy, prior CABG 5/15/2009, four-vessel grafting (SVG to OM, SVG to LAD, SVG to RPDA, LIMA to diagonal), prior PCI to proximal diagonal and distal circumflex 4/2012, prior inferolateral STEMI 1/7/2018 with multiple BMS to occluded SVG to OM, BJ to SVG to LAD at anastomosis, non-STEMI presentation with heart failure 2/2/2022 with catheterization 2/3/2022 showing stenotic SVG to RPDA, occluded vein grafts to LAD and OM as well as known occluded LIMA, ejection fraction 20% with severe eccentric posterior directed mitral regurgitation. 2.  Longstanding and ongoing tobacco use with probable COPD. 3.  Peripheral arterial disease with multiple peripheral interventions. 4.  Paroxysmal atrial fibrillation on Betapace. Subjective:  Mr. Kike Wilson reports feeling better. No shortness of breath. Able to lie flat. Remains in sinus rhythm. Objective:   BP (!) 123/90   Pulse 66   Temp 98.2 °F (36.8 °C) (Temporal)   Resp 18   Ht 6' (1.829 m)   Wt 148 lb 11.2 oz (67.4 kg)   SpO2 100%   BMI 20.17 kg/m²       Intake/Output Summary (Last 24 hours) at 2/6/2022 1303  Last data filed at 2/6/2022 0920  Gross per 24 hour   Intake 480 ml   Output 2375 ml   Net -1895 ml       Prior to Admission medications    Medication Sig Start Date End Date Taking? Authorizing Provider   sacubitril-valsartan (ENTRESTO) 24-26 MG per tablet Take 1 tablet by mouth 2 times daily for 60 doses 2/5/22 3/7/22 Yes ULIS Dailey - CNP   amLODIPine (NORVASC) 5 MG tablet Take 1 tablet by mouth daily 12/21/21  Yes LUIS Ernandez   nitroGLYCERIN (NITROSTAT) 0.4 MG SL tablet Place 1 tablet under the tongue every 5 minutes as needed for Chest pain up to max of 3 total doses.  If no relief after 1 dose, call 911. 6/8/21  Yes LUIS Johnson   lisinopril (PRINIVIL;ZESTRIL) 20 MG tablet Take 1 tablet by mouth daily 6/8/21  Yes LUIS Johnson   sotalol (BETAPACE) 160 MG tablet Take 1 tablet by mouth twice daily 6/8/21  Yes LUIS Johnson   nitroGLYCERIN (NITROSTAT) 0.4 MG SL tablet Place 1 tablet under the tongue every 5 minutes as needed for Chest pain 6/8/21  Yes LUIS Johnson   aspirin 81 MG EC tablet Take 81 mg by mouth daily. Yes Historical Provider, MD        furosemide  40 mg Oral BID    spironolactone  25 mg Oral Daily    isosorbide mononitrate  30 mg Oral Daily    sacubitril-valsartan  1 tablet Oral BID    Vitamin D  5,000 Units Oral Daily    sodium chloride flush  5-40 mL IntraVENous 2 times per day    atorvastatin  80 mg Oral Nightly    aspirin  81 mg Oral Daily    sotalol  160 mg Oral 2 times per day    oxybutynin  5 mg Oral Nightly    enoxaparin  1 mg/kg SubCUTAneous BID    ipratropium-albuterol  1 ampule Inhalation Q4H       TELEMETRY: Sinus     Physical Exam:      Physical Exam  HENT:      Mouth/Throat:      Pharynx: No oropharyngeal exudate. Eyes:      General: No scleral icterus. Right eye: No discharge. Left eye: No discharge. Neck:      Thyroid: No thyromegaly. Vascular: No JVD. Cardiovascular:      Rate and Rhythm: Normal rate and regular rhythm. Heart sounds: Murmur heard. No friction rub. No gallop. Comments: No edema  Mild jugular venous distention  Systolic murmur in apical location   Pulmonary:      Effort: No respiratory distress. Breath sounds: No stridor. No wheezing or rales. Abdominal:      General: Bowel sounds are normal. There is no distension. Palpations: Abdomen is soft. There is no mass. Tenderness: There is no abdominal tenderness. There is no guarding or rebound. Comments: No palpable organomegaly   Musculoskeletal:         General: No deformity.    Skin: General: Skin is warm. Coloration: Skin is not pale. Findings: No erythema or rash. Neurological:      Mental Status: He is alert and oriented to person, place, and time. Motor: No abnormal muscle tone. Coordination: Coordination normal.      Deep Tendon Reflexes: Reflexes normal.                 Lab Data:  CBC:   Recent Labs     02/04/22 0222 02/05/22  0750 02/06/22  0841   WBC 16.4* 15.7* 14.2*   HGB 11.3* 11.3* 12.6*   HCT 34.5* 35.0* 38.8*   MCV 95.3* 97.0* 94.4*    266 328     BMP:   Recent Labs     02/04/22 0222 02/05/22  0156 02/06/22  0203    138 137   K 3.6 3.7 3.3*    99 97*   CO2 26 27 27   BUN 28* 30* 31*   CREATININE 0.7 0.7 0.8     LIVER PROFILE:   Recent Labs     02/04/22 0222 02/05/22  0156 02/06/22  0203   AST 20 21 19   ALT 18 22 23   BILITOT 0.3 0.3 0.3   ALKPHOS 83 80 74     PT/INR: No results for input(s): PROTIME, INR in the last 72 hours. APTT: No results for input(s): APTT in the last 72 hours. BNP:  No results for input(s): BNP in the last 72 hours. CK, CKMB, Troponin: @LABRCNT (CKTOTAL:3, CKMB:3, TROPONINI:3)@    IMAGING:  ECHO Complete 2D W Doppler W Color    Result Date: 2/3/2022  Transthoracic Echocardiography Report (TTE)  Demographics   Patient Name  CASI Castellano Date of Study         02/03/2022   MRN           [id number]          Gender                Male   Date of Birth 1950      Room Number           L-0712   Age           66 year(s)   Height:       72 inches       Referring Physician   Laila Bray   Weight:       150 pounds      Sonographer           Luly Donaldson [facility]   BSA:          1.89 m^2        Interpreting          Mele Springer MD                                Physician   BMI:          20.34 kg/m^2  Procedure Type of Study   TTE procedure:ECHO 2D W/DOPPLER/COLOR/CONTRAST. Study Location: Echo Lab Technical Quality: Adequate visualization Patient Status: Inpatient Contrast Medium: Definity.  Amount - 8 ml BP: 114/77 mmHg Indications:Congestive heart failure. 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  0.79 cm, ERO more than 0.4 cm^2, jet width more than 50% of the left  atrial area  Central jet of the mitral regurgitation suggestive of functional secondary  mitral regurgitation  Moderately to severely dilated left atrium  Moderately dilated right atrium  Moderate aortic insufficiency  Moderate tricuspid regurgitation with estimated right ventricular systolic  pressure of 40 to 45 mm which is mildly elevated  Dilated IVC with impaired respiratory variation  Aortic sclerosis   Signature   ----------------------------------------------------------------  Electronically signed by Demetrius Miller MD(Interpreting physician)  on 02/03/2022 12:31 PM  ----------------------------------------------------------------  Allergies   - Statins. M-Mode Measurements (cm)   LVIDd: 5.76 cm                         LVIDs: 5.18 cm  IVSd: 0.98 cm  LVPWd: 1.23 cm                         AO Root Dimension: 2.4 cm  Rt. Vent. Dimension: 3.38 cm           LA: 4.6 cm  % Ejection Fraction: 20 %              LVOT: 2 cm  Doppler Measurements:   AV Peak Velocity:145 cm/s            MV Peak E-Wave: 102 cm/s  AV Peak Gradient: 8.41 mmHg          MV Peak A-Wave: 38.9 cm/s  AV Mean Gradient: 5 mmHg             MV E/A Ratio: 2.62 %  AV Area (Continuity):2.34 cm^2       MV Peak Gradient: 4.16 mmHg  TR Velocity:269 cm/s  TR Gradient:28.94 mmHg  Estimated RAP:10 mmHg  RVSP:40 mmHg      XR CHEST PORTABLE    Result Date: 2/2/2022  Examination. XR CHEST PORTABLE 2/2/2022 3:21 AM History: Shortness of breath.  Frontal portable upright view of the chest is compared with the previous study dated 1/7/2018. There is extensive interstitial infiltrate in the lungs bilaterally more severely in the mid and lower lungs. There is bilateral lower lobar consolidation. There is a small bibasal pleural effusion. There is no pneumothorax. The heart size is not evaluated due to complete obliteration of the cardiac border by the adjacent infiltrate. Atheromatous changes thoracic aorta are noted. There is evidence of prior cardiac surgery. No acute bony abnormality. 1. The extensive bilateral lung infiltrate. Bilateral lower lobar consolidation and bibasilar pleural effusion. Signed by Dr Rajinder Guevara and Plan:    26-year-old gentleman with past medical history of coronary artery disease with prior CABG with four-vessel grafting 5/15/2009, prior stents in distal circumflex and proximal diagonal 4/2012, inferolateral STEMI 1/7/2019 with intervention to vein grafts to LAD and OM, paroxysmal atrial fibrillation on Betapace, longstanding and ongoing tobacco use with probable COPD, peripheral arterial disease with multiple endovascular interventions presenting with non-STEMI and heart failure 2/2/2022 found to have ejection fraction of 20% with severe mitral regurgitation, occluded vein grafts to LAD and OM, stenotic SVG to RPDA and known LIMA occlusion to diagonal, turndown for repeat CABG being evaluated for PCI and possible MitraClip placement. 1.  Plan for PCI tomorrow. Doing well clinically. Plavix 600 mg loading today. 2.  Would increase Entresto to 49/50 mg twice daily. Can reduce Norvasc dosage if needed. 3.  Smoking cessation stressed.     Lauri Espinosa MD, MD 2/6/2022 1:03 PM

## 2022-02-07 LAB
ALBUMIN SERPL-MCNC: 3.4 G/DL (ref 3.5–5.2)
ALP BLD-CCNC: 88 U/L (ref 40–130)
ALT SERPL-CCNC: 26 U/L (ref 5–41)
ANION GAP SERPL CALCULATED.3IONS-SCNC: 14 MMOL/L (ref 7–19)
AST SERPL-CCNC: 23 U/L (ref 5–40)
BILIRUB SERPL-MCNC: 0.4 MG/DL (ref 0.2–1.2)
BLOOD CULTURE, ROUTINE: NORMAL
BLOOD CULTURE, ROUTINE: NORMAL
BUN BLDV-MCNC: 24 MG/DL (ref 8–23)
CALCIUM SERPL-MCNC: 8.6 MG/DL (ref 8.8–10.2)
CHLORIDE BLD-SCNC: 97 MMOL/L (ref 98–111)
CO2: 27 MMOL/L (ref 22–29)
CREAT SERPL-MCNC: 0.8 MG/DL (ref 0.5–1.2)
CULTURE, BLOOD 2: NORMAL
GFR AFRICAN AMERICAN: >59
GFR NON-AFRICAN AMERICAN: >60
GLUCOSE BLD-MCNC: 100 MG/DL (ref 74–109)
MAGNESIUM: 2 MG/DL (ref 1.6–2.4)
POTASSIUM SERPL-SCNC: 4.1 MMOL/L (ref 3.5–5)
SODIUM BLD-SCNC: 138 MMOL/L (ref 136–145)
TOTAL PROTEIN: 5.9 G/DL (ref 6.6–8.7)

## 2022-02-07 PROCEDURE — 6360000002 HC RX W HCPCS

## 2022-02-07 PROCEDURE — C1769 GUIDE WIRE: HCPCS

## 2022-02-07 PROCEDURE — 92928 PRQ TCAT PLMT NTRAC ST 1 LES: CPT

## 2022-02-07 PROCEDURE — 99153 MOD SED SAME PHYS/QHP EA: CPT

## 2022-02-07 PROCEDURE — 92937 PRQ TRLUML REVSC CAB GRF 1: CPT | Performed by: INTERNAL MEDICINE

## 2022-02-07 PROCEDURE — 92921 HC PRQ CARDIAC ANGIO ADDL ART: CPT

## 2022-02-07 PROCEDURE — 2709999900 HC NON-CHARGEABLE SUPPLY

## 2022-02-07 PROCEDURE — 83735 ASSAY OF MAGNESIUM: CPT

## 2022-02-07 PROCEDURE — 99152 MOD SED SAME PHYS/QHP 5/>YRS: CPT | Performed by: INTERNAL MEDICINE

## 2022-02-07 PROCEDURE — 36415 COLL VENOUS BLD VENIPUNCTURE: CPT

## 2022-02-07 PROCEDURE — 92933 PRQ TRLML C ATHRC ST ANGIOP1: CPT

## 2022-02-07 PROCEDURE — 027135Z DILATION OF CORONARY ARTERY, TWO ARTERIES WITH TWO DRUG-ELUTING INTRALUMINAL DEVICES, PERCUTANEOUS APPROACH: ICD-10-PCS | Performed by: INTERNAL MEDICINE

## 2022-02-07 PROCEDURE — 2500000003 HC RX 250 WO HCPCS

## 2022-02-07 PROCEDURE — 6370000000 HC RX 637 (ALT 250 FOR IP): Performed by: FAMILY MEDICINE

## 2022-02-07 PROCEDURE — C1874 STENT, COATED/COV W/DEL SYS: HCPCS

## 2022-02-07 PROCEDURE — 99152 MOD SED SAME PHYS/QHP 5/>YRS: CPT

## 2022-02-07 PROCEDURE — 80053 COMPREHEN METABOLIC PANEL: CPT

## 2022-02-07 PROCEDURE — 2580000003 HC RX 258: Performed by: INTERNAL MEDICINE

## 2022-02-07 PROCEDURE — 6370000000 HC RX 637 (ALT 250 FOR IP): Performed by: HOSPITALIST

## 2022-02-07 PROCEDURE — C1725 CATH, TRANSLUMIN NON-LASER: HCPCS

## 2022-02-07 PROCEDURE — 6370000000 HC RX 637 (ALT 250 FOR IP): Performed by: NURSE PRACTITIONER

## 2022-02-07 PROCEDURE — 92928 PRQ TCAT PLMT NTRAC ST 1 LES: CPT | Performed by: INTERNAL MEDICINE

## 2022-02-07 PROCEDURE — 6370000000 HC RX 637 (ALT 250 FOR IP): Performed by: INTERNAL MEDICINE

## 2022-02-07 PROCEDURE — 6360000004 HC RX CONTRAST MEDICATION: Performed by: HOSPITALIST

## 2022-02-07 PROCEDURE — 94640 AIRWAY INHALATION TREATMENT: CPT

## 2022-02-07 PROCEDURE — 2140000000 HC CCU INTERMEDIATE R&B

## 2022-02-07 PROCEDURE — 6370000000 HC RX 637 (ALT 250 FOR IP)

## 2022-02-07 PROCEDURE — C1887 CATHETER, GUIDING: HCPCS

## 2022-02-07 PROCEDURE — 94660 CPAP INITIATION&MGMT: CPT

## 2022-02-07 PROCEDURE — C1894 INTRO/SHEATH, NON-LASER: HCPCS

## 2022-02-07 RX ORDER — FENTANYL CITRATE 50 UG/ML
25 INJECTION, SOLUTION INTRAMUSCULAR; INTRAVENOUS
Status: ACTIVE | OUTPATIENT
Start: 2022-02-07 | End: 2022-02-07

## 2022-02-07 RX ORDER — ATROPINE SULFATE 0.4 MG/ML
0.5 AMPUL (ML) INJECTION
Status: ACTIVE | OUTPATIENT
Start: 2022-02-07 | End: 2022-02-07

## 2022-02-07 RX ORDER — CLOPIDOGREL BISULFATE 75 MG/1
75 TABLET ORAL DAILY
Status: DISCONTINUED | OUTPATIENT
Start: 2022-02-08 | End: 2022-02-09 | Stop reason: HOSPADM

## 2022-02-07 RX ORDER — SODIUM CHLORIDE 9 MG/ML
INJECTION, SOLUTION INTRAVENOUS CONTINUOUS
Status: ACTIVE | OUTPATIENT
Start: 2022-02-07 | End: 2022-02-07

## 2022-02-07 RX ADMIN — SODIUM CHLORIDE, PRESERVATIVE FREE 10 ML: 5 INJECTION INTRAVENOUS at 21:41

## 2022-02-07 RX ADMIN — IPRATROPIUM BROMIDE AND ALBUTEROL SULFATE 1 AMPULE: .5; 3 SOLUTION RESPIRATORY (INHALATION) at 06:15

## 2022-02-07 RX ADMIN — FUROSEMIDE 40 MG: 40 TABLET ORAL at 17:23

## 2022-02-07 RX ADMIN — ISOSORBIDE MONONITRATE 30 MG: 30 TABLET, EXTENDED RELEASE ORAL at 07:37

## 2022-02-07 RX ADMIN — LORAZEPAM 1 MG: 1 TABLET ORAL at 21:40

## 2022-02-07 RX ADMIN — SODIUM CHLORIDE: 9 INJECTION, SOLUTION INTRAVENOUS at 14:17

## 2022-02-07 RX ADMIN — IPRATROPIUM BROMIDE AND ALBUTEROL SULFATE 1 AMPULE: .5; 3 SOLUTION RESPIRATORY (INHALATION) at 18:53

## 2022-02-07 RX ADMIN — SACUBITRIL AND VALSARTAN 1 TABLET: 49; 51 TABLET, FILM COATED ORAL at 21:41

## 2022-02-07 RX ADMIN — ASPIRIN 81 MG: 81 TABLET, COATED ORAL at 07:37

## 2022-02-07 RX ADMIN — IPRATROPIUM BROMIDE AND ALBUTEROL SULFATE 1 AMPULE: .5; 3 SOLUTION RESPIRATORY (INHALATION) at 10:10

## 2022-02-07 RX ADMIN — LORAZEPAM 1 MG: 1 TABLET ORAL at 14:12

## 2022-02-07 RX ADMIN — IPRATROPIUM BROMIDE AND ALBUTEROL SULFATE 1 AMPULE: .5; 3 SOLUTION RESPIRATORY (INHALATION) at 14:20

## 2022-02-07 RX ADMIN — SODIUM CHLORIDE, PRESERVATIVE FREE 10 ML: 5 INJECTION INTRAVENOUS at 07:39

## 2022-02-07 RX ADMIN — FUROSEMIDE 40 MG: 40 TABLET ORAL at 07:37

## 2022-02-07 RX ADMIN — SODIUM CHLORIDE, PRESERVATIVE FREE 10 ML: 5 INJECTION INTRAVENOUS at 23:25

## 2022-02-07 RX ADMIN — Medication 5000 UNITS: at 07:36

## 2022-02-07 RX ADMIN — SACUBITRIL AND VALSARTAN 1 TABLET: 49; 51 TABLET, FILM COATED ORAL at 07:37

## 2022-02-07 RX ADMIN — SOTALOL HYDROCHLORIDE 160 MG: 80 TABLET ORAL at 21:40

## 2022-02-07 RX ADMIN — LORAZEPAM 1 MG: 1 TABLET ORAL at 07:38

## 2022-02-07 RX ADMIN — SPIRONOLACTONE 25 MG: 25 TABLET ORAL at 07:37

## 2022-02-07 RX ADMIN — ATORVASTATIN CALCIUM 80 MG: 40 TABLET, FILM COATED ORAL at 21:40

## 2022-02-07 RX ADMIN — IOPAMIDOL 190 ML: 612 INJECTION, SOLUTION INTRAVENOUS at 12:20

## 2022-02-07 RX ADMIN — SOTALOL HYDROCHLORIDE 160 MG: 80 TABLET ORAL at 07:37

## 2022-02-07 RX ADMIN — OXYBUTYNIN CHLORIDE 5 MG: 5 TABLET, EXTENDED RELEASE ORAL at 21:40

## 2022-02-07 RX ADMIN — SODIUM CHLORIDE, PRESERVATIVE FREE 10 ML: 5 INJECTION INTRAVENOUS at 07:44

## 2022-02-07 ASSESSMENT — ENCOUNTER SYMPTOMS
COLOR CHANGE: 0
VOMITING: 0
DIARRHEA: 0
ABDOMINAL DISTENTION: 0
WHEEZING: 0
SHORTNESS OF BREATH: 1
NAUSEA: 0
CONSTIPATION: 0
ABDOMINAL PAIN: 0
BLOOD IN STOOL: 0
COUGH: 0

## 2022-02-07 ASSESSMENT — PAIN SCALES - GENERAL: PAINLEVEL_OUTOF10: 0

## 2022-02-07 NOTE — PROCEDURES
Cardiac catheterization preliminary note:    Successful PCI to SVG to RCA with drug-eluting stent x1. Successful PCI to proximal to mid LAD with kissing balloons with diagonal (in-stent) with PTCA of distal LAD through stent struts (from SVG to LAD that is occluded). Plan: Medical management. Evaluation for management of mitral regurgitation.

## 2022-02-07 NOTE — PROGRESS NOTES
[facility]      Progress Note    Patient:  Guillaume Weiner  YOB: 1950  Date of Service: 2/7/2022  MRN: [id number]   Acct: [de-identified]   Primary Care Physician: Vasu Valentino MD  Advance Directive: Full Code  Admit Date: 2/2/2022       Hospital Day: 5    Portions of this note have been copied forward, however, updated to reflect the most current clinical status of this patient. CHIEF COMPLAINT Shortness of breath    SUBJECTIVE: Has ongoing shortness of breath today. Anxious about his PCI by Dr. Shawn Bates that is to be performed today. Has occasional chest pain that is brief. CUMULATIVE HOSPITAL COURSE:     66 M w h/o CAD, atrial fibrillation and hyperlipidemia who presents to the emergency department in acute respiratory failure. Arrives from home via EMS. They administered Solu-Medrol and DuoNeb and attempted CPAP which he did not tolerate. Patient not able to give history due to respiratory distress. Placed on BiPAP soon after arrival. [facility] provider reports patient states increasing shortness of breath over the last several days and over the past couple weeks has developed significant swelling of bilateral lower extremities.  No prior history of congestive heart failure.  No history of DVT or PE. Patient apparently denied chest pain, fevers, cough, abdominal pain, vomiting or diarrhea.  He was placed in CCU initially . He was diuresed with good results. His O2 sat came up with BIPAP. He was initally treated with IV antibiotics pending cultures . Echo showed severly diminished EF. Cath on 2/3/2022 per Dr. Edison Rahman showed 3 of 4 previous grafts from CABG occluded. .  CT surgery consulted and pt is not a candidate for redo of his CABG. His lasix was transititoned to p.o. dosing . Home O2 eval by resp 91% at rest and 95% walking.  ran [facility] and will ensure it is affordable when he is okay to DC.   To PCI and possible Mitral Clip per Dr. Shawn Bates today for probable stenting due to not being a surgical candidate. K improved at 4.1. All other labs remained stable       Review of Systems   Constitutional: Negative for chills, diaphoresis, fatigue and fever. HENT: Negative for congestion and ear pain. Eyes: Negative for visual disturbance. Respiratory: Positive for shortness of breath. Negative for cough and wheezing. Cardiovascular: Positive for chest pain and palpitations. Negative for leg swelling. Gastrointestinal: Negative for abdominal distention, abdominal pain, blood in stool, constipation, diarrhea, nausea and vomiting. Endocrine: Negative for cold intolerance and heat intolerance. Genitourinary: Negative for difficulty urinating, flank pain, frequency and urgency. Musculoskeletal: Negative for arthralgias and myalgias. Skin: Negative. Negative for color change and wound. Neurological: Negative for dizziness, syncope, weakness, light-headedness, numbness and headaches. Hematological: Does not bruise/bleed easily. Psychiatric/Behavioral: Negative for agitation, confusion and dysphoric mood. Objective:   VITALS:  /76   Pulse 61   Temp 97 °F (36.1 °C) (Temporal)   Resp 16   Ht 6' (1.829 m)   Wt 139 lb (63 kg)   SpO2 94%   BMI 18.85 kg/m²   24HR INTAKE/OUTPUT:      Intake/Output Summary (Last 24 hours) at 2/7/2022 1645  Last data filed at 2/7/2022 1556  Gross per 24 hour   Intake 240 ml   Output 4700 ml   Net -4460 ml           Physical Exam  Vitals and nursing note reviewed. Constitutional:       General: He is not in acute distress. Appearance: Normal appearance. He is ill-appearing. HENT:      Head: Normocephalic and atraumatic. Right Ear: External ear normal.      Left Ear: External ear normal.      Nose: Nose normal.      Mouth/Throat:      Mouth: Mucous membranes are moist.   Eyes:      Extraocular Movements: Extraocular movements intact.       Conjunctiva/sclera: Conjunctivae normal.      Pupils: Pupils are equal, round, and reactive to light. Cardiovascular:      Rate and Rhythm: Normal rate. Rhythm irregular. Pulses: Normal pulses. Heart sounds: Normal heart sounds. Pulmonary:      Effort: Pulmonary effort is normal. Tachypnea present. No respiratory distress. Breath sounds: Normal breath sounds. No wheezing, rhonchi or rales. Abdominal:      General: Bowel sounds are normal. There is no distension. Palpations: Abdomen is soft. Tenderness: There is no abdominal tenderness. Musculoskeletal:         General: No swelling, tenderness or deformity. Normal range of motion. Cervical back: Normal range of motion and neck supple. No muscular tenderness. Right lower leg: No edema. Left lower leg: No edema. Skin:     General: Skin is warm and dry. Coloration: Skin is pale. Findings: No bruising or lesion. Neurological:      General: No focal deficit present. Mental Status: He is alert and oriented to person, place, and time. Psychiatric:         Mood and Affect: Mood normal.         Behavior: Behavior normal.         Thought Content:  Thought content normal.            Medications:      sodium chloride 75 mL/hr at 02/07/22 1417    sodium chloride      sodium chloride        [START ON 2/8/2022] clopidogrel  75 mg Oral Daily    sacubitril-valsartan  1 tablet Oral BID    sodium chloride flush  5-40 mL IntraVENous 2 times per day    ipratropium-albuterol  1 ampule Inhalation Q4H WA    furosemide  40 mg Oral BID    spironolactone  25 mg Oral Daily    isosorbide mononitrate  30 mg Oral Daily    Vitamin D  5,000 Units Oral Daily    sodium chloride flush  5-40 mL IntraVENous 2 times per day    atorvastatin  80 mg Oral Nightly    aspirin  81 mg Oral Daily    sotalol  160 mg Oral 2 times per day    oxybutynin  5 mg Oral Nightly    [Held by provider] enoxaparin  1 mg/kg SubCUTAneous BID     diphenhydrAMINE-zinc acetate, atropine, fentanNYL, sodium chloride flush, sodium chloride, ondansetron, nitroGLYCERIN, sodium chloride flush, sodium chloride, acetaminophen, hydrALAZINE, LORazepam, nitroGLYCERIN, nitroGLYCERIN, ondansetron **OR** [DISCONTINUED] ondansetron, polyethylene glycol, acetaminophen **OR** acetaminophen  ADULT DIET; Regular     Lab and other Data:     Recent Labs     02/05/22  0750 02/06/22  0841   WBC 15.7* 14.2*   HGB 11.3* 12.6*    328     Recent Labs     02/05/22  0156 02/06/22  0203 02/07/22  0147    137 138   K 3.7 3.3* 4.1   CL 99 97* 97*   CO2 27 27 27   BUN 30* 31* 24*   CREATININE 0.7 0.8 0.8   GLUCOSE 106 109 100     Recent Labs     02/05/22  0156 02/06/22  0203 02/07/22  0147   AST 21 19 23   ALT 22 23 26   BILITOT 0.3 0.3 0.4   ALKPHOS 80 74 88       RAD:   ECHO Complete 2D W Doppler W Color    Result Date: 2/3/2022  Transthoracic Echocardiography Report (TTE)  Demographics   Patient Name  CASI Mcintosh Date of Study         02/03/2022   MRN           [id number]          Gender                Male   Date of Birth 1950      Room Number           MHL-0712   Age           66 year(s)   Height:       72 inches       Referring Physician   Ambrose Ambrocio   Weight:       150 pounds      Sonographer           Luly Donaldson [facility]   BSA:          1.89 m^2        Interpreting          Emerson Morales MD                                Physician   BMI:          20.34 kg/m^2  Procedure Type of Study   TTE procedure:ECHO 2D W/DOPPLER/COLOR/CONTRAST. Study Location: Echo Lab Technical Quality: Adequate visualization Patient Status: Inpatient Contrast Medium: Definity. Amount - 8 ml BP: 114/77 mmHg Indications:Congestive heart failure.   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  0.79 cm, ERO more than 0.4 cm^2, jet width more than 50% of the left  atrial area  Central jet of the mitral regurgitation suggestive of functional secondary  mitral regurgitation  Moderately to severely dilated left atrium  Moderately dilated right atrium  Moderate aortic insufficiency  Moderate tricuspid regurgitation with estimated right ventricular systolic  pressure of 40 to 45 mm which is mildly elevated  Dilated IVC with impaired respiratory variation  Aortic sclerosis   Signature   ----------------------------------------------------------------  Electronically signed by Emerson Morales MD(Interpreting physician)  on 02/03/2022 12:31 PM  ----------------------------------------------------------------  Allergies   - Statins. M-Mode Measurements (cm)   LVIDd: 5.76 cm                         LVIDs: 5.18 cm  IVSd: 0.98 cm  LVPWd: 1.23 cm                         AO Root Dimension: 2.4 cm  Rt. Vent. Dimension: 3.38 cm           LA: 4.6 cm  % Ejection Fraction: 20 %              LVOT: 2 cm  Doppler Measurements:   AV Peak Velocity:145 cm/s            MV Peak E-Wave: 102 cm/s  AV Peak Gradient: 8.41 mmHg          MV Peak A-Wave: 38.9 cm/s  AV Mean Gradient: 5 mmHg             MV E/A Ratio: 2.62 %  AV Area (Continuity):2.34 cm^2       MV Peak Gradient: 4.16 mmHg  TR Velocity:269 cm/s  TR Gradient:28.94 mmHg  Estimated RAP:10 mmHg  RVSP:40 mmHg      XR CHEST PORTABLE    Result Date: 2/2/2022  Examination. XR CHEST PORTABLE 2/2/2022 3:21 AM History: Shortness of breath. Frontal portable upright view of the chest is compared with the previous study dated 1/7/2018. There is extensive interstitial infiltrate in the lungs bilaterally more severely in the mid and lower lungs. There is bilateral lower lobar consolidation. There is a small bibasal pleural effusion. There is no pneumothorax.  The heart size is not evaluated due to complete obliteration of the cardiac border by the adjacent infiltrate. Atheromatous changes thoracic aorta are noted. There is evidence of prior cardiac surgery. No acute bony abnormality. 1. The extensive bilateral lung infiltrate. Bilateral lower lobar consolidation and bibasilar pleural effusion. Signed by Dr Valri Kussmaul            Assessment/Plan   Active Problems:    Acute respiratory failure with hypoxia and hypercarbia (HCC)/secondary to extensive CAD and low Ef of 20%   -Lasix twice daily and spironolactone   -Echo completed-low EF    Moniter for change   -Daily labs    Elevated Troponin    -PCI today per Dr. Kerri Benitez a surgical candidate for CABG    -Medication added by cardiology     -Swapna Ace    -Imdur 30 mg daily     Sepsis ([facility] [address]   Cultures NGTD        Acute exacerbation of CHF (congestive heart failure) ([facility] [address]   -Diuretics-lasix 40 BID   -Spironolactone    -Strict I and O       Acute systolic (congestive) heart failure ([address]   Will continue therapy to diurese    Entresto- confirmed affordability     Discharge planning   Will need another Home 02 eval prior to discharge   -Entresto confirmed affordable by this SW    Resolved Problems:    * No resolved hospital problems. *      DVT Prophylaxis: lovenox    Discharge planning: TBD-possibly tomorrow    Further Orders per Clinical course/attending. Electronically signed by LUIS Sarmiento on 2/7/2022 at 4:45 PM       EMR Dragon/Transcription disclaimer:   Much of this encounter note is an electronic transcription/translation of spoken language to printed text.  The electronic translation of spoken language may permit erroneous, or at times, nonsensical words or phrases to be inadvertently transcribed; although attempts have made to review the note for such errors, some may still exist.

## 2022-02-08 LAB
ALBUMIN SERPL-MCNC: 3.3 G/DL (ref 3.5–5.2)
ALP BLD-CCNC: 75 U/L (ref 40–130)
ALT SERPL-CCNC: 23 U/L (ref 5–41)
ANION GAP SERPL CALCULATED.3IONS-SCNC: 11 MMOL/L (ref 7–19)
AST SERPL-CCNC: 20 U/L (ref 5–40)
BILIRUB SERPL-MCNC: 0.6 MG/DL (ref 0.2–1.2)
BUN BLDV-MCNC: 20 MG/DL (ref 8–23)
CALCIUM SERPL-MCNC: 8.1 MG/DL (ref 8.8–10.2)
CHLORIDE BLD-SCNC: 97 MMOL/L (ref 98–111)
CO2: 27 MMOL/L (ref 22–29)
CREAT SERPL-MCNC: 0.6 MG/DL (ref 0.5–1.2)
GFR AFRICAN AMERICAN: >59
GFR NON-AFRICAN AMERICAN: >60
GLUCOSE BLD-MCNC: 94 MG/DL (ref 74–109)
HCT VFR BLD CALC: 48.3 % (ref 42–52)
HEMOGLOBIN: 15.9 G/DL (ref 14–18)
MAGNESIUM: 2 MG/DL (ref 1.6–2.4)
MCH RBC QN AUTO: 31.1 PG (ref 27–31)
MCHC RBC AUTO-ENTMCNC: 32.9 G/DL (ref 33–37)
MCV RBC AUTO: 94.3 FL (ref 80–94)
PDW BLD-RTO: 13.7 % (ref 11.5–14.5)
PLATELET # BLD: 349 K/UL (ref 130–400)
PMV BLD AUTO: 11.1 FL (ref 9.4–12.4)
POTASSIUM SERPL-SCNC: 3.9 MMOL/L (ref 3.5–5)
PRO-BNP: 2598 PG/ML (ref 0–900)
RBC # BLD: 5.12 M/UL (ref 4.7–6.1)
SODIUM BLD-SCNC: 135 MMOL/L (ref 136–145)
TESTOSTERONE FREE, CALC: 6 PG/ML (ref 47–244)
TOTAL PROTEIN: 5.6 G/DL (ref 6.6–8.7)
WBC # BLD: 15.4 K/UL (ref 4.8–10.8)

## 2022-02-08 PROCEDURE — 6370000000 HC RX 637 (ALT 250 FOR IP): Performed by: HOSPITALIST

## 2022-02-08 PROCEDURE — 85027 COMPLETE CBC AUTOMATED: CPT

## 2022-02-08 PROCEDURE — 6370000000 HC RX 637 (ALT 250 FOR IP): Performed by: FAMILY MEDICINE

## 2022-02-08 PROCEDURE — 80053 COMPREHEN METABOLIC PANEL: CPT

## 2022-02-08 PROCEDURE — 94660 CPAP INITIATION&MGMT: CPT

## 2022-02-08 PROCEDURE — 99222 1ST HOSP IP/OBS MODERATE 55: CPT | Performed by: INTERNAL MEDICINE

## 2022-02-08 PROCEDURE — 2140000000 HC CCU INTERMEDIATE R&B

## 2022-02-08 PROCEDURE — 2580000003 HC RX 258: Performed by: INTERNAL MEDICINE

## 2022-02-08 PROCEDURE — 6370000000 HC RX 637 (ALT 250 FOR IP): Performed by: INTERNAL MEDICINE

## 2022-02-08 PROCEDURE — 6370000000 HC RX 637 (ALT 250 FOR IP): Performed by: NURSE PRACTITIONER

## 2022-02-08 PROCEDURE — 6360000002 HC RX W HCPCS: Performed by: INTERNAL MEDICINE

## 2022-02-08 PROCEDURE — 83735 ASSAY OF MAGNESIUM: CPT

## 2022-02-08 PROCEDURE — 99232 SBSQ HOSP IP/OBS MODERATE 35: CPT | Performed by: INTERNAL MEDICINE

## 2022-02-08 PROCEDURE — 94640 AIRWAY INHALATION TREATMENT: CPT

## 2022-02-08 PROCEDURE — 83880 ASSAY OF NATRIURETIC PEPTIDE: CPT

## 2022-02-08 PROCEDURE — 36415 COLL VENOUS BLD VENIPUNCTURE: CPT

## 2022-02-08 RX ORDER — ATORVASTATIN CALCIUM 80 MG/1
80 TABLET, FILM COATED ORAL NIGHTLY
Qty: 30 TABLET | Refills: 3 | Status: SHIPPED | OUTPATIENT
Start: 2022-02-08 | End: 2022-02-09

## 2022-02-08 RX ORDER — VITAMIN B COMPLEX
5000 TABLET ORAL DAILY
Qty: 60 TABLET | Refills: 0 | Status: SHIPPED | OUTPATIENT
Start: 2022-02-09

## 2022-02-08 RX ORDER — ISOSORBIDE MONONITRATE 30 MG/1
30 TABLET, EXTENDED RELEASE ORAL DAILY
Qty: 30 TABLET | Refills: 3 | Status: SHIPPED | OUTPATIENT
Start: 2022-02-09 | End: 2022-07-06 | Stop reason: SDUPTHER

## 2022-02-08 RX ORDER — SPIRONOLACTONE 25 MG/1
25 TABLET ORAL DAILY
Qty: 30 TABLET | Refills: 3 | Status: SHIPPED | OUTPATIENT
Start: 2022-02-09 | End: 2022-02-09

## 2022-02-08 RX ORDER — OXYBUTYNIN CHLORIDE 5 MG/1
5 TABLET, EXTENDED RELEASE ORAL NIGHTLY
Qty: 30 TABLET | Refills: 3 | Status: SHIPPED | OUTPATIENT
Start: 2022-02-08 | End: 2022-02-09 | Stop reason: HOSPADM

## 2022-02-08 RX ORDER — CLOPIDOGREL BISULFATE 75 MG/1
75 TABLET ORAL DAILY
Qty: 30 TABLET | Refills: 3 | Status: SHIPPED | OUTPATIENT
Start: 2022-02-09 | End: 2022-02-09

## 2022-02-08 RX ORDER — FUROSEMIDE 40 MG/1
40 TABLET ORAL 2 TIMES DAILY
Qty: 60 TABLET | Refills: 3 | Status: SHIPPED | OUTPATIENT
Start: 2022-02-08 | End: 2022-02-09

## 2022-02-08 RX ADMIN — OXYBUTYNIN CHLORIDE 5 MG: 5 TABLET, EXTENDED RELEASE ORAL at 21:31

## 2022-02-08 RX ADMIN — SACUBITRIL AND VALSARTAN 1 TABLET: 49; 51 TABLET, FILM COATED ORAL at 10:16

## 2022-02-08 RX ADMIN — ENOXAPARIN SODIUM 70 MG: 100 INJECTION SUBCUTANEOUS at 21:31

## 2022-02-08 RX ADMIN — LORAZEPAM 1 MG: 1 TABLET ORAL at 07:23

## 2022-02-08 RX ADMIN — ASPIRIN 81 MG: 81 TABLET, COATED ORAL at 10:17

## 2022-02-08 RX ADMIN — SACUBITRIL AND VALSARTAN 1 TABLET: 49; 51 TABLET, FILM COATED ORAL at 21:31

## 2022-02-08 RX ADMIN — IPRATROPIUM BROMIDE AND ALBUTEROL SULFATE 1 AMPULE: .5; 3 SOLUTION RESPIRATORY (INHALATION) at 14:18

## 2022-02-08 RX ADMIN — SOTALOL HYDROCHLORIDE 160 MG: 80 TABLET ORAL at 21:31

## 2022-02-08 RX ADMIN — SODIUM CHLORIDE, PRESERVATIVE FREE 10 ML: 5 INJECTION INTRAVENOUS at 21:33

## 2022-02-08 RX ADMIN — IPRATROPIUM BROMIDE AND ALBUTEROL SULFATE 1 AMPULE: .5; 3 SOLUTION RESPIRATORY (INHALATION) at 10:14

## 2022-02-08 RX ADMIN — CLOPIDOGREL BISULFATE 75 MG: 75 TABLET ORAL at 10:18

## 2022-02-08 RX ADMIN — LORAZEPAM 1 MG: 1 TABLET ORAL at 17:21

## 2022-02-08 RX ADMIN — ENOXAPARIN SODIUM 70 MG: 100 INJECTION SUBCUTANEOUS at 10:21

## 2022-02-08 RX ADMIN — POLYETHYLENE GLYCOL 3350 17 G: 17 POWDER, FOR SOLUTION ORAL at 10:18

## 2022-02-08 RX ADMIN — SOTALOL HYDROCHLORIDE 160 MG: 80 TABLET ORAL at 10:16

## 2022-02-08 RX ADMIN — FUROSEMIDE 40 MG: 40 TABLET ORAL at 17:15

## 2022-02-08 RX ADMIN — IPRATROPIUM BROMIDE AND ALBUTEROL SULFATE 1 AMPULE: .5; 3 SOLUTION RESPIRATORY (INHALATION) at 06:50

## 2022-02-08 RX ADMIN — FUROSEMIDE 40 MG: 40 TABLET ORAL at 10:18

## 2022-02-08 RX ADMIN — ISOSORBIDE MONONITRATE 30 MG: 30 TABLET, EXTENDED RELEASE ORAL at 10:16

## 2022-02-08 RX ADMIN — Medication 5000 UNITS: at 10:17

## 2022-02-08 RX ADMIN — ATORVASTATIN CALCIUM 80 MG: 40 TABLET, FILM COATED ORAL at 21:31

## 2022-02-08 RX ADMIN — IPRATROPIUM BROMIDE AND ALBUTEROL SULFATE 1 AMPULE: .5; 3 SOLUTION RESPIRATORY (INHALATION) at 18:43

## 2022-02-08 RX ADMIN — SPIRONOLACTONE 25 MG: 25 TABLET ORAL at 10:18

## 2022-02-08 ASSESSMENT — ENCOUNTER SYMPTOMS
WHEEZING: 0
NAUSEA: 0
VOMITING: 0
COLOR CHANGE: 0
BLOOD IN STOOL: 0
COUGH: 0
SHORTNESS OF BREATH: 1
ABDOMINAL PAIN: 0
GASTROINTESTINAL NEGATIVE: 1
CONSTIPATION: 0
ABDOMINAL DISTENTION: 0
DIARRHEA: 0

## 2022-02-08 ASSESSMENT — PAIN SCALES - GENERAL: PAINLEVEL_OUTOF10: 0

## 2022-02-08 NOTE — PROGRESS NOTES
Patient agreeable to [facility]. Paperwork faxed to [facility] and [facility] made aware.

## 2022-02-08 NOTE — CONSULTS
[facility]) Cardiology   Structural heart Consult Note   Curt Ingram       Requesting MD:  Davy Villanueva MD   Admit Status:  Inpatient [101]       History obtained from:   [x] Patient  [] Other (specify):     Patient:  Monserrat White  [id number]     Chief Complaint:   Chief Complaint   Patient presents with    Respiratory Distress     initial 02 sat at home 65%        Severe mitral regurgitation for possible MitraClip    HPI: Mr. Catalino Fox is a 66 y.o. male with a history of severe coronary artery disease status post CABG x4 in 2009 by Dr. Yvette Wellington, status post multiple stents in the circumflex and proximal diagonal in 2012, he also sustained previous MI in 2019 with PCI to his grafts to LAD and OM, chronic smoking history with COPD, history of paroxysmal A. fib, history of hypertension hyperlipidemia, history of peripheral vascular disease    Patient admitted with history of worsening shortness of breath and was found to be in acute on chronic systolic congestive heart failure echo showed severe depressed LV function with ejection fraction of 20% and evidence of severe mitral regurgitation on echo performed on February 3, 2022 that showed severely enlarged LV function with global hypokinesia right ventricular dilatation with mitral regurgitation that showed vena contract of 0.8 cm and effective orifice area of more than 0.4 cm² with large it in the mitral position consistent with severe mitral regurgitation      Patient underwent PCI to SVG to right PDA and mid LAD stent yesterday after he was turned down for CABG and valve repair/replacement due to increased surgical risk    I was asked to evaluate him from structural heart standpoint for MitraClip procedure    Review of Systems:  Review of Systems   Constitutional: Negative. Respiratory: Positive for shortness of breath. Cardiovascular: Negative. Gastrointestinal: Negative. Endocrine: Negative. Genitourinary: Negative. Musculoskeletal: Negative. Skin: Negative. Neurological: Negative. Hematological: Negative. All other systems reviewed and are negative.       Cardiac Specific Data:  Specialty Problems        Cardiology Problems    Coronary artery disease involving native coronary artery of native heart with angina pectoris ([facility])        HTN (hypertension)        Chest pain        Hyperlipidemia        Paroxysmal a-fib ([facility])        Atherosclerosis of native artery of extremity with intermittent claudication ([facility])        Inferior MI ([facility])        ST elevation myocardial infarction (STEMI) ([facility]        Bilateral carotid artery stenosis        PVD (peripheral vascular disease) ([facility])        Acute exacerbation of CHF (congestive heart failure) ([facility])        Acute systolic (congestive) heart failure ([facility])              Past Medical History:  Past Medical History:   Diagnosis Date    Acute exacerbation of CHF (congestive heart failure) ([facility]) 2/2/2022    Atrial fibrillation ([facility]     Bilateral carotid artery stenosis 1/6/2020    CAD (coronary artery disease), native coronary artery     acute inferior MI on 5/15/09, s/p emeergent CABG x4 5/15/09    Chest pain     HTN (hypertension)     Hyperlipidemia     Dr. Abida Siddiqui manages    Palpitations     Statin intolerance 2/14/2018    Tobacco abuse     Transient atrial fibrillation or flutter     post op        Past Surgical History:  Past Surgical History:   Procedure Laterality Date    CARDIAC CATHETERIZATION  4/23/12    with stent to left circ, and LAD    CARDIAC CATHETERIZATION  01/07/2018    PTCA/BJ to VG to LAD;  atherectomy and 3 BMS to VG to 1st Circ    CORONARY ARTERY BYPASS GRAFT  05/15/09    x4    DIAGNOSTIC CARDIAC CATH LAB PROCEDURE  05/15/09    left heart cath, left ventr, selective coronary arteriography     FRACTURE SURGERY Left     Leg    VASCULAR SURGERY  4/21/2015 [facility]    Percutaneous cannulation right common femoral artery with 5[place] and later 6[place] Topics Concern    Not on file   Social History Narrative    Not on file     Social Determinants of Health     Financial Resource Strain:     Difficulty of Paying Living Expenses: Not on file   Food Insecurity:     Worried About Running Out of Food in the Last Year: Not on file    Eve of Food in the Last Year: Not on file   Transportation Needs:     Lack of Transportation (Medical): Not on file    Lack of Transportation (Non-Medical): Not on file   Physical Activity:     Days of Exercise per Week: Not on file    Minutes of Exercise per Session: Not on file   Stress:     Feeling of Stress : Not on file   Social Connections:     Frequency of Communication with Friends and Family: Not on file    Frequency of Social Gatherings with Friends and Family: Not on file    Attends Mu-ism Services: Not on file    Active Member of [address] [place] [address] or Organizations: Not on file    Attends Club or Organization Meetings: Not on file    Marital Status: Not on file   Intimate Partner Violence:     Fear of Current or Ex-Partner: Not on file    Emotionally Abused: Not on file    Physically Abused: Not on file    Sexually Abused: Not on file   Housing Stability:     Unable to Pay for Housing in the Last Year: Not on file    Number of [place] in the Last Year: Not on file    Unstable Housing in the Last Year: Not on file       Allergies: Allergies   Allergen Reactions    Statins Itching     myalagias       Home Meds:  Prior to Admission medications    Medication Sig Start Date End Date Taking? Authorizing Provider   sacubitril-valsartan (ENTRESTO) 24-26 MG per tablet Take 1 tablet by mouth 2 times daily for 60 doses 2/5/22 3/7/22 Yes LUIS Karimi - CNP   amLODIPine (NORVASC) 5 MG tablet Take 1 tablet by mouth daily 12/21/21  Yes LUIS Cruz   nitroGLYCERIN (NITROSTAT) 0.4 MG SL tablet Place 1 tablet under the tongue every 5 minutes as needed for Chest pain up to max of 3 total doses.  If no relief after 1 dose, call 911. 6/8/21  Yes [place] Marcelino, APRN   lisinopril (PRINIVIL;ZESTRIL) 20 MG tablet Take 1 tablet by mouth daily 6/8/21  Yes [place] Marcelino, APRN   sotalol (BETAPACE) 160 MG tablet Take 1 tablet by mouth twice daily 6/8/21  Yes [place] Marcelino, APRN   nitroGLYCERIN (NITROSTAT) 0.4 MG SL tablet Place 1 tablet under the tongue every 5 minutes as needed for Chest pain 6/8/21  Yes [place] Marcelino, APRN   aspirin 81 MG EC tablet Take 81 mg by mouth daily. Yes Historical Provider, MD       Current Meds:   clopidogrel  75 mg Oral Daily    sacubitril-valsartan  1 tablet Oral BID    sodium chloride flush  5-40 mL IntraVENous 2 times per day    ipratropium-albuterol  1 ampule Inhalation Q4H WA    furosemide  40 mg Oral BID    spironolactone  25 mg Oral Daily    isosorbide mononitrate  30 mg Oral Daily    Vitamin D  5,000 Units Oral Daily    sodium chloride flush  5-40 mL IntraVENous 2 times per day    atorvastatin  80 mg Oral Nightly    aspirin  81 mg Oral Daily    sotalol  160 mg Oral 2 times per day    oxybutynin  5 mg Oral Nightly    enoxaparin  1 mg/kg SubCUTAneous BID       Current Infused Meds:   sodium chloride      sodium chloride         Physical Exam:  Vitals:    02/08/22 0600   BP: 119/69   Pulse: 77   Resp: 18   Temp: 98.3 °F (36.8 °C)   SpO2:        Intake/Output Summary (Last 24 hours) at 2/8/2022 0957  Last data filed at 2/8/2022 2435  Gross per 24 hour   Intake 1080 ml   Output 1700 ml   Net -620 ml     Estimated body mass index is 18.87 kg/m² as calculated from the following:    Height as of this encounter: 6' (1.829 m). Weight as of this encounter: 139 lb 1.6 oz (63.1 kg). Physical Exam  Vitals reviewed. Constitutional:       Appearance: Normal appearance. HENT:      Head: Normocephalic. Mouth/Throat:      Mouth: Mucous membranes are moist.   Cardiovascular:      Rate and Rhythm: Normal rate and regular rhythm. Pulses: Normal pulses.       Heart sounds: Murmur (3/6 systolic murmur in the apical area) heard. Comments: Sternotomy scar  Pulmonary:      Effort: Pulmonary effort is normal.      Breath sounds: Normal breath sounds. Abdominal:      General: Bowel sounds are normal.      Palpations: Abdomen is soft. Tenderness: There is no abdominal tenderness. Musculoskeletal:         General: Normal range of motion. Right lower leg: No edema. Left lower leg: No edema. Skin:     General: Skin is warm. Neurological:      General: No focal deficit present. Mental Status: He is alert and oriented to person, place, and time. Psychiatric:         Mood and Affect: Mood normal.         Behavior: Behavior normal.         Labs:  Recent Labs     02/06/22  0841 02/08/22  0136   WBC 14.2* 15.4*   HGB 12.6* 15.9    349       Recent Labs     02/06/22  0203 02/07/22  0147 02/08/22  0136    138 135*   K 3.3* 4.1 3.9   CL 97* 97* 97*   CO2 27 27 27   BUN 31* 24* 20   CREATININE 0.8 0.8 0.6   LABGLOM >60 >60 >60   MG 1.9 2.0 2.0   CALCIUM 7.7* 8.6* 8.1*       CK, CKMB, Troponin: No results for input(s): CKTOTAL, CKMB, TROPONINI in the last 72 hours. Last 3 BNP:  Recent Labs     02/08/22  0136   PROBNP 2,598*         IMAGING:      ECHO    ECHO Complete 2D W Doppler W Color     Result Date: 2/3/2022  Transthoracic Echocardiography Report (TTE)  Demographics   Patient Name  CASI Gupta Date of Study         02/03/2022   MRN           [id number]          Gender                Male   Date of Birth 1950      Room Number           MHL-0712   Age           66 year(s)   Height:       72 inches       Referring Physician   Deidre Zamarripa   Weight:       150 pounds      Sonographer           Lluy Donaldson RDCS   BSA:          1.89 m^2        Interpreting          Juan Carlos Villa MD                                Physician   BMI:          20.34 kg/m^2    Procedure Type of Study   TTE procedure:ECHO 2D W/DOPPLER/COLOR/CONTRAST.   Study Location: Echo Lab Technical Quality: Adequate visualization Patient Status: Inpatient Contrast Medium: Definity. Amount - 8 ml BP: 114/77 mmHg Indications:Congestive heart failure. 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  0.79 cm, ERO more than 0.4 cm^2, jet width more than 50% of the left  atrial area  Central jet of the mitral regurgitation suggestive of functional secondary  mitral regurgitation  Moderately to severely dilated left atrium  Moderately dilated right atrium  Moderate aortic insufficiency  Moderate tricuspid regurgitation with estimated right ventricular systolic  pressure of 40 to 45 mm which is mildly elevated  Dilated IVC with impaired respiratory variation  Aortic sclerosis     Signature     ----------------------------------------------------------------    Electronically signed by Santana Valladares MD(Interpreting physician)  on 02/03/2022 12:31 PM  ----------------------------------------------------------------    Allergies   - Statins. M-Mode Measurements (cm)   LVIDd: 5.76 cm                         LVIDs: 5.18 cm  IVSd: 0.98 cm  LVPWd: 1.23 cm                         AO Root Dimension: 2.4 cm  Rt. Vent.  Dimension: 3.38 cm           LA: 4.6 cm  % Ejection Fraction: 20 %              LVOT: 2 cm  Doppler Measurements:   AV Peak Velocity:145 cm/s            MV Peak E-Wave: 102 cm/s  AV Peak Gradient: 8.41 mmHg          MV Peak A-Wave: 38.9 cm/s  AV Mean Gradient: 5 mmHg             MV E/A Ratio: 2.62 %  AV Area (Continuity):2.34 cm^2       MV Peak Gradient: 4.16 mmHg  TR Velocity:269 cm/s  TR Gradient:28.94 mmHg  Estimated RAP:10 mmHg  RVSP:40 mmHg         Cath  Cardiac catheterization preliminary note:     Successful PCI to SVG to RCA with drug-eluting stent x1. Successful PCI to proximal to mid LAD with kissing balloons with diagonal (in-stent) with PTCA of distal LAD through stent struts (from SVG to LAD that is occluded).     Plan: Medical management. Evaluation for management of mitral regurgitation. XR CHEST PORTABLE    Result Date: 2/2/2022  1. The extensive bilateral lung infiltrate. Bilateral lower lobar consolidation and bibasilar pleural effusion. Signed by Dr Usha Downs and Plan:    69-year-old male who is known to have history of advanced coronary artery disease status post CABG with failed graft status post multiple intervention in the past with previous MI, status post recent PCI during this admission to SVG to right PDA and mid LAD after he was turned down for surgery due to high surgical risk, multiple comorbidities including severe systolic LV dysfunction with ejection fraction recent echo of 20%, severe mitral regurgitation COPD, peripheral vascular disease, chronic smoking history, hypertension hyperlipidemia and history of A.  Fib    Patient is diagnosed with severe symptomatic mitral regurgitation based on the last echo   I reviewed his echo imaging doing during our structural conference today, I believe the patient is candidate MitraClip therapy with [id number] repair to reduce his mitral regurgitation, I discussed with the patient this option as he is not a candidate for surgery, he agreed to proceed with mitral valve repair with me in the next 1 to 2 weeks  We will also continue with medical therapy for ischemic cardiomyopathy, coronary artery disease with recent PCI  Recommend discharge on LifeVest  Possible ICD after 3 months if ejection fraction does not improve    My office will call the patient to schedule mitral intervention soon       Thank you for the consult, we appreciate the opportunity to provide care to your patients. Feel free to contact me if I can be of any further assistance.       Electronically signed by Nelson Schwarz MD on 2/8/2022 at 9:57 AM    Nelson Schwarz MD, [facility]  Interventional Cardiologist, Endovascular Specialist   Medical Director, Structural Heart Program   [facility]       (Please note that portions of this note were completed with a voice recognition program.  Effortswere made to edit the dictations but occasionally words are mis-transcribed.)

## 2022-02-08 NOTE — CONSULTS
Pt. Echo 2/2/22 showing EF 20% with G3DD. He is a current pt of [facility] and Carly Serna has been trying to get pt in for stress test but he has not been compliant. Pt. Found to have occluded vessels again from CABG in 2018 , non surgical now. Dr Elina Parra placed stents and consideration of amaya clip in process. Nurse met with patient re: referral to CHF clinic. Discussed with patient diet, activity, medications, definition of heart failure and s/s, daily wt monitoring with reporting of wt gain of >2-3 lbs in 24 hours or > 5 lbs in one week, BP/HR and activity monitoring with use of daily log, f/u appointments with PCP, CHF clinic and cardiologist. Discussed vaccine use, testing, monitoring of BS. HF packet given. Patient stated he does have a wt scale and BP monitor at home. f/u appointments was already made with Carly SMITH but pt does not want to see her. He states \"she wanted me to have that stress test and going to see her was stressful enough\". I pointed out to him the reality that the stress test she wanted him to get WAS needed and that now he has been hospitalized and has stents. He said \" Well I did it the better way, came through the ED when it suited me and got what I needed done quickly. \"  Conversation with pt that his way could also have ended in a much less desirable outcome also. Appt. Was changed as per his request to 2/22/2022 with Herlinda SMITH. Time spent with patient 30 minutes.

## 2022-02-08 NOTE — PROGRESS NOTES
[facility]      Progress Note    Patient:  Maxi Lennon  YOB: 1950  Date of Service: 2/8/2022  MRN: [id number]   Acct: [de-identified]   Primary Care Physician: Gerhardt Kaufmann, MD  Advance Directive: Full Code  Admit Date: 2/2/2022       Hospital Day: 6    Portions of this note have been copied forward, however, updated to reflect the most current clinical status of this patient. CHIEF COMPLAINT Shortness of breath    SUBJECTIVE: States that his shortness of breath is about the same. He is having some discomfort to his right wrist from the PCI. No overnight events. No other complaints. CUMULATIVE HOSPITAL COURSE:     66 M w h/o CAD, atrial fibrillation and hyperlipidemia who presents to the emergency department in acute respiratory failure. Arrives from home via EMS. They administered Solu-Medrol and DuoNeb and attempted CPAP which he did not tolerate. Patient not able to give history due to respiratory distress. Placed on BiPAP soon after arrival. [facility] provider reports patient states increasing shortness of breath over the last several days and over the past couple weeks has developed significant swelling of bilateral lower extremities.  No prior history of congestive heart failure.  No history of DVT or PE. Patient apparently denied chest pain, fevers, cough, abdominal pain, vomiting or diarrhea.  He was placed in CCU initially . He was diuresed with good results. His O2 sat came up with BIPAP. He was initally treated with IV antibiotics pending cultures . Echo showed severly diminished EF. Cath on 2/3/2022 per Dr. Chucky Motta showed 3 of 4 previous grafts from CABG occluded. .  CT surgery consulted and pt is not a candidate for redo of his CABG. His lasix was transititoned to p.o. dosing . Home O2 eval by resp 91% at rest and 95% walking.  ran Mario Singh and will ensure it is affordable when he is okay to [place].   PCI and PCTA successfully performed by Dr. Cesilia Mishra on 2/7/2022. To have continued work-up for MitraClip outpatient by Dr. Nick Carroll. Awaiting LifeVest fitting due to EF of 20%. Otherwise stable for discharge. Review of Systems   Constitutional: Negative for chills, diaphoresis, fatigue and fever. HENT: Negative for congestion and ear pain. Eyes: Negative for visual disturbance. Respiratory: Positive for shortness of breath. Negative for cough and wheezing. Cardiovascular: Positive for chest pain and palpitations. Negative for leg swelling. Gastrointestinal: Negative for abdominal distention, abdominal pain, blood in stool, constipation, diarrhea, nausea and vomiting. Endocrine: Negative for cold intolerance and heat intolerance. Genitourinary: Negative for difficulty urinating, flank pain, frequency and urgency. Musculoskeletal: Negative for arthralgias and myalgias. Skin: Negative. Negative for color change and wound. Neurological: Negative for dizziness, syncope, weakness, light-headedness, numbness and headaches. Hematological: Does not bruise/bleed easily. Psychiatric/Behavioral: Negative for agitation, confusion and dysphoric mood. Objective:   VITALS:  BP (!) 92/58   Pulse 73   Temp 98.3 °F (36.8 °C) (Temporal)   Resp 16   Ht 6' (1.829 m)   Wt 139 lb 1.6 oz (63.1 kg)   SpO2 93%   BMI 18.87 kg/m²   24HR INTAKE/OUTPUT:      Intake/Output Summary (Last 24 hours) at 2/8/2022 1737  Last data filed at 2/8/2022 1328  Gross per 24 hour   Intake 1040 ml   Output 900 ml   Net 140 ml           Physical Exam  Vitals and nursing note reviewed. Constitutional:       General: He is not in acute distress. Appearance: Normal appearance. He is ill-appearing. HENT:      Head: Normocephalic and atraumatic. Right Ear: External ear normal.      Left Ear: External ear normal.      Nose: Nose normal.      Mouth/Throat:      Mouth: Mucous membranes are moist.   Eyes:      Extraocular Movements: Extraocular movements intact. Conjunctiva/sclera: Conjunctivae normal.      Pupils: Pupils are equal, round, and reactive to light. Cardiovascular:      Rate and Rhythm: Normal rate. Rhythm irregular. Pulses: Normal pulses. Heart sounds: Normal heart sounds. Pulmonary:      Effort: Pulmonary effort is normal. Tachypnea present. No respiratory distress. Breath sounds: Normal breath sounds. No wheezing, rhonchi or rales. Abdominal:      General: Bowel sounds are normal. There is no distension. Palpations: Abdomen is soft. Tenderness: There is no abdominal tenderness. Musculoskeletal:         General: No swelling or deformity. Normal range of motion. Right wrist: Tenderness (Due to PCI) present. Cervical back: Normal range of motion and neck supple. No muscular tenderness. Right lower leg: No edema. Left lower leg: No edema. Skin:     General: Skin is warm and dry. Coloration: Skin is pale. Findings: Bruising present. No lesion. Neurological:      General: No focal deficit present. Mental Status: He is alert and oriented to person, place, and time. Psychiatric:         Mood and Affect: Mood normal.         Behavior: Behavior normal.         Thought Content:  Thought content normal.            Medications:      sodium chloride      sodium chloride        clopidogrel  75 mg Oral Daily    sacubitril-valsartan  1 tablet Oral BID    sodium chloride flush  5-40 mL IntraVENous 2 times per day    ipratropium-albuterol  1 ampule Inhalation Q4H WA    furosemide  40 mg Oral BID    spironolactone  25 mg Oral Daily    isosorbide mononitrate  30 mg Oral Daily    Vitamin D  5,000 Units Oral Daily    sodium chloride flush  5-40 mL IntraVENous 2 times per day    atorvastatin  80 mg Oral Nightly    aspirin  81 mg Oral Daily    sotalol  160 mg Oral 2 times per day    oxybutynin  5 mg Oral Nightly    enoxaparin  1 mg/kg SubCUTAneous BID     diphenhydrAMINE-zinc acetate, biventricular failure  Severe mitral regurgitation with the following criteria: Vena contractor  0.79 cm, ERO more than 0.4 cm^2, jet width more than 50% of the left  atrial area  Central jet of the mitral regurgitation suggestive of functional secondary  mitral regurgitation  Moderately to severely dilated left atrium  Moderately dilated right atrium  Moderate aortic insufficiency  Moderate tricuspid regurgitation with estimated right ventricular systolic  pressure of 40 to 45 mm which is mildly elevated  Dilated IVC with impaired respiratory variation  Aortic sclerosis   Signature   ----------------------------------------------------------------  Electronically signed by Gerry Weaver MD(Interpreting physician)  on 02/03/2022 12:31 PM  ----------------------------------------------------------------  Allergies   - Statins. M-Mode Measurements (cm)   LVIDd: 5.76 cm                         LVIDs: 5.18 cm  IVSd: 0.98 cm  LVPWd: 1.23 cm                         AO Root Dimension: 2.4 cm  Rt. Vent. Dimension: 3.38 cm           LA: 4.6 cm  % Ejection Fraction: 20 %              LVOT: 2 cm  Doppler Measurements:   AV Peak Velocity:145 cm/s            MV Peak E-Wave: 102 cm/s  AV Peak Gradient: 8.41 mmHg          MV Peak A-Wave: 38.9 cm/s  AV Mean Gradient: 5 mmHg             MV E/A Ratio: 2.62 %  AV Area (Continuity):2.34 cm^2       MV Peak Gradient: 4.16 mmHg  TR Velocity:269 cm/s  TR Gradient:28.94 mmHg  Estimated RAP:10 mmHg  RVSP:40 mmHg      XR CHEST PORTABLE    Result Date: 2/2/2022  Examination. XR CHEST PORTABLE 2/2/2022 3:21 AM History: Shortness of breath. Frontal portable upright view of the chest is compared with the previous study dated 1/7/2018. There is extensive interstitial infiltrate in the lungs bilaterally more severely in the mid and lower lungs. There is bilateral lower lobar consolidation. There is a small bibasal pleural effusion. There is no pneumothorax.  The heart size is not evaluated due to complete obliteration of the cardiac border by the adjacent infiltrate. Atheromatous changes thoracic aorta are noted. There is evidence of prior cardiac surgery. No acute bony abnormality. 1. The extensive bilateral lung infiltrate. Bilateral lower lobar consolidation and bibasilar pleural effusion. Signed by Dr Alesia Batista            Assessment/Plan   Active Problems:    Acute respiratory failure with hypoxia and hypercarbia (HCC)/secondary to extensive CAD and low Ef of 20%   -Lasix twice daily and spironolactone   -Echo completed-low EF    Moniter for change   -Daily labs   -We will need LifeVest fitting prior to discharge    Elevated Troponin    -Status post PCI in 2/7/2022 BJ and PCTA    -Medication added by cardiology     -[place] Maier    -Imdur 30 mg daily    -Plavix     Sepsis ([address]   Cultures NGTD        Acute exacerbation of CHF (congestive heart failure) (HCC)   -Diuretics-lasix 40 BID   -Spironolactone    -Strict I and O       Acute systolic (congestive) heart failure (HCC)   P.o. Lasix twice daily   Entresto- confirmed affordability     Discharge planning   -Home O2 eval passed   -Entresto confirmed affordable by this SW    Resolved Problems:    * No resolved hospital problems. *      DVT Prophylaxis: lovenox    Discharge planning: TBD-possibly tomorrow    Further Orders per Clinical course/attending. Electronically signed by LUIS Lopez on 2/8/2022 at 5:37 PM       EMR Dragon/Transcription disclaimer:   Much of this encounter note is an electronic transcription/translation of spoken language to printed text.  The electronic translation of spoken language may permit erroneous, or at times, nonsensical words or phrases to be inadvertently transcribed; although attempts have made to review the note for such errors, some may still exist.

## 2022-02-08 NOTE — DISCHARGE SUMMARY
Paul Forrester  :  1950  MRN:  [id number]    Admit date:  2022  Discharge date:  2022    Discharging Physician:  Dr. Katelin Jones Directive: Full Code    Consults: Efrem Lowe     Primary Care Physician:  Dante Jarvis MD    Discharge Diagnoses: Active Problems:    Acute respiratory failure with hypoxia and hypercarbia (HCC)    Sepsis (HCC)    Acute exacerbation of CHF (congestive heart failure) (HCC)    Acute systolic (congestive) heart failure (HCC)    Acute respiratory failure with hypoxia (HCC)  Resolved Problems:    * No resolved hospital problems. *      Portions of this note have been copied forward, however, changed to reflect the most current clinical status of this patient. Hospital Course:     65 M w h/o CAD, atrial fibrillation and hyperlipidemia who presents to the emergency department in acute respiratory failure on 2022.  Arrives from home via EMS.  They administered Solu-Medrol and DuoNeb and attempted CPAP which he did not tolerate.   Placed on BiPAP soon after arrival. [facility] provider reports patient states increasing shortness of breath over the last several days and over the past couple weeks has developed significant swelling of bilateral lower extremities.  No prior history of congestive heart failure.  No history of DVT or PE. Patient apparently denied chest pain, fevers, cough, abdominal pain, vomiting or diarrhea.  He was placed in CCU initially for further stabilization. He was diuresed with good results. His oxygen saturations sat came up with BIPAP. He was initally treated with IV antibiotics pending cultures which revealed NGTD.  Echo on 2022 showed severly diminished EF at approximately 20% with global hypokinesis G3DD and severe mitral valve regurgitation. He underwent cardiac catheterization on 2/3/2022 per Dr. Shantell Cartwright showed 3 of 4 previous grafts from CABG occluded. CT surgery consulted and pt is not a candidate for CABG. His lasix was transititoned to p.o. dosing with the addition of spironolactone. Home O2 eval by resp 91% at rest and 95% walking, therefore, he did not qualify for home oxygen upon discharge.  has been involved to ensure that his meds, including Entreso, are affordable at discharge. Patient underwent successful PCI to the SVG to RCA with BJ x1 and successful PCI to proximal mid LAD with kissing balloon at diagonal with the PTCA of the distal LAD through stent struts on 2/7/2022 by Dr. Savita Haile. He has been evaluated by Dr. Mila Lemons for amaya clip consultation due to severe mitral valve regurgitation, who will continue his work-up and management after discharge. He will be discharged home with a fitted LifeVest and Eliquis per the recommendations of Dr. Savita Haile. His vitals and labs remain stable. He is medically and clinically stable for discharge. All new medications have been reviewed. All questions have been answered. He has been advised to follow-up with his PCP in 1 week. He is to keep scheduled appointments with Cardiology as scheduled.       Significant Diagnostic Studies:     ECHO Complete 2D W Doppler W Color    Result Date: 2/3/2022  Transthoracic Echocardiography Report (TTE)  Demographics   Patient Name  CASI Reese Date of Study         02/03/2022   MRN           [id number]          Gender                Male   Date of Birth 1950      Room Number           MHL-0712   Age           66 year(s)   Height:       72 inches       Referring Physician   Merlene Mcmahan   Weight:       150 pounds      Sonographer           Luly Donaldson RDCS   BSA:          1.89 m^2        Interpreting          Oleh Ahumada, MD                                Physician   BMI:          20.34 kg/m^2  Procedure Type of Study TTE procedure:ECHO 2D W/DOPPLER/COLOR/CONTRAST. Study Location: Echo Lab Technical Quality: Adequate visualization Patient Status: Inpatient Contrast Medium: Definity. Amount - 8 ml BP: 114/77 mmHg Indications:Congestive heart failure. 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  0.79 cm, ERO more than 0.4 cm^2, jet width more than 50% of the left  atrial area  Central jet of the mitral regurgitation suggestive of functional secondary  mitral regurgitation  Moderately to severely dilated left atrium  Moderately dilated right atrium  Moderate aortic insufficiency  Moderate tricuspid regurgitation with estimated right ventricular systolic  pressure of 40 to 45 mm which is mildly elevated  Dilated IVC with impaired respiratory variation  Aortic sclerosis   Signature   ----------------------------------------------------------------  Electronically signed by Yogi Alexander MD(Interpreting physician)  on 02/03/2022 12:31 PM  ----------------------------------------------------------------  Allergies   - Statins. M-Mode Measurements (cm)   LVIDd: 5.76 cm                         LVIDs: 5.18 cm  IVSd: 0.98 cm  LVPWd: 1.23 cm                         AO Root Dimension: 2.4 cm  Rt. Vent.  Dimension: 3.38 cm           LA: 4.6 cm  % Ejection Fraction: 20 %              LVOT: 2 cm  Doppler Measurements:   AV Peak Velocity:145 cm/s            MV Peak E-Wave: 102 cm/s  AV Peak Gradient: 8.41 mmHg          MV Peak A-Wave: 38.9 cm/s  AV Mean Gradient: 5 mmHg             MV E/A Ratio: 2.62 %  AV Area (Continuity):2.34 cm^2       MV Peak Gradient: 4.16 mmHg  TR Velocity:269 cm/s  TR Gradient:28.94 mmHg  Estimated RAP:10 mmHg  RVSP:40 mmHg      XR CHEST PORTABLE    Result Date: 2/2/2022  Examination. XR CHEST PORTABLE 2/2/2022 3:21 AM History: Shortness of breath. Frontal portable upright view of the chest is compared with the previous study dated 1/7/2018. There is extensive interstitial infiltrate in the lungs bilaterally more severely in the mid and lower lungs. There is bilateral lower lobar consolidation. There is a small bibasal pleural effusion. There is no pneumothorax. The heart size is not evaluated due to complete obliteration of the cardiac border by the adjacent infiltrate. Atheromatous changes thoracic aorta are noted. There is evidence of prior cardiac surgery. No acute bony abnormality. 1. The extensive bilateral lung infiltrate. Bilateral lower lobar consolidation and bibasilar pleural effusion. Signed by Dr Willi Shannon:   CBC:   Recent Labs     02/08/22  0136 02/09/22  0907   WBC 15.4* 12.6*   HGB 15.9 15.4    343     BMP:    Recent Labs     02/07/22  0147 02/08/22  0136 02/09/22  0130    135* 136   K 4.1 3.9 4.5   CL 97* 97* 97*   CO2 27 27 27   BUN 24* 20 22   CREATININE 0.8 0.6 0.8   GLUCOSE 100 94 92     INR: No results for input(s): INR in the last 72 hours. Physical Exam:  Vital Signs: BP (!) 86/53   Pulse 73   Temp 98.4 °F (36.9 °C) (Temporal)   Resp 18   Ht 6' (1.829 m)   Wt 139 lb 1.6 oz (63.1 kg)   SpO2 91%   BMI 18.87 kg/m²   Physical Exam  Vitals and nursing note reviewed. Constitutional:       General: He is not in acute distress. Appearance: Normal appearance. He is ill-appearing. HENT:      Head: Normocephalic and atraumatic. Right Ear: External ear normal.      Left Ear: External ear normal.      Nose: Nose normal.      Mouth/Throat:      Mouth: Mucous membranes are moist.   Eyes:      Extraocular Movements: Extraocular movements intact.       Conjunctiva/sclera: Conjunctivae normal.      Pupils: Pupils are equal, round, and reactive to light. Cardiovascular:      Rate and Rhythm: Normal rate and regular rhythm. Pulses: Normal pulses. Heart sounds: Murmur heard. Pulmonary:      Effort: Pulmonary effort is normal. No respiratory distress. Breath sounds: Examination of the right-lower field reveals decreased breath sounds. Examination of the left-lower field reveals decreased breath sounds. Decreased breath sounds present. No wheezing, rhonchi or rales. Abdominal:      General: Bowel sounds are normal. There is no distension. Palpations: Abdomen is soft. Tenderness: There is no abdominal tenderness. Musculoskeletal:         General: No swelling, tenderness or deformity. Normal range of motion. Cervical back: Normal range of motion and neck supple. No muscular tenderness. Right lower leg: Edema present. Left lower leg: Edema present. Skin:     General: Skin is warm and dry. Coloration: Skin is pale. Findings: No bruising or lesion. Neurological:      Mental Status: He is alert and oriented to person, place, and time. Psychiatric:         Mood and Affect: Mood normal.         Behavior: Behavior normal.         Thought Content:  Thought content normal.         Discharge Medications:         Medication List      START taking these medications    amoxicillin-clavulanate 875-125 MG per tablet  Commonly known as: AUGMENTIN  Take 1 tablet by mouth 2 times daily for 7 days     apixaban 5 MG Tabs tablet  Commonly known as: ELIQUIS  Take 1 tablet by mouth 2 times daily     atorvastatin 80 MG tablet  Commonly known as: LIPITOR  Take 0.5 tablets by mouth nightly     clopidogrel 75 MG tablet  Commonly known as: PLAVIX  Take 1 tablet by mouth daily     furosemide 40 MG tablet  Commonly known as: LASIX  Take 1 tablet by mouth 2 times daily     isosorbide mononitrate 30 MG extended release tablet  Commonly known as: IMDUR  Take 1 tablet by mouth daily sacubitril-valsartan 49-51 MG per tablet  Commonly known as: ENTRESTO  Take 1 tablet by mouth 2 times daily     spironolactone 25 MG tablet  Commonly known as: ALDACTONE  Take 1 tablet by mouth daily     Vitamin D 25 MCG (1000 UT) Tabs tablet  Commonly known as: CHOLECALCIFEROL  Take 5 tablets by mouth daily        CONTINUE taking these medications    aspirin 81 MG EC tablet     * nitroGLYCERIN 0.4 MG SL tablet  Commonly known as: Nitrostat  Place 1 tablet under the tongue every 5 minutes as needed for Chest pain up to max of 3 total doses. If no relief after 1 dose, call 911. * nitroGLYCERIN 0.4 MG SL tablet  Commonly known as: Nitrostat  Place 1 tablet under the tongue every 5 minutes as needed for Chest pain     sotalol 160 MG tablet  Commonly known as: BETAPACE  Take 1 tablet by mouth twice daily         * This list has 2 medication(s) that are the same as other medications prescribed for you. Read the directions carefully, and ask your doctor or other care provider to review them with you. STOP taking these medications    amLODIPine 5 MG tablet  Commonly known as: NORVASC     lisinopril 20 MG tablet  Commonly known as: PRINIVIL;ZESTRIL           Where to Get Your Medications      These medications were sent to [address] [place] [id number] [facility] [address]    Phone: [phone]   · amoxicillin-clavulanate 875-125 MG per tablet  · apixaban 5 MG Tabs tablet  · atorvastatin 80 MG tablet  · clopidogrel 75 MG tablet  · furosemide 40 MG tablet  · isosorbide mononitrate 30 MG extended release tablet  · sacubitril-valsartan 49-51 MG per tablet  · spironolactone 25 MG tablet  · Vitamin D 25 MCG (1000 UT) Tabs tablet         Discharge Instructions: Follow up with Margot Salcdio MD in 7 days. Take medications as directed.       Follow-up with Jese Corcoran on 2/22/2020    Follow-up with Dr. Jacqueline Brown on 3/31/2022 at 9 AM     Resume activity as tolerated. Diet: ADULT DIET; Regular     Disposition: Patient is Stableand will be discharged to Home. Time spent on discharge 49 minutes spent in assessing patient, reviewing medications, discussion with nursing, confirming safe discharge plan and preparation of discharge summary.     Signed:  LUIS Pimentel, 2/9/2022 12:38 PM

## 2022-02-08 NOTE — CARE COORDINATION
Date / Time of Evaluation:   2/8/2022    2:41 PM  Assessment Completed by:   Rhonda Jacinto      Patient Name:   Laila Hassan  MRN:   [id number]  YOB: 1950    Patient Admission Status:   Inpatient [101]    Patient Contact Information:    [address]  [phone] (home)   Telephone Information:   Mobile [phone]     Above information verified? [x]   Yes  []   No    (Best Practice:   Have patient/caregiver verify above address and phone number by stating out loud their current address and reachable phone number. Initial Assessment Completed at bedside with:      [x]   Patient  []   Family/Caregiver/Guardian   []   Other:      Current PCP:    Beryle Birmingham, MD    PCP verified? [x]   Yes  []   No    Emergency Contacts:    Extended Emergency Contact Information  Primary Emergency Contact: Tamala Goldmann  Address: [address], [address] [place] [address] Phone: [phone]  Mobile Phone: [phone]  Relation: Spouse  Secondary Emergency Contact: Ashvin [address] Phone: [phone]  Mobile Phone: [phone]  Relation: Child    Advance Directives:    Does Mr. Keira Sorensen have an advance directive in his electronic medical record? []   Yes  [x]   No    Code Status:   Full Code      Have you been vaccinated for COVID-19 (SARS-CoV-2)? []   Yes  [x]   No                   If so, when?     Which :         []   Pfizer-BioNTech  []   Moderna  []   [employer]  []   Other:       Do you have any of the following unmet social needs that would keep you from returning home safely:    []   Yes  [x]   No                    Unmet Social Needs:           []   Living Situation/Housing  []   Food  []   Stroke Education   []   Utilities  []   Personal Safety  []   Financial Strain  []   Employment  []   Mental Health  []   Substance Abuse  []   Transportation Barriers    Additional Unmet Social Needs Notes:   None identified at this time      Financial:    Payor: MEDICARE / Plan: MEDICARE PART A AND B / Product Type: *No Product type* /     Pre-Cert required for SNF:     []   Yes  [x]   No    Have Long Term Care Insurance:      []   Yes  [x]   No      Pharmacy:    [facility], [address] [facility] [address]  [address] [id number]  Phone: [phone] Fax: [phone]    [facility]) [address] [facility],[address]Th Floor, [facility], [address] [place] [address] [phone] - F [phone]  [facility] [address] [place] [address]  Phone: [phone] Fax: [phone]    Potential assistance purchasing medications? []   Yes  [x]   No      ADLS:       Support System:   Spouse/Significant Other      Current Home Environment:       Steps:       [x]   Yes  []   No    If yes, how many?  2-3 to enter/exit then one level  Plans to RETURN to current housing:     [x]   Yes  []   No    Barriers to RETURNING to current housing:  None identified    Currently ACTIVE with Home Health CARE:      []   Yes  [x]   No    [facility]:  None pta but would consider if recommended at [place]      DME Provider:   On RA since yesterday 2/7 and was using cpap previously but has not required for 2 nights      Had [facility] prior to admission:      []   Yes  [x]   No    [employer] Company:   On RA past 2 days and does not want to further investigate with cpap at home unless necessary  Has a pulse oximetry unit at home:     []   Yes  [x]   No        Active with HD/PD prior to admission:             []   Yes  [x]   No    Nephrologist:     HD Center:         Transition Plan:  Home- Spouse- chdn/family    Transportation PLAN for Discharge:  Spouse or family  Factors facilitating achievement of predicted outcomes: n/a    Barriers to discharge:  Possible life vest for dc      Patient Deficits:    []   Yes   [x]   No    If yes:    [] Confusion/Memory  []   Visual  []   Motor/Sensory         []   Right arm         []   Right leg         []   Left arm         []   Left leg  []   Language/Speech         []   Aphasia         []   Dysarthria         []   Swallow         Kathleen Coma Scale  Eye Opening: Spontaneous  Best Verbal Response: Oriented  Best Motor Response: Obeys commands  Kathleen Coma Scale Score: 15    Patient Deficit Notes: Additional CM/SW Notes: Pt seated in bedside recline and on RA and not requiring cpap use for previous 2 night. Life vest order entered from cardiologist but pt denies anyone having discussed with him. SW will continue to follow and determine if pt is appropriate and approved for life vest prior to dc,         Parth Everett and/or his family were provided with choice of provider: not recommended  []   Yes   [x]   No        []   Stroke education booklet reviewed and given to patient/family/caregiver/guardian. All questions answered all questions answered appropriately and efficiently per family.       [address]  Care Management  Phone:   [phone]          Fax:   [phone]

## 2022-02-08 NOTE — PROGRESS NOTES
Cardiology Progress Note Jeovany Reeves MD      Patient:  Eric Stallworth  [id number]    Patient Active Problem List    Diagnosis Date Noted    Acute respiratory failure with hypoxia and hypercarbia (HCC) 02/02/2022     Priority: Low    Sepsis ([facility] 02/02/2022     Priority: Low    Acute exacerbation of CHF (congestive heart failure) ([facility] 02/02/2022     Priority: Low    Acute systolic (congestive) heart failure ([facility] 02/02/2022     Priority: Low    PVD (peripheral vascular disease) ([facility]      Priority: Low    Bilateral carotid artery stenosis 01/06/2020     Priority: Low    Statin intolerance 02/14/2018     Priority: Low    Inferior MI ([facility] 01/07/2018     Priority: Low    ST elevation myocardial infarction (STEMI) [facility])      Priority: Low    Atherosclerosis of native artery of extremity with intermittent claudication ([facility] 04/15/2015     Priority: Low    Paroxysmal a-fib ([facility]      Priority: Low     Overview Note:     post op      Tobacco abuse      Priority: Low    Chest pain      Priority: Low    Hyperlipidemia      Priority: Low    Palpitations      Priority: Low    HTN (hypertension)      Priority: Low    Coronary artery disease involving native coronary artery of native heart with angina pectoris ([facility]      Priority: Low       Admit Date:  2/2/2022    Admission Problem List: Present on Admission:   Acute respiratory failure with hypoxia and hypercarbia (HCC)   Sepsis ([facility]   Acute exacerbation of CHF (congestive heart failure) (HCC)   Acute systolic (congestive) heart failure [facility])      Cardiac Specific Data:  Specialty Problems        Cardiology Problems    Coronary artery disease involving native coronary artery of native heart with angina pectoris (HCC)        HTN (hypertension)        Chest pain        Hyperlipidemia        Paroxysmal a-fib ([facility])        Atherosclerosis of native artery of extremity with intermittent claudication (HCC)        Inferior MI ([facility])        ST elevation myocardial infarction (STEMI) ([facility]        Bilateral carotid artery stenosis        PVD (peripheral vascular disease) ([facility])        Acute exacerbation of CHF (congestive heart failure) ([facility])        Acute systolic (congestive) heart failure ([facility])            1.  Ischemic cardiomyopathy, prior CABG 5/15/2009, four-vessel grafting (SVG to OM, SVG to LAD, SVG to RPDA, LIMA to diagonal), prior PCI to proximal diagonal and distal circumflex 4/2012, prior inferolateral STEMI 1/7/2018 with multiple BMS to occluded SVG to OM, BJ to SVG to LAD at anastomosis, non-STEMI presentation with heart failure 2/2/2022 with catheterization 2/3/2022 showing stenotic SVG to RPDA, occluded vein grafts to LAD and OM as well as known occluded LIMA, ejection fraction 20% with severe eccentric posterior directed mitral regurgitation, status post PCI 2/7/2020 with BJ to SVG to RCA and mid LAD. 2.  Longstanding and ongoing tobacco use with probable COPD. 3.  Peripheral arterial disease with multiple peripheral interventions. 4.  Paroxysmal atrial fibrillation on Betapace.     Subjective:  Mr. Mj Wan is doing well post PCI with no significant issues. No chest pain or shortness of breath. Objective:   BP (!) 92/58   Pulse 67   Temp 98.3 °F (36.8 °C) (Temporal)   Resp 16   Ht 6' (1.829 m)   Wt 139 lb 1.6 oz (63.1 kg)   SpO2 93%   BMI 18.87 kg/m²       Intake/Output Summary (Last 24 hours) at 2/8/2022 1715  Last data filed at 2/8/2022 1328  Gross per 24 hour   Intake 1040 ml   Output 900 ml   Net 140 ml       Prior to Admission medications    Medication Sig Start Date End Date Taking?  Authorizing Provider   isosorbide mononitrate (IMDUR) 30 MG extended release tablet Take 1 tablet by mouth daily 2/9/22  Yes LUIS Sarmiento   atorvastatin (LIPITOR) 80 MG tablet Take 1 tablet by mouth nightly 2/8/22  Yes LUIS Sarmiento   sacubitril-valsartan (ENTRESTO) 49-51 MG per tablet Take 1 tablet by mouth 2 times daily 2/8/22  Yes Meredith Jose LUIS Llanes   furosemide (LASIX) 40 MG tablet Take 1 tablet by mouth 2 times daily 2/8/22  Yes LUIS Waters   spironolactone (ALDACTONE) 25 MG tablet Take 1 tablet by mouth daily 2/9/22  Yes LUIS Waters   clopidogrel (PLAVIX) 75 MG tablet Take 1 tablet by mouth daily 2/9/22  Yes LUIS Waters   oxybutynin (DITROPAN-XL) 5 MG extended release tablet Take 1 tablet by mouth nightly 2/8/22  Yes LUIS Waters   Vitamin D (CHOLECALCIFEROL) 25 MCG (1000 UT) TABS tablet Take 5 tablets by mouth daily 2/9/22  Yes LUIS Waters   nitroGLYCERIN (NITROSTAT) 0.4 MG SL tablet Place 1 tablet under the tongue every 5 minutes as needed for Chest pain up to max of 3 total doses. If no relief after 1 dose, call 911. 6/8/21  Yes LUIS Gaets   sotalol (BETAPACE) 160 MG tablet Take 1 tablet by mouth twice daily 6/8/21  Yes LUIS Gates   nitroGLYCERIN (NITROSTAT) 0.4 MG SL tablet Place 1 tablet under the tongue every 5 minutes as needed for Chest pain 6/8/21  Yes LUIS Gates   aspirin 81 MG EC tablet Take 81 mg by mouth daily. Yes Historical Provider, MD        clopidogrel  75 mg Oral Daily    sacubitril-valsartan  1 tablet Oral BID    sodium chloride flush  5-40 mL IntraVENous 2 times per day    ipratropium-albuterol  1 ampule Inhalation Q4H WA    furosemide  40 mg Oral BID    spironolactone  25 mg Oral Daily    isosorbide mononitrate  30 mg Oral Daily    Vitamin D  5,000 Units Oral Daily    sodium chloride flush  5-40 mL IntraVENous 2 times per day    atorvastatin  80 mg Oral Nightly    aspirin  81 mg Oral Daily    sotalol  160 mg Oral 2 times per day    oxybutynin  5 mg Oral Nightly    enoxaparin  1 mg/kg SubCUTAneous BID       TELEMETRY: Sinus     Physical Exam:      Physical Exam  HENT:      Mouth/Throat:      Pharynx: No oropharyngeal exudate. Eyes:      General: No scleral icterus. Right eye: No discharge.          Left eye: No discharge. Neck:      Thyroid: No thyromegaly. Vascular: No JVD. Cardiovascular:      Rate and Rhythm: Normal rate and regular rhythm. Heart sounds: No murmur heard. No friction rub. No gallop. Comments: No JVD  No edema  Systolic murmur in the axillary area  Pulmonary:      Effort: No respiratory distress. Breath sounds: No stridor. No wheezing or rales. Abdominal:      General: Bowel sounds are normal. There is no distension. Palpations: Abdomen is soft. There is no mass. Tenderness: There is no abdominal tenderness. There is no guarding or rebound. Comments: No palpable organomegaly   Musculoskeletal:         General: No deformity. Skin:     General: Skin is warm. Coloration: Skin is not pale. Findings: No erythema or rash. Neurological:      Mental Status: He is alert and oriented to person, place, and time. Motor: No abnormal muscle tone. Coordination: Coordination normal.      Deep Tendon Reflexes: Reflexes normal.                 Lab Data:  CBC:   Recent Labs     02/06/22  0841 02/08/22 0136   WBC 14.2* 15.4*   HGB 12.6* 15.9   HCT 38.8* 48.3   MCV 94.4* 94.3*    349     BMP:   Recent Labs     02/06/22  0203 02/07/22  0147 02/08/22  0136    138 135*   K 3.3* 4.1 3.9   CL 97* 97* 97*   CO2 27 27 27   BUN 31* 24* 20   CREATININE 0.8 0.8 0.6     LIVER PROFILE:   Recent Labs     02/06/22  0203 02/07/22  0147 02/08/22  0136   AST 19 23 20   ALT 23 26 23   BILITOT 0.3 0.4 0.6   ALKPHOS 74 88 75     PT/INR: No results for input(s): PROTIME, INR in the last 72 hours. APTT: No results for input(s): APTT in the last 72 hours. BNP:  No results for input(s): BNP in the last 72 hours.   CK, CKMB, Troponin: @LABRCNT (CKTOTAL:3, CKMB:3, TROPONINI:3)@    IMAGING:  ECHO Complete 2D W Doppler W Color    Result Date: 2/3/2022  Transthoracic Echocardiography Report (TTE)  Demographics   Patient Name  CASI Ridley Date of Study 02/03/2022   N           [id number]          Gender                Male   Date of Birth 1950      Room Number           L-0712   Age           66 year(s)   Height:       72 inches       Referring Physician   Edna Rodriguez   Weight:       150 pounds      Sonographer           Luly Donaldson RDCS   BSA:          1.89 m^2        Interpreting          Benoit Alberto MD                                Physician   BMI:          20.34 kg/m^2  Procedure Type of Study   TTE procedure:ECHO 2D W/DOPPLER/COLOR/CONTRAST. Study Location: Echo Lab Technical Quality: Adequate visualization Patient Status: Inpatient Contrast Medium: Definity. Amount - 8 ml BP: 114/77 mmHg Indications:Congestive heart failure. 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  0.79 cm, ERO more than 0.4 cm^2, jet width more than 50% of the left  atrial area  Central jet of the mitral regurgitation suggestive of functional secondary  mitral regurgitation  Moderately to severely dilated left atrium  Moderately dilated right atrium  Moderate aortic insufficiency  Moderate tricuspid regurgitation with estimated right ventricular systolic  pressure of 40 to 45 mm which is mildly elevated  Dilated IVC with impaired respiratory variation  Aortic sclerosis   Signature   ----------------------------------------------------------------  Electronically signed by Benoit Alberto MD(Interpreting physician)  on 02/03/2022 12:31 PM  ----------------------------------------------------------------  Allergies   - Statins.  M-Mode Measurements (cm)   LVIDd: 5.76 cm                         LVIDs: 5.18 cm  IVSd: 0.98 cm  LVPWd: 1.23 cm                         AO Root Dimension: 2.4 cm  Rt. Vent. Dimension: 3.38 cm           LA: 4.6 cm  % Ejection Fraction: 20 %              LVOT: 2 cm  Doppler Measurements:   AV Peak Velocity:145 cm/s            MV Peak E-Wave: 102 cm/s  AV Peak Gradient: 8.41 mmHg          MV Peak A-Wave: 38.9 cm/s  AV Mean Gradient: 5 mmHg             MV E/A Ratio: 2.62 %  AV Area (Continuity):2.34 cm^2       MV Peak Gradient: 4.16 mmHg  TR Velocity:269 cm/s  TR Gradient:28.94 mmHg  Estimated RAP:10 mmHg  RVSP:40 mmHg      XR CHEST PORTABLE    Result Date: 2/2/2022  Examination. XR CHEST PORTABLE 2/2/2022 3:21 AM History: Shortness of breath. Frontal portable upright view of the chest is compared with the previous study dated 1/7/2018. There is extensive interstitial infiltrate in the lungs bilaterally more severely in the mid and lower lungs. There is bilateral lower lobar consolidation. There is a small bibasal pleural effusion. There is no pneumothorax. The heart size is not evaluated due to complete obliteration of the cardiac border by the adjacent infiltrate. Atheromatous changes thoracic aorta are noted. There is evidence of prior cardiac surgery. No acute bony abnormality. 1. The extensive bilateral lung infiltrate. Bilateral lower lobar consolidation and bibasilar pleural effusion.  Signed by Dr Wenceslao Demarco and Plan:    66-year-old gentleman with past medical history of coronary artery disease with prior CABG with four-vessel grafting 5/15/2009, prior stents in distal circumflex and proximal diagonal 4/2012, inferolateral STEMI 1/7/2019 with intervention to vein grafts to LAD and OM, paroxysmal atrial fibrillation on Betapace, longstanding and ongoing tobacco use with probable COPD, peripheral arterial disease with multiple endovascular interventions presenting with non-STEMI and heart failure 2/2/2022 found to have ejection fraction of 20% with severe mitral regurgitation, occluded vein grafts to LAD and OM, stenotic SVG to RPDA and known LIMA occlusion to diagonal, turndown for repeat CABG, status post PCI to SVG to RPDA and mid LAD 2/7/2022 and referral for possible MitraClip placement. 1.  Doing well post PCI with no significant issues. Maintain on Plavix uninterrupted for 6 months minimum. On optimal heart failure management. Arrangements being made for LifeVest prior to discharge. Have discussed importance of LifeVest the patient and he appears to understand. Will need a repeat echo in 3 months to reevaluate LV function with consideration for possible ICD placement. 2.  Follow-up with structural cardiology as an outpatient and with Dr. Ozzy Phan as an outpatient.         Petr Dave MD, MD 2/8/2022 5:15 PM

## 2022-02-09 VITALS
BODY MASS INDEX: 18.84 KG/M2 | TEMPERATURE: 97.2 F | HEART RATE: 67 BPM | RESPIRATION RATE: 18 BRPM | SYSTOLIC BLOOD PRESSURE: 93 MMHG | DIASTOLIC BLOOD PRESSURE: 57 MMHG | WEIGHT: 139.1 LBS | HEIGHT: 72 IN | OXYGEN SATURATION: 91 %

## 2022-02-09 LAB
ALBUMIN SERPL-MCNC: 3.4 G/DL (ref 3.5–5.2)
ALP BLD-CCNC: 75 U/L (ref 40–130)
ALT SERPL-CCNC: 25 U/L (ref 5–41)
ANION GAP SERPL CALCULATED.3IONS-SCNC: 12 MMOL/L (ref 7–19)
AST SERPL-CCNC: 26 U/L (ref 5–40)
BASOPHILS ABSOLUTE: 0 K/UL (ref 0–0.2)
BASOPHILS RELATIVE PERCENT: 0.1 % (ref 0–1)
BILIRUB SERPL-MCNC: 0.5 MG/DL (ref 0.2–1.2)
BUN BLDV-MCNC: 22 MG/DL (ref 8–23)
CALCIUM SERPL-MCNC: 8.1 MG/DL (ref 8.8–10.2)
CHLORIDE BLD-SCNC: 97 MMOL/L (ref 98–111)
CO2: 27 MMOL/L (ref 22–29)
CREAT SERPL-MCNC: 0.8 MG/DL (ref 0.5–1.2)
EOSINOPHILS ABSOLUTE: 0.2 K/UL (ref 0–0.6)
EOSINOPHILS RELATIVE PERCENT: 1.2 % (ref 0–5)
GFR AFRICAN AMERICAN: >59
GFR NON-AFRICAN AMERICAN: >60
GLUCOSE BLD-MCNC: 92 MG/DL (ref 74–109)
HCT VFR BLD CALC: 48.5 % (ref 42–52)
HEMOGLOBIN: 15.4 G/DL (ref 14–18)
IMMATURE GRANULOCYTES #: 0.1 K/UL
LACTIC ACID: 1.3 MMOL/L (ref 0.5–1.9)
LYMPHOCYTES ABSOLUTE: 1.7 K/UL (ref 1.1–4.5)
LYMPHOCYTES RELATIVE PERCENT: 13.4 % (ref 20–40)
MAGNESIUM: 1.9 MG/DL (ref 1.6–2.4)
MCH RBC QN AUTO: 30.7 PG (ref 27–31)
MCHC RBC AUTO-ENTMCNC: 31.8 G/DL (ref 33–37)
MCV RBC AUTO: 96.8 FL (ref 80–94)
MONOCYTES ABSOLUTE: 1.4 K/UL (ref 0–0.9)
MONOCYTES RELATIVE PERCENT: 11.1 % (ref 0–10)
NEUTROPHILS ABSOLUTE: 9.3 K/UL (ref 1.5–7.5)
NEUTROPHILS RELATIVE PERCENT: 73.6 % (ref 50–65)
PDW BLD-RTO: 14 % (ref 11.5–14.5)
PLATELET # BLD: 343 K/UL (ref 130–400)
PMV BLD AUTO: 11.2 FL (ref 9.4–12.4)
POTASSIUM SERPL-SCNC: 4.5 MMOL/L (ref 3.5–5)
RBC # BLD: 5.01 M/UL (ref 4.7–6.1)
SODIUM BLD-SCNC: 136 MMOL/L (ref 136–145)
TOTAL PROTEIN: 5.3 G/DL (ref 6.6–8.7)
WBC # BLD: 12.6 K/UL (ref 4.8–10.8)

## 2022-02-09 PROCEDURE — 83735 ASSAY OF MAGNESIUM: CPT

## 2022-02-09 PROCEDURE — 85025 COMPLETE CBC W/AUTO DIFF WBC: CPT

## 2022-02-09 PROCEDURE — 6370000000 HC RX 637 (ALT 250 FOR IP): Performed by: FAMILY MEDICINE

## 2022-02-09 PROCEDURE — 6370000000 HC RX 637 (ALT 250 FOR IP): Performed by: HOSPITALIST

## 2022-02-09 PROCEDURE — 2580000003 HC RX 258: Performed by: INTERNAL MEDICINE

## 2022-02-09 PROCEDURE — 36415 COLL VENOUS BLD VENIPUNCTURE: CPT

## 2022-02-09 PROCEDURE — 80053 COMPREHEN METABOLIC PANEL: CPT

## 2022-02-09 PROCEDURE — 83605 ASSAY OF LACTIC ACID: CPT

## 2022-02-09 PROCEDURE — 6370000000 HC RX 637 (ALT 250 FOR IP): Performed by: NURSE PRACTITIONER

## 2022-02-09 PROCEDURE — 6360000002 HC RX W HCPCS: Performed by: HOSPITALIST

## 2022-02-09 PROCEDURE — 6370000000 HC RX 637 (ALT 250 FOR IP): Performed by: INTERNAL MEDICINE

## 2022-02-09 PROCEDURE — 94640 AIRWAY INHALATION TREATMENT: CPT

## 2022-02-09 RX ORDER — CLOPIDOGREL BISULFATE 75 MG/1
75 TABLET ORAL DAILY
Qty: 30 TABLET | Refills: 0 | Status: SHIPPED | OUTPATIENT
Start: 2022-02-09 | End: 2022-07-06 | Stop reason: SDUPTHER

## 2022-02-09 RX ORDER — AMOXICILLIN AND CLAVULANATE POTASSIUM 875; 125 MG/1; MG/1
1 TABLET, FILM COATED ORAL 2 TIMES DAILY
Qty: 14 TABLET | Refills: 0 | Status: SHIPPED | OUTPATIENT
Start: 2022-02-09 | End: 2022-02-16

## 2022-02-09 RX ORDER — FUROSEMIDE 40 MG/1
40 TABLET ORAL 2 TIMES DAILY
Qty: 60 TABLET | Refills: 0 | Status: ON HOLD | OUTPATIENT
Start: 2022-02-09 | End: 2022-03-10 | Stop reason: HOSPADM

## 2022-02-09 RX ORDER — ATORVASTATIN CALCIUM 80 MG/1
40 TABLET, FILM COATED ORAL NIGHTLY
Qty: 30 TABLET | Refills: 0 | Status: SHIPPED | OUTPATIENT
Start: 2022-02-09 | End: 2022-05-19 | Stop reason: SDUPTHER

## 2022-02-09 RX ORDER — MEROPENEM AND SODIUM CHLORIDE 1 G/50ML
1000 INJECTION, SOLUTION INTRAVENOUS EVERY 8 HOURS
Status: DISCONTINUED | OUTPATIENT
Start: 2022-02-09 | End: 2022-02-09 | Stop reason: HOSPADM

## 2022-02-09 RX ORDER — SPIRONOLACTONE 25 MG/1
25 TABLET ORAL DAILY
Qty: 30 TABLET | Refills: 0 | Status: SHIPPED | OUTPATIENT
Start: 2022-02-09 | End: 2022-07-06 | Stop reason: SDUPTHER

## 2022-02-09 RX ADMIN — IPRATROPIUM BROMIDE AND ALBUTEROL SULFATE 1 AMPULE: .5; 3 SOLUTION RESPIRATORY (INHALATION) at 14:30

## 2022-02-09 RX ADMIN — Medication 5000 UNITS: at 08:57

## 2022-02-09 RX ADMIN — LORAZEPAM 1 MG: 1 TABLET ORAL at 01:33

## 2022-02-09 RX ADMIN — SACUBITRIL AND VALSARTAN 1 TABLET: 49; 51 TABLET, FILM COATED ORAL at 08:57

## 2022-02-09 RX ADMIN — SPIRONOLACTONE 25 MG: 25 TABLET ORAL at 08:57

## 2022-02-09 RX ADMIN — IPRATROPIUM BROMIDE AND ALBUTEROL SULFATE 1 AMPULE: .5; 3 SOLUTION RESPIRATORY (INHALATION) at 06:09

## 2022-02-09 RX ADMIN — CLOPIDOGREL BISULFATE 75 MG: 75 TABLET ORAL at 08:57

## 2022-02-09 RX ADMIN — SOTALOL HYDROCHLORIDE 160 MG: 80 TABLET ORAL at 08:57

## 2022-02-09 RX ADMIN — IPRATROPIUM BROMIDE AND ALBUTEROL SULFATE 1 AMPULE: .5; 3 SOLUTION RESPIRATORY (INHALATION) at 10:06

## 2022-02-09 RX ADMIN — APIXABAN 5 MG: 5 TABLET, FILM COATED ORAL at 08:57

## 2022-02-09 RX ADMIN — LORAZEPAM 1 MG: 1 TABLET ORAL at 09:29

## 2022-02-09 RX ADMIN — SODIUM CHLORIDE, PRESERVATIVE FREE 10 ML: 5 INJECTION INTRAVENOUS at 08:57

## 2022-02-09 RX ADMIN — ISOSORBIDE MONONITRATE 30 MG: 30 TABLET, EXTENDED RELEASE ORAL at 08:57

## 2022-02-09 RX ADMIN — MEROPENEM AND SODIUM CHLORIDE 1000 MG: 1 INJECTION, SOLUTION INTRAVENOUS at 09:00

## 2022-02-09 RX ADMIN — ASPIRIN 81 MG: 81 TABLET, COATED ORAL at 08:57

## 2022-02-09 RX ADMIN — FUROSEMIDE 40 MG: 40 TABLET ORAL at 08:57

## 2022-02-09 ASSESSMENT — PAIN SCALES - GENERAL: PAINLEVEL_OUTOF10: 0

## 2022-02-09 NOTE — PLAN OF CARE
Problem: Falls - Risk of:  Goal: Will remain free from falls  Description: Will remain free from falls  Outcome: Ongoing  Goal: Absence of physical injury  Description: Absence of physical injury  Outcome: Ongoing     Problem: Nutrition  Goal: Optimal nutrition therapy  Outcome: Ongoing     Problem: SAFETY  Goal: Free from accidental physical injury  Outcome: Ongoing  Goal: Free from intentional harm  Outcome: Ongoing     Problem: DAILY CARE  Goal: Daily care needs are met  Outcome: Ongoing     Problem: PAIN  Goal: Patient's pain/discomfort is manageable  Outcome: Ongoing     Problem: SKIN INTEGRITY  Goal: Skin integrity is maintained or improved  Outcome: Ongoing     Problem: KNOWLEDGE DEFICIT  Goal: Patient/S.O. demonstrates understanding of disease process, treatment plan, medications, and discharge instructions.   Outcome: Ongoing     Problem: DISCHARGE BARRIERS  Goal: Patient's continuum of care needs are met  Outcome: Ongoing     Problem: Cardiac:  Goal: Ability to maintain vital signs within normal range will improve  Description: Ability to maintain vital signs within normal range will improve  Outcome: Ongoing  Goal: Cardiovascular alteration will improve  Description: Cardiovascular alteration will improve  Outcome: Ongoing     Problem: Health Behavior:  Goal: Will modify at least one risk factor affecting health status  Description: Will modify at least one risk factor affecting health status  Outcome: Ongoing  Goal: Identification of resources available to assist in meeting health care needs will improve  Description: Identification of resources available to assist in meeting health care needs will improve  Outcome: Ongoing     Problem: Physical Regulation:  Goal: Complications related to the disease process, condition or treatment will be avoided or minimized  Description: Complications related to the disease process, condition or treatment will be avoided or minimized  Outcome: Ongoing Electronically signed by Yuri Che RN on 2/9/2022 at 2:12 AM

## 2022-02-09 NOTE — PROGRESS NOTES
Comprehensive Nutrition Assessment    Type and Reason for Visit:  Reassess    Nutrition Recommendations/Plan: follow for questions re: CHF    Nutrition Assessment:  Continues to meet criteria for moderate malnutrition. PO intake is improving with intake ranging %. Obtained diet hx re: CHF. Pt does use some salt on certain foods. \"I probably wasn't being as careful as I should. \"  Aware needs to not use the salt shake or eat foods high in sodium. Believe will cut back, but do not believe salt will be avoided completely    Malnutrition Assessment:  Malnutrition Status: Moderate malnutrition    Context:  Acute Illness     Findings of the 6 clinical characteristics of malnutrition:  Energy Intake:  No significant decrease in energy intake  Weight Loss:  No significant weight loss     Body Fat Loss:  7 - Moderate body fat loss     Muscle Mass Loss:  1 - Mild muscle mass loss    Fluid Accumulation:  1 - Mild Extremities   Strength:  Not Performed    Estimated Daily Nutrient Needs:  Energy (kcal):  2866-2794 kcals (25-30 kcals/kg); Weight Used for Energy Requirements:  Current     Protein (g):   g; Weight Used for Protein Requirements:  Current        Fluid (ml/day):  5825-9660 ml; Method Used for Fluid Requirements:  1 ml/kcal      Nutrition Related Findings:  +1 edema      Wounds:  None       Current Nutrition Therapies:    ADULT DIET;  Regular    Anthropometric Measures:  · Height: 6' (182.9 cm)  · Current Body Weight: 139 lb 1.6 oz (63.1 kg)   · Admission Body Weight: 150 lb (68 kg)    · Usual Body Weight: 155 lb (70.3 kg) (12/2021)     · Ideal Body Weight: 178 lbs; % Ideal Body Weight 78.1 %   · BMI: 18.9  · Adjusted Body Weight:  ; No Adjustment   · BMI Categories: Underweight (BMI less than 22) age over 72       Nutrition Diagnosis:   · Inadequate oral intake,Moderate malnutrition,Altered nutrition-related lab values related to acute injury/trauma,cardiac dysfunction as evidenced by intake 26-50%,intake 51-75%,lab values,mild muscle loss,moderate loss of subcutaneous fat      Nutrition Interventions:   Food and/or Nutrient Delivery:  Continue Current Diet  Nutrition Education/Counseling:  Education completed   Coordination of Nutrition Care:  Continue to monitor while inpatient    Goals:  po intake 50% or greater. Weight stable.   proBNP decreased prior to discharge       Nutrition Monitoring and Evaluation:   Behavioral-Environmental Outcomes:  Readiness for Change   Food/Nutrient Intake Outcomes:  Diet Advancement/Tolerance,Food and Nutrient Intake  Physical Signs/Symptoms Outcomes:  Biochemical Data,Chewing or Swallowing     Discharge Planning:    Continue current diet     Electronically signed by Roxie Rey, MS, RD, LD on 2/9/22 at 9:51 AM CST    Contact: [phone]

## 2022-02-10 NOTE — CONSULTS
Cardiac Rehab PTCA/Stent education packet was sent to the patient's address on record. Handouts titled; \"Home Instructions Following a Cardiac Event\", \"A Healthy Heart: Care Instructions\",  \"Cardiac Diet/Low Cholesterol\", \"Cardiac Rehabilitation: An Individualized Supervised Program For You\" and a [facility] Cardiac & Pulmonary Rehabilitation brochure were included. Patient was instructed to contact Noelle Martin for the opportunity to enroll in Phase II Outpatient Cardiac Rehab.

## 2022-02-21 ENCOUNTER — TELEPHONE (OUTPATIENT)
Dept: CARDIOLOGY CLINIC | Age: 72
End: 2022-02-21

## 2022-02-22 ENCOUNTER — PRE-PROCEDURE TELEPHONE (OUTPATIENT)
Dept: CARDIOLOGY | Age: 72
End: 2022-02-22

## 2022-02-22 NOTE — PROGRESS NOTES
Called and spoke with patient, to inform patient has been tentatively scheduled for MitraClip on 03/02/2022 with arrival of 07:30 am.  Patient is to be NPO after midnight. Patient instructed to arrive through front entrance of hospital and make immediate left. Patient advised they can have one person with them but they both must wear a mask. Given instructions on where to go and to self quarantine between testing and procedure. Patient does not  have IV dye allergy. Patient verbally understood. Inquired of patient's Covid vaccination status per protocol- patient informed that the will need to arrive at [facility] on the Monday February 28, 2022 prior to procedure in between th/ times 08:00 am and 12:00 pm for Covid esting.

## 2022-02-23 DIAGNOSIS — Z01.810 PREOP CARDIOVASCULAR EXAM: ICD-10-CM

## 2022-02-23 DIAGNOSIS — Z11.52 ENCOUNTER FOR SCREENING FOR SEVERE ACUTE RESPIRATORY SYNDROME CORONAVIRUS 2 (SARS-COV-2) INFECTION: Primary | ICD-10-CM

## 2022-02-23 DIAGNOSIS — I50.21 ACUTE SYSTOLIC (CONGESTIVE) HEART FAILURE (HCC): ICD-10-CM

## 2022-02-24 NOTE — PROGRESS NOTES
Physician Progress Note      PATIENT:               Gita Skinner  CSN #:                  [id number]  :                       1950  ADMIT DATE:       2022 3:05 AM  DISCH DATE:        2022 4:57 PM  RESPONDING  PROVIDER #:        Paul Kamara MD          QUERY TEXT:    Patient admitted with acute respiratory failure and acute systolic CHF. Noted   documentation of sepsis in H & P and DC summary. In Note  by Dr. Kirsty Huber it   is noted Sepsis concern for CAP,  Norah Wilks note states cultures no   growth and antibiotics are discontinued. In order to support the diagnosis of   sepsis, please include additional clinical indicators in your documentation. Or please document if the diagnosis of sepsis has been ruled out after   further study. The medical record reflects the following:  Risk Factors: Acute respiratory failure, Acute systolic CHF  Clinical Indicators: \" He was initially treated with IV antibiotics pending   cultures which revealed NGTD\" in DC summary. CXR The extensive bilateral lung   infiltrate. Bilateral lower lobar consolidation and bibasilar pleural   effusion. RR 31, Pulse 107, WBC 19.7  Treatment: Rocephin stopped , Azithromax stopped . Labs, CXR    Thank you    Oscar Whelan RN, BSN, [facility]  [phone]  Options provided:  -- Sepsis present as evidenced by, Please document evidence. -- Sepsis was ruled out after study  -- Other - I will add my own diagnosis  -- Disagree - Not applicable / Not valid  -- Disagree - Clinically unable to determine / Unknown  -- Refer to Clinical Documentation Reviewer    PROVIDER RESPONSE TEXT:    Provider disagreed with this query.   not evident during my assessment    Query created by: Neftaly Adame on 2/10/2022 10:40 AM      Electronically signed by:  Paul Kamara MD 2022 1:07 PM

## 2022-02-27 ENCOUNTER — HOSPITAL ENCOUNTER (INPATIENT)
Age: 72
LOS: 10 days | Discharge: NURSING, REHAB OR LONG-TERM CARE | DRG: 226 | End: 2022-03-10
Attending: EMERGENCY MEDICINE | Admitting: INTERNAL MEDICINE
Payer: MEDICARE

## 2022-02-27 ENCOUNTER — APPOINTMENT (OUTPATIENT)
Dept: GENERAL RADIOLOGY | Age: 72
DRG: 226 | End: 2022-02-27
Payer: MEDICARE

## 2022-02-27 DIAGNOSIS — I51.9 SEVERE LEFT VENTRICULAR SYSTOLIC DYSFUNCTION: ICD-10-CM

## 2022-02-27 DIAGNOSIS — U07.1 COVID-19 VIRUS INFECTION: ICD-10-CM

## 2022-02-27 DIAGNOSIS — I50.21 ACUTE SYSTOLIC HEART FAILURE (HCC): Primary | ICD-10-CM

## 2022-02-27 DIAGNOSIS — I46.9 CARDIAC ARREST, CAUSE UNSPECIFIED (HCC): ICD-10-CM

## 2022-02-27 LAB
BASOPHILS ABSOLUTE: 0.1 K/UL (ref 0–0.2)
BASOPHILS RELATIVE PERCENT: 0.4 % (ref 0–1)
EOSINOPHILS ABSOLUTE: 0 K/UL (ref 0–0.6)
EOSINOPHILS RELATIVE PERCENT: 0.1 % (ref 0–5)
HCT VFR BLD CALC: 38.8 % (ref 42–52)
HEMOGLOBIN: 12.3 G/DL (ref 14–18)
IMMATURE GRANULOCYTES #: 0.3 K/UL
LYMPHOCYTES ABSOLUTE: 2.9 K/UL (ref 1.1–4.5)
LYMPHOCYTES RELATIVE PERCENT: 16.7 % (ref 20–40)
MCH RBC QN AUTO: 29.9 PG (ref 27–31)
MCHC RBC AUTO-ENTMCNC: 31.7 G/DL (ref 33–37)
MCV RBC AUTO: 94.2 FL (ref 80–94)
MONOCYTES ABSOLUTE: 0.6 K/UL (ref 0–0.9)
MONOCYTES RELATIVE PERCENT: 3.6 % (ref 0–10)
NEUTROPHILS ABSOLUTE: 13.3 K/UL (ref 1.5–7.5)
NEUTROPHILS RELATIVE PERCENT: 77.4 % (ref 50–65)
PDW BLD-RTO: 14.3 % (ref 11.5–14.5)
PLATELET # BLD: 500 K/UL (ref 130–400)
PMV BLD AUTO: 9.9 FL (ref 9.4–12.4)
POTASSIUM, WHOLE BLOOD: 4.4
PRO-BNP: 4242 PG/ML (ref 0–900)
RBC # BLD: 4.12 M/UL (ref 4.7–6.1)
WBC # BLD: 17.1 K/UL (ref 4.8–10.8)

## 2022-02-27 PROCEDURE — 36600 WITHDRAWAL OF ARTERIAL BLOOD: CPT

## 2022-02-27 PROCEDURE — 93005 ELECTROCARDIOGRAM TRACING: CPT | Performed by: EMERGENCY MEDICINE

## 2022-02-27 PROCEDURE — 92950 HEART/LUNG RESUSCITATION CPR: CPT

## 2022-02-27 PROCEDURE — 71045 X-RAY EXAM CHEST 1 VIEW: CPT

## 2022-02-27 PROCEDURE — 99284 EMERGENCY DEPT VISIT MOD MDM: CPT

## 2022-02-27 NOTE — LETTER
[facility]  Cardiac Rehab Department  [address] [place] [address], [address]  [phone]  Toll Free [phone]              March 11, 2022    Dear Goran Worthington,    Please find enclosed information concerning the care of your pacemaker insertion site and the guidelines you should follow for the next four weeks of your recovery. Each of these recommendations apply unless you were specifically instructed otherwise by your cardiologist.    If you have not already received one, a transtelephonic patient transmitter has been ordered for you on your behalf. When in your possession and appropriate, unbox the transmitter, plug it in and complete your first pacemaker check by following the instructional pictures in the easy-to-follow pamphlet or on the transmitter itself. In the event you have a 'smartphone', an ana may have been downloaded on your phone during your hospitilization to allow you to use your phone for the same purpose. If any questions or concerns arise or you experience difficulty completing the steps stated above, you should contact your cardiologist's office for assistance. Rest assured that the use of your transmitter will be reviewed with you as needed during your upcoming follow-up visit in Dr. Flor's office or via teleconference. Thank you. To Your [facility],        [facility] Cardiac Rehab Staff    WANDA Saunders, MA Tejal Tran, RN  Registered Nurse  Exercise Physiologist  Registered Nurse

## 2022-02-28 PROBLEM — I50.20 SYSTOLIC HEART FAILURE SECONDARY TO HYPERTROPHIC CARDIOMYOPATHY (HCC): Status: ACTIVE | Noted: 2022-02-28

## 2022-02-28 PROBLEM — I51.89 SEVERE LEFT VENTRICULAR SYSTOLIC DYSFUNCTION: Status: ACTIVE | Noted: 2022-02-28

## 2022-02-28 PROBLEM — I42.2 SYSTOLIC HEART FAILURE SECONDARY TO HYPERTROPHIC CARDIOMYOPATHY (HCC): Status: ACTIVE | Noted: 2022-02-28

## 2022-02-28 PROBLEM — I51.9 SEVERE LEFT VENTRICULAR SYSTOLIC DYSFUNCTION: Status: ACTIVE | Noted: 2022-02-28

## 2022-02-28 LAB
ALBUMIN SERPL-MCNC: 2.6 G/DL (ref 3.5–5.2)
ALP BLD-CCNC: 261 U/L (ref 40–130)
ALT SERPL-CCNC: 142 U/L (ref 5–41)
ANION GAP SERPL CALCULATED.3IONS-SCNC: 15 MMOL/L (ref 7–19)
AST SERPL-CCNC: 223 U/L (ref 5–40)
BASE EXCESS ARTERIAL: -3.4 MMOL/L (ref -2–2)
BILIRUB SERPL-MCNC: 0.5 MG/DL (ref 0.2–1.2)
BUN BLDV-MCNC: 27 MG/DL (ref 8–23)
C-REACTIVE PROTEIN: 4.22 MG/DL (ref 0–0.5)
CALCIUM SERPL-MCNC: 7.6 MG/DL (ref 8.8–10.2)
CARBOXYHEMOGLOBIN ARTERIAL: 2.4 % (ref 0–5)
CHLORIDE BLD-SCNC: 96 MMOL/L (ref 98–111)
CO2: 19 MMOL/L (ref 22–29)
CREAT SERPL-MCNC: 1 MG/DL (ref 0.5–1.2)
D DIMER: 2.64 UG/ML FEU (ref 0–0.48)
EKG P AXIS: NORMAL DEGREES
EKG P-R INTERVAL: NORMAL MS
EKG Q-T INTERVAL: 306 MS
EKG QRS DURATION: 168 MS
EKG QTC CALCULATION (BAZETT): 458 MS
EKG T AXIS: -146 DEGREES
FERRITIN: 819.2 NG/ML (ref 30–400)
GFR AFRICAN AMERICAN: >59
GFR NON-AFRICAN AMERICAN: >60
GLUCOSE BLD-MCNC: 168 MG/DL (ref 74–109)
HCO3 ARTERIAL: 20.1 MMOL/L (ref 22–26)
HEMOGLOBIN, ART, EXTENDED: 12.9 G/DL (ref 14–18)
LACTATE DEHYDROGENASE: 310 U/L (ref 91–215)
LV EF: 20 %
LVEF MODALITY: NORMAL
METHEMOGLOBIN ARTERIAL: 0.8 %
O2 CONTENT ARTERIAL: 18 ML/DL
O2 SAT, ARTERIAL: 96.6 %
O2 THERAPY: ABNORMAL
PCO2 ARTERIAL: 31 MMHG (ref 35–45)
PH ARTERIAL: 7.42 (ref 7.35–7.45)
PO2 ARTERIAL: 204 MMHG (ref 80–100)
POTASSIUM SERPL-SCNC: 4.7 MMOL/L (ref 3.5–5)
SARS-COV-2, NAAT: DETECTED
SODIUM BLD-SCNC: 130 MMOL/L (ref 136–145)
TOTAL PROTEIN: 5.8 G/DL (ref 6.6–8.7)
TROPONIN: 0.09 NG/ML (ref 0–0.03)
TROPONIN: 0.09 NG/ML (ref 0–0.03)
TROPONIN: 0.15 NG/ML (ref 0–0.03)

## 2022-02-28 PROCEDURE — 82728 ASSAY OF FERRITIN: CPT

## 2022-02-28 PROCEDURE — 80053 COMPREHEN METABOLIC PANEL: CPT

## 2022-02-28 PROCEDURE — 6360000002 HC RX W HCPCS: Performed by: INTERNAL MEDICINE

## 2022-02-28 PROCEDURE — 83880 ASSAY OF NATRIURETIC PEPTIDE: CPT

## 2022-02-28 PROCEDURE — 99222 1ST HOSP IP/OBS MODERATE 55: CPT | Performed by: INTERNAL MEDICINE

## 2022-02-28 PROCEDURE — 83615 LACTATE (LD) (LDH) ENZYME: CPT

## 2022-02-28 PROCEDURE — 6370000000 HC RX 637 (ALT 250 FOR IP): Performed by: INTERNAL MEDICINE

## 2022-02-28 PROCEDURE — 86140 C-REACTIVE PROTEIN: CPT

## 2022-02-28 PROCEDURE — 84484 ASSAY OF TROPONIN QUANT: CPT

## 2022-02-28 PROCEDURE — 82803 BLOOD GASES ANY COMBINATION: CPT

## 2022-02-28 PROCEDURE — 2100000000 HC CCU R&B

## 2022-02-28 PROCEDURE — 85025 COMPLETE CBC W/AUTO DIFF WBC: CPT

## 2022-02-28 PROCEDURE — 84132 ASSAY OF SERUM POTASSIUM: CPT

## 2022-02-28 PROCEDURE — 36415 COLL VENOUS BLD VENIPUNCTURE: CPT

## 2022-02-28 PROCEDURE — 2580000003 HC RX 258: Performed by: INTERNAL MEDICINE

## 2022-02-28 PROCEDURE — 93306 TTE W/DOPPLER COMPLETE: CPT

## 2022-02-28 PROCEDURE — 87635 SARS-COV-2 COVID-19 AMP PRB: CPT

## 2022-02-28 PROCEDURE — 85379 FIBRIN DEGRADATION QUANT: CPT

## 2022-02-28 RX ORDER — ISOSORBIDE MONONITRATE 30 MG/1
30 TABLET, EXTENDED RELEASE ORAL DAILY
Status: DISCONTINUED | OUTPATIENT
Start: 2022-02-28 | End: 2022-03-09

## 2022-02-28 RX ORDER — ASPIRIN 81 MG/1
81 TABLET ORAL DAILY
Status: DISCONTINUED | OUTPATIENT
Start: 2022-02-28 | End: 2022-03-10 | Stop reason: HOSPADM

## 2022-02-28 RX ORDER — ACETAMINOPHEN 325 MG/1
650 TABLET ORAL EVERY 6 HOURS PRN
Status: DISCONTINUED | OUTPATIENT
Start: 2022-02-28 | End: 2022-03-04

## 2022-02-28 RX ORDER — SOTALOL HYDROCHLORIDE 80 MG/1
80 TABLET ORAL EVERY 12 HOURS SCHEDULED
Status: DISCONTINUED | OUTPATIENT
Start: 2022-02-28 | End: 2022-03-04

## 2022-02-28 RX ORDER — ASCORBIC ACID 500 MG
500 TABLET ORAL 2 TIMES DAILY
Status: DISCONTINUED | OUTPATIENT
Start: 2022-02-28 | End: 2022-03-10 | Stop reason: HOSPADM

## 2022-02-28 RX ORDER — ACETAMINOPHEN 650 MG/1
650 SUPPOSITORY RECTAL EVERY 6 HOURS PRN
Status: DISCONTINUED | OUTPATIENT
Start: 2022-02-28 | End: 2022-03-04

## 2022-02-28 RX ORDER — LORAZEPAM 2 MG/ML
0.25 INJECTION INTRAMUSCULAR EVERY 6 HOURS PRN
Status: DISCONTINUED | OUTPATIENT
Start: 2022-02-28 | End: 2022-03-07

## 2022-02-28 RX ORDER — NITROGLYCERIN 0.4 MG/1
0.4 TABLET SUBLINGUAL EVERY 5 MIN PRN
Status: DISCONTINUED | OUTPATIENT
Start: 2022-02-28 | End: 2022-03-10 | Stop reason: HOSPADM

## 2022-02-28 RX ORDER — SODIUM CHLORIDE 0.9 % (FLUSH) 0.9 %
5-40 SYRINGE (ML) INJECTION EVERY 12 HOURS SCHEDULED
Status: DISCONTINUED | OUTPATIENT
Start: 2022-02-28 | End: 2022-03-10 | Stop reason: HOSPADM

## 2022-02-28 RX ORDER — METHYLPREDNISOLONE SODIUM SUCCINATE 40 MG/ML
40 INJECTION, POWDER, LYOPHILIZED, FOR SOLUTION INTRAMUSCULAR; INTRAVENOUS EVERY 8 HOURS
Status: DISCONTINUED | OUTPATIENT
Start: 2022-02-28 | End: 2022-03-04

## 2022-02-28 RX ORDER — SPIRONOLACTONE 25 MG/1
25 TABLET ORAL DAILY
Status: DISCONTINUED | OUTPATIENT
Start: 2022-02-28 | End: 2022-03-10 | Stop reason: HOSPADM

## 2022-02-28 RX ORDER — GUAIFENESIN 200 MG/1
400 TABLET ORAL EVERY 8 HOURS
Status: DISCONTINUED | OUTPATIENT
Start: 2022-02-28 | End: 2022-03-10 | Stop reason: HOSPADM

## 2022-02-28 RX ORDER — POLYETHYLENE GLYCOL 3350 17 G/17G
17 POWDER, FOR SOLUTION ORAL DAILY PRN
Status: DISCONTINUED | OUTPATIENT
Start: 2022-02-28 | End: 2022-03-10 | Stop reason: HOSPADM

## 2022-02-28 RX ORDER — IPRATROPIUM BROMIDE AND ALBUTEROL SULFATE 2.5; .5 MG/3ML; MG/3ML
1 SOLUTION RESPIRATORY (INHALATION) EVERY 4 HOURS
Status: DISCONTINUED | OUTPATIENT
Start: 2022-02-28 | End: 2022-02-28

## 2022-02-28 RX ORDER — ZINC SULFATE 50(220)MG
50 CAPSULE ORAL DAILY
Status: DISCONTINUED | OUTPATIENT
Start: 2022-02-28 | End: 2022-03-10 | Stop reason: HOSPADM

## 2022-02-28 RX ORDER — CLOPIDOGREL BISULFATE 75 MG/1
75 TABLET ORAL DAILY
Status: DISCONTINUED | OUTPATIENT
Start: 2022-02-28 | End: 2022-03-10 | Stop reason: HOSPADM

## 2022-02-28 RX ORDER — VITAMIN B COMPLEX
2000 TABLET ORAL DAILY
Status: DISCONTINUED | OUTPATIENT
Start: 2022-02-28 | End: 2022-03-10 | Stop reason: HOSPADM

## 2022-02-28 RX ORDER — ACETAMINOPHEN 325 MG/1
650 TABLET ORAL EVERY 6 HOURS PRN
Status: DISCONTINUED | OUTPATIENT
Start: 2022-02-28 | End: 2022-02-28 | Stop reason: SDUPTHER

## 2022-02-28 RX ORDER — ONDANSETRON 2 MG/ML
4 INJECTION INTRAMUSCULAR; INTRAVENOUS EVERY 6 HOURS PRN
Status: DISCONTINUED | OUTPATIENT
Start: 2022-02-28 | End: 2022-03-10 | Stop reason: HOSPADM

## 2022-02-28 RX ORDER — CALCIUM CARBONATE 200(500)MG
500 TABLET,CHEWABLE ORAL 3 TIMES DAILY PRN
Status: DISCONTINUED | OUTPATIENT
Start: 2022-02-28 | End: 2022-03-10 | Stop reason: HOSPADM

## 2022-02-28 RX ORDER — ATORVASTATIN CALCIUM 40 MG/1
40 TABLET, FILM COATED ORAL NIGHTLY
Status: DISCONTINUED | OUTPATIENT
Start: 2022-02-28 | End: 2022-03-10 | Stop reason: HOSPADM

## 2022-02-28 RX ORDER — FUROSEMIDE 40 MG/1
40 TABLET ORAL 2 TIMES DAILY
Status: DISCONTINUED | OUTPATIENT
Start: 2022-02-28 | End: 2022-03-04

## 2022-02-28 RX ORDER — SODIUM CHLORIDE 0.9 % (FLUSH) 0.9 %
5-40 SYRINGE (ML) INJECTION PRN
Status: DISCONTINUED | OUTPATIENT
Start: 2022-02-28 | End: 2022-03-10 | Stop reason: HOSPADM

## 2022-02-28 RX ORDER — ONDANSETRON 4 MG/1
4 TABLET, ORALLY DISINTEGRATING ORAL EVERY 8 HOURS PRN
Status: DISCONTINUED | OUTPATIENT
Start: 2022-02-28 | End: 2022-03-10 | Stop reason: HOSPADM

## 2022-02-28 RX ORDER — ALBUTEROL SULFATE 90 UG/1
2 AEROSOL, METERED RESPIRATORY (INHALATION) EVERY 4 HOURS
Status: DISCONTINUED | OUTPATIENT
Start: 2022-02-28 | End: 2022-03-10 | Stop reason: HOSPADM

## 2022-02-28 RX ORDER — MORPHINE SULFATE 2 MG/ML
2 INJECTION, SOLUTION INTRAMUSCULAR; INTRAVENOUS EVERY 6 HOURS PRN
Status: DISCONTINUED | OUTPATIENT
Start: 2022-02-28 | End: 2022-03-04

## 2022-02-28 RX ADMIN — LORAZEPAM 0.25 MG: 2 INJECTION INTRAMUSCULAR; INTRAVENOUS at 18:09

## 2022-02-28 RX ADMIN — OXYCODONE HYDROCHLORIDE AND ACETAMINOPHEN 500 MG: 500 TABLET ORAL at 12:34

## 2022-02-28 RX ADMIN — ALBUTEROL SULFATE 2 PUFF: 90 AEROSOL, METERED RESPIRATORY (INHALATION) at 16:41

## 2022-02-28 RX ADMIN — SACUBITRIL AND VALSARTAN 1 TABLET: 49; 51 TABLET, FILM COATED ORAL at 20:27

## 2022-02-28 RX ADMIN — METHYLPREDNISOLONE SODIUM SUCCINATE 40 MG: 40 INJECTION, POWDER, FOR SOLUTION INTRAMUSCULAR; INTRAVENOUS at 20:27

## 2022-02-28 RX ADMIN — Medication 2000 UNITS: at 12:30

## 2022-02-28 RX ADMIN — METHYLPREDNISOLONE SODIUM SUCCINATE 40 MG: 40 INJECTION, POWDER, FOR SOLUTION INTRAMUSCULAR; INTRAVENOUS at 12:29

## 2022-02-28 RX ADMIN — ALBUTEROL SULFATE 2 PUFF: 90 AEROSOL, METERED RESPIRATORY (INHALATION) at 20:31

## 2022-02-28 RX ADMIN — GUAIFENESIN 400 MG: 200 TABLET ORAL at 12:30

## 2022-02-28 RX ADMIN — GUAIFENESIN 400 MG: 200 TABLET ORAL at 20:27

## 2022-02-28 RX ADMIN — AMIODARONE HYDROCHLORIDE 150 MG: 50 INJECTION, SOLUTION INTRAVENOUS at 20:23

## 2022-02-28 RX ADMIN — CLOPIDOGREL BISULFATE 75 MG: 75 TABLET ORAL at 08:06

## 2022-02-28 RX ADMIN — ISOSORBIDE MONONITRATE 30 MG: 30 TABLET, EXTENDED RELEASE ORAL at 08:06

## 2022-02-28 RX ADMIN — MORPHINE SULFATE 2 MG: 2 INJECTION, SOLUTION INTRAMUSCULAR; INTRAVENOUS at 04:19

## 2022-02-28 RX ADMIN — SACUBITRIL AND VALSARTAN 1 TABLET: 49; 51 TABLET, FILM COATED ORAL at 08:06

## 2022-02-28 RX ADMIN — ASPIRIN 81 MG: 81 TABLET, COATED ORAL at 08:06

## 2022-02-28 RX ADMIN — CALCIUM CARBONATE 500 MG: 500 TABLET, CHEWABLE ORAL at 20:31

## 2022-02-28 RX ADMIN — AZITHROMYCIN MONOHYDRATE 500 MG: 500 INJECTION, POWDER, LYOPHILIZED, FOR SOLUTION INTRAVENOUS at 12:31

## 2022-02-28 RX ADMIN — SOTALOL HYDROCHLORIDE 80 MG: 80 TABLET ORAL at 08:06

## 2022-02-28 RX ADMIN — CEFTRIAXONE 1000 MG: 1 INJECTION, POWDER, FOR SOLUTION INTRAMUSCULAR; INTRAVENOUS at 12:29

## 2022-02-28 RX ADMIN — LORAZEPAM 0.25 MG: 2 INJECTION INTRAMUSCULAR; INTRAVENOUS at 08:46

## 2022-02-28 RX ADMIN — SPIRONOLACTONE 25 MG: 25 TABLET ORAL at 08:06

## 2022-02-28 RX ADMIN — CALCIUM CARBONATE 500 MG: 500 TABLET, CHEWABLE ORAL at 16:41

## 2022-02-28 RX ADMIN — MORPHINE SULFATE 2 MG: 2 INJECTION, SOLUTION INTRAMUSCULAR; INTRAVENOUS at 16:41

## 2022-02-28 RX ADMIN — OXYCODONE HYDROCHLORIDE AND ACETAMINOPHEN 500 MG: 500 TABLET ORAL at 20:28

## 2022-02-28 RX ADMIN — AMIODARONE HYDROCHLORIDE 1 MG/MIN: 50 INJECTION, SOLUTION INTRAVENOUS at 20:34

## 2022-02-28 RX ADMIN — FUROSEMIDE 40 MG: 40 TABLET ORAL at 08:06

## 2022-02-28 RX ADMIN — IPRATROPIUM BROMIDE 2 PUFF: 17 AEROSOL, METERED RESPIRATORY (INHALATION) at 13:45

## 2022-02-28 RX ADMIN — ATORVASTATIN CALCIUM 40 MG: 40 TABLET, FILM COATED ORAL at 20:28

## 2022-02-28 RX ADMIN — SODIUM CHLORIDE, PRESERVATIVE FREE 10 ML: 5 INJECTION INTRAVENOUS at 08:07

## 2022-02-28 RX ADMIN — SODIUM CHLORIDE, PRESERVATIVE FREE 10 ML: 5 INJECTION INTRAVENOUS at 20:33

## 2022-02-28 RX ADMIN — FUROSEMIDE 40 MG: 40 TABLET ORAL at 16:41

## 2022-02-28 RX ADMIN — ZINC SULFATE 220 MG (50 MG) CAPSULE 50 MG: CAPSULE at 12:30

## 2022-02-28 RX ADMIN — IPRATROPIUM BROMIDE 2 PUFF: 17 AEROSOL, METERED RESPIRATORY (INHALATION) at 16:41

## 2022-02-28 RX ADMIN — Medication 5000 UNITS: at 08:06

## 2022-02-28 RX ADMIN — MORPHINE SULFATE 2 MG: 2 INJECTION, SOLUTION INTRAMUSCULAR; INTRAVENOUS at 10:46

## 2022-02-28 RX ADMIN — APIXABAN 5 MG: 5 TABLET, FILM COATED ORAL at 08:06

## 2022-02-28 RX ADMIN — IPRATROPIUM BROMIDE 2 PUFF: 17 AEROSOL, METERED RESPIRATORY (INHALATION) at 20:31

## 2022-02-28 RX ADMIN — APIXABAN 5 MG: 5 TABLET, FILM COATED ORAL at 20:27

## 2022-02-28 RX ADMIN — SOTALOL HYDROCHLORIDE 80 MG: 80 TABLET ORAL at 20:27

## 2022-02-28 RX ADMIN — ALBUTEROL SULFATE 2 PUFF: 90 AEROSOL, METERED RESPIRATORY (INHALATION) at 13:45

## 2022-02-28 ASSESSMENT — PAIN SCALES - GENERAL
PAINLEVEL_OUTOF10: 8
PAINLEVEL_OUTOF10: 0
PAINLEVEL_OUTOF10: 0
PAINLEVEL_OUTOF10: 3
PAINLEVEL_OUTOF10: 5
PAINLEVEL_OUTOF10: 8
PAINLEVEL_OUTOF10: 8
PAINLEVEL_OUTOF10: 10
PAINLEVEL_OUTOF10: 3

## 2022-02-28 ASSESSMENT — ENCOUNTER SYMPTOMS
SHORTNESS OF BREATH: 1
VOMITING: 0
ABDOMINAL PAIN: 0
NAUSEA: 1

## 2022-02-28 ASSESSMENT — PAIN DESCRIPTION - FREQUENCY: FREQUENCY: CONTINUOUS

## 2022-02-28 ASSESSMENT — PAIN DESCRIPTION - PAIN TYPE: TYPE: ACUTE PAIN

## 2022-02-28 ASSESSMENT — PAIN DESCRIPTION - ONSET: ONSET: ON-GOING

## 2022-02-28 ASSESSMENT — PAIN DESCRIPTION - LOCATION: LOCATION: THROAT

## 2022-02-28 ASSESSMENT — PAIN DESCRIPTION - PROGRESSION: CLINICAL_PROGRESSION: NOT CHANGED

## 2022-02-28 ASSESSMENT — PAIN DESCRIPTION - DESCRIPTORS: DESCRIPTORS: DISCOMFORT

## 2022-02-28 NOTE — PROGRESS NOTES
4 Eyes Skin Assessment    Kennedy Pappas is being assessed upon: Admission    I agree that I, Vikki Jeffrey, RN, along with *** (either 2 RN's or 1 LPN and 1 RN) have performed a thorough Head to Toe Skin Assessment on the patient. ALL assessment sites listed below have been assessed. Areas assessed by both nurses:     [x]   Head, Face, and Ears   [x]   Shoulders, Back, and Chest  [x]   Arms, Elbows, and Hands   [x]   Coccyx, Sacrum, and Ischium  [x]   Legs, Feet, and Heels    Does the Patient have Skin Breakdown? Yes, wound(s) noted upon assessment. It is the responsibility of the Primary Nurse to assure that the following documentation, preventions, orders, and consults are complete on the above noted wound(s): Wound LDA initiated. LDA Flowsheet Documentation includes the Mesha-wound, Wound Assessment, Measurements, Dressing Treatment, Drainage, and Color. Redness on sacrum     Dilan Prevention initiated: Yes  Wound Care Orders initiated: No    WOC nurse consulted for Pressure Injury (Stage 3,4, Unstageable, DTI, NWPT, and Complex wounds) and New or Established Ostomies: No        Primary Nurse eSignature:  Vikki Jeffrey, RN on 2/28/2022 at 5:12 AM      Co-Signer eSignature: {Esignature:[id number]}

## 2022-02-28 NOTE — PROGRESS NOTES
This SN followed up with Luis Arizmendi to see if she has any legal documents naming her POA for the pt. Fidel Erasmo answered the phone and states she is not POA or have any documents made by the pt. Fidel Zimmerman states that the pt and her have talked about getting documents done but have not yet. SN explained to Fidel Zimmerman that since she is not related to the pt then she can not legally make decisions for him without documentation such as advanced directive stating so. Fidel Zimmerman asked if this can be done in the hospital, SN explained that if pt is oriented and able to make his wishes known that a Osman can complete this paperwork. SN notified Jarad Bartlett with [facility] of pt's need for advanced directive. Mesha Mealing to see pt and discuss with his nurse if pt able to make decisions.

## 2022-02-28 NOTE — H&P
[place], [place], [address]    DEPARTMENT OF HOSPITALIST MEDICINE      HISTORY & PHYSICAL:          REASON FOR ADMISSION: Called for admission after 12 midnight  Chief Complaint   Patient presents with    Chest Pain        HISTORY OF PRESENT ILLNESS:  Bethanie Russo is an 66 y.o. male with medical history significant for atrial fibrillation systolic dysfunction CHF EF of 10% multiple coronary artery stenting, history of hypertension hyperlipidemia who was actually admitted to the hospital on 2 February and discharged on 9 February to home after being treated for CHF with systolic dysfunction. The second patient was Covid negative on presentation patient tested COVID-19 positivity 2/27/2022. Patient was unvaccinated. Wife related that patient was found to be extremely weaker and weaker ever since discharge from the hospital.  Very unusually weak. Patient started complaining of chest pains at home and not feeling well.   And had gotten up and went to the bathroom to ease self when the vest started  firing wife and patient did not know what to do but it unbuckling the vest off of the patient wife told me she that the patient was getting diaphoretic called the ambulance the patient also told the wife to let him down flat on the ground when they did that he was beginning to feel better and most likely postural hypotension with tachycardia with associated chest.      EMS got to the house they noted patient was hypoxic placed some oxygen on the patient also noted patient was tachycardic in the 140s to 150s      In the emergency room patient tested Covid positive the rest of ABG was unremarkable and white count was up at 17,000 from 812.6 on discharge on 2/9/2022 in the emergency room patient lost pulse and had to undergo CPR for 2 and half minutes and did not have to be intubated was already talking to the people and her oxygen level was placed at 3 L doing okay was called to evaluate patient for admission. For many years reason and meets inpatient criteria will be going to ICU. Blood pressure was soft the 80s, arrhythmia were  assured as the vest was beeping. Patient going asystole in the emergency room and a status post CODE BLUE days of ICU placement neurology consultation  And white count is normal lactate is also normal at 1.3      Will be treated for an acute COVID-19 positive test (U07.1, COVID-19) with hypoxemia  Severe ventricular systolic dysfunction and cardiac arrest.  Patient is placed in ICU allergy to follow-up on patient today        PAST MEDICAL HISTORY:  Past Medical History:   Diagnosis Date    Acute exacerbation of CHF (congestive heart failure) ([facility] [address] 2/2/2022    Atrial fibrillation ([facility] [facility]     Bilateral carotid artery stenosis 1/6/2020    CAD (coronary artery disease), native coronary artery     acute inferior MI on 5/15/09, s/p emeergent CABG x4 5/15/09    Chest pain     HTN (hypertension)     Hyperlipidemia     Dr. Vanessa Jerome manages    Palpitations     Statin intolerance 2/14/2018    Tobacco abuse     Transient atrial fibrillation or flutter     post op         PAST SURGICAL HISTORY:  Past Surgical History:   Procedure Laterality Date    CARDIAC CATHETERIZATION  4/23/12    with stent to left circ, and LAD    CARDIAC CATHETERIZATION  01/07/2018    PTCA/BJ to VG to LAD;  atherectomy and 3 BMS to VG to 1st Circ    CORONARY ARTERY BYPASS GRAFT  05/15/09    x4    DIAGNOSTIC CARDIAC CATH LAB PROCEDURE  05/15/09    left heart cath, left ventr, selective coronary arteriography     FRACTURE SURGERY Left     Leg    VASCULAR SURGERY  4/21/2015 [facility]    Percutaneous cannulation right common femoral artery with 5-Wolof and later 6-Wolof pinnacle destination sheath. Suprarenal abdominal aortogram with bilateral iliofemoral arteriograms. Bilateral lower extremity arteriograms. Left popliteal/tibioperoneal trunk artery balloon angioplasty with 4 mm x 15 mm cutting balloon.     VASCULAR SURGERY  4/21/2015 [facility] cont    Arteriograms after balloon angioplasty. Balloon angioplasty left popliteal artery/tibioperoneal trunk with 5 mm x 40 mm russell balloon. Arteriograms after balloon angioplasty. Left superficial femoral artery balloon angioplasty with 7 mm x 100 mm  balloon. Left superficial femoral artery stent placement idev supera 6.5 x 100 mm self-expanding nitinol stent. Completion arteriograms left lower e    VASCULAR SURGERY  4/21/2015 [facility] cont    extremity. Mynx closure right common femoral artery puncture site.  VASCULAR SURGERY  03/04/2020    [facility]. Percutaneous cannulation left common femoral artery with 5 Vietnamese glide sheath. Suprarenal abdominal aortogram with bilateral iliofemoral arteriogram.Bilateral lower extremity arteriogram.Minx closure left common femoral artery puncture site. SOCIAL HISTORY:  Social History     Socioeconomic History    Marital status: Single     Spouse name: Not on file    Number of children: Not on file    Years of education: Not on file    Highest education level: Not on file   Occupational History    Not on file   Tobacco Use    Smoking status: Former Smoker     Packs/day: 0.00     Years: 45.00     Pack years: 0.00     Types: Cigarettes     Start date: 1964    Smokeless tobacco: Never Used    Tobacco comment: STATES,\"I VAP\"   Vaping Use    Vaping Use: Every day   Substance and Sexual Activity    Alcohol use: No    Drug use: No    Sexual activity: Not on file   Other Topics Concern    Not on file   Social History Narrative    Not on file     Social Determinants of Health     Financial Resource Strain:     Difficulty of Paying Living Expenses: Not on file   Food Insecurity:     Worried About [employer] [employer] in the Last Year: Not on file    [address] [facility] [address] in the Last Year: Not on file   Transportation Needs:     Lack of Transportation (Medical): Not on file    Lack of Transportation (Non-Medical):  Not on file   Physical Activity:  Days of Exercise per Week: Not on file    Minutes of Exercise per Session: Not on file   Stress:     Feeling of Stress : Not on file   Social Connections:     Frequency of Communication with Friends and Family: Not on file    Frequency of Social Gatherings with Friends and Family: Not on file    Attends Christian Services: Not on file    Active Member of Clubs or Organizations: Not on file    Attends Club or Organization Meetings: Not on file    Marital Status: Not on file   Intimate Partner Violence:     Fear of Current or Ex-Partner: Not on file    Emotionally Abused: Not on file    Physically Abused: Not on file    Sexually Abused: Not on file   Housing Stability:     Unable to Pay for Housing in the Last Year: Not on file    Number of [place] in the Last Year: Not on file    Unstable Housing in the Last Year: Not on file        FAMILY HISTORY:  Family History   Problem Relation Age of Onset    Coronary Art Dis Father     High Blood Pressure Father     Coronary Art Dis Brother     High Blood Pressure Mother     Cancer Mother     High Blood Pressure Sister          ALLERGIES:  Allergies   Allergen Reactions    Statins Itching     myalagias        PRIOR TO ADMISSION MEDS:  Not in a hospital admission.      REVIEW OF SYSTEMS: He is awake alert oriented x3 post CODE BLUE in the emergency room  Constitutional:  No fevers, chills, nausea, vomiting, + tiredness & fatigue   Head:  No head injury, facial trauma   Eyes:  No acute visual changes, exudate, trauma   Ears:  No acute hearing loss, earaches   Nose: No nasal discharge, epistaxis   Neck: No new hoarseness, voice change, or new masses   Lungs:   No hemoptysis, pleurisy   Heart:  No chest pressure with exertion, palpitations,    Abdomen:   No new masses, no bright red blood per rectum   Extremities: No acute pain while ambulating, no new lesions   Skin: No new changes in skin color, no rashes or lesions   Neurologic: No new motor or sensory changes     14 point review of systems addressed with patient which is essentially negative except as specifically addressed above:    PHYSICAL EXAM:  BP (!) 83/55   Pulse 69   Temp 97.6 °F (36.4 °C) (Oral)   Resp 14   Ht 6' (1.829 m)   Wt 140 lb (63.5 kg)   SpO2 97%   BMI 18.99 kg/m²   No intake/output data recorded. PHYSICAL EXAMINATION:    Vital Signs: Please see the chart   ROSINA:  Awake, alert, oriented x 3, patient appears tired and fatigued   Head/Eyes:  Normocephalic, atraumatic, EOMI and PERRLA bilaterally   ENT: Moist mucous membranes, nasal passages clear   Neck: Supple, full range of motion, no carotid bruit, trachea midline   Respiratory:   Bilateral fair air entry in both lung fields, mild B/L crackles, symmetric expansion of chest   Cardiovascular:  Regular rate and rhythm, S1+S2+0, no murmurs/rubs   Urology: No bilateral CVA tenderness, no suprapubic tenderness   Abdomen:   Soft, non-tender, bowel sounds +ve, no organomegaly   Muscle/Joints: Moves all, full range of motion, no muscle spasms   Extremities: No clubbing, no cyanosis, no calf tenderness, no edema   Pulses: 2+ bilaterally, symmetrical   Skin: Warm, dry, no pallor/cyanosis/jaundice, no rashes/lesions   Neurologic: Awake, alert, oriented x 3, cranial nerves II-XII intact, no focal neurological deficits, sensory system intact   Psychiatric: Normal mood, non-suicidal         LABORATORY DATA:       DIAGNOSTIC RESULTS      EKG: All EKG's areinterpreted by the Emergency Department Physician who either signs or Co-signs this chart in the absence of a cardiologist.     2300: tachycardic wide-complex, regular rhythm at a rate of 147.  MS.   This is a marked change from previous EKG performed on 2/4/2022 which demonstrated a normal sinus rhythm.     RADIOLOGY:  Non-plain film images such as CT, Ultrasound and MRI are read by the radiologist. Plain radiographic images are visualized and preliminarily interpreted bythe emergency physician with the below findings:     CXR: No infiltrates identified.   Bilateral vascular congestion although much improved from previous chest radiograph several weeks ago.     XR CHEST PORTABLE    (Results Pending)               LABS:        Labs Reviewed   COVID-19, RAPID - Abnormal; Notable for the following components:       Result Value      SARS-CoV-2, NAAT DETECTED (*)       All other components within normal limits     Narrative:      CALL  Ramos  KLED tel. ,  Microbiology results called to and read back by Smith Pruitt RN in ED, 02/28/2022  00:01, by [facility]   COMPREHENSIVE METABOLIC PANEL - Abnormal; Notable for the following components:     Sodium 130 (*)       Chloride 96 (*)       CO2 19 (*)       Glucose 168 (*)       BUN 27 (*)       Calcium 7.6 (*)       Total Protein 5.8 (*)       Albumin 2.6 (*)       Alkaline Phosphatase 261 (*)        (*)        (*)       All other components within normal limits     Narrative:      CALL  Ramos  KLED tel. ,  Chemistry results called to and read back by Smith Pruitt RN in ED, 02/28/2022 00:05,  by [facility]   CBC WITH AUTO DIFFERENTIAL - Abnormal; Notable for the following components:     WBC 17.1 (*)       RBC 4.12 (*)       Hemoglobin 12.3 (*)       Hematocrit 38.8 (*)       MCV 94.2 (*)       MCHC 31.7 (*)       Platelets 852 (*)       Neutrophils % 77.4 (*)       Lymphocytes % 16.7 (*)       Neutrophils Absolute 13.3 (*)       All other components within normal limits   TROPONIN - Abnormal; Notable for the following components:     Troponin 0.15 (*)       All other components within normal limits     Narrative:      CALL  Ramos  KLED tel. ,  Chemistry results called to and read back by Smith Pruitt RN in ED, 02/28/2022 00:05,  by [address] - Abnormal; Notable for the following components:     Pro-BNP 4,242 (*)       All other components within normal limits   BLOOD GAS, ARTERIAL - Abnormal; Notable for the following components:     pCO2, Arterial 31.0 (*)       pO2, Arterial 204.0 (*)       HCO3, Arterial 20.1 (*)       Base Excess, Arterial -3.4 (*)       Hemoglobin, Art, Extended 12.9 (*)       All other components within normal limits             CBC:  Recent Labs     02/27/22 2331   WBC 17.1*   HGB 12.3*   HCT 38.8*   *     BMP:  Recent Labs     02/27/22 2331 02/27/22 2334   *  --    K 4.7 4.4   CL 96*  --    CO2 19*  --    BUN 27*  --    CREATININE 1.0  --    CALCIUM 7.6*  --      Recent Labs     02/27/22 2331   *   *   BILITOT 0.5   ALKPHOS 261*     Coag Panel: No results for input(s): INR, PROTIME, APTT in the last 72 hours. Cardiac Enzymes:   Recent Labs     02/27/22 2331   TROPONINI 0.15*     ABGs:  Lab Results   Component Value Date    PHART 7.420 02/27/2022    PO2ART 204.0 02/27/2022    AAZ1GFM 31.0 02/27/2022     Urinalysis:  Lab Results   Component Value Date    NITRU Negative 01/07/2018    WBCUA 4 01/07/2018    BACTERIA NEGATIVE 01/07/2018    RBCUA 0 01/07/2018    BLOODU TRACE 01/07/2018    SPECGRAV >1.045 01/07/2018    GLUCOSEU Negative 01/07/2018     A1C: No results for input(s): LABA1C in the last 72 hours. ABG:  Recent Labs     02/27/22 2334   PHART 7.420   TUM3ZQF 31.0*   PO2ART 204.0*   IZL9OSY 20.1*   BEART -3.4*   HGBAE 12.9*   D2RZMTFE 96.6   CARBOXHGBART 2.4       EKG:   Please see chart      IMAGING:  No results found.       Assessment and Plan:    Principal Problem:    Systolic heart failure secondary to hypertrophic cardiomyopathy ([address]  Active Problems:    Coronary artery disease involving native coronary artery of native heart with angina pectoris (HCC)    Tobacco abuse    Hyperlipidemia    Paroxysmal A-fib (HCC)    Severe left ventricular systolic dysfunction     Leukocytosis with left shift      Transaminitis secondary to COVID-19 infection         Plan  Systolic left ventricular heart failure pulmonary vascular congestion  -Admit to ICU with continuation of diuretics  -Cardiology consultation to partake in the care of the patient  -Continue I and O's, daily weights, fluid restriction to 1500mL/day      COVID-19 infection  -And is truly symptomatic with COVID-19 infection though with no pneumonia  -He was hypoxic with elevated liver enzymes and profound and worsening weakness at home  -Initiated empiric antibiotics with azithromycin and ceftriaxone  -Initiated dexamethasone  -Ordered C-reactive protein if this is elevated beyond 7 could be a candidate for clonal    Antibodies  -Hypoxic on presentation show be initiated on remdesivir  family group of medication  Case patient is going to be on biaricitinib pharmacy to dose      Weakness  -PT OT      Postural hypotension at home vestt firing associated tachycardia  -Optimize optimize  blood pressure  -Pressure had improved on systolic of 67N to 90P in the  ED now systolic above 420 in ICU      Leukocytosis with left shift   -Culture from blood urine and sputum  -Gait antibiotics with azithromycin and ceftriaxone  -Blood cultures    Attestation:  Inpatient status is used for patients with an expected LOS extending past two midnights due to medical therapy and/or critical care needs  . .. all other patients are placed under OBServation status. Mele Moraes DO  3:16 AM 2/28/2022      DISCLAIMER: This note was created with electronic voice recognition which does have occasional errors. If you have any questions regarding the content within the note please do not hesitate to contact me. .. Thanks.

## 2022-02-28 NOTE — CONSULTS
[facility]  Cardiology Consult      Requesting MD:  Leo Sarah MD   Admit Status:         History obtained from:   ? Patient  ?  Other (specify):     PROBLEM LIST:    Patient Active Problem List    Diagnosis Date Noted    Systolic heart failure secondary to hypertrophic cardiomyopathy ([facility] [address] 02/28/2022     Priority: Low    Severe left ventricular systolic dysfunction 26/58/6732     Priority: Low    Acute respiratory failure with hypoxia ([facility])      Priority: Low    Acute respiratory failure with hypoxia and hypercarbia ([facility]) 02/02/2022     Priority: Low    Sepsis ([facility] [address] 02/02/2022     Priority: Low    Acute exacerbation of CHF (congestive heart failure) ([facility] [address] 02/02/2022     Priority: Low    Acute systolic (congestive) heart failure ([facility] [address] 02/02/2022     Priority: Low    PVD (peripheral vascular disease) ([facility])      Priority: Low    Bilateral carotid artery stenosis 01/06/2020     Priority: Low    Statin intolerance 02/14/2018     Priority: Low    Inferior MI ([facility] [address] 01/07/2018     Priority: Low    ST elevation myocardial infarction (STEMI) [facility])      Priority: Low    Atherosclerosis of native artery of extremity with intermittent claudication ([facility]) 04/15/2015     Priority: Low    Paroxysmal A-fib ([facility] [address]      Priority: Low     Overview Note:     post op      Tobacco abuse      Priority: Low    Chest pain      Priority: Low    Hyperlipidemia      Priority: Low    Palpitations      Priority: Low    HTN (hypertension)      Priority: Low    Coronary artery disease involving native coronary artery of native heart with angina pectoris ([facility])      Priority: Low     1.  Ischemic cardiomyopathy, prior CABG 5/15/2009, four-vessel grafting (SVG to OM, SVG to LAD, SVG to RPDA, LIMA to diagonal), prior PCI to proximal diagonal and distal circumflex 4/2012, prior inferolateral STEMI 1/7/2018 with multiple BMS to occluded SVG to OM, BJ to SVG to LAD at anastomosis, non-STEMI presentation with heart failure 2/2/2022 with catheterization 2/3/2022 showing stenotic SVG to RPDA, occluded vein grafts to LAD and OM as well as known occluded LIMA, ejection fraction 20% with severe eccentric posterior directed mitral regurgitation, status post PCI 2/7/2020 with BJ to SVG to RCA and mid LAD. 2.  Longstanding and ongoing tobacco use with probable COPD. 3.  Peripheral arterial disease with multiple peripheral interventions. 4.  Paroxysmal atrial fibrillation on Betapace. 5.  Worsening shortness of breath with positive Covid infection, bradycardic arrest 2/27/2022 with brief CPR.     PRESENTATION: Ele Ferguson is a 66y.o. year old male who presents with increasing shortness of breath that has been progressive since his discharge. Was discharged 2/9/2022 at which time he did not report any significant symptomatology. Found to be Covid positive. In the emergency room he apparently had a bradycardic arrest with agonal rhythm that required brief CPR with return of spontaneous circulation. Slightly lethargic and tired but responsive. Not in any distress. On 3 L nasal cannula. Initial EKG with wide-complex tachycardia at 150 bpm, right bundle morphology suggestive of possible VT. Subsequent EKG shows sinus rhythm with borderline IVCD and borderline ST-T changes. Troponin 0.1 5-0 0.09.  proBNP 4242. Patient with known severe cardiomyopathy, ejection fraction 20% with severe mitral regurgitation. Recent complex PCI 2/7/2020 to mid LAD and SVG to RCA with known occluded grafts. Reportedly compliant with his medications. Has been on Betapace. Remains in sinus rhythm. Ongoing tobacco use. REVIEW OF SYSTEMS:  Review of Systems   Unable to perform ROS: Patient nonverbal   Respiratory: Positive for shortness of breath.         Past Medical History:      Diagnosis Date    Acute exacerbation of CHF (congestive heart failure) ([facility] [address] 02/02/2022    Atrial fibrillation (HCC)     Bilateral carotid artery stenosis 01/06/2020    CAD (coronary artery disease), native coronary artery     acute inferior MI on 5/15/09, s/p emeergent CABG x4 5/15/09    Chest pain     HTN (hypertension)     Hyperlipidemia     Dr. Mihir Dickey manages    Palliative care patient 02/28/2022    Palpitations     Statin intolerance 02/14/2018    Tobacco abuse     Transient atrial fibrillation or flutter     post op       Past Surgical History:      Procedure Laterality Date    CARDIAC CATHETERIZATION  4/23/12    with stent to left circ, and LAD    CARDIAC CATHETERIZATION  01/07/2018    PTCA/BJ to VG to LAD;  atherectomy and 3 BMS to VG to 1st Circ    CORONARY ARTERY BYPASS GRAFT  05/15/09    x4    DIAGNOSTIC CARDIAC CATH LAB PROCEDURE  05/15/09    left heart cath, left ventr, selective coronary arteriography     FRACTURE SURGERY Left     Leg    VASCULAR SURGERY  4/21/2015 [facility]    Percutaneous cannulation right common femoral artery with 5-Canadian and later 6-Canadian [place] sheath. Suprarenal abdominal aortogram with bilateral iliofemoral arteriograms. Bilateral lower extremity arteriograms. Left popliteal/tibioperoneal trunk artery balloon angioplasty with 4 mm x 15 mm cutting balloon.  VASCULAR SURGERY  4/21/2015 [facility] cont    Arteriograms after balloon angioplasty. Balloon angioplasty left popliteal artery/tibioperoneal trunk with 5 mm x 40 mm russell balloon. Arteriograms after balloon angioplasty. Left superficial femoral artery balloon angioplasty with 7 mm x 100 mm  balloon. Left superficial femoral artery stent placement idev supera 6.5 x 100 mm self-expanding nitinol stent. Completion arteriograms left lower e    VASCULAR SURGERY  4/21/2015 [facility] cont    extremity. Mynx closure right common femoral artery puncture site.  VASCULAR SURGERY  03/04/2020    [facility]. Percutaneous cannulation left common femoral artery with 5 Canadian glide sheath. Suprarenal abdominal aortogram with bilateral iliofemoral arteriogram.Bilateral lower extremity arteriogram.Minx closure left common femoral artery puncture site. Allergies:  Statins    Past Social History:  Social History     Socioeconomic History    Marital status: Single     Spouse name: Not on file    Number of children: Not on file    Years of education: Not on file    Highest education level: Not on file   Occupational History    Not on file   Tobacco Use    Smoking status: Former Smoker     Packs/day: 0.00     Years: 45.00     Pack years: 0.00     Types: Cigarettes     Start date: 1964    Smokeless tobacco: Never Used    Tobacco comment: STATES,\"I VAP\"   Vaping Use    Vaping Use: Every day   Substance and Sexual Activity    Alcohol use: No    Drug use: No    Sexual activity: Not on file   Other Topics Concern    Not on file   Social History Narrative    Not on file     Social Determinants of Health     Financial Resource Strain:     Difficulty of Paying Living Expenses: Not on file   Food Insecurity:     Worried About [address] outdoors in the Last Year: Not on file    [address] in the Last Year: Not on file   Transportation Needs:     Lack of Transportation (Medical): Not on file    Lack of Transportation (Non-Medical):  Not on file   Physical Activity:     Days of Exercise per Week: Not on file    Minutes of Exercise per Session: Not on file   Stress:     Feeling of Stress : Not on file   Social Connections:     Frequency of Communication with Friends and Family: Not on file    Frequency of Social Gatherings with Friends and Family: Not on file    Attends Orthodoxy Services: Not on file    Active Member of Clubs or Organizations: Not on file    Attends Club or Organization Meetings: Not on file    Marital Status: Not on file   Intimate Partner Violence:     Fear of Current or Ex-Partner: Not on file    Emotionally Abused: Not on file    Physically Abused: Not on file    Sexually Abused: Not on file   Housing Stability:  Unable to Pay for Housing in the Last Year: Not on file    Number of Places Lived in the Last Year: Not on file    Unstable Housing in the Last Year: Not on file       Family History:       Problem Relation Age of Onset    Coronary Art Dis Father     High Blood Pressure Father     Coronary Art Dis Brother     High Blood Pressure Mother     Cancer Mother     High Blood Pressure Sister        Home Meds:  Prior to Admission medications    Medication Sig Start Date End Date Taking? Authorizing Provider   furosemide (LASIX) 40 MG tablet Take 1 tablet by mouth 2 times daily 2/9/22   Garcia Chakraborty MD   atorvastatin (LIPITOR) 80 MG tablet Take 0.5 tablets by mouth nightly 2/9/22   Garcia Chakraborty MD   spironolactone (ALDACTONE) 25 MG tablet Take 1 tablet by mouth daily 2/9/22   Garcia Chakraborty MD   clopidogrel (PLAVIX) 75 MG tablet Take 1 tablet by mouth daily 2/9/22   Garcia Chakraborty MD   apixaban [place]) 5 MG TABS tablet Take 1 tablet by mouth 2 times daily 2/9/22   LUIS Arenas   isosorbide mononitrate (IMDUR) 30 MG extended release tablet Take 1 tablet by mouth daily 2/9/22   LUIS Arenas   sacubitril-valsartan (ENTRESTO) 49-51 MG per tablet Take 1 tablet by mouth 2 times daily 2/8/22   LUIS Arenas   Vitamin D (CHOLECALCIFEROL) 25 MCG (1000 UT) TABS tablet Take 5 tablets by mouth daily 2/9/22   LUIS Arenas   nitroGLYCERIN (NITROSTAT) 0.4 MG SL tablet Place 1 tablet under the tongue every 5 minutes as needed for Chest pain up to max of 3 total doses. If no relief after 1 dose, call 911. 6/8/21   LUIS Hanna   sotalol (BETAPACE) 160 MG tablet Take 1 tablet by mouth twice daily 6/8/21   LUIS Hanna   nitroGLYCERIN (NITROSTAT) 0.4 MG SL tablet Place 1 tablet under the tongue every 5 minutes as needed for Chest pain 6/8/21   LUIS Hanna   aspirin 81 MG EC tablet Take 81 mg by mouth daily.       Historical Provider, MD       Current Meds:   sodium chloride flush  5-40 mL IntraVENous 2 times per day    apixaban  5 mg Oral BID    aspirin  81 mg Oral Daily    atorvastatin  40 mg Oral Nightly    clopidogrel  75 mg Oral Daily    furosemide  40 mg Oral BID    isosorbide mononitrate  30 mg Oral Daily    sacubitril-valsartan  1 tablet Oral BID    sotalol  80 mg Oral 2 times per day    spironolactone  25 mg Oral Daily    methylPREDNISolone  40 mg IntraVENous Q8H    cefTRIAXone (ROCEPHIN) IV  1,000 mg IntraVENous Q24H    azithromycin  500 mg IntraVENous Q24H    zinc sulfate  50 mg Oral Daily    Vitamin D  2,000 Units Oral Daily    vitamin C  500 mg Oral BID    guaiFENesin  400 mg Oral Q8H    albuterol sulfate HFA  2 puff Inhalation Q4H    And    ipratropium  2 puff Inhalation Q4H       Current Infused Meds:      Physical Exam:  Vitals:    02/28/22 1200   BP: (!) 95/59   Pulse: 65   Resp: 15   Temp: 97.6 °F (36.4 °C)   SpO2: 94%     No intake or output data in the 24 hours ending 02/28/22 1227  Estimated body mass index is 18.99 kg/m² as calculated from the following:    Height as of this encounter: 6' (1.829 m). Weight as of this encounter: 140 lb (63.5 kg). Physical Exam  Constitutional:       Comments: Arousable but limited verbalization. Unable to get history from patient   HENT:      Mouth/Throat:      Pharynx: No oropharyngeal exudate. Eyes:      General: No scleral icterus. Right eye: No discharge. Left eye: No discharge. Neck:      Thyroid: No thyromegaly. Vascular: No JVD. Cardiovascular:      Rate and Rhythm: Normal rate and regular rhythm. Heart sounds: No murmur heard. No friction rub. No gallop. Comments: No significant jugular venous distention noted  Trace to 1+ pitting edema  Heart sounds are diminished. No obvious murmur clearly audible. Pulmonary:      Effort: No respiratory distress. Breath sounds: No stridor. No wheezing or rales. Abdominal:      General: Bowel sounds are normal. There is no distension. Palpations: Abdomen is soft. There is no mass. Tenderness: There is no abdominal tenderness. There is no guarding or rebound. Comments: No palpable organomegaly   Musculoskeletal:         General: No deformity. Skin:     General: Skin is warm. Coloration: Skin is not pale. Findings: No erythema or rash. Neurological:      Motor: No abnormal muscle tone. Comments: Moving all extremities. Labs:  Recent Labs     02/27/22 2331   WBC 17.1*   HGB 12.3*   *       Recent Labs     02/27/22 2331 02/27/22 2334   *  --    K 4.7 4.4   CL 96*  --    CO2 19*  --    BUN 27*  --    CREATININE 1.0  --    LABGLOM >60  --    CALCIUM 7.6*  --        CK, CKMB, Troponin: @LABRCNT (CKTOTAL:3, CKMB:3, TROPONINI:3)@    Last 3 BNP:          IMAGING:  ECHO Complete 2D W Doppler W Color    Result Date: 2/3/2022  Transthoracic Echocardiography Report (TTE)  Demographics   Patient Name  CASI Khan Date of Study         02/03/2022   MRN           [id number]          Gender                Male   Date of Birth 1950      Room Number           MHL-0712   Age           66 year(s)   Height:       72 inches       Referring Physician   Andrés Wick   Weight:       150 pounds      Sonographer           Luly Donaldson ROSIO   BSA:          1.89 m^2        Interpreting          Demetrius Miller MD                                Physician   BMI:          20.34 kg/m^2  Procedure Type of Study   TTE procedure:ECHO 2D W/DOPPLER/COLOR/CONTRAST. Study Location: Echo Lab Technical Quality: Adequate visualization Patient Status: Inpatient Contrast Medium: Definity. Amount - 8 ml BP: 114/77 mmHg Indications:Congestive heart failure.   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  0.79 cm, ERO more than 0.4 cm^2, jet width more than 50% of the left  atrial area  Central jet of the mitral regurgitation suggestive of functional secondary  mitral regurgitation  Moderately to severely dilated left atrium  Moderately dilated right atrium  Moderate aortic insufficiency  Moderate tricuspid regurgitation with estimated right ventricular systolic  pressure of 40 to 45 mm which is mildly elevated  Dilated IVC with impaired respiratory variation  Aortic sclerosis   Signature   ----------------------------------------------------------------  Electronically signed by Jodee Santana MD(Interpreting physician)  on 02/03/2022 12:31 PM  ----------------------------------------------------------------  Allergies   - Statins. M-Mode Measurements (cm)   LVIDd: 5.76 cm                         LVIDs: 5.18 cm  IVSd: 0.98 cm  LVPWd: 1.23 cm                         AO Root Dimension: 2.4 cm  Rt. Vent. Dimension: 3.38 cm           LA: 4.6 cm  % Ejection Fraction: 20 %              LVOT: 2 cm  Doppler Measurements:   AV Peak Velocity:145 cm/s            MV Peak E-Wave: 102 cm/s  AV Peak Gradient: 8.41 mmHg          MV Peak A-Wave: 38.9 cm/s  AV Mean Gradient: 5 mmHg             MV E/A Ratio: 2.62 %  AV Area (Continuity):2.34 cm^2       MV Peak Gradient: 4.16 mmHg  TR Velocity:269 cm/s  TR Gradient:28.94 mmHg  Estimated RAP:10 mmHg  RVSP:40 mmHg      XR CHEST PORTABLE    Result Date: 2/28/2022  Examination. XR CHEST PORTABLE 2/27/2022 11:30 PM History: Chest pain. A single frontal portable semiupright view of the chest is compared with the previous study dated 2/2/2022. There is a significant improvement in the bilateral lung infiltrate since the previous study.  There is a small residual infiltrate at bilateral lung bases and a small left basal pleural effusion. There is persistent moderate cardiomegaly. Atheromatous changes thoracic aorta are noted. No pulmonary congestion or pneumothorax. No acute bony abnormality. 1. Improvement. Signed by Dr Brandon Kaur    Result Date: 2/2/2022  Examination. XR CHEST PORTABLE 2/2/2022 3:21 AM History: Shortness of breath. Frontal portable upright view of the chest is compared with the previous study dated 1/7/2018. There is extensive interstitial infiltrate in the lungs bilaterally more severely in the mid and lower lungs. There is bilateral lower lobar consolidation. There is a small bibasal pleural effusion. There is no pneumothorax. The heart size is not evaluated due to complete obliteration of the cardiac border by the adjacent infiltrate. Atheromatous changes thoracic aorta are noted. There is evidence of prior cardiac surgery. No acute bony abnormality. 1. The extensive bilateral lung infiltrate. Bilateral lower lobar consolidation and bibasilar pleural effusion.  Signed by Dr Robinson Sanchez and Plan:    39-year-old gentleman with past medical history of coronary artery disease with prior CABG with four-vessel grafting 5/15/2009, prior stents in distal circumflex and proximal diagonal 4/2012, inferolateral STEMI 1/7/2019 with intervention to vein grafts to LAD and OM, paroxysmal atrial fibrillation on Betapace, longstanding and ongoing tobacco use with probable COPD, peripheral arterial disease with multiple endovascular interventions presenting with non-STEMI and heart failure 2/2/2022 found to have ejection fraction of 20% with severe mitral regurgitation, occluded vein grafts to LAD and OM, stenotic SVG to RPDA and known LIMA occlusion to diagonal, turndown for repeat CABG, status post PCI to SVG to RPDA and mid LAD 2/7/2022 and referral for possible MitraClip placement, now presenting with worsening shortness of breath, Covid positive with brief bradycardic arrest and CPR. 1.  Betapace dose reduced to 80 mg twice daily. Will continue at this dose for now. Initial EKG with wide-complex tachycardia which would be difficult to differentiate from VT. Current EKG with no ST elevations. Troponins borderline and decreasing with no active symptoms noted. Noted active Covid infection. Ongoing tobacco use as well with known COPD. Can obtain a limited echo for LV and MR. Being evaluated for mitral clip placement as well. 2.  We will continue to monitor at this time. Maintain on Plavix and Eliquis uninterrupted. Continue heart failure management.     Electronically signed by Jeovany Reeves MD on 2/28/2022 at 12:27 PM

## 2022-02-28 NOTE — CARE COORDINATION
Confirmed with Nelida addison the pt received life vest the previous dc on 2/8 and is compliant.  SW following to determine dc disposition for the pt and if SNF requested will determine which facilities are trained and can accept life vest.

## 2022-02-28 NOTE — PROGRESS NOTES
Results for Eleno LANCE (MRN [id number]) as of 2/27/2022 23:35   Ref.  Range 2/27/2022 23:34   Hemoglobin, Art, Extended Latest Ref Range: 14.0 - 18.0 g/dL 12.9 (L)   pH, Arterial Latest Ref Range: 7.350 - 7.450  7.420   pCO2, Arterial Latest Ref Range: 35.0 - 45.0 mmHg 31.0 (L)   pO2, Arterial Latest Ref Range: 80.0 - 100.0 mmHg 204.0 (H)   HCO3, Arterial Latest Ref Range: 22.0 - 26.0 mmol/L 20.1 (L)   Base Excess, Arterial Latest Ref Range: -2.0 - 2.0 mmol/L -3.4 (L)   O2 Sat, Arterial Latest Ref Range: >92 % 96.6   O2 Content, Arterial Latest Ref Range: Not Established mL/dL 18.0   Methemoglobin, Arterial Latest Ref Range: <1.5 % 0.8   Carboxyhgb, Arterial Latest Ref Range: 0.0 - 5.0 % 2.4   100% NRB   RB site choice

## 2022-02-28 NOTE — ED PROVIDER NOTES
[facility] EMERGENCY DEPT  eMERGENCY dEPARTMENT eNCOUnter      Pt Name: Jeffery Olvera  MRN: [id number]  [place] 1950  Date of evaluation: 2/27/2022  Provider: Vince Ramsey MD    CHIEF COMPLAINT       Chief Complaint   Patient presents with    Chest Pain         HISTORY OF PRESENT ILLNESS   (Location/Symptom, Timing/Onset,Context/Setting, Quality, Duration, Modifying Factors, Severity)  Note limiting factors. Jeffery Olvera is a 66 y.o. male who presents to the emergency department via EMS for evaluation regarding shortness of breath and chest pain. History has been obtained from patient's wife who is present with him here in the emergency department. She reports that he recently had a prolonged hospitalization secondary to congestive heart failure. In reviewing these records it appears that he underwent stent placement along with a LifeVest secondary to severe cardiomyopathy. She reports that this evening patient has not felt well for the past couple of days due to increased congestion and shortness of breath. States he went to the bathroom and began to have increasing shortness of breath. His LifeVest began to beep and they were unsure what to do so they turned it off. Patient's wife then called 46. EMS reported upon their arrival patient was hypoxic with a tachycardic rhythm in the 140s to 150s. Of note, patient is previously unvaccinated for COVID-19. HPI    NursingNotes were reviewed. REVIEW OF SYSTEMS    (2-9 systems for level 4, 10 or more for level 5)     Review of Systems   Constitutional: Negative for chills and fever. Respiratory: Positive for shortness of breath. Cardiovascular: Positive for chest pain, palpitations and leg swelling. Gastrointestinal: Positive for nausea. Negative for abdominal pain and vomiting. Neurological: Negative for syncope. All other systems reviewed and are negative.            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 Jazmine Hidalgo manages    Palpitations     Statin intolerance 2/14/2018    Tobacco abuse     Transient atrial fibrillation or flutter     post op         SURGICAL HISTORY       Past Surgical History:   Procedure Laterality Date    CARDIAC CATHETERIZATION  4/23/12    with stent to left circ, and LAD    CARDIAC CATHETERIZATION  01/07/2018    PTCA/BJ to VG to LAD;  atherectomy and 3 BMS to VG to 1st Circ    CORONARY ARTERY BYPASS GRAFT  05/15/09    x4    DIAGNOSTIC CARDIAC CATH LAB PROCEDURE  05/15/09    left heart cath, left ventr, selective coronary arteriography     FRACTURE SURGERY Left     Leg    VASCULAR SURGERY  4/21/2015 [facility]    Percutaneous cannulation right common femoral artery with 5-Andorran and later 6-Andorran pinnacle destination sheath. Suprarenal abdominal aortogram with bilateral iliofemoral arteriograms. Bilateral lower extremity arteriograms. Left popliteal/tibioperoneal trunk artery balloon angioplasty with 4 mm x 15 mm cutting balloon.  VASCULAR SURGERY  4/21/2015 [facility] cont    Arteriograms after balloon angioplasty. Balloon angioplasty left popliteal artery/tibioperoneal trunk with 5 mm x 40 mm russell balloon. Arteriograms after balloon angioplasty. Left superficial femoral artery balloon angioplasty with 7 mm x 100 mm  balloon. Left superficial femoral artery stent placement idev supera 6.5 x 100 mm self-expanding nitinol stent. Completion arteriograms left lower e    VASCULAR SURGERY  4/21/2015 [facility] cont    extremity. Mynx closure right common femoral artery puncture site.  VASCULAR SURGERY  03/04/2020    [facility]. Percutaneous cannulation left common femoral artery with 5 french glide sheath. Suprarenal abdominal aortogram with bilateral iliofemoral arteriogram.Bilateral lower extremity arteriogram.Minx closure left common femoral artery puncture site. CURRENT MEDICATIONS     Previous Medications    APIXABAN (ELIQUIS) 5 MG TABS TABLET    Take 1 tablet by mouth 2 times daily    ASPIRIN 81 MG EC TABLET    Take 81 mg by mouth daily. ATORVASTATIN (LIPITOR) 80 MG TABLET    Take 0.5 tablets by mouth nightly    CLOPIDOGREL (PLAVIX) 75 MG TABLET    Take 1 tablet by mouth daily    FUROSEMIDE (LASIX) 40 MG TABLET    Take 1 tablet by mouth 2 times daily    ISOSORBIDE MONONITRATE (IMDUR) 30 MG EXTENDED RELEASE TABLET    Take 1 tablet by mouth daily    NITROGLYCERIN (NITROSTAT) 0.4 MG SL TABLET    Place 1 tablet under the tongue every 5 minutes as needed for Chest pain up to max of 3 total doses. If no relief after 1 dose, call 911.     NITROGLYCERIN (NITROSTAT) 0.4 MG SL TABLET    Place 1 tablet under the tongue every 5 minutes as needed for Chest pain    SACUBITRIL-VALSARTAN (ENTRESTO) 49-51 MG PER TABLET    Take 1 tablet by mouth 2 times daily    SOTALOL (BETAPACE) 160 MG TABLET    Take 1 tablet by mouth twice daily    SPIRONOLACTONE (ALDACTONE) 25 MG TABLET    Take 1 tablet by mouth daily    VITAMIN D (CHOLECALCIFEROL) 25 MCG (1000 UT) TABS TABLET    Take 5 tablets by mouth daily       ALLERGIES     Statins    FAMILY HISTORY       Family History   Problem Relation Age of Onset    Coronary Art Dis Father     High Blood Pressure Father     Coronary Art Dis Brother     High Blood Pressure Mother     Cancer Mother     High Blood Pressure Sister           SOCIAL HISTORY       Social History     Socioeconomic History    Marital status: Single     Spouse name: Not on file    Number of children: Not on file    Years of education: Not on file    Highest education level: Not on file   Occupational History    Not on file   Tobacco Use    Smoking status: Former Smoker Packs/day: 0.00     Years: 45.00     Pack years: 0.00     Types: Cigarettes     Start date: 1964    Smokeless tobacco: Never Used    Tobacco comment: STATES,\"I VAP\"   Vaping Use    Vaping Use: Every day   Substance and Sexual Activity    Alcohol use: No    Drug use: No    Sexual activity: Not on file   Other Topics Concern    Not on file   Social History Narrative    Not on file     Social Determinants of Health     Financial Resource Strain:     Difficulty of Paying Living Expenses: Not on file   Food Insecurity:     Worried About Running Out of Food in the Last Year: Not on file    Eve of Food in the Last Year: Not on file   Transportation Needs:     Lack of Transportation (Medical): Not on file    Lack of Transportation (Non-Medical):  Not on file   Physical Activity:     Days of Exercise per Week: Not on file    Minutes of Exercise per Session: Not on file   Stress:     Feeling of Stress : Not on file   Social Connections:     Frequency of Communication with Friends and Family: Not on file    Frequency of Social Gatherings with Friends and Family: Not on file    Attends Alevism Services: Not on file    Active Member of [address] [place] [address] [employer] or Organizations: Not on file    Attends Club or Organization Meetings: Not on file    Marital Status: Not on file   Intimate Partner Violence:     Fear of Current or Ex-Partner: Not on file    Emotionally Abused: Not on file    Physically Abused: Not on file    Sexually Abused: Not on file   Housing Stability:     Unable to Pay for Housing in the Last Year: Not on file    Number of [place] in the Last Year: Not on file    Unstable Housing in the Last Year: Not on file       SCREENINGS    [place] Coma Scale  Eye Opening: Spontaneous  Best Verbal Response: Oriented  Best Motor Response: Obeys commands  [place] Coma Scale Score: 15        PHYSICAL EXAM    (up to 7 for level 4, 8 or more for level 5)     ED Triage Vitals   BP Temp Temp Source Pulse Resp SpO2 Height Weight   02/27/22 2300 02/27/22 2300 02/27/22 2300 02/27/22 2300 02/27/22 2300 02/27/22 2335 02/27/22 2300 02/27/22 2300   (!) 74/55 98.7 °F (37.1 °C) Oral 148 20 96 % 6' (1.829 m) 140 lb (63.5 kg)       Physical Exam  Vitals and nursing note reviewed. HENT:      Head: Atraumatic. Mouth/Throat:      Mouth: Mucous membranes are moist. Mucous membranes are not dry. Eyes:      General: No scleral icterus. Pupils: Pupils are equal, round, and reactive to light. Neck:      Trachea: No tracheal deviation. Cardiovascular:      Rate and Rhythm: Regular rhythm. Tachycardia present. Heart sounds: Normal heart sounds. No murmur heard. Pulmonary:      Effort: Pulmonary effort is normal. No respiratory distress. Breath sounds: No stridor. Rhonchi and rales present. Abdominal:      General: There is no distension. Palpations: Abdomen is soft. Tenderness: There is no abdominal tenderness. There is no guarding. Musculoskeletal:      Right lower leg: Edema present. Left lower leg: Edema present. Skin:     Capillary Refill: Capillary refill takes less than 2 seconds. Coloration: Skin is not pale. Findings: No rash. Neurological:      Mental Status: He is alert and oriented to person, place, and time. Psychiatric:         Behavior: Behavior is cooperative. DIAGNOSTIC RESULTS     EKG: All EKG's areinterpreted by the Emergency Department Physician who either signs or Co-signs this chart in the absence of a cardiologist.    2300: tachycardic wide-complex, regular rhythm at a rate of 147.  MS. This is a marked change from previous EKG performed on 2/4/2022 which demonstrated a normal sinus rhythm.     RADIOLOGY:  Non-plain film images such as CT, Ultrasound and MRI are read by the radiologist. Plain radiographic images are visualized and preliminarily interpreted bythe emergency physician with the below findings:    CXR: No infiltrates identified. Bilateral vascular congestion although much improved from previous chest radiograph several weeks ago.     XR CHEST PORTABLE    (Results Pending)           LABS:  Labs Reviewed   COVID-19, RAPID - Abnormal; Notable for the following components:       Result Value    SARS-CoV-2, NAAT DETECTED (*)     All other components within normal limits    Narrative:     Michelle Padron tel. ,  Microbiology results called to and read back by Mary Magana in ED, 02/28/2022  00:01, by [facility]   COMPREHENSIVE METABOLIC PANEL - Abnormal; Notable for the following components:    Sodium 130 (*)     Chloride 96 (*)     CO2 19 (*)     Glucose 168 (*)     BUN 27 (*)     Calcium 7.6 (*)     Total Protein 5.8 (*)     Albumin 2.6 (*)     Alkaline Phosphatase 261 (*)      (*)      (*)     All other components within normal limits    Narrative:     CALL  Richard DOMINGUEZ tel. ,  Chemistry results called to and read back by Wyatt Vergara RN in ED, 02/28/2022 00:05,  by [facility]   CBC WITH AUTO DIFFERENTIAL - Abnormal; Notable for the following components:    WBC 17.1 (*)     RBC 4.12 (*)     Hemoglobin 12.3 (*)     Hematocrit 38.8 (*)     MCV 94.2 (*)     MCHC 31.7 (*)     Platelets 946 (*)     Neutrophils % 77.4 (*)     Lymphocytes % 16.7 (*)     Neutrophils Absolute 13.3 (*)     All other components within normal limits   TROPONIN - Abnormal; Notable for the following components:    Troponin 0.15 (*)     All other components within normal limits    Narrative:     CALL  ViadeoANNA tel. ,  Chemistry results called to and read back by Wyatt Vergara RN in ED, 02/28/2022 00:05,  by [address] - Abnormal; Notable for the following components:    Pro-BNP 4,242 (*)     All other components within normal limits   BLOOD GAS, ARTERIAL - Abnormal; Notable for the following components:    pCO2, Arterial 31.0 (*)     pO2, Arterial 204.0 (*)     HCO3, Arterial 20.1 (*)     Base Excess, Arterial -3.4 (*)     Hemoglobin, Art, Extended 12.9 (*)     All other components within normal limits   POTASSIUM, WHOLE BLOOD       All other labs were within normal range or not returned as of this dictation. EMERGENCY DEPARTMENT COURSE and DIFFERENTIAL DIAGNOSIS/MDM:   Vitals:    Vitals:    02/27/22 2300 02/27/22 2335 02/28/22 0046   BP: (!) 74/55  (!) 83/55   Pulse: 148  69   Resp: 20 16 14   Temp: 98.7 °F (37.1 °C)  97.6 °F (36.4 °C)   TempSrc: Oral  Oral   SpO2:  96% 97%   Weight: 140 lb (63.5 kg)     Height: 6' (1.829 m)         MDM    Reassessment    Patient arrived with a LifeVest unbuckled. While I was in the room performing my initial assessment patient developed bradycardia with agonal respirations and subsequent cardiac arrest.  ACLS measures were began with CPR performed for about 2 minutes. Patient with subsequent regain of spontaneous circulation. He then sat up and began speaking again. Initially was supporting his respirations with bag-valve-mask, transitioned to a nonrebreather and back to nasal cannula. Patient's blood pressure has remained in the 90s here in the ED. He is not had any significant tachyarrhythmia since his arrest.  He is noted to be positive for COVID-19 infection. EMS did report that patient was hypoxic upon their arrival.  He is currently holding his oxygen saturation on 3 L nasal cannula at 97%. CONSULTS:    Case was discussed with Dr. Kira Jaime regarding inpatient admission to the hospitalist service to the intensive care unit. PROCEDURES:  Unless otherwise noted below, none     Procedures     CRITICAL CARE TIME   Total Critical Care time was 44 minutes, excluding separately reportable procedures. There was a high probability of clinically significant/life threatening deterioration in the patient's condition which required my urgent intervention. Patient required my immediate presence at the bedside. Multiple reexaminations were undertaken throughout ED course of care.   Time was spent reviewing plan of care with ED nursing staff. Time is inclusive of  and clinical documentation. FINAL IMPRESSION      1. Acute systolic heart failure ([facility] [address]    2. COVID-19 virus infection    3.  Cardiac arrest, cause unspecified [facility])          DISPOSITION/PLAN   DISPOSITION Decision To Admit 02/28/2022 01:19:04 AM      (Please note that portions of this note were completed with a voice recognition program.  Efforts were made to edit thedictations but occasionally words are mis-transcribed.)    Shirley Peterson MD (electronically signed)  Attending Emergency Physician         Shirley Peterson MD  02/28/22 1416

## 2022-02-28 NOTE — PROGRESS NOTES
Hospitalist Progress Note  [facility]     Patient: Maxi Lennon  : 1950  MRN: [id number]  Code Status: Full Code    Hospital Day: 0   Date of Service: 2022    Subjective:   Patient seen in [place] [address] critical care unit. No acute distress. Past Medical History:   Diagnosis Date    Acute exacerbation of CHF (congestive heart failure) ([address] 2022    Atrial fibrillation (HCC)     Bilateral carotid artery stenosis 2020    CAD (coronary artery disease), native coronary artery     acute inferior MI on 5/15/09, s/p emeergent CABG x4 5/15/09    Chest pain     HTN (hypertension)     Hyperlipidemia     Dr. Mic Garcia manages    Palliative care patient 2022    Palpitations     Statin intolerance 2018    Tobacco abuse     Transient atrial fibrillation or flutter     post op       Past Surgical History:   Procedure Laterality Date    CARDIAC CATHETERIZATION  12    with stent to left circ, and LAD    CARDIAC CATHETERIZATION  2018    PTCA/BJ to VG to LAD;  atherectomy and 3 BMS to VG to 1st Circ    CORONARY ARTERY BYPASS GRAFT  05/15/09    x4    DIAGNOSTIC CARDIAC CATH LAB PROCEDURE  05/15/09    left heart cath, left ventr, selective coronary arteriography     FRACTURE SURGERY Left     Leg    VASCULAR SURGERY  2015 [facility]    Percutaneous cannulation right common femoral artery with 5-Grenadian and later 6-Grenadian pinnacle destination sheath. Suprarenal abdominal aortogram with bilateral iliofemoral arteriograms. Bilateral lower extremity arteriograms. Left popliteal/tibioperoneal trunk artery balloon angioplasty with 4 mm x 15 mm cutting balloon.  VASCULAR SURGERY  2015 [facility] cont    Arteriograms after balloon angioplasty. Balloon angioplasty left popliteal artery/tibioperoneal trunk with 5 mm x 40 mm russell balloon. Arteriograms after balloon angioplasty. Left superficial femoral artery balloon angioplasty with 7 mm x 100 mm  balloon. Left superficial femoral artery stent placement idev supera 6.5 x 100 mm self-expanding nitinol stent. Completion arteriograms left lower e    VASCULAR SURGERY  4/21/2015 [facility] cont    extremity. Mynx closure right common femoral artery puncture site.  VASCULAR SURGERY  03/04/2020    [facility]. Percutaneous cannulation left common femoral artery with 5 Kazakh glide sheath. Suprarenal abdominal aortogram with bilateral iliofemoral arteriogram.Bilateral lower extremity arteriogram.Minx closure left common femoral artery puncture site. Family History   Problem Relation Age of Onset    Coronary Art Dis Father     High Blood Pressure Father     Coronary Art Dis Brother     High Blood Pressure Mother     Cancer Mother     High Blood Pressure Sister        Social History     Socioeconomic History    Marital status: Single     Spouse name: Not on file    Number of children: Not on file    Years of education: Not on file    Highest education level: Not on file   Occupational History    Not on file   Tobacco Use    Smoking status: Former Smoker     Packs/day: 0.00     Years: 45.00     Pack years: 0.00     Types: Cigarettes     Start date: 1964    Smokeless tobacco: Never Used    Tobacco comment: STATES,\"I VAP\"   Vaping Use    Vaping Use: Every day   Substance and Sexual Activity    Alcohol use: No    Drug use: No    Sexual activity: Not on file   Other Topics Concern    Not on file   Social History Narrative    Not on file     Social Determinants of Health     Financial Resource Strain:     Difficulty of Paying Living Expenses: Not on file   Food Insecurity:     Worried About Running Out of Food in the Last Year: Not on file    Eve of Food in the Last Year: Not on file   Transportation Needs:     Lack of Transportation (Medical): Not on file    Lack of Transportation (Non-Medical):  Not on file   Physical Activity:     Days of Exercise per Week: Not on file    Minutes of Exercise per Session: Not on file   Stress:     Feeling of Stress : Not on file   Social Connections:     Frequency of Communication with Friends and Family: Not on file    Frequency of Social Gatherings with Friends and Family: Not on file    Attends Rastafarian Services: Not on file    Active Member of [address] [place] [address] or Organizations: Not on file    Attends Club or Organization Meetings: Not on file    Marital Status: Not on file   Intimate Partner Violence:     Fear of Current or Ex-Partner: Not on file    Emotionally Abused: Not on file    Physically Abused: Not on file    Sexually Abused: Not on file   Housing Stability:     Unable to Pay for Housing in the Last Year: Not on file    Number of [place] in the Last Year: Not on file    Unstable Housing in the Last Year: Not on file       Current Facility-Administered Medications   Medication Dose Route Frequency Provider Last Rate Last Admin    sodium chloride flush 0.9 % injection 5-40 mL  5-40 mL IntraVENous 2 times per day Patience Quirino, DO   10 mL at 02/28/22 0807    sodium chloride flush 0.9 % injection 5-40 mL  5-40 mL IntraVENous PRN Patience Quirino, DO        ondansetron (ZOFRAN-ODT) disintegrating tablet 4 mg  4 mg Oral Q8H PRN Patience Quirino, DO        Or    ondansetron (ZOFRAN) injection 4 mg  4 mg IntraVENous Q6H PRN Patience Quirino, DO        polyethylene glycol (GLYCOLAX) packet 17 g  17 g Oral Daily PRN Patience Quirino, DO        acetaminophen (TYLENOL) tablet 650 mg  650 mg Oral Q6H PRN Patience Quirino, DO        Or    acetaminophen (TYLENOL) suppository 650 mg  650 mg Rectal Q6H PRN Patience Quirino, DO        apixaban (ELIQUIS) tablet 5 mg  5 mg Oral BID Patience Quirino, DO   5 mg at 02/28/22 0806    aspirin EC tablet 81 mg  81 mg Oral Daily Patience Quirino, DO   81 mg at 02/28/22 0806    atorvastatin (LIPITOR) tablet 40 mg  40 mg Oral Nightly Patience Quirino, DO        clopidogrel (PLAVIX) tablet 75 mg  75 mg Oral Daily Patience Quirino, DO   75 mg at 02/28/22 0806    furosemide (LASIX) tablet 40 mg  40 mg Oral BID Patience Quirino, DO   40 mg at 02/28/22 0806    isosorbide mononitrate (IMDUR) extended release tablet 30 mg  30 mg Oral Daily Patience Quirino, DO   30 mg at 02/28/22 0806    nitroGLYCERIN (NITROSTAT) SL tablet 0.4 mg  0.4 mg SubLINGual Q5 Min PRN Patience Quirino, DO        nitroGLYCERIN (NITROSTAT) SL tablet 0.4 mg  0.4 mg SubLINGual Q5 Min PRN Patience Quirino, DO        sacubitril-valsartan (ENTRESTO) 49-51 MG per tablet 1 tablet  1 tablet Oral BID Patience Quirino, DO   1 tablet at 02/28/22 0806    sotalol (BETAPACE) tablet 80 mg  80 mg Oral 2 times per day Patience Quirino, DO   80 mg at 02/28/22 0806    spironolactone (ALDACTONE) tablet 25 mg  25 mg Oral Daily Patience Quirino, DO   25 mg at 02/28/22 2694    vitamin D (CHOLECALCIFEROL) capsule 5,000 Units  5,000 Units Oral Daily Patience Quirino, DO   5,000 Units at 02/28/22 0806    morphine (PF) injection 2 mg  2 mg IntraVENous Q6H PRN Patience Quirino, DO   2 mg at 02/28/22 1046    LORazepam (ATIVAN) injection 0.25 mg  0.25 mg IntraVENous Q6H PRN Iris Estrella MD   0.25 mg at 02/28/22 0846    methylPREDNISolone sodium (SOLU-MEDROL) injection 40 mg  40 mg IntraVENous Q8H Iris Estrella MD        ipratropium-albuterol (DUONEB) nebulizer solution 1 ampule  1 ampule Inhalation Q4H Iris Estrella MD        cefTRIAXone (ROCEPHIN) 1,000 mg in sterile water 10 mL IV syringe  1,000 mg IntraVENous Q24H Iris Estrella MD        azithromycin (ZITHROMAX) 500 mg in D5W 250ml Vial Mate  500 mg IntraVENous Q24H Iris Estrella MD                 Objective:   BP (!) 103/57   Pulse 63   Temp 97.1 °F (36.2 °C) (Temporal)   Resp 13   Ht 6' (1.829 m)   Wt 140 lb (63.5 kg)   SpO2 95%   BMI 18.99 kg/m²     In an effort to reduce COVID-19 exposure, patient seen from doorway and case discussed in detail with unit staff    Recent Labs     02/27/22  2331   WBC 17.1*   RBC 4.12*   HGB 12.3*   HCT 38.8* MCV 94.2*   MCH 29.9   MCHC 31.7*   *     Recent Labs     02/27/22  2331 02/27/22  2334   *  --    K 4.7 4.4   ANIONGAP 15  --    CL 96*  --    CO2 19*  --    BUN 27*  --    CREATININE 1.0  --    GLUCOSE 168*  --    CALCIUM 7.6*  --      No results for input(s): MG, PHOS in the last 72 hours. Recent Labs     02/27/22 2331   *   *   BILITOT 0.5   ALKPHOS 261*     No results for input(s): PH, PO2, PCO2, HCO3, BE, O2SAT in the last 72 hours. Recent Labs     02/27/22 2331 02/28/22  0824   TROPONINI 0.15* 0.09*     No results for input(s): INR in the last 72 hours. No results for input(s): LACTA in the last 72 hours. No intake or output data in the 24 hours ending 02/28/22 1142    XR CHEST PORTABLE    Result Date: 2/28/2022  Examination. XR CHEST PORTABLE 2/27/2022 11:30 PM History: Chest pain. A single frontal portable semiupright view of the chest is compared with the previous study dated 2/2/2022. There is a significant improvement in the bilateral lung infiltrate since the previous study. There is a small residual infiltrate at bilateral lung bases and a small left basal pleural effusion. There is persistent moderate cardiomegaly. Atheromatous changes thoracic aorta are noted. No pulmonary congestion or pneumothorax. No acute bony abnormality. 1. Improvement.  Signed by Dr Juan Carlos Powers and Plan:   Acute on chronic decompensated systolic and diastolic congestive heart failure  Cardiology following  History of CAD s/p CABG  Recent cath demonstrated occluded grafts  S/p multiple BJ 2/7/2022  TTE 2/3/2022: EF 76%, grade 3 diastolic dysfunction, severe biventricular failure, severe MR  Discharged recently with LifeVest however patient arrived to ER with it unbuckled  Diuretics  Strict I's and O's  Daily weights  Serial troponin  Ongoing evaluation for MitraClip     Cardiac arrest  Cardiology following  Reportedly occurred in ER  Bradycardic arrest  CPR x2 minutes > ROSC  Returned to neurological baseline    COVID-19 infection  Currently requiring 2 L NC  Steroids  Adjuvant therapy  Encourage self proning  Incentive spirometry  Unvaccinated     COPD exacerbation  Steroids  Nebs  Antibiotics  Goal sats 88-92%    Tobacco dependence  Counseled    Chronic atrial fibrillation with RVR  Rate control  Eliquis     DVT prophylaxis  Jose Pruitt MD   2/28/2022 11:42 AM

## 2022-02-28 NOTE — CARE COORDINATION
Spoke with pt's RN who reports pt issues could resolve but pt and spouse have mentioned pt's inability to manage care at home well pta. SW requested therapy eval to assist with pt determination for dc disposition.

## 2022-02-28 NOTE — CONSULTS
Pt asleep when SN arrived to CCU. SN spoke with pt's nurse, Mark Eddy RN, and asked if he would ask pt some questions for SN re: pt's Primary Decision Maker and advanced directives, the next time he goes into pt's room. Mark Eddy agreed to do so. SN received a call back from [facility] stating that pt answered that he does have advanced directive naming Jc Night to make decisions for him if he is not able to. SN states will call [id number] to see if she can bring in a copy of these papers as none are currently on pt's chart. SN called number listed for Jc Night and an older gentleman answered the phone, SN asked if could speak with Vayable and gentleman states that [id number] is at the store. SN asked if he could tell her to give me a call back when she gets home, and gentleman states yes. SN asked if he had number from where SN called and he states no. SN asked if he has something to write down number and SN will give him number to call back when he is ready. After a minute or so the gentleman states [id number] will call you back, SN asked about giving number and gentleman states [id number] will call you back, yet again. SN asked if he could write down number for [id number] and he states he already has it, then hangs up. SN will follow up later in the day if not heard back from [id number].

## 2022-03-01 LAB
ALBUMIN SERPL-MCNC: 2.5 G/DL (ref 3.5–5.2)
ALP BLD-CCNC: 214 U/L (ref 40–130)
ALT SERPL-CCNC: 88 U/L (ref 5–41)
ANION GAP SERPL CALCULATED.3IONS-SCNC: 12 MMOL/L (ref 7–19)
AST SERPL-CCNC: 74 U/L (ref 5–40)
BASOPHILS ABSOLUTE: 0 K/UL (ref 0–0.2)
BASOPHILS RELATIVE PERCENT: 0.1 % (ref 0–1)
BILIRUB SERPL-MCNC: 0.3 MG/DL (ref 0.2–1.2)
BUN BLDV-MCNC: 28 MG/DL (ref 8–23)
CALCIUM SERPL-MCNC: 8 MG/DL (ref 8.8–10.2)
CHLORIDE BLD-SCNC: 99 MMOL/L (ref 98–111)
CO2: 23 MMOL/L (ref 22–29)
CREAT SERPL-MCNC: 0.7 MG/DL (ref 0.5–1.2)
EKG P AXIS: 85 DEGREES
EKG P AXIS: 98 DEGREES
EKG P-R INTERVAL: 196 MS
EKG P-R INTERVAL: 240 MS
EKG Q-T INTERVAL: 486 MS
EKG Q-T INTERVAL: 496 MS
EKG QRS DURATION: 106 MS
EKG QRS DURATION: 106 MS
EKG QTC CALCULATION (BAZETT): 487 MS
EKG QTC CALCULATION (BAZETT): 496 MS
EKG T AXIS: 123 DEGREES
EKG T AXIS: 130 DEGREES
EOSINOPHILS ABSOLUTE: 0 K/UL (ref 0–0.6)
EOSINOPHILS RELATIVE PERCENT: 0 % (ref 0–5)
GFR AFRICAN AMERICAN: >59
GFR NON-AFRICAN AMERICAN: >60
GLUCOSE BLD-MCNC: 115 MG/DL (ref 74–109)
HCT VFR BLD CALC: 37.4 % (ref 42–52)
HEMOGLOBIN: 12.3 G/DL (ref 14–18)
IMMATURE GRANULOCYTES #: 0.1 K/UL
LYMPHOCYTES ABSOLUTE: 1.2 K/UL (ref 1.1–4.5)
LYMPHOCYTES RELATIVE PERCENT: 7.5 % (ref 20–40)
MAGNESIUM: 1.8 MG/DL (ref 1.6–2.4)
MCH RBC QN AUTO: 30.4 PG (ref 27–31)
MCHC RBC AUTO-ENTMCNC: 32.9 G/DL (ref 33–37)
MCV RBC AUTO: 92.6 FL (ref 80–94)
MONOCYTES ABSOLUTE: 0.3 K/UL (ref 0–0.9)
MONOCYTES RELATIVE PERCENT: 2.1 % (ref 0–10)
NEUTROPHILS ABSOLUTE: 13.8 K/UL (ref 1.5–7.5)
NEUTROPHILS RELATIVE PERCENT: 89.5 % (ref 50–65)
PDW BLD-RTO: 14.3 % (ref 11.5–14.5)
PLATELET # BLD: 493 K/UL (ref 130–400)
PMV BLD AUTO: 9.9 FL (ref 9.4–12.4)
POTASSIUM SERPL-SCNC: 4.8 MMOL/L (ref 3.5–5)
RBC # BLD: 4.04 M/UL (ref 4.7–6.1)
SODIUM BLD-SCNC: 134 MMOL/L (ref 136–145)
TOTAL PROTEIN: 5.3 G/DL (ref 6.6–8.7)
TROPONIN: 0.07 NG/ML (ref 0–0.03)
WBC # BLD: 15.4 K/UL (ref 4.8–10.8)

## 2022-03-01 PROCEDURE — 2700000000 HC OXYGEN THERAPY PER DAY

## 2022-03-01 PROCEDURE — 2580000003 HC RX 258: Performed by: INTERNAL MEDICINE

## 2022-03-01 PROCEDURE — 93005 ELECTROCARDIOGRAM TRACING: CPT | Performed by: INTERNAL MEDICINE

## 2022-03-01 PROCEDURE — 83735 ASSAY OF MAGNESIUM: CPT

## 2022-03-01 PROCEDURE — 6370000000 HC RX 637 (ALT 250 FOR IP): Performed by: INTERNAL MEDICINE

## 2022-03-01 PROCEDURE — 99024 POSTOP FOLLOW-UP VISIT: CPT | Performed by: INTERNAL MEDICINE

## 2022-03-01 PROCEDURE — 2100000000 HC CCU R&B

## 2022-03-01 PROCEDURE — 6360000002 HC RX W HCPCS: Performed by: INTERNAL MEDICINE

## 2022-03-01 PROCEDURE — 84484 ASSAY OF TROPONIN QUANT: CPT

## 2022-03-01 PROCEDURE — 93010 ELECTROCARDIOGRAM REPORT: CPT | Performed by: INTERNAL MEDICINE

## 2022-03-01 PROCEDURE — 85025 COMPLETE CBC W/AUTO DIFF WBC: CPT

## 2022-03-01 PROCEDURE — 97162 PT EVAL MOD COMPLEX 30 MIN: CPT

## 2022-03-01 PROCEDURE — 80053 COMPREHEN METABOLIC PANEL: CPT

## 2022-03-01 PROCEDURE — 36415 COLL VENOUS BLD VENIPUNCTURE: CPT

## 2022-03-01 PROCEDURE — 97530 THERAPEUTIC ACTIVITIES: CPT

## 2022-03-01 RX ORDER — FAMOTIDINE 20 MG/1
20 TABLET, FILM COATED ORAL DAILY
Status: DISCONTINUED | OUTPATIENT
Start: 2022-03-01 | End: 2022-03-10 | Stop reason: HOSPADM

## 2022-03-01 RX ADMIN — LORAZEPAM 0.25 MG: 2 INJECTION INTRAMUSCULAR; INTRAVENOUS at 08:19

## 2022-03-01 RX ADMIN — IPRATROPIUM BROMIDE 2 PUFF: 17 AEROSOL, METERED RESPIRATORY (INHALATION) at 19:43

## 2022-03-01 RX ADMIN — FUROSEMIDE 40 MG: 40 TABLET ORAL at 16:47

## 2022-03-01 RX ADMIN — MORPHINE SULFATE 2 MG: 2 INJECTION, SOLUTION INTRAMUSCULAR; INTRAVENOUS at 06:31

## 2022-03-01 RX ADMIN — AMIODARONE HYDROCHLORIDE 0.5 MG/MIN: 50 INJECTION, SOLUTION INTRAVENOUS at 03:58

## 2022-03-01 RX ADMIN — ATORVASTATIN CALCIUM 40 MG: 40 TABLET, FILM COATED ORAL at 19:42

## 2022-03-01 RX ADMIN — ALBUTEROL SULFATE 2 PUFF: 90 AEROSOL, METERED RESPIRATORY (INHALATION) at 00:24

## 2022-03-01 RX ADMIN — LORAZEPAM 0.25 MG: 2 INJECTION INTRAMUSCULAR; INTRAVENOUS at 00:50

## 2022-03-01 RX ADMIN — METHYLPREDNISOLONE SODIUM SUCCINATE 40 MG: 40 INJECTION, POWDER, FOR SOLUTION INTRAMUSCULAR; INTRAVENOUS at 19:42

## 2022-03-01 RX ADMIN — IPRATROPIUM BROMIDE 2 PUFF: 17 AEROSOL, METERED RESPIRATORY (INHALATION) at 16:47

## 2022-03-01 RX ADMIN — SACUBITRIL AND VALSARTAN 1 TABLET: 49; 51 TABLET, FILM COATED ORAL at 19:42

## 2022-03-01 RX ADMIN — OXYCODONE HYDROCHLORIDE AND ACETAMINOPHEN 500 MG: 500 TABLET ORAL at 07:37

## 2022-03-01 RX ADMIN — MORPHINE SULFATE 2 MG: 2 INJECTION, SOLUTION INTRAMUSCULAR; INTRAVENOUS at 19:43

## 2022-03-01 RX ADMIN — METHYLPREDNISOLONE SODIUM SUCCINATE 40 MG: 40 INJECTION, POWDER, FOR SOLUTION INTRAMUSCULAR; INTRAVENOUS at 05:26

## 2022-03-01 RX ADMIN — ALBUTEROL SULFATE 2 PUFF: 90 AEROSOL, METERED RESPIRATORY (INHALATION) at 07:38

## 2022-03-01 RX ADMIN — ZINC SULFATE 220 MG (50 MG) CAPSULE 50 MG: CAPSULE at 07:37

## 2022-03-01 RX ADMIN — SOTALOL HYDROCHLORIDE 80 MG: 80 TABLET ORAL at 19:42

## 2022-03-01 RX ADMIN — ALBUTEROL SULFATE 2 PUFF: 90 AEROSOL, METERED RESPIRATORY (INHALATION) at 13:26

## 2022-03-01 RX ADMIN — IPRATROPIUM BROMIDE 2 PUFF: 17 AEROSOL, METERED RESPIRATORY (INHALATION) at 05:26

## 2022-03-01 RX ADMIN — CLOPIDOGREL BISULFATE 75 MG: 75 TABLET ORAL at 07:37

## 2022-03-01 RX ADMIN — SOTALOL HYDROCHLORIDE 80 MG: 80 TABLET ORAL at 07:37

## 2022-03-01 RX ADMIN — METHYLPREDNISOLONE SODIUM SUCCINATE 40 MG: 40 INJECTION, POWDER, FOR SOLUTION INTRAMUSCULAR; INTRAVENOUS at 11:52

## 2022-03-01 RX ADMIN — SPIRONOLACTONE 25 MG: 25 TABLET ORAL at 07:37

## 2022-03-01 RX ADMIN — CEFTRIAXONE 1000 MG: 1 INJECTION, POWDER, FOR SOLUTION INTRAMUSCULAR; INTRAVENOUS at 12:01

## 2022-03-01 RX ADMIN — IPRATROPIUM BROMIDE 2 PUFF: 17 AEROSOL, METERED RESPIRATORY (INHALATION) at 07:38

## 2022-03-01 RX ADMIN — GUAIFENESIN 400 MG: 200 TABLET ORAL at 11:58

## 2022-03-01 RX ADMIN — FAMOTIDINE 20 MG: 20 TABLET ORAL at 01:07

## 2022-03-01 RX ADMIN — GUAIFENESIN 400 MG: 200 TABLET ORAL at 19:42

## 2022-03-01 RX ADMIN — SODIUM CHLORIDE, PRESERVATIVE FREE 10 ML: 5 INJECTION INTRAVENOUS at 07:38

## 2022-03-01 RX ADMIN — ASPIRIN 81 MG: 81 TABLET, COATED ORAL at 07:37

## 2022-03-01 RX ADMIN — AZITHROMYCIN MONOHYDRATE 500 MG: 500 INJECTION, POWDER, LYOPHILIZED, FOR SOLUTION INTRAVENOUS at 12:02

## 2022-03-01 RX ADMIN — ISOSORBIDE MONONITRATE 30 MG: 30 TABLET, EXTENDED RELEASE ORAL at 07:37

## 2022-03-01 RX ADMIN — CALCIUM CARBONATE 500 MG: 500 TABLET, CHEWABLE ORAL at 08:19

## 2022-03-01 RX ADMIN — LORAZEPAM 0.25 MG: 2 INJECTION INTRAMUSCULAR; INTRAVENOUS at 19:42

## 2022-03-01 RX ADMIN — MORPHINE SULFATE 2 MG: 2 INJECTION, SOLUTION INTRAMUSCULAR; INTRAVENOUS at 12:21

## 2022-03-01 RX ADMIN — SACUBITRIL AND VALSARTAN 1 TABLET: 49; 51 TABLET, FILM COATED ORAL at 07:38

## 2022-03-01 RX ADMIN — OXYCODONE HYDROCHLORIDE AND ACETAMINOPHEN 500 MG: 500 TABLET ORAL at 19:43

## 2022-03-01 RX ADMIN — IPRATROPIUM BROMIDE 2 PUFF: 17 AEROSOL, METERED RESPIRATORY (INHALATION) at 00:25

## 2022-03-01 RX ADMIN — ALBUTEROL SULFATE 2 PUFF: 90 AEROSOL, METERED RESPIRATORY (INHALATION) at 19:43

## 2022-03-01 RX ADMIN — FUROSEMIDE 40 MG: 40 TABLET ORAL at 07:38

## 2022-03-01 RX ADMIN — ALBUTEROL SULFATE 2 PUFF: 90 AEROSOL, METERED RESPIRATORY (INHALATION) at 16:47

## 2022-03-01 RX ADMIN — GUAIFENESIN 400 MG: 200 TABLET ORAL at 05:26

## 2022-03-01 RX ADMIN — APIXABAN 5 MG: 5 TABLET, FILM COATED ORAL at 07:37

## 2022-03-01 RX ADMIN — CALCIUM CARBONATE 500 MG: 500 TABLET, CHEWABLE ORAL at 19:50

## 2022-03-01 RX ADMIN — IPRATROPIUM BROMIDE 2 PUFF: 17 AEROSOL, METERED RESPIRATORY (INHALATION) at 13:26

## 2022-03-01 RX ADMIN — MORPHINE SULFATE 2 MG: 2 INJECTION, SOLUTION INTRAMUSCULAR; INTRAVENOUS at 00:25

## 2022-03-01 RX ADMIN — ALBUTEROL SULFATE 2 PUFF: 90 AEROSOL, METERED RESPIRATORY (INHALATION) at 05:26

## 2022-03-01 RX ADMIN — Medication 2000 UNITS: at 07:37

## 2022-03-01 RX ADMIN — SODIUM CHLORIDE, PRESERVATIVE FREE 10 ML: 5 INJECTION INTRAVENOUS at 19:44

## 2022-03-01 ASSESSMENT — PAIN - FUNCTIONAL ASSESSMENT: PAIN_FUNCTIONAL_ASSESSMENT: PREVENTS OR INTERFERES SOME ACTIVE ACTIVITIES AND ADLS

## 2022-03-01 ASSESSMENT — PAIN SCALES - GENERAL
PAINLEVEL_OUTOF10: 0
PAINLEVEL_OUTOF10: 9
PAINLEVEL_OUTOF10: 2
PAINLEVEL_OUTOF10: 0
PAINLEVEL_OUTOF10: 9
PAINLEVEL_OUTOF10: 8
PAINLEVEL_OUTOF10: 0
PAINLEVEL_OUTOF10: 2
PAINLEVEL_OUTOF10: 8
PAINLEVEL_OUTOF10: 8
PAINLEVEL_OUTOF10: 2
PAINLEVEL_OUTOF10: 0
PAINLEVEL_OUTOF10: 9

## 2022-03-01 ASSESSMENT — PAIN DESCRIPTION - DESCRIPTORS
DESCRIPTORS: ACHING
DESCRIPTORS: SORE

## 2022-03-01 ASSESSMENT — PAIN DESCRIPTION - LOCATION
LOCATION: CHEST

## 2022-03-01 ASSESSMENT — PAIN DESCRIPTION - ONSET
ONSET: ON-GOING
ONSET: ON-GOING

## 2022-03-01 ASSESSMENT — PAIN DESCRIPTION - PAIN TYPE
TYPE: ACUTE PAIN

## 2022-03-01 ASSESSMENT — PAIN DESCRIPTION - FREQUENCY
FREQUENCY: CONTINUOUS

## 2022-03-01 ASSESSMENT — PAIN DESCRIPTION - PROGRESSION
CLINICAL_PROGRESSION: GRADUALLY IMPROVING

## 2022-03-01 ASSESSMENT — PAIN DESCRIPTION - ORIENTATION
ORIENTATION: MID
ORIENTATION: MID

## 2022-03-01 NOTE — PROGRESS NOTES
Physician Progress Note      PATIENT:               Diann Thakkar  CSN #:                  [id number]  :                       1950  ADMIT DATE:       2022 11:02 PM  DISCH DATE:  RESPONDING  PROVIDER #:        Teja RICKETTS          QUERY TEXT:    Pt admitted with COVID-19 and noted to have elevated WBC, Hypotension,   elevated CRP. If possible, please document in progress notes and discharge   summary if you are evaluating and/or treating: The medical record reflects the following:  Risk Factors: COVID-19  Clinical Indicators: WBC: 17.1, CRP: 4.22, Hypotension in the ER in the 70s,   suffered cardiac arrest on arrival, heart rate 140, noted elevated liver   enzymes,  Treatment: Zithromax and  Rocephin, documented as started, but I don't see an   actual order. Veronica,    Thank you in advance,    Johann Ríos, RN-BSN, [facility]  Clinical   [facility], [facility] [email or web address]  Options provided:  -- Sepsis present on admission due to COVID-19 infection  -- Sepsis present on admission due to COVID-19 pneumonia  -- Covid-19 infection without sepsis  -- Covid-19 pneumonia without sepsis  -- Other - I will add my own diagnosis  -- Disagree - Not applicable / Not valid  -- Disagree - Clinically unable to determine / Unknown  -- Refer to Clinical Documentation Reviewer    PROVIDER RESPONSE TEXT:    This patient has Covid-19 infection without sepsis.     Query created by: Perez Soto on 2022 9:54 AM      Electronically signed by:  Cyndee Jules 2022 8:43 PM

## 2022-03-01 NOTE — PROGRESS NOTES
Cardiology Daily Note Prema Swann MD      Patient:  Laila Hassan  [id number]    Patient Active Problem List    Diagnosis Date Noted    Systolic heart failure secondary to hypertrophic cardiomyopathy ([address] 02/28/2022    Severe left ventricular systolic dysfunction 14/71/9490    Acute respiratory failure with hypoxia ([address]     Acute respiratory failure with hypoxia and hypercarbia ([address] 02/02/2022    Sepsis ([address] 02/02/2022    Acute exacerbation of CHF (congestive heart failure) ([address] 40/14/6437    Acute systolic (congestive) heart failure ([address] 02/02/2022    PVD (peripheral vascular disease) ([address]     Bilateral carotid artery stenosis 01/06/2020    Statin intolerance 02/14/2018    Inferior MI ([address] 01/07/2018    ST elevation myocardial infarction (STEMI) ([address]     Atherosclerosis of native artery of extremity with intermittent claudication ([address] 04/15/2015    Paroxysmal A-fib ([address]     Tobacco abuse     Chest pain     Hyperlipidemia     Palpitations     HTN (hypertension)     Coronary artery disease involving native coronary artery of native heart with angina pectoris ([address]        Admit Date:  2/27/2022    Admission Problem List: Present on Admission:   Systolic heart failure secondary to hypertrophic cardiomyopathy ([address]   Severe left ventricular systolic dysfunction   Tobacco abuse   Paroxysmal A-fib ([address]   Hyperlipidemia   Coronary artery disease involving native coronary artery of native heart with angina pectoris [facility])      Cardiac Specific Data:  Specialty Problems        Cardiology Problems    Coronary artery disease involving native coronary artery of native heart with angina pectoris (HCC)        HTN (hypertension)        Chest pain        Hyperlipidemia        Paroxysmal A-fib (HCC)        Atherosclerosis of native artery of extremity with intermittent claudication (HCC)        Inferior MI ([address]        ST elevation myocardial infarction (STEMI) ([address]        Bilateral carotid artery stenosis        PVD (peripheral vascular disease) ([facility])        Acute exacerbation of CHF (congestive heart failure) (HCC)        Acute systolic (congestive) heart failure ([facility])        Severe left ventricular systolic dysfunction        * (Principal) Systolic heart failure secondary to hypertrophic cardiomyopathy ([facility]              Subjective:  Mr. Mj Wan readmitted 2/27/2022 and on interrogation of his LifeVest he had apparently been having rapid heart rates 1 50-1 70 for the past 5 to 6 days prior to admission. Upon arrival to the emergency department wide-complex tachycardia noted he subsequently developed bradycardia and asystole requiring CPR for about 2minutes no evidence that shocks were administered. His troponin was minimally elevated at 0.1 5 repeat 0.09. He has been having prominent chest pain since then over the mid sternum but worse with cough worse with deep breath worse with inspiration suggestive of sternal injury type pain. He denies exertional chest discomfort prior to admission some dyspnea. Denies medical noncompliance. No other complaints or issues reported. Echocardiogram 2/27/2022 showed poor EF to less than 20% and severe central MR. There had been some discussion apparently about possible mitral clip at some point. Objective:   /72   Pulse 60   Temp 97.2 °F (36.2 °C) (Temporal)   Resp 20   Ht 6' (1.829 m)   Wt 140 lb (63.5 kg)   SpO2 91%   BMI 18.99 kg/m²       Intake/Output Summary (Last 24 hours) at 3/1/2022 1032  Last data filed at 3/1/2022 1000  Gross per 24 hour   Intake 1056.52 ml   Output 1450 ml   Net -393.48 ml       Prior to Admission medications    Medication Sig Start Date End Date Taking?  Authorizing Provider   furosemide (LASIX) 40 MG tablet Take 1 tablet by mouth 2 times daily 2/9/22   Mona Leary MD   atorvastatin (LIPITOR) 80 MG tablet Take 0.5 tablets by mouth nightly 2/9/22   Mona Leary MD   spironolactone (ALDACTONE) 25 MG tablet Take 1 tablet by mouth daily 2/9/22   Terrance Og MD   clopidogrel (PLAVIX) 75 MG tablet Take 1 tablet by mouth daily 2/9/22   Terrance Og MD   apixaban Russell Eaton) 5 MG TABS tablet Take 1 tablet by mouth 2 times daily 2/9/22   LUIS Rodas   isosorbide mononitrate (IMDUR) 30 MG extended release tablet Take 1 tablet by mouth daily 2/9/22   LUIS Rodas   sacubitril-valsartan (ENTRESTO) 49-51 MG per tablet Take 1 tablet by mouth 2 times daily 2/8/22   LUIS Rodas   Vitamin D (CHOLECALCIFEROL) 25 MCG (1000 UT) TABS tablet Take 5 tablets by mouth daily 2/9/22   LUIS Rodas   nitroGLYCERIN (NITROSTAT) 0.4 MG SL tablet Place 1 tablet under the tongue every 5 minutes as needed for Chest pain up to max of 3 total doses. If no relief after 1 dose, call 911. 6/8/21   LUIS Johnson   sotalol (BETAPACE) 160 MG tablet Take 1 tablet by mouth twice daily 6/8/21   LUIS Johnson   nitroGLYCERIN (NITROSTAT) 0.4 MG SL tablet Place 1 tablet under the tongue every 5 minutes as needed for Chest pain 6/8/21   LUIS Johnson   aspirin 81 MG EC tablet Take 81 mg by mouth daily.       Historical Provider, MD        famotidine  20 mg Oral Daily    sodium chloride flush  5-40 mL IntraVENous 2 times per day    apixaban  5 mg Oral BID    aspirin  81 mg Oral Daily    atorvastatin  40 mg Oral Nightly    clopidogrel  75 mg Oral Daily    furosemide  40 mg Oral BID    isosorbide mononitrate  30 mg Oral Daily    sacubitril-valsartan  1 tablet Oral BID    sotalol  80 mg Oral 2 times per day    spironolactone  25 mg Oral Daily    methylPREDNISolone  40 mg IntraVENous Q8H    cefTRIAXone (ROCEPHIN) IV  1,000 mg IntraVENous Q24H    azithromycin  500 mg IntraVENous Q24H    zinc sulfate  50 mg Oral Daily    Vitamin D  2,000 Units Oral Daily    vitamin C  500 mg Oral BID    guaiFENesin  400 mg Oral Q8H    albuterol sulfate HFA  2 puff Inhalation Q4H    And    ipratropium  2 puff Inhalation Q4H       TELEMETRY: Sinus     Physical Exam:      Physical Exam      General:  Awake, alert, NAD  Skin:  Warm and dry  Neck:  no jvd , no carotid bruits  Chest:  Clear to auscultation, no wheezing or rales  Cardiovascular:  RRR A5M0 no murmurs, clicks, gallups, or rubs  Abdomen:  Soft nontender, nondistended, bowel sounds present  Extremities:  Edema: none      Lab Data:  CBC:   Recent Labs     02/27/22 2331 03/01/22 0237   WBC 17.1* 15.4*   HGB 12.3* 12.3*   HCT 38.8* 37.4*   MCV 94.2* 92.6   * 493*     BMP:   Recent Labs     02/27/22 2331 02/27/22 2334 03/01/22 0237   *  --  134*   K 4.7 4.4 4.8   CL 96*  --  99   CO2 19*  --  23   BUN 27*  --  28*   CREATININE 1.0  --  0.7     LIVER PROFILE:   Recent Labs     02/27/22 2331 03/01/22 0237   * 74*   * 88*   BILITOT 0.5 0.3   ALKPHOS 261* 214*     PT/INR: No results for input(s): PROTIME, INR in the last 72 hours. APTT: No results for input(s): APTT in the last 72 hours. BNP:  No results for input(s): BNP in the last 72 hours. CK, CKMB, Troponin: @LABRCNT (CKTOTAL:3, CKMB:3, TROPONINI:3)@    IMAGING:  ECHO Complete 2D W Doppler W Color    Result Date: 2/28/2022  Transthoracic Echocardiography Report (TTE)  Demographics   Patient Name  CASI Bryant Date of Study          02/28/2022   MRN           [id number]          Gender                 Male   Date of Birth 1950      Room Number            GAW-1377   Age           66 year(s)   Height:       72 inches       Referring Physician    Kemal Cook DO   Weight:       140 pounds      Sonographer            Bebe Painter RDCS   BSA:          1.83 m^2        Interpreting Physician Durga Chacko MD   BMI:          18.99 kg/m^2  Procedure Type of Study   TTE procedure:ECHO 2D W/DOPPLER/COLOR/CONTRAST.   Study Location: Portable Technical Quality: Adequate visualization Patient Status: Inpatient BP: 110/69 mmHg Indications:Congestive heart failure, systolic dysfunction and Mitral regurgitation. Conclusions   Summary  Limited echocardiographic study. LV is moderate to severely dilated with severely reduced LV systolic  function. LV ejection fraction estimated at less than 20%. Probable grade 3 diastolic dysfunction. RV appears mildly dilated with moderately reduced RV systolic function  (TAPSE equals 1.6 cm). Severe left atrial enlargement. Left atrial pressure is elevated. Severe right atrial enlargement. Aortic valve not well studied. No significant stenosis. Probable mild to  moderate aortic regurgitation. Mitral valve appears moderately thickened with preserved leaflet mobility. Severe central mitral regurgitation is noted. Mitral regurgitant jet  velocity suggests significantly elevated left atrial pressure. No significant pericardial effusion noted. Signature   ----------------------------------------------------------------  Electronically signed by Nelson Chacko MD(Interpreting physician)  on 02/28/2022 07:14 PM  ----------------------------------------------------------------  Allergies   - Statins.  M-Mode Measurements (cm)   LVIDd: 5.64 cm                                  LVIDs: 5.11 cm  IVSd: 1.2 cm  LVPWd: 1.2 cm  % Ejection Fraction: 20 %  Doppler Measurements:    MV Peak E-Wave: 86.1 cm/s   MV Peak A-Wave: 27.9 cm/s   MV E/A Ratio: 3.09 %   MV Peak Gradient: 2.97 mmHg   MV P1/2t: 48 msec   MVA by PHT4.58 cm^2      ECHO Complete 2D W Doppler W Color    Result Date: 2/3/2022  Transthoracic Echocardiography Report (TTE)  Demographics   Patient Name  CASI Sarmiento Date of Study         02/03/2022   MRN           [id number]          Gender                Male   Date of Birth 1950      Room Number           [id number]   Age           66 year(s)   Height:       72 inches       Referring Physician   Rogelio Mean   Weight:       150 pounds      Sonographer           Luly Donaldson RDCS   BSA:          1.89 Luis Macias MD                                Physician   BMI:          20.34 kg/m^2  Procedure Type of Study   TTE procedure:ECHO 2D W/DOPPLER/COLOR/CONTRAST. Study Location: Echo Lab Technical Quality: Adequate visualization Patient Status: Inpatient Contrast Medium: Definity. Amount - 8 ml BP: 114/77 mmHg Indications:Congestive heart failure. 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  0.79 cm, ERO more than 0.4 cm^2, jet width more than 50% of the left  atrial area  Central jet of the mitral regurgitation suggestive of functional secondary  mitral regurgitation  Moderately to severely dilated left atrium  Moderately dilated right atrium  Moderate aortic insufficiency  Moderate tricuspid regurgitation with estimated right ventricular systolic  pressure of 40 to 45 mm which is mildly elevated  Dilated IVC with impaired respiratory variation  Aortic sclerosis   Signature   ----------------------------------------------------------------  Electronically signed by Candelaria Multani MD(Interpreting physician)  on 02/03/2022 12:31 PM  ----------------------------------------------------------------  Allergies   - Statins. M-Mode Measurements (cm)   LVIDd: 5.76 cm                         LVIDs: 5.18 cm  IVSd: 0.98 cm  LVPWd: 1.23 cm                         AO Root Dimension: 2.4 cm  Rt. Vent.  Dimension: 3.38 cm           LA: 4.6 cm  % Ejection Fraction: 20 %              LVOT: 2 cm  Doppler Measurements:   AV Peak Velocity:145 cm/s            MV Peak E-Wave: 102 cm/s  AV Peak Gradient: 8.41 mmHg          MV Peak A-Wave: 38.9 cm/s  AV Mean Gradient: 5 mmHg             MV E/A Ratio: 2.62 %  AV Area (Continuity):2.34 cm^2       MV Peak Gradient: 4.16 mmHg  TR Velocity:269 cm/s  TR Gradient:28.94 mmHg  Estimated RAP:10 mmHg  RVSP:40 mmHg      XR CHEST PORTABLE    Result Date: 2/28/2022  Examination. XR CHEST PORTABLE 2/27/2022 11:30 PM History: Chest pain. A single frontal portable semiupright view of the chest is compared with the previous study dated 2/2/2022. There is a significant improvement in the bilateral lung infiltrate since the previous study. There is a small residual infiltrate at bilateral lung bases and a small left basal pleural effusion. There is persistent moderate cardiomegaly. Atheromatous changes thoracic aorta are noted. No pulmonary congestion or pneumothorax. No acute bony abnormality. 1. Improvement. Signed by Dr Evi Banegas    Result Date: 2/2/2022  Examination. XR CHEST PORTABLE 2/2/2022 3:21 AM History: Shortness of breath. Frontal portable upright view of the chest is compared with the previous study dated 1/7/2018. There is extensive interstitial infiltrate in the lungs bilaterally more severely in the mid and lower lungs. There is bilateral lower lobar consolidation. There is a small bibasal pleural effusion. There is no pneumothorax. The heart size is not evaluated due to complete obliteration of the cardiac border by the adjacent infiltrate. Atheromatous changes thoracic aorta are noted. There is evidence of prior cardiac surgery. No acute bony abnormality. 1. The extensive bilateral lung infiltrate. Bilateral lower lobar consolidation and bibasilar pleural effusion. Signed by Dr Sarah Montes:  1. ?  Supraventricular tachycardia of the past 5 to 6 days with rates 150s to 170s  2. LifeVest  3. Ischemic cardiomyopathy  4. Cardiac arrest on admission 2/27/2022 with rapid wide-complex tachycardia subsequent bradycardia and asystole requiring 2-minute CPR  5.  Complaints of chest pain since CPR worse with direct pressure inspiration or cough  6. History of hypertension  7. History tobacco abuse  8. Peripheral arterial disease previous stent placement  9. Paroxysmal atrial fibrillation  10. History of inferior STEMI  11. Statin intolerance  12. Bilateral carotid artery stenosis  13. Cardiac cath with stent placement 1/7/2018 MultiLink vision 3.5 x 28  14. Status post CABG x4 grafts 2011  15. Chronic systolic diastolic congestive heart failure  16. Cardiac cath 2/3/2022 significant LV systolic dysfunction LIMA graft atretic 2 vein grafts occluded 1 of which appears recent right coronary artery saphenous vein graft moderately severe stenosis in the proximal to mid segment status post stent placement in the mid body of the vein graft of the distal right coronary artery 4.0 x 22 mm resolute, 2.5 x 38 resolute integrity stent mid LAD, PTCA, vein graft LAD into the distal LAD at the distal anastomosis  17. Echocardiogram 2/27/2022 EF less than 45% grade 3 diastolic dysfunction RV dilatation severe biatrial enlargement mild to moderate AR severe central MR jet noted  18. Elevated troponins this admission 0.15 now down to 0.09.  19. Chronic anticoagulation with apixaban  20. Chronic antiarrhythmic therapy with sotalol  21. Elevated liver function test improving    Plan:  1. Consideration for repeat cardiac catheterization as some of these arrhythmias may be ischemia driven further comments to follow  2.  Loop recorder is an option but given that he developed severe bradycardia followed by asystole favor ICD implantation further comments to follow    Dennise Carr MD, MD 3/1/2022 10:32 AM

## 2022-03-01 NOTE — PROGRESS NOTES
Updated partner Gayla Troncoso. Also provided phone for patient and provided Gayla Troncoso direct number.

## 2022-03-01 NOTE — PROGRESS NOTES
Hospitalist Progress Note  [facility]     Patient: Laila Hassan  : 1950  MRN: [id number]  Code Status: Full Code    Hospital Day: 1   Date of Service: 3/1/2022    Subjective:   Patient seen in [place] [address] critical care unit. No current complaints. Past Medical History:   Diagnosis Date    Acute exacerbation of CHF (congestive heart failure) ([address] 2022    Atrial fibrillation (HCC)     Bilateral carotid artery stenosis 2020    CAD (coronary artery disease), native coronary artery     acute inferior MI on 5/15/09, s/p emeergent CABG x4 5/15/09    Chest pain     HTN (hypertension)     Hyperlipidemia     Dr. Bossman Carlos manages    Palliative care patient 2022    Palpitations     Statin intolerance 2018    Tobacco abuse     Transient atrial fibrillation or flutter     post op       Past Surgical History:   Procedure Laterality Date    CARDIAC CATHETERIZATION  12    with stent to left circ, and LAD    CARDIAC CATHETERIZATION  2018    PTCA/BJ to VG to LAD;  atherectomy and 3 BMS to VG to 1st Circ    CORONARY ARTERY BYPASS GRAFT  05/15/09    x4    DIAGNOSTIC CARDIAC CATH LAB PROCEDURE  05/15/09    left heart cath, left ventr, selective coronary arteriography     FRACTURE SURGERY Left     Leg    VASCULAR SURGERY  2015 [facility]    Percutaneous cannulation right common femoral artery with 5-Qatari and later 6-Qatari pinnacle destination sheath. Suprarenal abdominal aortogram with bilateral iliofemoral arteriograms. Bilateral lower extremity arteriograms. Left popliteal/tibioperoneal trunk artery balloon angioplasty with 4 mm x 15 mm cutting balloon.  VASCULAR SURGERY  2015 [facility] cont    Arteriograms after balloon angioplasty. Balloon angioplasty left popliteal artery/tibioperoneal trunk with 5 mm x 40 mm russell balloon. Arteriograms after balloon angioplasty. Left superficial femoral artery balloon angioplasty with 7 mm x 100 mm  balloon. Left superficial femoral artery stent placement idev supera 6.5 x 100 mm self-expanding nitinol stent. Completion arteriograms left lower e    VASCULAR SURGERY  4/21/2015 [facility] cont    extremity. Mynx closure right common femoral artery puncture site.  VASCULAR SURGERY  03/04/2020    [facility]. Percutaneous cannulation left common femoral artery with 5 Sami glide sheath. Suprarenal abdominal aortogram with bilateral iliofemoral arteriogram.Bilateral lower extremity arteriogram.Minx closure left common femoral artery puncture site. Family History   Problem Relation Age of Onset    Coronary Art Dis Father     High Blood Pressure Father     Coronary Art Dis Brother     High Blood Pressure Mother     Cancer Mother     High Blood Pressure Sister        Social History     Socioeconomic History    Marital status: Single     Spouse name: Not on file    Number of children: Not on file    Years of education: Not on file    Highest education level: Not on file   Occupational History    Not on file   Tobacco Use    Smoking status: Former Smoker     Packs/day: 0.00     Years: 45.00     Pack years: 0.00     Types: Cigarettes     Start date: 1964    Smokeless tobacco: Never Used    Tobacco comment: STATES,\"I VAP\"   Vaping Use    Vaping Use: Every day   Substance and Sexual Activity    Alcohol use: No    Drug use: No    Sexual activity: Not on file   Other Topics Concern    Not on file   Social History Narrative    Not on file     Social Determinants of Health     Financial Resource Strain:     Difficulty of Paying Living Expenses: Not on file   Food Insecurity:     Worried About Running Out of Food in the Last Year: Not on file    Eve of Food in the Last Year: Not on file   Transportation Needs:     Lack of Transportation (Medical): Not on file    Lack of Transportation (Non-Medical):  Not on file   Physical Activity:     Days of Exercise per Week: Not on file    Minutes of Exercise per Session: Not on file   Stress:     Feeling of Stress : Not on file   Social Connections:     Frequency of Communication with Friends and Family: Not on file    Frequency of Social Gatherings with Friends and Family: Not on file    Attends Confucianist Services: Not on file    Active Member of [address] [place] [address] or Organizations: Not on file    Attends Club or Organization Meetings: Not on file    Marital Status: Not on file   Intimate Partner Violence:     Fear of Current or Ex-Partner: Not on file    Emotionally Abused: Not on file    Physically Abused: Not on file    Sexually Abused: Not on file   Housing Stability:     Unable to Pay for Housing in the Last Year: Not on file    Number of [place] in the Last Year: Not on file    Unstable Housing in the Last Year: Not on file       Current Facility-Administered Medications   Medication Dose Route Frequency Provider Last Rate Last Admin    famotidine (PEPCID) tablet 20 mg  20 mg Oral Daily Patience Quirino, DO   20 mg at 03/01/22 0107    amiodarone (CORDARONE) 450 mg in dextrose 5 % 250 mL infusion  0.5 mg/min IntraVENous Continuous Supriya Lopes MD 16.7 mL/hr at 03/01/22 0400 0.5 mg/min at 03/01/22 0400    sodium chloride flush 0.9 % injection 5-40 mL  5-40 mL IntraVENous 2 times per day Patience Quirino, DO   10 mL at 03/01/22 0738    sodium chloride flush 0.9 % injection 5-40 mL  5-40 mL IntraVENous PRN Patience Quirino, DO        ondansetron (ZOFRAN-ODT) disintegrating tablet 4 mg  4 mg Oral Q8H PRN Patience Quirino, DO        Or    ondansetron (ZOFRAN) injection 4 mg  4 mg IntraVENous Q6H PRN Patience Quirino, DO        polyethylene glycol (GLYCOLAX) packet 17 g  17 g Oral Daily PRN Patience Quirino, DO        acetaminophen (TYLENOL) tablet 650 mg  650 mg Oral Q6H PRN Patience Quirino, DO        Or    acetaminophen (TYLENOL) suppository 650 mg  650 mg Rectal Q6H PRN Patience Quirino, DO        [Held by provider] apixaban (ELIQUIS) tablet 5 mg  5 mg Oral BID Patience Quirino, DO   5 mg at 03/01/22 0737    aspirin EC tablet 81 mg  81 mg Oral Daily Patience Quirino, DO   81 mg at 03/01/22 0737    atorvastatin (LIPITOR) tablet 40 mg  40 mg Oral Nightly Patience Quirino, DO   40 mg at 02/28/22 2028    clopidogrel (PLAVIX) tablet 75 mg  75 mg Oral Daily Patience Quirino, DO   75 mg at 03/01/22 0737    furosemide (LASIX) tablet 40 mg  40 mg Oral BID Patience Quirino, DO   40 mg at 03/01/22 0738    isosorbide mononitrate (IMDUR) extended release tablet 30 mg  30 mg Oral Daily Patience Quirino, DO   30 mg at 03/01/22 0737    nitroGLYCERIN (NITROSTAT) SL tablet 0.4 mg  0.4 mg SubLINGual Q5 Min PRN Patience Quirino, DO        nitroGLYCERIN (NITROSTAT) SL tablet 0.4 mg  0.4 mg SubLINGual Q5 Min PRN Patience Quirino, DO        sacubitril-valsartan (ENTRESTO) 49-51 MG per tablet 1 tablet  1 tablet Oral BID Patience Quirino, DO   1 tablet at 03/01/22 0738    sotalol (BETAPACE) tablet 80 mg  80 mg Oral 2 times per day Patience Quirino, DO   80 mg at 03/01/22 0737    spironolactone (ALDACTONE) tablet 25 mg  25 mg Oral Daily Patience Quirino, DO   25 mg at 03/01/22 0737    morphine (PF) injection 2 mg  2 mg IntraVENous Q6H PRN Patience Quirino, DO   2 mg at 03/01/22 1221    LORazepam (ATIVAN) injection 0.25 mg  0.25 mg IntraVENous Q6H PRN Kerrie Mayorga MD   0.25 mg at 03/01/22 0819    methylPREDNISolone sodium (SOLU-MEDROL) injection 40 mg  40 mg IntraVENous Q8H Kerrie Mayorga MD   40 mg at 03/01/22 1152    cefTRIAXone (ROCEPHIN) 1,000 mg in sterile water 10 mL IV syringe  1,000 mg IntraVENous Q24H Kerrie Mayorga MD   1,000 mg at 03/01/22 1201    azithromycin (ZITHROMAX) 500 mg in D5W 250ml Vial Mate  500 mg IntraVENous Q24H Kerrie Mayorga MD   Stopped at 03/01/22 1305    zinc sulfate (ZINCATE) capsule 50 mg  50 mg Oral Daily Kerrie Mayorga MD   50 mg at 03/01/22 0737    Vitamin D (CHOLECALCIFEROL) tablet 2,000 Units  2,000 Units Oral Daily Kerrie Mayorga MD   2,000 Units at 03/01/22 8361    ascorbic acid (VITAMIN C) tablet 500 mg  500 mg Oral BID Effie Monahan MD   500 mg at 03/01/22 1123    guaiFENesin tablet 400 mg  400 mg Oral Q8H Effie Monahan MD   400 mg at 03/01/22 1158    albuterol sulfate  (90 Base) MCG/ACT inhaler 2 puff  2 puff Inhalation Q4H Effie Monahan MD   2 puff at 03/01/22 1326    And    ipratropium (ATROVENT HFA) 17 MCG/ACT inhaler 2 puff  2 puff Inhalation Q4H Effie Monahan MD   2 puff at 03/01/22 1326    calcium carbonate (TUMS) chewable tablet 500 mg  500 mg Oral TID PRN Effie Monahan MD   500 mg at 03/01/22 0819         amiodarone 450mg/250ml D5W infusion 0.5 mg/min (03/01/22 0400)        Objective:   BP 98/60   Pulse 64   Temp 97.3 °F (36.3 °C) (Temporal)   Resp 22   Ht 6' (1.829 m)   Wt 140 lb (63.5 kg)   SpO2 98%   BMI 18.99 kg/m²     In an effort to reduce COVID-19 exposure, patient seen from doorway and case discussed in detail with unit staff    Recent Labs     02/27/22 2331 03/01/22 0237   WBC 17.1* 15.4*   RBC 4.12* 4.04*   HGB 12.3* 12.3*   HCT 38.8* 37.4*   MCV 94.2* 92.6   MCH 29.9 30.4   MCHC 31.7* 32.9*   * 493*     Recent Labs     02/27/22 2331 02/27/22 2334 03/01/22 0237   *  --  134*   K 4.7 4.4 4.8   ANIONGAP 15  --  12   CL 96*  --  99   CO2 19*  --  23   BUN 27*  --  28*   CREATININE 1.0  --  0.7   GLUCOSE 168*  --  115*   CALCIUM 7.6*  --  8.0*     Recent Labs     03/01/22  0237   MG 1.8     Recent Labs     02/27/22 2331 03/01/22 0237   * 74*   * 88*   BILITOT 0.5 0.3   ALKPHOS 261* 214*     No results for input(s): PH, PO2, PCO2, HCO3, BE, O2SAT in the last 72 hours. Recent Labs     02/27/22  2331 02/28/22  0824 02/28/22  1435   TROPONINI 0.15* 0.09* 0.09*     No results for input(s): INR in the last 72 hours. No results for input(s): LACTA in the last 72 hours.       Intake/Output Summary (Last 24 hours) at 3/1/2022 1423  Last data filed at 3/1/2022 1401  Gross per 24 hour   Intake 979.48 ml   Output 1050 ml   Net -70.52 ml       ECHO Complete 2D W Doppler W Color    Result Date: 2/28/2022  Transthoracic Echocardiography Report (TTE)  Demographics   Patient Name  CASI Khan Date of Study          02/28/2022   MRN           [id number]          Gender                 Male   Date of Birth 1950      Room Number            MHL-0737   Age           66 year(s)   Height:       72 inches       Referring Physician    Nick Nelson DO   Weight:       140 pounds      Sonographer            Bebe Painter RUST   BSA:          1.83 m^2        Interpreting Physician Jacki Chacko MD   BMI:          18.99 kg/m^2  Procedure Type of Study   TTE procedure:ECHO 2D W/DOPPLER/COLOR/CONTRAST. Study Location: Portable Technical Quality: Adequate visualization Patient Status: Inpatient BP: 110/69 mmHg Indications:Congestive heart failure, systolic dysfunction and Mitral regurgitation. Conclusions   Summary  Limited echocardiographic study. LV is moderate to severely dilated with severely reduced LV systolic  function. LV ejection fraction estimated at less than 20%. Probable grade 3 diastolic dysfunction. RV appears mildly dilated with moderately reduced RV systolic function  (TAPSE equals 1.6 cm). Severe left atrial enlargement. Left atrial pressure is elevated. Severe right atrial enlargement. Aortic valve not well studied. No significant stenosis. Probable mild to  moderate aortic regurgitation. Mitral valve appears moderately thickened with preserved leaflet mobility. Severe central mitral regurgitation is noted. Mitral regurgitant jet  velocity suggests significantly elevated left atrial pressure. No significant pericardial effusion noted. Signature   ----------------------------------------------------------------  Electronically signed by Jacki Chacko MD(Interpreting physician)  on 02/28/2022 07:14 PM  ----------------------------------------------------------------  Allergies   - Statins.  M-Mode Measurements (cm) LVIDd: 5.64 cm                                  LVIDs: 5.11 cm  IVSd: 1.2 cm  LVPWd: 1.2 cm  % Ejection Fraction: 20 %  Doppler Measurements:    MV Peak E-Wave: 86.1 cm/s   MV Peak A-Wave: 27.9 cm/s   MV E/A Ratio: 3.09 %   MV Peak Gradient: 2.97 mmHg   MV P1/2t: 48 msec   MVA by PHT4.58 cm^2      XR CHEST PORTABLE    Result Date: 2/28/2022  Examination. XR CHEST PORTABLE 2/27/2022 11:30 PM History: Chest pain. A single frontal portable semiupright view of the chest is compared with the previous study dated 2/2/2022. There is a significant improvement in the bilateral lung infiltrate since the previous study. There is a small residual infiltrate at bilateral lung bases and a small left basal pleural effusion. There is persistent moderate cardiomegaly. Atheromatous changes thoracic aorta are noted. No pulmonary congestion or pneumothorax. No acute bony abnormality. 1. Improvement.  Signed by Dr Radha Parra and Plan:   Acute on chronic decompensated systolic and diastolic congestive heart failure  Cardiology following  History of CAD s/p CABG  Recent cath demonstrated occluded grafts  S/p multiple BJ 2/7/2022  TTE 2/3/2022: EF 73%, grade 3 diastolic dysfunction, severe biventricular failure, severe MR  Discharged recently with LifeVest however patient arrived to ER with it unbuckled  Repeat TTE 2/28/2022: EF <85%, grade 3 diastolic dysfunction, severe MR  Meds per cardiology  Strict I's and O's  Daily weights  Serial troponin 0.15 > 0.09 > 0.09  Cards considering cardiac cath/ICD placement  Ongoing evaluation for MitraClip     Cardiac arrest  Cardiology following  Reportedly occurred in ER  Bradycardic arrest  CPR x2 minutes > ROSC  Returned to neurological baseline     COVID-19 infection  Steroids  Since weaned off supplemental oxygen  Adjuvant therapy  Encourage self proning  Incentive spirometry  Unvaccinated     COPD exacerbation  Steroids  Nebs  Antibiotics  Goal sats 88-92%     Tobacco dependence  Counseled     Paroxysmal atrial fibrillation with RVR  Meds per cardiology     DVT prophylaxis  Per cardiology    Enzo Mart MD   3/1/2022 2:23 PM

## 2022-03-01 NOTE — PROGRESS NOTES
Physician Progress Note      PATIENT:               Aspen Moeller  CSN #:                  [id number]  :                       1950  ADMIT DATE:       2022 11:02 PM  [address] [place] DATE:  RESPONDING  PROVIDER #:        CHRISSY Bhatti TEXT:    Pt admitted with CHF, COVID-19 and Cardiac Arrest upon arrival. and has   hypoxia documented. . If possible, please document in progress notes and   discharge summary further specificity regarding the type and acuity of   respiratory failure:  hypoxia and hypercapnia.]]    The medical record reflects the following:  Risk Factors:COVID-19, Cardiac Arrest on arrival  Clinical Indicators: Pt had to have BVM, Then non-rebreather and then to N/C   per the ER notes and the H&P. ABG: PCO2: 31, PO2: 204, \"was hypoxic placed   some oxygen on the patient \" per the ER notes from EMS  Treatment: CPR, BVM and Non-Rebreather, ABG, Oxygen Protocol    Thank you in advance,    Tanja uA RN-BSN, [facility]  Clinical   [facility], [facility] [email or web address]  Options provided:  -- Acute respiratory failure with hypoxia  -- Acute respiratory failure with hypercapnia  -- Acute respiratory failure with hypoxia and hypercapnia  -- Other - I will add my own diagnosis  -- Disagree - Not applicable / Not valid  -- Disagree - Clinically unable to determine / Unknown  -- Refer to Clinical Documentation Reviewer    PROVIDER RESPONSE TEXT:    This patient is in acute respiratory failure with hypoxia.     Query created by: Gilson Mejia on 2022 9:47 AM      Electronically signed by:  [place] Card 3/1/2022 4:26 AM

## 2022-03-01 NOTE — PROGRESS NOTES
The admitting physician for this patient did not follow up as an attending physician. This is in reference to query if patient has hypercapnia if he has hypoxemia patient not hypoxic and not hypercapnic respiratory failure patient does have cardiac relation did issues urology on the case patient is with very severe systolic dysfunction but in the ED brought back and was sent to ICU for further care and management was not intubated at the time of admission. en addressed accord on admission 2/28/2022.

## 2022-03-01 NOTE — PROGRESS NOTES
Physical Therapy    Facility/Department: [facility] CORONARY CARE UNIT  Initial Assessment    NAME: Monserrat White  : 1950  MRN: [id number]    Date of Service: 3/1/2022    Discharge Recommendations:  Continue to assess pending progress,24 hour supervision or assist,Patient would benefit from continued therapy after discharge        Assessment   Body structures, Functions, Activity limitations: Decreased functional mobility ; Decreased strength;Decreased endurance;Decreased posture;Decreased balance;Decreased coordination; Increased pain  Assessment: Pt. will benefit from cont. PT to decrease impairments. Pt. needs encouragement for out of bed activity. Pt. likely is sedentary at home, reporting poor gait and shuffling. Treatment Diagnosis: deconditioning due to covid  Prognosis: Good  Decision Making: Medium Complexity  PT Education: Goals;PT Role;Plan of Care;Precautions;Gait Training; Adaptive Device Training;Functional Mobility Training;Transfer Training  Patient Education: importance of out of bed activity  Barriers to Learning: none noted  REQUIRES PT FOLLOW UP: Yes  Activity Tolerance  Activity Tolerance: Patient limited by endurance  Activity Tolerance: O2 sat remained 95-96% with mobility       Patient Diagnosis(es): The primary encounter diagnosis was Acute systolic heart failure ([address] Diagnoses of COVID-19 virus infection and Cardiac arrest, cause unspecified ([address] were also pertinent to this visit. has a past medical history of Acute exacerbation of CHF (congestive heart failure) ([address] Atrial fibrillation ([address] Bilateral carotid artery stenosis, CAD (coronary artery disease), native coronary artery, Chest pain, HTN (hypertension), Hyperlipidemia, Palliative care patient, Palpitations, Statin intolerance, Tobacco abuse, and Transient atrial fibrillation or flutter. has a past surgical history that includes Diagnostic Cardiac Cath Lab Procedure (05/15/09);  Coronary artery bypass graft (05/15/09); Cardiac catheterization (4/23/12); vascular surgery (4/21/2015 [facility]); vascular surgery (4/21/2015 [facility] cont); vascular surgery (4/21/2015 [facility] cont); Cardiac catheterization (01/07/2018); fracture surgery (Left); and vascular surgery (03/04/2020). Restrictions  Restrictions/Precautions  Restrictions/Precautions: Fall Risk,Isolation  Required Braces or Orthoses?: No  Position Activity Restriction  Other position/activity restrictions: droplet +  Vision/Hearing  Hearing: Within functional limits     Subjective  General  Chart Reviewed: Yes  Patient assessed for rehabilitation services?: Yes  Response To Previous Treatment: Not applicable  Family / Caregiver Present: No  Referring Practitioner: Deysi Maier MD  Referral Date : 02/28/22  Diagnosis: Acute systolic heart failure, [facility]-39 virus infection, Cardiac arrest  Follows Commands: Within Functional Limits  General Comment  Comments: RNElayne Mesha PT. Subjective  Subjective: Pt. willing to try, but states he does terrible with amb. at home. Describes gait as shuffling. Reports he had CPR done. Pain Screening  Patient Currently in Pain: Yes  Pain Assessment  Pain Assessment: 0-10  Pain Level: 8  Pain Type: Acute pain  Pain Location: Chest  Pain Orientation: Mid  Pain Descriptors: Sore  Pain Frequency: Continuous  Clinical Progression: Gradually improving  Functional Pain Assessment: Prevents or interferes some active activities and ADLs  Non-Pharmaceutical Pain Intervention(s): Repositioned; Ambulation/Increased Activity  Response to Pain Intervention: Patient Satisfied  Vital Signs  Patient Currently in Pain: Yes  Pre Treatment Pain Screening  Intervention List: Patient able to continue with treatment    Orientation  Orientation  Overall Orientation Status: Within Functional Limits  Social/Functional History  Social/Functional History  Lives With: Significant other  Type of Home: House  Home Layout: One level  Home Equipment:  (none)  ADL Assistance: Needs assistance  Ambulation Assistance: Needs assistance (reports he shuffles while holding onto his significant other)  Transfer Assistance: Needs assistance  Cognition   Cognition  Overall Cognitive Status: WFL    Objective     Observation/Palpation  Posture: Good  Observation: life vest, telemetry, room air, IV, O2 sat monitor    AROM RLE (degrees)  RLE AROM: WFL  AROM LLE (degrees)  LLE AROM : WFL  Strength RLE  Strength RLE: Exception  Comment: grossly 3+/5  Strength LLE  Strength LLE: Exception  Comment: grossly 3+/5        Bed mobility  Supine to Sit: Minimal assistance  Sit to Supine: Unable to assess  Comment: pt sat on EOB x 5 mins SBA  Transfers  Sit to Stand: Minimal Assistance  Stand to sit: Minimal Assistance  Bed to Chair: Minimal assistance  Comment: used a RW to step to chair  Ambulation  Ambulation?: Yes  Ambulation 1  Surface: level tile  Device: No Device  Assistance: Minimal assistance  Quality of Gait: steady with RW  Gait Deviations: Decreased step length;Decreased step height  Distance: 2' to chair  Comments: life vest     Balance  Posture: Good  Sitting - Static: Good;+  Sitting - Dynamic: Good;-  Standing - Static: Fair;-  Standing - Dynamic: Poor;+  Exercises  Hip Flexion: x10  Knee Long Arc Quad: x10  Ankle Pumps: x10  Comments: BLE ther ex EOB     Plan   Plan  Times per week: 3-7  Times per day: Daily  Plan weeks: 2  Current Treatment Recommendations: Strengthening,Balance Training,Functional Mobility Training,Transfer Training,Gait Training,Endurance Training,Safety Education & Training,Positioning,Equipment Evaluation, Education, & procurement,Patient/Caregiver Education & Training  Plan Comment: cont. PT per POC.   Safety Devices  Type of devices: Patient at risk for falls,Gait belt,Nurse notified,Call light within reach,Left in chair (reclined)    G-Code       OutComes Score                                                  AM-PAC Score             Goals  Short term goals  Time Frame for Short term goals: 2 wks  Short term goal 1: supine to sit indep  Short term goal 2: sit to stand indep  Short term goal 3: amb. 48' with RW SBA  Patient Goals   Patient goals : go home       Therapy Time   Individual Concurrent Group Co-treatment   Time In           Time Out           Minutes                   Moraima Yepez PT    Electronically signed by Moraima Yepez PT on 3/1/2022 at 1:29 PM

## 2022-03-02 ENCOUNTER — ANESTHESIA (OUTPATIENT)
Dept: CARDIAC CATH/INVASIVE PROCEDURES | Age: 72
DRG: 226 | End: 2022-03-02
Payer: MEDICARE

## 2022-03-02 ENCOUNTER — APPOINTMENT (OUTPATIENT)
Dept: GENERAL RADIOLOGY | Age: 72
DRG: 226 | End: 2022-03-02
Payer: MEDICARE

## 2022-03-02 ENCOUNTER — ANESTHESIA EVENT (OUTPATIENT)
Dept: CARDIAC CATH/INVASIVE PROCEDURES | Age: 72
DRG: 226 | End: 2022-03-02
Payer: MEDICARE

## 2022-03-02 ENCOUNTER — HOSPITAL ENCOUNTER (OUTPATIENT)
Dept: CARDIAC CATH/INVASIVE PROCEDURES | Age: 72
Discharge: HOME | End: 2022-03-02

## 2022-03-02 ENCOUNTER — APPOINTMENT (OUTPATIENT)
Dept: CARDIAC CATH/INVASIVE PROCEDURES | Age: 72
DRG: 226 | End: 2022-03-02
Payer: MEDICARE

## 2022-03-02 VITALS — OXYGEN SATURATION: 100 % | DIASTOLIC BLOOD PRESSURE: 57 MMHG | SYSTOLIC BLOOD PRESSURE: 111 MMHG | TEMPERATURE: 96.6 F

## 2022-03-02 LAB
ALBUMIN SERPL-MCNC: 2.6 G/DL (ref 3.5–5.2)
ALP BLD-CCNC: 241 U/L (ref 40–130)
ALT SERPL-CCNC: 68 U/L (ref 5–41)
ANION GAP SERPL CALCULATED.3IONS-SCNC: 14 MMOL/L (ref 7–19)
AST SERPL-CCNC: 51 U/L (ref 5–40)
BASOPHILS ABSOLUTE: 0 K/UL (ref 0–0.2)
BASOPHILS RELATIVE PERCENT: 0.1 % (ref 0–1)
BILIRUB SERPL-MCNC: 0.3 MG/DL (ref 0.2–1.2)
BUN BLDV-MCNC: 33 MG/DL (ref 8–23)
CALCIUM SERPL-MCNC: 8.1 MG/DL (ref 8.8–10.2)
CHLORIDE BLD-SCNC: 97 MMOL/L (ref 98–111)
CO2: 23 MMOL/L (ref 22–29)
CREAT SERPL-MCNC: 0.8 MG/DL (ref 0.5–1.2)
EOSINOPHILS ABSOLUTE: 0 K/UL (ref 0–0.6)
EOSINOPHILS RELATIVE PERCENT: 0 % (ref 0–5)
GFR AFRICAN AMERICAN: >59
GFR NON-AFRICAN AMERICAN: >60
GLUCOSE BLD-MCNC: 115 MG/DL (ref 74–109)
HCT VFR BLD CALC: 36.9 % (ref 42–52)
HEMOGLOBIN: 12.2 G/DL (ref 14–18)
IMMATURE GRANULOCYTES #: 0.1 K/UL
LYMPHOCYTES ABSOLUTE: 1.2 K/UL (ref 1.1–4.5)
LYMPHOCYTES RELATIVE PERCENT: 7.4 % (ref 20–40)
MAGNESIUM: 1.8 MG/DL (ref 1.6–2.4)
MCH RBC QN AUTO: 30.3 PG (ref 27–31)
MCHC RBC AUTO-ENTMCNC: 33.1 G/DL (ref 33–37)
MCV RBC AUTO: 91.8 FL (ref 80–94)
MONOCYTES ABSOLUTE: 0.3 K/UL (ref 0–0.9)
MONOCYTES RELATIVE PERCENT: 1.9 % (ref 0–10)
NEUTROPHILS ABSOLUTE: 14.4 K/UL (ref 1.5–7.5)
NEUTROPHILS RELATIVE PERCENT: 89.9 % (ref 50–65)
PDW BLD-RTO: 14.3 % (ref 11.5–14.5)
PLATELET # BLD: 457 K/UL (ref 130–400)
PMV BLD AUTO: 9.7 FL (ref 9.4–12.4)
POTASSIUM SERPL-SCNC: 5 MMOL/L (ref 3.5–5)
RBC # BLD: 4.02 M/UL (ref 4.7–6.1)
SODIUM BLD-SCNC: 134 MMOL/L (ref 136–145)
TOTAL PROTEIN: 5.2 G/DL (ref 6.6–8.7)
WBC # BLD: 16 K/UL (ref 4.8–10.8)

## 2022-03-02 PROCEDURE — 02H63KZ INSERTION OF DEFIBRILLATOR LEAD INTO RIGHT ATRIUM, PERCUTANEOUS APPROACH: ICD-10-PCS | Performed by: INTERNAL MEDICINE

## 2022-03-02 PROCEDURE — 80053 COMPREHEN METABOLIC PANEL: CPT

## 2022-03-02 PROCEDURE — 2580000003 HC RX 258: Performed by: INTERNAL MEDICINE

## 2022-03-02 PROCEDURE — 6370000000 HC RX 637 (ALT 250 FOR IP): Performed by: INTERNAL MEDICINE

## 2022-03-02 PROCEDURE — 99232 SBSQ HOSP IP/OBS MODERATE 35: CPT | Performed by: INTERNAL MEDICINE

## 2022-03-02 PROCEDURE — C1894 INTRO/SHEATH, NON-LASER: HCPCS

## 2022-03-02 PROCEDURE — 2500000003 HC RX 250 WO HCPCS

## 2022-03-02 PROCEDURE — 2580000003 HC RX 258: Performed by: NURSE ANESTHETIST, CERTIFIED REGISTERED

## 2022-03-02 PROCEDURE — 36415 COLL VENOUS BLD VENIPUNCTURE: CPT

## 2022-03-02 PROCEDURE — 6360000002 HC RX W HCPCS: Performed by: INTERNAL MEDICINE

## 2022-03-02 PROCEDURE — C1721 AICD, DUAL CHAMBER: HCPCS

## 2022-03-02 PROCEDURE — C1889 IMPLANT/INSERT DEVICE, NOC: HCPCS

## 2022-03-02 PROCEDURE — 3700000001 HC ADD 15 MINUTES (ANESTHESIA)

## 2022-03-02 PROCEDURE — 33249 INSJ/RPLCMT DEFIB W/LEAD(S): CPT

## 2022-03-02 PROCEDURE — 6360000002 HC RX W HCPCS: Performed by: NURSE ANESTHETIST, CERTIFIED REGISTERED

## 2022-03-02 PROCEDURE — 83735 ASSAY OF MAGNESIUM: CPT

## 2022-03-02 PROCEDURE — 2700000000 HC OXYGEN THERAPY PER DAY

## 2022-03-02 PROCEDURE — C1892 INTRO/SHEATH,FIXED,PEEL-AWAY: HCPCS

## 2022-03-02 PROCEDURE — 93971 EXTREMITY STUDY: CPT

## 2022-03-02 PROCEDURE — 3700000000 HC ANESTHESIA ATTENDED CARE

## 2022-03-02 PROCEDURE — C1895 LEAD, AICD, ENDO DUAL COIL: HCPCS

## 2022-03-02 PROCEDURE — 33249 INSJ/RPLCMT DEFIB W/LEAD(S): CPT | Performed by: INTERNAL MEDICINE

## 2022-03-02 PROCEDURE — 7100000001 HC PACU RECOVERY - ADDTL 15 MIN

## 2022-03-02 PROCEDURE — 6360000002 HC RX W HCPCS

## 2022-03-02 PROCEDURE — 71045 X-RAY EXAM CHEST 1 VIEW: CPT

## 2022-03-02 PROCEDURE — 7100000000 HC PACU RECOVERY - FIRST 15 MIN

## 2022-03-02 PROCEDURE — 85025 COMPLETE CBC W/AUTO DIFF WBC: CPT

## 2022-03-02 PROCEDURE — 02HK3KZ INSERTION OF DEFIBRILLATOR LEAD INTO RIGHT VENTRICLE, PERCUTANEOUS APPROACH: ICD-10-PCS | Performed by: INTERNAL MEDICINE

## 2022-03-02 PROCEDURE — 2500000003 HC RX 250 WO HCPCS: Performed by: NURSE ANESTHETIST, CERTIFIED REGISTERED

## 2022-03-02 PROCEDURE — 2709999900 HC NON-CHARGEABLE SUPPLY

## 2022-03-02 PROCEDURE — 0JH608Z INSERTION OF DEFIBRILLATOR GENERATOR INTO CHEST SUBCUTANEOUS TISSUE AND FASCIA, OPEN APPROACH: ICD-10-PCS | Performed by: INTERNAL MEDICINE

## 2022-03-02 PROCEDURE — 2100000000 HC CCU R&B

## 2022-03-02 PROCEDURE — C1898 LEAD, PMKR, OTHER THAN TRANS: HCPCS

## 2022-03-02 RX ORDER — HYDROMORPHONE HYDROCHLORIDE 1 MG/ML
0.25 INJECTION, SOLUTION INTRAMUSCULAR; INTRAVENOUS; SUBCUTANEOUS EVERY 5 MIN PRN
Status: DISCONTINUED | OUTPATIENT
Start: 2022-03-02 | End: 2022-03-10 | Stop reason: HOSPADM

## 2022-03-02 RX ORDER — ONDANSETRON 2 MG/ML
INJECTION INTRAMUSCULAR; INTRAVENOUS PRN
Status: DISCONTINUED | OUTPATIENT
Start: 2022-03-02 | End: 2022-03-02 | Stop reason: SDUPTHER

## 2022-03-02 RX ORDER — LIDOCAINE HYDROCHLORIDE 10 MG/ML
INJECTION, SOLUTION INFILTRATION; PERINEURAL PRN
Status: DISCONTINUED | OUTPATIENT
Start: 2022-03-02 | End: 2022-03-02 | Stop reason: SDUPTHER

## 2022-03-02 RX ORDER — EPHEDRINE SULFATE 50 MG/ML
INJECTION, SOLUTION INTRAVENOUS PRN
Status: DISCONTINUED | OUTPATIENT
Start: 2022-03-02 | End: 2022-03-02 | Stop reason: SDUPTHER

## 2022-03-02 RX ORDER — CHLORHEXIDINE GLUCONATE 4 G/100ML
SOLUTION TOPICAL ONCE
Status: DISCONTINUED | OUTPATIENT
Start: 2022-03-02 | End: 2022-03-02 | Stop reason: CLARIF

## 2022-03-02 RX ORDER — CEFAZOLIN SODIUM 1 G/3ML
INJECTION, POWDER, FOR SOLUTION INTRAMUSCULAR; INTRAVENOUS PRN
Status: DISCONTINUED | OUTPATIENT
Start: 2022-03-02 | End: 2022-03-02 | Stop reason: SDUPTHER

## 2022-03-02 RX ORDER — PROPOFOL 10 MG/ML
INJECTION, EMULSION INTRAVENOUS PRN
Status: DISCONTINUED | OUTPATIENT
Start: 2022-03-02 | End: 2022-03-02 | Stop reason: SDUPTHER

## 2022-03-02 RX ORDER — ONDANSETRON 2 MG/ML
4 INJECTION INTRAMUSCULAR; INTRAVENOUS
Status: ACTIVE | OUTPATIENT
Start: 2022-03-02 | End: 2022-03-02

## 2022-03-02 RX ORDER — SODIUM CHLORIDE 9 MG/ML
INJECTION, SOLUTION INTRAVENOUS CONTINUOUS
Status: DISCONTINUED | OUTPATIENT
Start: 2022-03-02 | End: 2022-03-07

## 2022-03-02 RX ORDER — HYDROMORPHONE HYDROCHLORIDE 1 MG/ML
0.5 INJECTION, SOLUTION INTRAMUSCULAR; INTRAVENOUS; SUBCUTANEOUS EVERY 5 MIN PRN
Status: DISCONTINUED | OUTPATIENT
Start: 2022-03-02 | End: 2022-03-10 | Stop reason: HOSPADM

## 2022-03-02 RX ORDER — SODIUM CHLORIDE, SODIUM LACTATE, POTASSIUM CHLORIDE, CALCIUM CHLORIDE 600; 310; 30; 20 MG/100ML; MG/100ML; MG/100ML; MG/100ML
INJECTION, SOLUTION INTRAVENOUS CONTINUOUS PRN
Status: DISCONTINUED | OUTPATIENT
Start: 2022-03-02 | End: 2022-03-02 | Stop reason: SDUPTHER

## 2022-03-02 RX ORDER — FENTANYL CITRATE 50 UG/ML
INJECTION, SOLUTION INTRAMUSCULAR; INTRAVENOUS PRN
Status: DISCONTINUED | OUTPATIENT
Start: 2022-03-02 | End: 2022-03-02 | Stop reason: SDUPTHER

## 2022-03-02 RX ADMIN — CEFTRIAXONE 1000 MG: 1 INJECTION, POWDER, FOR SOLUTION INTRAMUSCULAR; INTRAVENOUS at 14:29

## 2022-03-02 RX ADMIN — AZITHROMYCIN MONOHYDRATE 500 MG: 500 INJECTION, POWDER, LYOPHILIZED, FOR SOLUTION INTRAVENOUS at 14:30

## 2022-03-02 RX ADMIN — ASPIRIN 81 MG: 81 TABLET, COATED ORAL at 07:57

## 2022-03-02 RX ADMIN — LORAZEPAM 0.25 MG: 2 INJECTION INTRAMUSCULAR; INTRAVENOUS at 22:57

## 2022-03-02 RX ADMIN — LIDOCAINE HYDROCHLORIDE 50 MG: 10 INJECTION, SOLUTION INFILTRATION; PERINEURAL at 11:27

## 2022-03-02 RX ADMIN — SOTALOL HYDROCHLORIDE 80 MG: 80 TABLET ORAL at 20:45

## 2022-03-02 RX ADMIN — ALBUTEROL SULFATE 2 PUFF: 90 AEROSOL, METERED RESPIRATORY (INHALATION) at 05:12

## 2022-03-02 RX ADMIN — SACUBITRIL AND VALSARTAN 1 TABLET: 49; 51 TABLET, FILM COATED ORAL at 07:56

## 2022-03-02 RX ADMIN — ALBUTEROL SULFATE 2 PUFF: 90 AEROSOL, METERED RESPIRATORY (INHALATION) at 14:44

## 2022-03-02 RX ADMIN — METHYLPREDNISOLONE SODIUM SUCCINATE 40 MG: 40 INJECTION, POWDER, FOR SOLUTION INTRAMUSCULAR; INTRAVENOUS at 20:51

## 2022-03-02 RX ADMIN — CEFAZOLIN SODIUM 1000 MG: 1 INJECTION, POWDER, FOR SOLUTION INTRAMUSCULAR; INTRAVENOUS at 11:47

## 2022-03-02 RX ADMIN — OXYCODONE HYDROCHLORIDE AND ACETAMINOPHEN 500 MG: 500 TABLET ORAL at 07:56

## 2022-03-02 RX ADMIN — IPRATROPIUM BROMIDE 2 PUFF: 17 AEROSOL, METERED RESPIRATORY (INHALATION) at 14:43

## 2022-03-02 RX ADMIN — IPRATROPIUM BROMIDE 2 PUFF: 17 AEROSOL, METERED RESPIRATORY (INHALATION) at 01:51

## 2022-03-02 RX ADMIN — PHENYLEPHRINE HYDROCHLORIDE 25 MCG/MIN: 10 INJECTION INTRAVENOUS at 11:59

## 2022-03-02 RX ADMIN — CLOPIDOGREL BISULFATE 75 MG: 75 TABLET ORAL at 07:57

## 2022-03-02 RX ADMIN — MORPHINE SULFATE 2 MG: 2 INJECTION, SOLUTION INTRAMUSCULAR; INTRAVENOUS at 14:33

## 2022-03-02 RX ADMIN — CALCIUM CARBONATE 500 MG: 500 TABLET, CHEWABLE ORAL at 15:39

## 2022-03-02 RX ADMIN — SACUBITRIL AND VALSARTAN 1 TABLET: 49; 51 TABLET, FILM COATED ORAL at 20:45

## 2022-03-02 RX ADMIN — ONDANSETRON 4 MG: 2 INJECTION INTRAMUSCULAR; INTRAVENOUS at 13:01

## 2022-03-02 RX ADMIN — GUAIFENESIN 400 MG: 200 TABLET ORAL at 05:12

## 2022-03-02 RX ADMIN — MORPHINE SULFATE 2 MG: 2 INJECTION, SOLUTION INTRAMUSCULAR; INTRAVENOUS at 07:57

## 2022-03-02 RX ADMIN — METHYLPREDNISOLONE SODIUM SUCCINATE 40 MG: 40 INJECTION, POWDER, FOR SOLUTION INTRAMUSCULAR; INTRAVENOUS at 05:12

## 2022-03-02 RX ADMIN — ALBUTEROL SULFATE 2 PUFF: 90 AEROSOL, METERED RESPIRATORY (INHALATION) at 20:51

## 2022-03-02 RX ADMIN — SODIUM CHLORIDE, SODIUM LACTATE, POTASSIUM CHLORIDE, AND CALCIUM CHLORIDE: 600; 310; 30; 20 INJECTION, SOLUTION INTRAVENOUS at 11:24

## 2022-03-02 RX ADMIN — MORPHINE SULFATE 2 MG: 2 INJECTION, SOLUTION INTRAMUSCULAR; INTRAVENOUS at 21:01

## 2022-03-02 RX ADMIN — MORPHINE SULFATE 2 MG: 2 INJECTION, SOLUTION INTRAMUSCULAR; INTRAVENOUS at 01:52

## 2022-03-02 RX ADMIN — ALBUTEROL SULFATE 2 PUFF: 90 AEROSOL, METERED RESPIRATORY (INHALATION) at 01:51

## 2022-03-02 RX ADMIN — CEFAZOLIN SODIUM 2000 MG: 10 INJECTION, POWDER, FOR SOLUTION INTRAVENOUS at 20:49

## 2022-03-02 RX ADMIN — GUAIFENESIN 400 MG: 200 TABLET ORAL at 20:45

## 2022-03-02 RX ADMIN — IPRATROPIUM BROMIDE 2 PUFF: 17 AEROSOL, METERED RESPIRATORY (INHALATION) at 05:12

## 2022-03-02 RX ADMIN — METHYLPREDNISOLONE SODIUM SUCCINATE 40 MG: 40 INJECTION, POWDER, FOR SOLUTION INTRAMUSCULAR; INTRAVENOUS at 14:33

## 2022-03-02 RX ADMIN — GUAIFENESIN 400 MG: 200 TABLET ORAL at 14:34

## 2022-03-02 RX ADMIN — ALBUTEROL SULFATE 2 PUFF: 90 AEROSOL, METERED RESPIRATORY (INHALATION) at 09:28

## 2022-03-02 RX ADMIN — SODIUM CHLORIDE, PRESERVATIVE FREE 10 ML: 5 INJECTION INTRAVENOUS at 20:46

## 2022-03-02 RX ADMIN — PHENYLEPHRINE HYDROCHLORIDE 80 MCG: 10 INJECTION INTRAVENOUS at 11:42

## 2022-03-02 RX ADMIN — MUPIROCIN: 20 OINTMENT TOPICAL at 15:02

## 2022-03-02 RX ADMIN — IPRATROPIUM BROMIDE 2 PUFF: 17 AEROSOL, METERED RESPIRATORY (INHALATION) at 20:51

## 2022-03-02 RX ADMIN — EPHEDRINE SULFATE 10 MG: 50 INJECTION INTRAMUSCULAR; INTRAVENOUS; SUBCUTANEOUS at 11:33

## 2022-03-02 RX ADMIN — LORAZEPAM 0.25 MG: 2 INJECTION INTRAMUSCULAR; INTRAVENOUS at 17:42

## 2022-03-02 RX ADMIN — OXYCODONE HYDROCHLORIDE AND ACETAMINOPHEN 500 MG: 500 TABLET ORAL at 20:45

## 2022-03-02 RX ADMIN — FAMOTIDINE 20 MG: 20 TABLET ORAL at 07:57

## 2022-03-02 RX ADMIN — PHENYLEPHRINE HYDROCHLORIDE 80 MCG: 10 INJECTION INTRAVENOUS at 11:33

## 2022-03-02 RX ADMIN — EPHEDRINE SULFATE 10 MG: 50 INJECTION INTRAMUSCULAR; INTRAVENOUS; SUBCUTANEOUS at 11:42

## 2022-03-02 RX ADMIN — Medication 2000 MG: at 15:00

## 2022-03-02 RX ADMIN — EPHEDRINE SULFATE 10 MG: 50 INJECTION INTRAMUSCULAR; INTRAVENOUS; SUBCUTANEOUS at 12:08

## 2022-03-02 RX ADMIN — FENTANYL CITRATE 25 MCG: 50 INJECTION INTRAMUSCULAR; INTRAVENOUS at 11:27

## 2022-03-02 RX ADMIN — LORAZEPAM 0.25 MG: 2 INJECTION INTRAMUSCULAR; INTRAVENOUS at 01:52

## 2022-03-02 RX ADMIN — CHLORHEXIDINE GLUCONATE: 4 SOLUTION TOPICAL at 15:01

## 2022-03-02 RX ADMIN — SODIUM CHLORIDE: 9 INJECTION, SOLUTION INTRAVENOUS at 15:43

## 2022-03-02 RX ADMIN — ATORVASTATIN CALCIUM 40 MG: 40 TABLET, FILM COATED ORAL at 20:46

## 2022-03-02 RX ADMIN — SPIRONOLACTONE 25 MG: 25 TABLET ORAL at 07:56

## 2022-03-02 RX ADMIN — FUROSEMIDE 40 MG: 40 TABLET ORAL at 07:56

## 2022-03-02 RX ADMIN — ISOSORBIDE MONONITRATE 30 MG: 30 TABLET, EXTENDED RELEASE ORAL at 07:57

## 2022-03-02 RX ADMIN — ZINC SULFATE 220 MG (50 MG) CAPSULE 50 MG: CAPSULE at 07:56

## 2022-03-02 RX ADMIN — LORAZEPAM 0.25 MG: 2 INJECTION INTRAMUSCULAR; INTRAVENOUS at 07:57

## 2022-03-02 RX ADMIN — PHENYLEPHRINE HYDROCHLORIDE 80 MCG: 10 INJECTION INTRAVENOUS at 11:27

## 2022-03-02 RX ADMIN — IPRATROPIUM BROMIDE 2 PUFF: 17 AEROSOL, METERED RESPIRATORY (INHALATION) at 09:29

## 2022-03-02 RX ADMIN — SOTALOL HYDROCHLORIDE 80 MG: 80 TABLET ORAL at 07:56

## 2022-03-02 RX ADMIN — Medication 2000 UNITS: at 07:57

## 2022-03-02 RX ADMIN — FUROSEMIDE 40 MG: 40 TABLET ORAL at 17:42

## 2022-03-02 RX ADMIN — PROPOFOL 140 MG: 10 INJECTION, EMULSION INTRAVENOUS at 11:28

## 2022-03-02 ASSESSMENT — PAIN SCALES - GENERAL
PAINLEVEL_OUTOF10: 5
PAINLEVEL_OUTOF10: 0
PAINLEVEL_OUTOF10: 9
PAINLEVEL_OUTOF10: 8
PAINLEVEL_OUTOF10: 0
PAINLEVEL_OUTOF10: 5
PAINLEVEL_OUTOF10: 0
PAINLEVEL_OUTOF10: 5
PAINLEVEL_OUTOF10: 9
PAINLEVEL_OUTOF10: 0
PAINLEVEL_OUTOF10: 9

## 2022-03-02 ASSESSMENT — ENCOUNTER SYMPTOMS
SHORTNESS OF BREATH: 0
CONSTIPATION: 0
COLOR CHANGE: 0
VOICE CHANGE: 0
DIARRHEA: 0
VOMITING: 0
NAUSEA: 0
BACK PAIN: 0

## 2022-03-02 ASSESSMENT — PAIN DESCRIPTION - DESCRIPTORS: DESCRIPTORS: ACHING

## 2022-03-02 ASSESSMENT — PAIN DESCRIPTION - FREQUENCY: FREQUENCY: CONTINUOUS

## 2022-03-02 ASSESSMENT — PAIN DESCRIPTION - ONSET: ONSET: ON-GOING

## 2022-03-02 ASSESSMENT — LIFESTYLE VARIABLES: SMOKING_STATUS: 1

## 2022-03-02 ASSESSMENT — PAIN DESCRIPTION - LOCATION
LOCATION: CHEST
LOCATION: CHEST

## 2022-03-02 ASSESSMENT — PAIN DESCRIPTION - PROGRESSION
CLINICAL_PROGRESSION: GRADUALLY IMPROVING
CLINICAL_PROGRESSION: NOT CHANGED

## 2022-03-02 ASSESSMENT — PAIN - FUNCTIONAL ASSESSMENT: PAIN_FUNCTIONAL_ASSESSMENT: PREVENTS OR INTERFERES SOME ACTIVE ACTIVITIES AND ADLS

## 2022-03-02 ASSESSMENT — PAIN DESCRIPTION - PAIN TYPE
TYPE: ACUTE PAIN
TYPE: ACUTE PAIN

## 2022-03-02 NOTE — PROGRESS NOTES
Cardiology MD at bedside to discuss plan of care. MD explained benefits/risks of procedure. Informed consents signed for ICD placement later today and placed in pt's soft chart. Asked MD to call and speak with pt's significant other, Denise Santana, regarding plan of care for pt.      Electronically signed by Kindra Dean RN on 3/2/2022 at 10:08 AM

## 2022-03-02 NOTE — PROGRESS NOTES
Hospitalist Progress Note    Patient:  Guillaume Weiner  YOB: 1950  Date of Service: 3/2/2022  MRN: [id number]   Acct: [de-identified]   Primary Care Physician: Vasu Valentino MD  Advance Directive: Full Code  Admit Date: 2/27/2022       Hospital Day: 2  Referring Provider: Katie Ellis DO    Patient Seen, Chart, Consults, Notes, Labs, Radiology studies reviewed. Subjective:  Guillaume Weiner is a 66 y.o. male  whom we are following for longstanding ischemic cardiomyopathy, EF of 10%, ventricular arrhythmias. He was currently wearing a LifeVest.  He was at home and apparently the device was about to deliver a shock when he took the best off. He became diaphoretic and had a near syncopal episode. EMS was summoned and he was brought into the emergency department. He had a brief episode of ventricular fibrillation and was treated with ACLS protocol and had ROSC. He is scheduled for AICD placement this afternoon. Hemodynamics are otherwise stable. He is currently on an amiodarone infusion.     Allergies:  Statins    Medicines:  Current Facility-Administered Medications   Medication Dose Route Frequency Provider Last Rate Last Admin    famotidine (PEPCID) tablet 20 mg  20 mg Oral Daily Patience Quirino, DO   20 mg at 03/02/22 0757    amiodarone (CORDARONE) 450 mg in dextrose 5 % 250 mL infusion  0.5 mg/min IntraVENous Continuous Hina Alex MD   Stopped at 03/01/22 1645    sodium chloride flush 0.9 % injection 5-40 mL  5-40 mL IntraVENous 2 times per day Patience Quirino, DO   10 mL at 03/01/22 1944    sodium chloride flush 0.9 % injection 5-40 mL  5-40 mL IntraVENous PRN Patience Quirino, DO        ondansetron (ZOFRAN-ODT) disintegrating tablet 4 mg  4 mg Oral Q8H PRN Patience Quirino, DO        Or    ondansetron (ZOFRAN) injection 4 mg  4 mg IntraVENous Q6H PRN Patience Quirino, DO        polyethylene glycol (GLYCOLAX) packet 17 g  17 g Oral Daily PRN Patience Quirino, DO        acetaminophen Stopped at 03/01/22 1305    zinc sulfate (ZINCATE) capsule 50 mg  50 mg Oral Daily Anna Doll MD   50 mg at 03/02/22 0756    Vitamin D (CHOLECALCIFEROL) tablet 2,000 Units  2,000 Units Oral Daily Anna Doll MD   2,000 Units at 03/02/22 0757    ascorbic acid (VITAMIN C) tablet 500 mg  500 mg Oral BID Anna Doll MD   500 mg at 03/02/22 8279    guaiFENesin tablet 400 mg  400 mg Oral Q8H Anna Doll MD   400 mg at 03/02/22 3081    albuterol sulfate  (90 Base) MCG/ACT inhaler 2 puff  2 puff Inhalation Q4H Anna Doll MD   2 puff at 03/02/22 4604    And    ipratropium (ATROVENT HFA) 17 MCG/ACT inhaler 2 puff  2 puff Inhalation Q4H Anna Doll MD   2 puff at 03/02/22 1051    calcium carbonate (TUMS) chewable tablet 500 mg  500 mg Oral TID PRN Anna Doll MD   500 mg at 03/01/22 1950       Past Medical History:  Past Medical History:   Diagnosis Date    Acute exacerbation of CHF (congestive heart failure) ([facility] [address] 02/02/2022    Atrial fibrillation ([facility] [address]     Bilateral carotid artery stenosis 01/06/2020    CAD (coronary artery disease), native coronary artery     acute inferior MI on 5/15/09, s/p emeergent CABG x4 5/15/09    Chest pain     HTN (hypertension)     Hyperlipidemia     Dr. Tenisha Sabillon manages    Palliative care patient 02/28/2022    Palpitations     Statin intolerance 02/14/2018    Tobacco abuse     Transient atrial fibrillation or flutter     post op       Past Surgical History:  Past Surgical History:   Procedure Laterality Date    CARDIAC CATHETERIZATION  4/23/12    with stent to left circ, and LAD    CARDIAC CATHETERIZATION  01/07/2018    PTCA/BJ to VG to LAD;  atherectomy and 3 BMS to VG to 1st Circ    CORONARY ARTERY BYPASS GRAFT  05/15/09    x4    DIAGNOSTIC CARDIAC CATH LAB PROCEDURE  05/15/09    left heart cath, left ventr, selective coronary arteriography     FRACTURE SURGERY Left     Leg    VASCULAR SURGERY  4/21/2015 [facility]    Percutaneous cannulation right common femoral artery with 5-English and later 6-English pinnacle destination sheath. Suprarenal abdominal aortogram with bilateral iliofemoral arteriograms. Bilateral lower extremity arteriograms. Left popliteal/tibioperoneal trunk artery balloon angioplasty with 4 mm x 15 mm cutting balloon.  VASCULAR SURGERY  4/21/2015 [facility] cont    Arteriograms after balloon angioplasty. Balloon angioplasty left popliteal artery/tibioperoneal trunk with 5 mm x 40 mm russell balloon. Arteriograms after balloon angioplasty. Left superficial femoral artery balloon angioplasty with 7 mm x 100 mm  balloon. Left superficial femoral artery stent placement idev supera 6.5 x 100 mm self-expanding nitinol stent. Completion arteriograms left lower e    VASCULAR SURGERY  4/21/2015 [facility] cont    extremity. Mynx closure right common femoral artery puncture site.  VASCULAR SURGERY  03/04/2020    [facility]. Percutaneous cannulation left common femoral artery with 5 English glide sheath. Suprarenal abdominal aortogram with bilateral iliofemoral arteriogram.Bilateral lower extremity arteriogram.Minx closure left common femoral artery puncture site.        Family History  Family History   Problem Relation Age of Onset    Coronary Art Dis Father     High Blood Pressure Father     Coronary Art Dis Brother     High Blood Pressure Mother     Cancer Mother     High Blood Pressure Sister        Social History  Social History     Socioeconomic History    Marital status: Single     Spouse name: Not on file    Number of children: Not on file    Years of education: Not on file    Highest education level: Not on file   Occupational History    Not on file   Tobacco Use    Smoking status: Former Smoker     Packs/day: 0.00     Years: 45.00     Pack years: 0.00     Types: Cigarettes     Start date: 1964    Smokeless tobacco: Never Used    Tobacco comment: STATES,\"I VAP\"   Vaping Use    Vaping Use: Every day   Substance and Sexual Activity    Alcohol use: problem. Negative for back pain and neck pain. Skin: Negative for color change. Allergic/Immunologic: Negative for immunocompromised state. Neurological: Positive for weakness. Negative for dizziness and light-headedness. Psychiatric/Behavioral: The patient is not nervous/anxious. Objective:  Blood pressure 101/60, pulse 62, temperature 97.3 °F (36.3 °C), temperature source Temporal, resp. rate 22, height 6' (1.829 m), weight 140 lb (63.5 kg), SpO2 95 %. Intake/Output Summary (Last 24 hours) at 3/2/2022 1038  Last data filed at 3/2/2022 0800  Gross per 24 hour   Intake 536.04 ml   Output 900 ml   Net -363.96 ml       Physical Exam  Vitals and nursing note reviewed. HENT:      Head: Normocephalic and atraumatic. Right Ear: External ear normal.      Left Ear: External ear normal.      Nose: Nose normal.      Mouth/Throat:      Mouth: Mucous membranes are moist.   Eyes:      Conjunctiva/sclera: Conjunctivae normal.      Pupils: Pupils are equal, round, and reactive to light. Cardiovascular:      Rate and Rhythm: Normal rate and regular rhythm. Heart sounds: Normal heart sounds. Pulmonary:      Effort: Pulmonary effort is normal.      Breath sounds: Normal breath sounds. Abdominal:      General: Abdomen is flat. Palpations: Abdomen is soft. Musculoskeletal:         General: No swelling. Cervical back: Neck supple. No rigidity. Skin:     General: Skin is warm and dry. Neurological:      Mental Status: He is oriented to person, place, and time. Psychiatric:         Mood and Affect: Mood normal.         Thought Content:  Thought content normal.         Labs:  BMP:   Recent Labs     02/27/22 2331 02/27/22 2334 03/01/22 0237 03/02/22  0154   *  --  134* 134*   K 4.7 4.4 4.8 5.0   CL 96*  --  99 97*   CO2 19*  --  23 23   BUN 27*  --  28* 33*   CREATININE 1.0  --  0.7 0.8   CALCIUM 7.6*  --  8.0* 8.1*     CBC:   Recent Labs     02/27/22 2331 03/01/22 0237 03/02/22 0154   WBC 17.1* 15.4* 16.0*   HGB 12.3* 12.3* 12.2*   HCT 38.8* 37.4* 36.9*   MCV 94.2* 92.6 91.8   * 493* 457*     LIVER PROFILE:   Recent Labs     02/27/22 2331 03/01/22 0237 03/02/22 0154   * 74* 51*   * 88* 68*   BILITOT 0.5 0.3 0.3   ALKPHOS 261* 214* 241*     PT/INR: No results for input(s): PROTIME, INR in the last 72 hours. APTT: No results for input(s): APTT in the last 72 hours. BNP:  No results for input(s): BNP in the last 72 hours. Ionized Calcium:No results for input(s): IONCA in the last 72 hours. Magnesium:  Recent Labs     03/01/22 0237 03/02/22 0154   MG 1.8 1.8     Phosphorus:No results for input(s): PHOS in the last 72 hours. HgbA1C: No results for input(s): LABA1C in the last 72 hours. Hepatic:   Recent Labs     02/27/22 2331 03/01/22 0237 03/02/22 0154   ALKPHOS 261* 214* 241*   * 88* 68*   * 74* 51*   PROT 5.8* 5.3* 5.2*   BILITOT 0.5 0.3 0.3   LABALBU 2.6* 2.5* 2.6*     Lactic Acid: No results for input(s): LACTA in the last 72 hours. Troponin: No results for input(s): CKTOTAL, CKMB, TROPONINT in the last 72 hours. ABGs: No results for input(s): PH, PCO2, PO2, HCO3, O2SAT in the last 72 hours. CRP:    Recent Labs     02/27/22 2331   CRP 4.22*     Sed Rate:  No results for input(s): SEDRATE in the last 72 hours. Cultures:   No results for input(s): CULTURE in the last 72 hours. No results for input(s): BC, Tika Spencer in the last 72 hours. No results for input(s): CXSURG in the last 72 hours.     Radiology reports as per the Radiologist  Radiology: ECHO Complete 2D W Doppler W Color    Result Date: 2/28/2022  Transthoracic Echocardiography Report (TTE)  Demographics   Patient Name  CASI Underwood Date of Study          02/28/2022   MRN           [id number]          Gender                 Male   Date of Birth 1950      Room Number            TSI-4225   Age           66 year(s)   Height:       72 inches       Referring Physician    Nick Nelson DO   Weight:       140 pounds      Sonographer            Bebe Painter RUST   BSA:          1.83 m^2        Interpreting Physician Jacki Chacko MD   BMI:          18.99 kg/m^2  Procedure Type of Study   TTE procedure:ECHO 2D W/DOPPLER/COLOR/CONTRAST. Study Location: Portable Technical Quality: Adequate visualization Patient Status: Inpatient BP: 110/69 mmHg Indications:Congestive heart failure, systolic dysfunction and Mitral regurgitation. Conclusions   Summary  Limited echocardiographic study. LV is moderate to severely dilated with severely reduced LV systolic  function. LV ejection fraction estimated at less than 20%. Probable grade 3 diastolic dysfunction. RV appears mildly dilated with moderately reduced RV systolic function  (TAPSE equals 1.6 cm). Severe left atrial enlargement. Left atrial pressure is elevated. Severe right atrial enlargement. Aortic valve not well studied. No significant stenosis. Probable mild to  moderate aortic regurgitation. Mitral valve appears moderately thickened with preserved leaflet mobility. Severe central mitral regurgitation is noted. Mitral regurgitant jet  velocity suggests significantly elevated left atrial pressure. No significant pericardial effusion noted. Signature   ----------------------------------------------------------------  Electronically signed by Jacki Chacko MD(Interpreting physician)  on 02/28/2022 07:14 PM  ----------------------------------------------------------------  Allergies   - Statins. M-Mode Measurements (cm)   LVIDd: 5.64 cm                                  LVIDs: 5.11 cm  IVSd: 1.2 cm  LVPWd: 1.2 cm  % Ejection Fraction: 20 %  Doppler Measurements:    MV Peak E-Wave: 86.1 cm/s   MV Peak A-Wave: 27.9 cm/s   MV E/A Ratio: 3.09 %   MV Peak Gradient: 2.97 mmHg   MV P1/2t: 48 msec   MVA by PHT4.58 cm^2      XR CHEST PORTABLE    Result Date: 2/28/2022  Examination.  XR CHEST PORTABLE 2/27/2022 11:30 PM History: Chest pain. A single frontal portable semiupright view of the chest is compared with the previous study dated 2/2/2022. There is a significant improvement in the bilateral lung infiltrate since the previous study. There is a small residual infiltrate at bilateral lung bases and a small left basal pleural effusion. There is persistent moderate cardiomegaly. Atheromatous changes thoracic aorta are noted. No pulmonary congestion or pneumothorax. No acute bony abnormality. 1. Improvement. Signed by Dr Jay Tejada     Systolic heart failure secondary to ischemic cardiomyopathy. Tentative AICD today. Coronary artery disease involving native coronary artery of native heart with angina pectoris. Continue ongoing medical management. Tobacco abuse. Encourage cessation. Hyperlipidemia. Continue medical management. Severe left ventricular systolic dysfunction. Continue medical management. Please document 40 minutes of critical care time for patient assessment, chart review, discussion with staff, .       Winsome Ashley DO

## 2022-03-02 NOTE — ANESTHESIA POSTPROCEDURE EVALUATION
Department of Anesthesiology  Postprocedure Note    Patient: Monserrat White  MRN: [id number]  YOB: 1950  Date of evaluation: 3/2/2022  Time:  1:21 PM     Procedure Summary     Date: 03/02/22 Room / Location: [facility] CATH LAB    Anesthesia Start: 2700 Anesthesia Stop:     Procedure: DEFIBRILLATOR PLACEMENT Diagnosis:     Scheduled Providers:  Responsible Provider: LUIS Aldrich CRNA    Anesthesia Type: general ASA Status: 4 - Emergent          Anesthesia Type: No value filed. Raul Phase I:      Raul Phase II:      Last vitals: Reviewed and per EMR flowsheets. Anesthesia Post Evaluation    Patient location during evaluation: PACU  Patient participation: waiting for patient participation  Pain score: 0  Airway patency: patent  Nausea & Vomiting: no nausea and no vomiting  Complications: no  Cardiovascular status: blood pressure returned to baseline  Respiratory status: oral airway  Hydration status: euvolemic  Comments: Pt transported with oxygen.   Report to RN

## 2022-03-02 NOTE — ANESTHESIA PRE PROCEDURE
Department of Anesthesiology  Preprocedure Note       Name:  Maninder Born   Age:  66 y.o.  :  1950                                          MRN:  [id number]         Date:  3/2/2022      Surgeon: * Surgery not found *    Procedure:     Medications prior to admission:   Prior to Admission medications    Medication Sig Start Date End Date Taking? Authorizing Provider   furosemide (LASIX) 40 MG tablet Take 1 tablet by mouth 2 times daily 22   Edi Gutierrez MD   atorvastatin (LIPITOR) 80 MG tablet Take 0.5 tablets by mouth nightly 22   Edi Gutierrez MD   spironolactone (ALDACTONE) 25 MG tablet Take 1 tablet by mouth daily 22   Edi Gutierrez MD   clopidogrel (PLAVIX) 75 MG tablet Take 1 tablet by mouth daily 22   Edi Gutierrez MD   apixaban Betsey Robertson) 5 MG TABS tablet Take 1 tablet by mouth 2 times daily 22   LUIS Dong   isosorbide mononitrate (IMDUR) 30 MG extended release tablet Take 1 tablet by mouth daily 22   LUIS Dong   sacubitril-valsartan (ENTRESTO) 49-51 MG per tablet Take 1 tablet by mouth 2 times daily 22   LUIS Dong   Vitamin D (CHOLECALCIFEROL) 25 MCG (1000 UT) TABS tablet Take 5 tablets by mouth daily 22   LUIS Dong   nitroGLYCERIN (NITROSTAT) 0.4 MG SL tablet Place 1 tablet under the tongue every 5 minutes as needed for Chest pain up to max of 3 total doses. If no relief after 1 dose, call 911. 21   LUIS Alegre   sotalol (BETAPACE) 160 MG tablet Take 1 tablet by mouth twice daily 21   [place] MarcelinoLUIS arzate   nitroGLYCERIN (NITROSTAT) 0.4 MG SL tablet Place 1 tablet under the tongue every 5 minutes as needed for Chest pain 21   LUIS Alegre   aspirin 81 MG EC tablet Take 81 mg by mouth daily.       Historical Provider, MD       Current medications:    Current Facility-Administered Medications   Medication Dose Route Frequency Provider Last Rate Last Admin    famotidine (PEPCID) tablet 20 mg  20 mg Oral Daily Patience Quirino, DO   20 mg at 03/02/22 0757    amiodarone (CORDARONE) 450 mg in dextrose 5 % 250 mL infusion  0.5 mg/min IntraVENous Continuous Julisa Hopkins MD   Stopped at 03/01/22 1645    sodium chloride flush 0.9 % injection 5-40 mL  5-40 mL IntraVENous 2 times per day Patience Quirino, DO   10 mL at 03/01/22 1944    sodium chloride flush 0.9 % injection 5-40 mL  5-40 mL IntraVENous PRN Patience Quirino, DO        ondansetron (ZOFRAN-ODT) disintegrating tablet 4 mg  4 mg Oral Q8H PRN Patience Quirino, DO        Or    ondansetron (ZOFRAN) injection 4 mg  4 mg IntraVENous Q6H PRN Patience Quirino, DO        polyethylene glycol (GLYCOLAX) packet 17 g  17 g Oral Daily PRN Patience Quirino, DO        acetaminophen (TYLENOL) tablet 650 mg  650 mg Oral Q6H PRN Patience Quirino, DO        Or    acetaminophen (TYLENOL) suppository 650 mg  650 mg Rectal Q6H PRN Patience Quriino, DO        [Held by provider] apixaban (ELIQUIS) tablet 5 mg  5 mg Oral BID Patience Quirino, DO   5 mg at 03/01/22 0737    aspirin EC tablet 81 mg  81 mg Oral Daily Patience Quirino, DO   81 mg at 03/02/22 0757    atorvastatin (LIPITOR) tablet 40 mg  40 mg Oral Nightly Patience Quirino, DO   40 mg at 03/01/22 1942    clopidogrel (PLAVIX) tablet 75 mg  75 mg Oral Daily Patience Quirino, DO   75 mg at 03/02/22 0757    furosemide (LASIX) tablet 40 mg  40 mg Oral BID Patience Quirino, DO   40 mg at 03/02/22 0756    isosorbide mononitrate (IMDUR) extended release tablet 30 mg  30 mg Oral Daily Patience Quirino, DO   30 mg at 03/02/22 0757    nitroGLYCERIN (NITROSTAT) SL tablet 0.4 mg  0.4 mg SubLINGual Q5 Min PRN Patience Quirino, DO        nitroGLYCERIN (NITROSTAT) SL tablet 0.4 mg  0.4 mg SubLINGual Q5 Min PRN Patience Quirino, DO        sacubitril-valsartan (ENTRESTO) 49-51 MG per tablet 1 tablet  1 tablet Oral BID Patience DO Quirino   1 tablet at 03/02/22 0756    sotalol (BETAPACE) tablet 80 mg  80 mg Oral 2 times per day Patience Quirino, DO   80 mg at 03/02/22 0756    spironolactone (ALDACTONE) tablet 25 mg  25 mg Oral Daily Patience Quirino, DO   25 mg at 03/02/22 0756    morphine (PF) injection 2 mg  2 mg IntraVENous Q6H PRN Patience Quirino, DO   2 mg at 03/02/22 0757    LORazepam (ATIVAN) injection 0.25 mg  0.25 mg IntraVENous Q6H PRN Dawood Palm MD   0.25 mg at 03/02/22 0757    methylPREDNISolone sodium (SOLU-MEDROL) injection 40 mg  40 mg IntraVENous Q8H Dawood Palm MD   40 mg at 03/02/22 8274    cefTRIAXone (ROCEPHIN) 1,000 mg in sterile water 10 mL IV syringe  1,000 mg IntraVENous Q24H Dawood Palm MD   1,000 mg at 03/01/22 1201    azithromycin (ZITHROMAX) 500 mg in D5W 250ml Vial Mate  500 mg IntraVENous Q24H Dawood Palm MD   Stopped at 03/01/22 1305    zinc sulfate (ZINCATE) capsule 50 mg  50 mg Oral Daily Dawood Palm MD   50 mg at 03/02/22 0756    Vitamin D (CHOLECALCIFEROL) tablet 2,000 Units  2,000 Units Oral Daily Dawood Palm MD   2,000 Units at 03/02/22 0757    ascorbic acid (VITAMIN C) tablet 500 mg  500 mg Oral BID Dawood Palm MD   500 mg at 03/02/22 0756    guaiFENesin tablet 400 mg  400 mg Oral Q8H Dawood Palm MD   400 mg at 03/02/22 0512    albuterol sulfate  (90 Base) MCG/ACT inhaler 2 puff  2 puff Inhalation Q4H Dawodo Palm MD   2 puff at 03/02/22 1196    And    ipratropium (ATROVENT HFA) 17 MCG/ACT inhaler 2 puff  2 puff Inhalation Q4H Dawood Palm MD   2 puff at 03/02/22 0929    calcium carbonate (TUMS) chewable tablet 500 mg  500 mg Oral TID PRN Dawood Palm MD   500 mg at 03/01/22 1950       Allergies:     Allergies   Allergen Reactions    Statins Itching     myalagias       Problem List:    Patient Active Problem List   Diagnosis Code    HTN (hypertension) I10    Coronary artery disease involving native coronary artery of native heart with angina pectoris ([facility] [address] I25.119    Tobacco abuse Z72.0    Chest pain R07.9    Hyperlipidemia E78.5    Palpitations R00.2    Paroxysmal A-fib ([facility]) I48.0    Atherosclerosis of native artery of extremity with intermittent claudication ([facility]) I70.219    ST elevation myocardial infarction (STEMI) ([facility]) I21.3    Inferior MI ([facility]) I21.19    Statin intolerance Z78.9    Bilateral carotid artery stenosis I65.23    PVD (peripheral vascular disease) ([facility]) I73.9    Acute respiratory failure with hypoxia and hypercarbia ([facility]) J96.01, J96.02    Sepsis ([facility]) A41.9    Acute exacerbation of CHF (congestive heart failure) ([facility]) J48.0    Acute systolic (congestive) heart failure ([facility]) I50.21    Acute respiratory failure with hypoxia ([facility]) M85.56    Systolic heart failure secondary to hypertrophic cardiomyopathy ([facility]) I50.20, I42.2    Severe left ventricular systolic dysfunction P20.0       Past Medical History:        Diagnosis Date    Acute exacerbation of CHF (congestive heart failure) ([facility]) 02/02/2022    Atrial fibrillation ([facility])     Bilateral carotid artery stenosis 01/06/2020    CAD (coronary artery disease), native coronary artery     acute inferior MI on 5/15/09, s/p emeergent CABG x4 5/15/09    Chest pain     HTN (hypertension)     Hyperlipidemia     Dr. Rebecca oTmlinson manages    Palliative care patient 02/28/2022    Palpitations     Statin intolerance 02/14/2018    Tobacco abuse     Transient atrial fibrillation or flutter     post op       Past Surgical History:        Procedure Laterality Date    CARDIAC CATHETERIZATION  4/23/12    with stent to left circ, and LAD    CARDIAC CATHETERIZATION  01/07/2018    PTCA/BJ to VG to LAD;  atherectomy and 3 BMS to VG to 1st Circ    CORONARY ARTERY BYPASS GRAFT  05/15/09    x4    DIAGNOSTIC CARDIAC CATH LAB PROCEDURE  05/15/09    left heart cath, left ventr, selective coronary arteriography     FRACTURE SURGERY Left     Leg    VASCULAR SURGERY  4/21/2015 [facility]    Percutaneous cannulation right common femoral artery with 5-Swiss and later 6-Tristanian pinnacle destination sheath. Suprarenal abdominal aortogram with bilateral iliofemoral arteriograms. Bilateral lower extremity arteriograms. Left popliteal/tibioperoneal trunk artery balloon angioplasty with 4 mm x 15 mm cutting balloon.  VASCULAR SURGERY  4/21/2015 [facility] cont    Arteriograms after balloon angioplasty. Balloon angioplasty left popliteal artery/tibioperoneal trunk with 5 mm x 40 mm russell balloon. Arteriograms after balloon angioplasty. Left superficial femoral artery balloon angioplasty with 7 mm x 100 mm  balloon. Left superficial femoral artery stent placement idev supera 6.5 x 100 mm self-expanding nitinol stent. Completion arteriograms left lower e    VASCULAR SURGERY  4/21/2015 [facility] cont    extremity. Mynx closure right common femoral artery puncture site.  VASCULAR SURGERY  03/04/2020    [facility]. Percutaneous cannulation left common femoral artery with 5 Tristanian glide sheath. Suprarenal abdominal aortogram with bilateral iliofemoral arteriogram.Bilateral lower extremity arteriogram.Minx closure left common femoral artery puncture site. Social History:    Social History     Tobacco Use    Smoking status: Former Smoker     Packs/day: 0.00     Years: 45.00     Pack years: 0.00     Types: Cigarettes     Start date: 1964    Smokeless tobacco: Never Used    Tobacco comment: STATES,\"I VAP\"   Substance Use Topics    Alcohol use:  No                                Counseling given: Not Answered  Comment: STATES,\"I VAP\"      Vital Signs (Current):   Vitals:    03/02/22 0500 03/02/22 0600 03/02/22 0800 03/02/22 0900   BP: (!) 93/50 (!) 86/58 101/60 101/60   Pulse: 65 62 61 62   Resp: 23 18 (!) 35 22   Temp:   97.3 °F (36.3 °C)    TempSrc:   Temporal    SpO2: 96% 94% 94% 95%   Weight:       Height:                                                  BP Readings from Last 3 Encounters:   03/02/22 101/60   02/09/22 (!) 93/57   12/21/21 126/78       NPO Status: BMI:   Wt Readings from Last 3 Encounters:   02/27/22 140 lb (63.5 kg)   02/08/22 139 lb 1.6 oz (63.1 kg)   12/21/21 155 lb (70.3 kg)     Body mass index is 18.99 kg/m². CBC:   Lab Results   Component Value Date    WBC 16.0 03/02/2022    RBC 4.02 03/02/2022    HGB 12.2 03/02/2022    HCT 36.9 03/02/2022    HCT 39.4 04/25/2012    MCV 91.8 03/02/2022    RDW 14.3 03/02/2022     03/02/2022     04/25/2012       CMP:   Lab Results   Component Value Date     03/02/2022     04/25/2012    K 5.0 03/02/2022    K 3.6 02/04/2022    K 4.0 04/25/2012    CL 97 03/02/2022     04/25/2012    CO2 23 03/02/2022    BUN 33 03/02/2022    CREATININE 0.8 03/02/2022    CREATININE 0.9 04/25/2012    GFRAA >59 03/02/2022    LABGLOM >60 03/02/2022    GLUCOSE 115 03/02/2022    PROT 5.2 03/02/2022    PROT 7.5 04/23/2012    CALCIUM 8.1 03/02/2022    BILITOT 0.3 03/02/2022    ALKPHOS 241 03/02/2022    ALKPHOS 69 04/23/2012    AST 51 03/02/2022    ALT 68 03/02/2022       POC Tests: No results for input(s): POCGLU, POCNA, POCK, POCCL, POCBUN, POCHEMO, POCHCT in the last 72 hours.     Coags:   Lab Results   Component Value Date    PROTIME 12.6 01/07/2018    PROTIME 10.96 04/23/2012    INR 0.95 01/07/2018    APTT 29.4 01/07/2018       HCG (If Applicable): No results found for: PREGTESTUR, PREGSERUM, HCG, HCGQUANT     ABGs:   Lab Results   Component Value Date    PHART 7.420 02/27/2022    PO2ART 204.0 02/27/2022    WLB9VPY 31.0 02/27/2022    GHD0VYS 20.1 02/27/2022    BEART -3.4 02/27/2022    O8WBORZZ 96.6 02/27/2022        Type & Screen (If Applicable):  No results found for: LABABO, LABRH    Drug/Infectious Status (If Applicable):  No results found for: HIV, HEPCAB    COVID-19 Screening (If Applicable):   Lab Results   Component Value Date    COVID19 DETECTED 02/27/2022           Anesthesia Evaluation  Patient summary reviewed no history of anesthetic complications:   Airway: Mallampati: II  TM distance: >3 FB   Neck ROM: full  Mouth opening: > = 3 FB Dental:          Pulmonary:normal exam  breath sounds clear to auscultation  (+) current smoker    (-) asthma, recent URI and sleep apnea          Patient did not smoke on day of surgery. Cardiovascular:  Exercise tolerance: poor (<4 METS),   (+) hypertension:, angina:, past MI: > 6 months, CAD:, CABG/stent (CABG 2009, multiple stents since):, dysrhythmias: atrial fibrillation and atrial flutter, CHF (HZ<40%): systolic and diastolic, hyperlipidemia    (-) pacemaker    ECG reviewed  Rhythm: regular  Rate: normal  Echocardiogram reviewed         Beta Blocker:  Dose within 24 Hrs      ROS comment: 60 BPM  Sinus rhythm  Prolonged QT interval  Left axis deviation  Inferior infarct - age undetermined  Anterolateral ST-T abnormality may be due to myocardial ischemia  Comparison Summary: Significant changes  Summary: Abnormal ECG    LV is moderate to severely dilated with severely reduced LV systolic   function. LV ejection fraction estimated at less than 20%. Probable grade 3 diastolic dysfunction. RV appears mildly dilated with moderately reduced RV systolic function   (TAPSE equals 1.6 cm). Severe left atrial enlargement. Left atrial pressure is elevated. Severe right atrial enlargement. Aortic valve not well studied. No significant stenosis. Probable mild to   moderate aortic regurgitation. Mitral valve appears moderately thickened with preserved leaflet mobility. Severe central mitral regurgitation is noted. Mitral regurgitant jet   velocity suggests significantly elevated left atrial pressure. No significant pericardial effusion noted.      Neuro/Psych:      (-) seizures, TIA and CVA           GI/Hepatic/Renal:        (-) GERD, liver disease and no renal disease       Endo/Other:    (+) blood dyscrasia: anticoagulation therapy:., .    (-) diabetes mellitus, hypothyroidism, hyperthyroidism        Pt had no PAT visit       Abdominal:             Vascular:   + PVD, aortic or cerebral, . Other Findings:           Anesthesia Plan      general     ASA 4 - emergent     (COVID +  Patient admitted 2/27/22, with diaphoresis, syncopal event, subsequent V-fib requiring ACLS, with ROSC. Case declared urgent by cardiologist)  Induction: intravenous. MIPS: Postoperative opioids intended and Prophylactic antiemetics administered. Anesthetic plan and risks discussed with patient. Use of blood products discussed with patient whom consented to blood products. Plan discussed with CRNA.                 LUIS Mack - CRNA   3/2/2022

## 2022-03-02 NOTE — PROCEDURES
Cardiac Dual Chamber ICD placement Operative Report    Kennedy Pappas  [id number]  3/2/2022    Surgeon: Milton Quiroz    Anesthesia: General see separate report by anesthesiologist    Procedure(s):1. Dual Chamber ICD implant    Indications:   1. Ischemic cardiomyopathy with ejection fraction less than 30%  2. Chronic congestive heart failure systolic and diastolic [place] heart association class III    Procedure Details  The risks, benefits, complications, treatment options, and expected outcomes were discussed with the patient. The patient and/or family concurred with the proposed plan, giving informed consent. Patient was prepped and draped in the usual strict sterile fashion. After the antibiotic was completely infused, 30 cc Sensorcaine was infiltrated into the left Infraclavicular area. Venous access obtained utilizing a micropuncture needle under ultrasonographic guidance and the micropuncture wire was advanced followed by the dilator. The wire was then exchanged for a standard 0.0.35 wire and then the dilator was removed. Next, an incision was made adjacent to the wire entry site. Utilizing sharp and blunt dissection a pocket was then created down to the prepectoralis fascia. The guidewire was identified, freed from the surrounding subcutaneous tissue, and delivered into the operative field. Next an 9french sheath and dilator was advanced over the wire then the dilator was removed. Another similar wire was advanced into the sheath then the sheath was removed leaving two wires in place in the subclavian vein. Next, the 7french sheath and dilators were advanced over their respective wires into the subclavian vein. The dilator and wires were then removed. Next the atrial and ventricular leads were inserted into their respective sheaths   And advanced into the right atrium and ventricle where the leads were positioned under fluoroscopic guidance. The sheaths were peeled away and removed.  Appropriate sensing and thresholds were obtained. No diaphragmatic pacing occurred at 10 V and 1.5 ms. The active fixation mechanisms were extended. Next, the leads were then sutured utilizing 2-0 ethibond sutures. Lead measurements were then rechecked. After having assured adequate hemostasis the leads were connected to the pulse generator and the pulse generator and leads were placed in the pocket. The pocket was copiously irrigated utilizing antibiotic solution. The pacemaker and leads system were visualized under fluoroscopy. Appropriate redundancy/slack in the leads were noted. The pins of the leads were beyond the set screws. Hemostasis was reverified. The pocket was then closed using 2-0 Vicryl for the deep layer and 3-0 Vicryl for the middle layer. Surgical staples were then applied to the skin and sterile dressing was applied. At the end of the procedure instrument, needle, and sponge counts were correct. An arm immobilizer was applied at this point and the patient was transferred. An independent trained observer administered medications under my direction. The patient was continuously monitored with respect to level of consciousness, and vital signs/physiologic status throughout the case. For further details regarding specific medications and doses please refer to Cath Lab procedural notes.       Anesthesia start time:1158  Anesthesia stop time:1259          ICD data:       Atrial lead   Medtronic  Model   9628-87   serial #   [id number]  Volt    0.5 V    PW    0.4 ms    Current   NA   ma       Impedance:   401   ohms        Slew rate:   NA   V/sec  P wave:   2.1 mv    Right Ventricular lead    Medtronic  Model   9159R06   serial #   [id number]  Volt    0.5    V     PW    0.4 ms     Current   NA   ma    I  Impedance:   569   ohms       Slew rate:   NA   V/sec  R wave:    10.4 mv      Generator  Medtronic  Model   [id number]  Serial   [id number]          Estimated Blood Loss:  Minimal Complications:  None; patient tolerated the procedure well. Disposition:  To PACU           Condition: stable      Janet Balderas MD 3/2/2022 1:07 PM

## 2022-03-02 NOTE — PROGRESS NOTES
Physical Therapy  Name: Vidya Matson  MRN:  [id number]  Date of service:  3/2/2022     03/02/22 7137   Subjective   Subjective Attempt: Pt quite lethargic, RN states that pt had recently rec'd morphine and ativan. Will cont to follow for therapy.          Electronically signed by Ivett Hernandez PTA on 3/2/2022 at 9:22 AM

## 2022-03-02 NOTE — PROGRESS NOTES
Follow up:  Pt going down for AICD placement today. Report from nursing. Pt's partner René Thomason informed and waiting for post op report. Pt completed an advance directive on admission naming his domestic partner Durga Keenan as his HCS/primary decision maker if he is unable. Palliative Care will continue to follow and support. Ongoing d/c planning to evaluate pt's potential to return home or if he may need some rehab prior.     Electronically signed by Elroy Vázquez RN on 3/2/2022 at 11:11 AM

## 2022-03-02 NOTE — PROGRESS NOTES
Cardiology Daily Note Jacqueline Brown MD      Patient:  Jeffery Olvera  [id number]    Patient Active Problem List    Diagnosis Date Noted    Systolic heart failure secondary to hypertrophic cardiomyopathy ([address] 02/28/2022    Severe left ventricular systolic dysfunction 96/55/0993    Acute respiratory failure with hypoxia ([address]     Acute respiratory failure with hypoxia and hypercarbia ([address] 02/02/2022    Sepsis ([address] 02/02/2022    Acute exacerbation of CHF (congestive heart failure) ([address] 18/52/9952    Acute systolic (congestive) heart failure ([address] 02/02/2022    PVD (peripheral vascular disease) ([address]     Bilateral carotid artery stenosis 01/06/2020    Statin intolerance 02/14/2018    Inferior MI ([address] 01/07/2018    ST elevation myocardial infarction (STEMI) ([address]     Atherosclerosis of native artery of extremity with intermittent claudication ([address] 04/15/2015    Paroxysmal A-fib ([address]     Tobacco abuse     Chest pain     Hyperlipidemia     Palpitations     HTN (hypertension)     Coronary artery disease involving native coronary artery of native heart with angina pectoris ([address]        Admit Date:  2/27/2022    Admission Problem List: Present on Admission:   Systolic heart failure secondary to hypertrophic cardiomyopathy ([address]   Severe left ventricular systolic dysfunction   Tobacco abuse   Paroxysmal A-fib ([address]   Hyperlipidemia   Coronary artery disease involving native coronary artery of native heart with angina pectoris [facility])      Cardiac Specific Data:  Specialty Problems        Cardiology Problems    Coronary artery disease involving native coronary artery of native heart with angina pectoris (HCC)        HTN (hypertension)        Chest pain        Hyperlipidemia        Paroxysmal A-fib (HCC)        Atherosclerosis of native artery of extremity with intermittent claudication (HCC)        Inferior MI ([address]        ST elevation myocardial infarction (STEMI) ([address]        Bilateral carotid artery stenosis        PVD (peripheral vascular disease) (HCC)        Acute exacerbation of CHF (congestive heart failure) (HCC)        Acute systolic (congestive) heart failure ([facility])        Severe left ventricular systolic dysfunction        * (Principal) Systolic heart failure secondary to hypertrophic cardiomyopathy ([facility]              Subjective:  Mr. Stokes Cheeks seen today resting comfortably no pain other than that described yesterday. No new events or issues dyspnea stable. Blood pressure 101/60 heart 62. Objective:   /60   Pulse 62   Temp 97.3 °F (36.3 °C) (Temporal)   Resp 22   Ht 6' (1.829 m)   Wt 140 lb (63.5 kg)   SpO2 95%   BMI 18.99 kg/m²       Intake/Output Summary (Last 24 hours) at 3/2/2022 5472  Last data filed at 3/2/2022 0800  Gross per 24 hour   Intake 629.05 ml   Output 900 ml   Net -270.95 ml       Prior to Admission medications    Medication Sig Start Date End Date Taking?  Authorizing Provider   furosemide (LASIX) 40 MG tablet Take 1 tablet by mouth 2 times daily 2/9/22   May Fall, MD   atorvastatin (LIPITOR) 80 MG tablet Take 0.5 tablets by mouth nightly 2/9/22   May Fall, MD   spironolactone (ALDACTONE) 25 MG tablet Take 1 tablet by mouth daily 2/9/22   May Fall, MD   clopidogrel (PLAVIX) 75 MG tablet Take 1 tablet by mouth daily 2/9/22   May Fall, MD   apixaban (ELIQUIS) 5 MG TABS tablet Take 1 tablet by mouth 2 times daily 2/9/22   LUIS Gann Ala   isosorbide mononitrate (IMDUR) 30 MG extended release tablet Take 1 tablet by mouth daily 2/9/22   LUIS Gann Ala   sacubitril-valsartan (ENTRESTO) 49-51 MG per tablet Take 1 tablet by mouth 2 times daily 2/8/22   LUIS Gann Ala   Vitamin D (CHOLECALCIFEROL) 25 MCG (1000 UT) TABS tablet Take 5 tablets by mouth daily 2/9/22   LUIS Gann Ala   nitroGLYCERIN (NITROSTAT) 0.4 MG SL tablet Place 1 tablet under the tongue every 5 minutes as needed for Chest pain up to max of 3 total doses. If no relief after 1 dose, call 911. 6/8/21   LUIS Kline   sotalol (BETAPACE) 160 MG tablet Take 1 tablet by mouth twice daily 6/8/21   LUIS Kline   nitroGLYCERIN (NITROSTAT) 0.4 MG SL tablet Place 1 tablet under the tongue every 5 minutes as needed for Chest pain 6/8/21   LUIS Kline   aspirin 81 MG EC tablet Take 81 mg by mouth daily.       Historical Provider, MD        famotidine  20 mg Oral Daily    sodium chloride flush  5-40 mL IntraVENous 2 times per day    [Held by provider] apixaban  5 mg Oral BID    aspirin  81 mg Oral Daily    atorvastatin  40 mg Oral Nightly    clopidogrel  75 mg Oral Daily    furosemide  40 mg Oral BID    isosorbide mononitrate  30 mg Oral Daily    sacubitril-valsartan  1 tablet Oral BID    sotalol  80 mg Oral 2 times per day    spironolactone  25 mg Oral Daily    methylPREDNISolone  40 mg IntraVENous Q8H    cefTRIAXone (ROCEPHIN) IV  1,000 mg IntraVENous Q24H    azithromycin  500 mg IntraVENous Q24H    zinc sulfate  50 mg Oral Daily    Vitamin D  2,000 Units Oral Daily    vitamin C  500 mg Oral BID    guaiFENesin  400 mg Oral Q8H    albuterol sulfate HFA  2 puff Inhalation Q4H    And    ipratropium  2 puff Inhalation Q4H       TELEMETRY: Sinus     Physical Exam:      Physical Exam      General:  Awake, alert, NAD  Skin:  Warm and dry  Neck:  no jvd , no carotid bruits  Chest:  Clear to auscultation, no wheezing or rales  Cardiovascular:  RRR [id number] no murmurs, clicks, gallups, or rubs  Abdomen:  Soft nontender, nondistended, bowel sounds present  Extremities:  Edema: none        Lab Data:  CBC:   Recent Labs     02/27/22 2331 03/01/22  0237 03/02/22  0154   WBC 17.1* 15.4* 16.0*   HGB 12.3* 12.3* 12.2*   HCT 38.8* 37.4* 36.9*   MCV 94.2* 92.6 91.8   * 493* 457*     BMP:   Recent Labs     02/27/22  2331 02/27/22  2334 03/01/22  0237 03/02/22  0154   *  --  134* 134*   K 4.7 4.4 4.8 5.0   CL 96*  --  99 97*   CO2 19*  --  23 23   BUN 27*  --  28* 33*   CREATININE 1.0  --  0.7 0.8     LIVER PROFILE:   Recent Labs     02/27/22  2331 03/01/22  0237 03/02/22  0154   * 74* 51*   * 88* 68*   BILITOT 0.5 0.3 0.3   ALKPHOS 261* 214* 241*     PT/INR: No results for input(s): PROTIME, INR in the last 72 hours. APTT: No results for input(s): APTT in the last 72 hours. BNP:  No results for input(s): BNP in the last 72 hours. CK, CKMB, Troponin: @LABRCNT (CKTOTAL:3, CKMB:3, TROPONINI:3)@    IMAGING:  ECHO Complete 2D W Doppler W Color    Result Date: 2/28/2022  Transthoracic Echocardiography Report (TTE)  Demographics   Patient Name  CASI Wilson Date of Study          02/28/2022   MRN           [id number]          Gender                 Male   Date of Birth 1950      Room Number            [id number]   Age           66 year(s)   Height:       72 inches       Referring Physician    Mele Moraes DO   Weight:       140 pounds      Sonographer            Bebe Painter [facility]   BSA:          1.83 m^2        Interpreting Physician Keerthi Chacko MD   BMI:          18.99 kg/m^2  Procedure Type of Study   TTE procedure:ECHO 2D W/DOPPLER/COLOR/CONTRAST. Study Location: Portable Technical Quality: Adequate visualization Patient Status: Inpatient BP: 110/69 mmHg Indications:Congestive heart failure, systolic dysfunction and Mitral regurgitation. Conclusions   Summary  Limited echocardiographic study. LV is moderate to severely dilated with severely reduced LV systolic  function. LV ejection fraction estimated at less than 20%. Probable grade 3 diastolic dysfunction. RV appears mildly dilated with moderately reduced RV systolic function  (TAPSE equals 1.6 cm). Severe left atrial enlargement. Left atrial pressure is elevated. Severe right atrial enlargement. Aortic valve not well studied. No significant stenosis. Probable mild to  moderate aortic regurgitation.   Mitral valve appears moderately thickened with preserved leaflet mobility. Severe central mitral regurgitation is noted. Mitral regurgitant jet  velocity suggests significantly elevated left atrial pressure. No significant pericardial effusion noted. Signature   ----------------------------------------------------------------  Electronically signed by Monisha Chacko MD(Interpreting physician)  on 02/28/2022 07:14 PM  ----------------------------------------------------------------  Allergies   - Statins. M-Mode Measurements (cm)   LVIDd: 5.64 cm                                  LVIDs: 5.11 cm  IVSd: 1.2 cm  LVPWd: 1.2 cm  % Ejection Fraction: 20 %  Doppler Measurements:    MV Peak E-Wave: 86.1 cm/s   MV Peak A-Wave: 27.9 cm/s   MV E/A Ratio: 3.09 %   MV Peak Gradient: 2.97 mmHg   MV P1/2t: 48 msec   MVA by PHT4.58 cm^2      ECHO Complete 2D W Doppler W Color    Result Date: 2/3/2022  Transthoracic Echocardiography Report (TTE)  Demographics   Patient Name  CASI Gupta Date of Study         02/03/2022   MRN           [id number]          Gender                Male   Date of Birth 1950      Room Number           [id number]   Age           66 year(s)   Height:       72 inches       Referring Physician   Deidre Zamarripa   Weight:       150 pounds      Sonographer           Luly Donaldson [facility]   BSA:          1.89 m^2        Interpreting          Juan Carlos Villa MD                                Physician   BMI:          20.34 kg/m^2  Procedure Type of Study   TTE procedure:ECHO 2D W/DOPPLER/COLOR/CONTRAST. Study Location: Echo Lab Technical Quality: Adequate visualization Patient Status: Inpatient Contrast Medium: Definity. Amount - 8 ml BP: 114/77 mmHg Indications:Congestive heart failure.   Conclusions   Summary  Left ventricle is markedly dilated with normal thickness  Marked impairment of left ventricular systolic wall motion with estimated  ejection fraction of 20%  Severe global hypokinesis  Grade 3 diastolic dysfunction  Right ventricle is dilated cardiomegaly. Atheromatous changes thoracic aorta are noted. No pulmonary congestion or pneumothorax. No acute bony abnormality. 1. Improvement. Signed by Dr Diane Htichcock    Result Date: 2/2/2022  Examination. XR CHEST PORTABLE 2/2/2022 3:21 AM History: Shortness of breath. Frontal portable upright view of the chest is compared with the previous study dated 1/7/2018. There is extensive interstitial infiltrate in the lungs bilaterally more severely in the mid and lower lungs. There is bilateral lower lobar consolidation. There is a small bibasal pleural effusion. There is no pneumothorax. The heart size is not evaluated due to complete obliteration of the cardiac border by the adjacent infiltrate. Atheromatous changes thoracic aorta are noted. There is evidence of prior cardiac surgery. No acute bony abnormality. 1. The extensive bilateral lung infiltrate. Bilateral lower lobar consolidation and bibasilar pleural effusion. Signed by Dr Lara Ga:  1. ?  Supraventricular tachycardia of the past 5 to 6 days with rates 150s to 170s  2. LifeVest  3. Ischemic cardiomyopathy  4. Cardiac arrest on admission 2/27/2022 with rapid wide-complex tachycardia subsequent bradycardia and asystole requiring 2-minute CPR  5. Complaints of chest pain since CPR worse with direct pressure inspiration or cough  6. History of hypertension  7. History tobacco abuse  8. Peripheral arterial disease previous stent placement  9. Paroxysmal atrial fibrillation  10. History of inferior STEMI  11. Statin intolerance  12. Bilateral carotid artery stenosis  13. Cardiac cath with stent placement 1/7/2018 MultiLink vision 3.5 x 28  14. Status post CABG x4 grafts 2011  15. Chronic systolic diastolic congestive heart failure  16.  Cardiac cath 2/3/2022 significant LV systolic dysfunction LIMA graft atretic 2 vein grafts occluded 1 of which appears recent right coronary artery saphenous vein graft discussed. We also discussed the risk of potential stroke or thromboembolic phenomena during the timeframe that the patient may be off off of anticoagulation. The patient is advised the device battery will eventually become depleted and require replacement at some point. The patient is awake and alert and understands the issues involved and indicates willingness to proceed as ordered. The patient is  a reasonable candidate for moderate conscious sedation. ASA score:  ASA 3 - Patient with moderate systemic disease with functional limitations    Mallampati: I (soft palate, uvula, fauces, tonsillar pillars visible)    Preferred vascular access site will be: left subclavian vein.         Radha Wallace MD, MD 3/2/2022 9:58 AM

## 2022-03-03 LAB
ALBUMIN SERPL-MCNC: 2.6 G/DL (ref 3.5–5.2)
ALP BLD-CCNC: 188 U/L (ref 40–130)
ALT SERPL-CCNC: 47 U/L (ref 5–41)
ANION GAP SERPL CALCULATED.3IONS-SCNC: 12 MMOL/L (ref 7–19)
AST SERPL-CCNC: 30 U/L (ref 5–40)
BASOPHILS ABSOLUTE: 0 K/UL (ref 0–0.2)
BASOPHILS RELATIVE PERCENT: 0.1 % (ref 0–1)
BILIRUB SERPL-MCNC: <0.2 MG/DL (ref 0.2–1.2)
BUN BLDV-MCNC: 32 MG/DL (ref 8–23)
CALCIUM SERPL-MCNC: 7.8 MG/DL (ref 8.8–10.2)
CHLORIDE BLD-SCNC: 99 MMOL/L (ref 98–111)
CO2: 23 MMOL/L (ref 22–29)
CREAT SERPL-MCNC: 0.8 MG/DL (ref 0.5–1.2)
EOSINOPHILS ABSOLUTE: 0 K/UL (ref 0–0.6)
EOSINOPHILS RELATIVE PERCENT: 0 % (ref 0–5)
GFR AFRICAN AMERICAN: >59
GFR NON-AFRICAN AMERICAN: >60
GLUCOSE BLD-MCNC: 120 MG/DL (ref 74–109)
HCT VFR BLD CALC: 36.3 % (ref 42–52)
HEMOGLOBIN: 11.8 G/DL (ref 14–18)
IMMATURE GRANULOCYTES #: 0.1 K/UL
LYMPHOCYTES ABSOLUTE: 1 K/UL (ref 1.1–4.5)
LYMPHOCYTES RELATIVE PERCENT: 6 % (ref 20–40)
MAGNESIUM: 1.9 MG/DL (ref 1.6–2.4)
MCH RBC QN AUTO: 30 PG (ref 27–31)
MCHC RBC AUTO-ENTMCNC: 32.5 G/DL (ref 33–37)
MCV RBC AUTO: 92.4 FL (ref 80–94)
MONOCYTES ABSOLUTE: 0.7 K/UL (ref 0–0.9)
MONOCYTES RELATIVE PERCENT: 4.1 % (ref 0–10)
NEUTROPHILS ABSOLUTE: 15.4 K/UL (ref 1.5–7.5)
NEUTROPHILS RELATIVE PERCENT: 89.3 % (ref 50–65)
PDW BLD-RTO: 14.3 % (ref 11.5–14.5)
PLATELET # BLD: 413 K/UL (ref 130–400)
PMV BLD AUTO: 9.8 FL (ref 9.4–12.4)
POTASSIUM SERPL-SCNC: 4.6 MMOL/L (ref 3.5–5)
PRO-BNP: 2486 PG/ML (ref 0–900)
RBC # BLD: 3.93 M/UL (ref 4.7–6.1)
SODIUM BLD-SCNC: 134 MMOL/L (ref 136–145)
TOTAL PROTEIN: 5 G/DL (ref 6.6–8.7)
TROPONIN: 0.05 NG/ML (ref 0–0.03)
WBC # BLD: 17.2 K/UL (ref 4.8–10.8)

## 2022-03-03 PROCEDURE — 85025 COMPLETE CBC W/AUTO DIFF WBC: CPT

## 2022-03-03 PROCEDURE — 97530 THERAPEUTIC ACTIVITIES: CPT

## 2022-03-03 PROCEDURE — 6370000000 HC RX 637 (ALT 250 FOR IP): Performed by: INTERNAL MEDICINE

## 2022-03-03 PROCEDURE — 83880 ASSAY OF NATRIURETIC PEPTIDE: CPT

## 2022-03-03 PROCEDURE — 36415 COLL VENOUS BLD VENIPUNCTURE: CPT

## 2022-03-03 PROCEDURE — 97116 GAIT TRAINING THERAPY: CPT

## 2022-03-03 PROCEDURE — 6360000002 HC RX W HCPCS: Performed by: INTERNAL MEDICINE

## 2022-03-03 PROCEDURE — 2580000003 HC RX 258: Performed by: INTERNAL MEDICINE

## 2022-03-03 PROCEDURE — 83735 ASSAY OF MAGNESIUM: CPT

## 2022-03-03 PROCEDURE — 80053 COMPREHEN METABOLIC PANEL: CPT

## 2022-03-03 PROCEDURE — 2100000000 HC CCU R&B

## 2022-03-03 PROCEDURE — 2700000000 HC OXYGEN THERAPY PER DAY

## 2022-03-03 PROCEDURE — 84484 ASSAY OF TROPONIN QUANT: CPT

## 2022-03-03 PROCEDURE — 99024 POSTOP FOLLOW-UP VISIT: CPT | Performed by: INTERNAL MEDICINE

## 2022-03-03 RX ADMIN — LORAZEPAM 0.25 MG: 2 INJECTION INTRAMUSCULAR; INTRAVENOUS at 12:24

## 2022-03-03 RX ADMIN — ALBUTEROL SULFATE 2 PUFF: 90 AEROSOL, METERED RESPIRATORY (INHALATION) at 05:00

## 2022-03-03 RX ADMIN — SACUBITRIL AND VALSARTAN 1 TABLET: 49; 51 TABLET, FILM COATED ORAL at 20:15

## 2022-03-03 RX ADMIN — FUROSEMIDE 40 MG: 40 TABLET ORAL at 09:18

## 2022-03-03 RX ADMIN — OXYCODONE HYDROCHLORIDE AND ACETAMINOPHEN 500 MG: 500 TABLET ORAL at 09:14

## 2022-03-03 RX ADMIN — SPIRONOLACTONE 25 MG: 25 TABLET ORAL at 09:14

## 2022-03-03 RX ADMIN — CEFAZOLIN SODIUM 2000 MG: 10 INJECTION, POWDER, FOR SOLUTION INTRAVENOUS at 05:30

## 2022-03-03 RX ADMIN — LORAZEPAM 0.25 MG: 2 INJECTION INTRAMUSCULAR; INTRAVENOUS at 20:15

## 2022-03-03 RX ADMIN — ASPIRIN 81 MG: 81 TABLET, COATED ORAL at 09:14

## 2022-03-03 RX ADMIN — SOTALOL HYDROCHLORIDE 80 MG: 80 TABLET ORAL at 09:14

## 2022-03-03 RX ADMIN — METHYLPREDNISOLONE SODIUM SUCCINATE 40 MG: 40 INJECTION, POWDER, FOR SOLUTION INTRAMUSCULAR; INTRAVENOUS at 05:00

## 2022-03-03 RX ADMIN — IPRATROPIUM BROMIDE 2 PUFF: 17 AEROSOL, METERED RESPIRATORY (INHALATION) at 20:16

## 2022-03-03 RX ADMIN — SODIUM CHLORIDE: 9 INJECTION, SOLUTION INTRAVENOUS at 07:26

## 2022-03-03 RX ADMIN — ATORVASTATIN CALCIUM 40 MG: 40 TABLET, FILM COATED ORAL at 20:15

## 2022-03-03 RX ADMIN — GUAIFENESIN 400 MG: 200 TABLET ORAL at 20:15

## 2022-03-03 RX ADMIN — AZITHROMYCIN MONOHYDRATE 500 MG: 500 INJECTION, POWDER, LYOPHILIZED, FOR SOLUTION INTRAVENOUS at 12:23

## 2022-03-03 RX ADMIN — FAMOTIDINE 20 MG: 20 TABLET ORAL at 09:14

## 2022-03-03 RX ADMIN — IPRATROPIUM BROMIDE 2 PUFF: 17 AEROSOL, METERED RESPIRATORY (INHALATION) at 09:50

## 2022-03-03 RX ADMIN — SOTALOL HYDROCHLORIDE 80 MG: 80 TABLET ORAL at 20:15

## 2022-03-03 RX ADMIN — LORAZEPAM 0.25 MG: 2 INJECTION INTRAMUSCULAR; INTRAVENOUS at 06:16

## 2022-03-03 RX ADMIN — IPRATROPIUM BROMIDE 2 PUFF: 17 AEROSOL, METERED RESPIRATORY (INHALATION) at 05:00

## 2022-03-03 RX ADMIN — CALCIUM CARBONATE 500 MG: 500 TABLET, CHEWABLE ORAL at 14:22

## 2022-03-03 RX ADMIN — ZINC SULFATE 220 MG (50 MG) CAPSULE 50 MG: CAPSULE at 09:14

## 2022-03-03 RX ADMIN — ALBUTEROL SULFATE 2 PUFF: 90 AEROSOL, METERED RESPIRATORY (INHALATION) at 16:53

## 2022-03-03 RX ADMIN — MORPHINE SULFATE 2 MG: 2 INJECTION, SOLUTION INTRAMUSCULAR; INTRAVENOUS at 06:16

## 2022-03-03 RX ADMIN — GUAIFENESIN 400 MG: 200 TABLET ORAL at 05:00

## 2022-03-03 RX ADMIN — Medication 2000 UNITS: at 09:14

## 2022-03-03 RX ADMIN — IPRATROPIUM BROMIDE 2 PUFF: 17 AEROSOL, METERED RESPIRATORY (INHALATION) at 12:28

## 2022-03-03 RX ADMIN — ISOSORBIDE MONONITRATE 30 MG: 30 TABLET, EXTENDED RELEASE ORAL at 09:14

## 2022-03-03 RX ADMIN — METHYLPREDNISOLONE SODIUM SUCCINATE 40 MG: 40 INJECTION, POWDER, FOR SOLUTION INTRAMUSCULAR; INTRAVENOUS at 12:20

## 2022-03-03 RX ADMIN — ALBUTEROL SULFATE 2 PUFF: 90 AEROSOL, METERED RESPIRATORY (INHALATION) at 20:16

## 2022-03-03 RX ADMIN — POLYETHYLENE GLYCOL 3350 17 G: 17 POWDER, FOR SOLUTION ORAL at 14:22

## 2022-03-03 RX ADMIN — GUAIFENESIN 400 MG: 200 TABLET ORAL at 12:21

## 2022-03-03 RX ADMIN — ALBUTEROL SULFATE 2 PUFF: 90 AEROSOL, METERED RESPIRATORY (INHALATION) at 09:50

## 2022-03-03 RX ADMIN — IPRATROPIUM BROMIDE 2 PUFF: 17 AEROSOL, METERED RESPIRATORY (INHALATION) at 16:53

## 2022-03-03 RX ADMIN — CEFTRIAXONE 1000 MG: 1 INJECTION, POWDER, FOR SOLUTION INTRAMUSCULAR; INTRAVENOUS at 12:19

## 2022-03-03 RX ADMIN — MORPHINE SULFATE 2 MG: 2 INJECTION, SOLUTION INTRAMUSCULAR; INTRAVENOUS at 20:15

## 2022-03-03 RX ADMIN — CLOPIDOGREL BISULFATE 75 MG: 75 TABLET ORAL at 09:15

## 2022-03-03 RX ADMIN — MORPHINE SULFATE 2 MG: 2 INJECTION, SOLUTION INTRAMUSCULAR; INTRAVENOUS at 12:20

## 2022-03-03 RX ADMIN — ALBUTEROL SULFATE 2 PUFF: 90 AEROSOL, METERED RESPIRATORY (INHALATION) at 01:30

## 2022-03-03 RX ADMIN — FUROSEMIDE 40 MG: 40 TABLET ORAL at 16:53

## 2022-03-03 RX ADMIN — IPRATROPIUM BROMIDE 2 PUFF: 17 AEROSOL, METERED RESPIRATORY (INHALATION) at 01:30

## 2022-03-03 RX ADMIN — ALBUTEROL SULFATE 2 PUFF: 90 AEROSOL, METERED RESPIRATORY (INHALATION) at 12:28

## 2022-03-03 RX ADMIN — SACUBITRIL AND VALSARTAN 1 TABLET: 49; 51 TABLET, FILM COATED ORAL at 09:14

## 2022-03-03 RX ADMIN — METHYLPREDNISOLONE SODIUM SUCCINATE 40 MG: 40 INJECTION, POWDER, FOR SOLUTION INTRAMUSCULAR; INTRAVENOUS at 20:15

## 2022-03-03 RX ADMIN — OXYCODONE HYDROCHLORIDE AND ACETAMINOPHEN 500 MG: 500 TABLET ORAL at 20:15

## 2022-03-03 ASSESSMENT — PAIN SCALES - GENERAL
PAINLEVEL_OUTOF10: 6
PAINLEVEL_OUTOF10: 5
PAINLEVEL_OUTOF10: 9
PAINLEVEL_OUTOF10: 5
PAINLEVEL_OUTOF10: 0
PAINLEVEL_OUTOF10: 5
PAINLEVEL_OUTOF10: 7
PAINLEVEL_OUTOF10: 0
PAINLEVEL_OUTOF10: 9

## 2022-03-03 ASSESSMENT — ENCOUNTER SYMPTOMS
SHORTNESS OF BREATH: 0
DIARRHEA: 0
COLOR CHANGE: 0
VOICE CHANGE: 0
BACK PAIN: 0
CONSTIPATION: 0
VOMITING: 0
NAUSEA: 0

## 2022-03-03 ASSESSMENT — PAIN DESCRIPTION - DESCRIPTORS
DESCRIPTORS: SORE
DESCRIPTORS: SORE

## 2022-03-03 ASSESSMENT — PAIN DESCRIPTION - LOCATION
LOCATION: GENERALIZED
LOCATION: SHOULDER
LOCATION: SHOULDER

## 2022-03-03 ASSESSMENT — PAIN - FUNCTIONAL ASSESSMENT
PAIN_FUNCTIONAL_ASSESSMENT: ACTIVITIES ARE NOT PREVENTED

## 2022-03-03 ASSESSMENT — PAIN DESCRIPTION - PAIN TYPE
TYPE: SURGICAL PAIN
TYPE: SURGICAL PAIN

## 2022-03-03 ASSESSMENT — PAIN DESCRIPTION - ORIENTATION
ORIENTATION: LEFT
ORIENTATION: LEFT

## 2022-03-03 ASSESSMENT — PAIN DESCRIPTION - FREQUENCY
FREQUENCY: INTERMITTENT
FREQUENCY: CONTINUOUS
FREQUENCY: INTERMITTENT

## 2022-03-03 ASSESSMENT — PAIN DESCRIPTION - PROGRESSION: CLINICAL_PROGRESSION: GRADUALLY IMPROVING

## 2022-03-03 ASSESSMENT — PAIN DESCRIPTION - ONSET: ONSET: ON-GOING

## 2022-03-03 NOTE — PROGRESS NOTES
Hospitalist Progress Note    Patient:  Maxi Lennon  YOB: 1950  Date of Service: 3/3/2022  MRN: [id number]   Acct: [de-identified]   Primary Care Physician: Gerhardt Kaufmann, MD  Advance Directive: Full Code  Admit Date: 2/27/2022       Hospital Day: 3  Referring Provider: Elena Nunes DO    Patient Seen, Chart, Consults, Notes, Labs, Radiology studies reviewed. Subjective:  Maxi Lennon is a 66 y.o. male  whom we are following for ischemic cardiomyopathy, EF of 10%, ventricular arrhythmia. He underwent AICD placement yesterday. No problems overnight. No evidence of further arrhythmia. Hemodynamics are stable. He is afebrile. He is tolerating his diet.     Allergies:  Statins    Medicines:  Current Facility-Administered Medications   Medication Dose Route Frequency Provider Last Rate Last Admin    [START ON 3/4/2022] apixaban (ELIQUIS) tablet 5 mg  5 mg Oral BID Blue Rachel MD        0.9 % sodium chloride infusion   IntraVENous Continuous Blue Rachel MD        HYDROmorphone HCl PF (DILAUDID) injection 0.25 mg  0.25 mg IntraVENous Q5 Min PRN Gisela Jain MD        HYDROmorphone HCl PF (DILAUDID) injection 0.5 mg  0.5 mg IntraVENous Q5 Min PRN Gisela Jain MD        0.9 % sodium chloride infusion   IntraVENous Continuous Blue Rachel MD 75 mL/hr at 03/03/22 0726 New Bag at 03/03/22 0726    chlorhexidine gluconate (ANTISEPTIC SKIN CLEANSER) 4 % solution   Topical Once Meet Rowley RN        famotidine (PEPCID) tablet 20 mg  20 mg Oral Daily Patience Quirino, DO   20 mg at 03/03/22 0914    amiodarone (CORDARONE) 450 mg in dextrose 5 % 250 mL infusion  0.5 mg/min IntraVENous Continuous Dillon Dave MD   Stopped at 03/01/22 1645    sodium chloride flush 0.9 % injection 5-40 mL  5-40 mL IntraVENous 2 times per day Patience Quirino, DO   10 mL at 03/02/22 2046    sodium chloride flush 0.9 % injection 5-40 mL  5-40 mL IntraVENous PRN Patience Quirino, DO  ondansetron (ZOFRAN-ODT) disintegrating tablet 4 mg  4 mg Oral Q8H PRN Patience Quirino, DO        Or    ondansetron (ZOFRAN) injection 4 mg  4 mg IntraVENous Q6H PRN Patience Quirino, DO        polyethylene glycol (GLYCOLAX) packet 17 g  17 g Oral Daily PRN Patience Quirino, DO        acetaminophen (TYLENOL) tablet 650 mg  650 mg Oral Q6H PRN Patience Quirino, DO        Or    acetaminophen (TYLENOL) suppository 650 mg  650 mg Rectal Q6H PRN Patience Quirino, DO        aspirin EC tablet 81 mg  81 mg Oral Daily Patience Quirino, DO   81 mg at 03/03/22 0914    atorvastatin (LIPITOR) tablet 40 mg  40 mg Oral Nightly Patience Quirino, DO   40 mg at 03/02/22 2046    clopidogrel (PLAVIX) tablet 75 mg  75 mg Oral Daily Patience Quirino, DO   75 mg at 03/03/22 0915    furosemide (LASIX) tablet 40 mg  40 mg Oral BID Patience Quirino, DO   40 mg at 03/03/22 0918    isosorbide mononitrate (IMDUR) extended release tablet 30 mg  30 mg Oral Daily Patience Quirino, DO   30 mg at 03/03/22 0914    nitroGLYCERIN (NITROSTAT) SL tablet 0.4 mg  0.4 mg SubLINGual Q5 Min PRN Patience Quirino, DO        nitroGLYCERIN (NITROSTAT) SL tablet 0.4 mg  0.4 mg SubLINGual Q5 Min PRN Patience Quirino, DO        sacubitril-valsartan (ENTRESTO) 49-51 MG per tablet 1 tablet  1 tablet Oral BID Patience Quirino, DO   1 tablet at 03/03/22 0914    sotalol (BETAPACE) tablet 80 mg  80 mg Oral 2 times per day Patience Quirino, DO   80 mg at 03/03/22 0914    spironolactone (ALDACTONE) tablet 25 mg  25 mg Oral Daily Patience Quirino, DO   25 mg at 03/03/22 0914    morphine (PF) injection 2 mg  2 mg IntraVENous Q6H PRN Patience Quirino, DO   2 mg at 03/03/22 0616    LORazepam (ATIVAN) injection 0.25 mg  0.25 mg IntraVENous Q6H PRN Dev Rollins MD   0.25 mg at 03/03/22 0616    methylPREDNISolone sodium (SOLU-MEDROL) injection 40 mg  40 mg IntraVENous Q8H Dev Rollins MD   40 mg at 03/03/22 0500    cefTRIAXone (ROCEPHIN) 1,000 mg in sterile water 10 mL IV syringe  1,000 mg IntraVENous Q24H Cierra Mark MD   1,000 mg at 03/02/22 1429    azithromycin (ZITHROMAX) 500 mg in D5W 250ml Vial Mate  500 mg IntraVENous Q24H Cierra Mark MD   Stopped at 03/02/22 1530    zinc sulfate (ZINCATE) capsule 50 mg  50 mg Oral Daily Cierra Mark MD   50 mg at 03/03/22 0914    Vitamin D (CHOLECALCIFEROL) tablet 2,000 Units  2,000 Units Oral Daily Cierra Mark MD   2,000 Units at 03/03/22 7222    ascorbic acid (VITAMIN C) tablet 500 mg  500 mg Oral BID Cierra Mark MD   500 mg at 03/03/22 6160    guaiFENesin tablet 400 mg  400 mg Oral Q8H Cierra Mark MD   400 mg at 03/03/22 0500    albuterol sulfate  (90 Base) MCG/ACT inhaler 2 puff  2 puff Inhalation Q4H Cierra Mark MD   2 puff at 03/03/22 0950    And    ipratropium (ATROVENT HFA) 17 MCG/ACT inhaler 2 puff  2 puff Inhalation Q4H Cierra Mark MD   2 puff at 03/03/22 0950    calcium carbonate (TUMS) chewable tablet 500 mg  500 mg Oral TID PRN Cierra Mark MD   500 mg at 03/02/22 1539       Past Medical History:  Past Medical History:   Diagnosis Date    Acute exacerbation of CHF (congestive heart failure) ([facility] [address] 02/02/2022    Atrial fibrillation ([facility] [address]     Bilateral carotid artery stenosis 01/06/2020    CAD (coronary artery disease), native coronary artery     acute inferior MI on 5/15/09, s/p emeergent CABG x4 5/15/09    Chest pain     HTN (hypertension)     Hyperlipidemia     Dr. Boo Valdez manages    Palliative care patient 02/28/2022    Palpitations     Statin intolerance 02/14/2018    Tobacco abuse     Transient atrial fibrillation or flutter     post op       Past Surgical History:  Past Surgical History:   Procedure Laterality Date    CARDIAC CATHETERIZATION  4/23/12    with stent to left circ, and LAD    CARDIAC CATHETERIZATION  01/07/2018    PTCA/BJ to VG to LAD;  atherectomy and 3 BMS to VG to 1st Circ    CORONARY ARTERY BYPASS GRAFT  05/15/09    x4    DIAGNOSTIC CARDIAC CATH LAB PROCEDURE  05/15/09    left heart cath, left ventr, selective coronary arteriography     FRACTURE SURGERY Left     Leg    VASCULAR SURGERY  4/21/2015 [facility]    Percutaneous cannulation right common femoral artery with 5-Telugu and later 6-Telugu pinnacle destination sheath. Suprarenal abdominal aortogram with bilateral iliofemoral arteriograms. Bilateral lower extremity arteriograms. Left popliteal/tibioperoneal trunk artery balloon angioplasty with 4 mm x 15 mm cutting balloon.  VASCULAR SURGERY  4/21/2015 [facility] cont    Arteriograms after balloon angioplasty. Balloon angioplasty left popliteal artery/tibioperoneal trunk with 5 mm x 40 mm russell balloon. Arteriograms after balloon angioplasty. Left superficial femoral artery balloon angioplasty with 7 mm x 100 mm  balloon. Left superficial femoral artery stent placement idev supera 6.5 x 100 mm self-expanding nitinol stent. Completion arteriograms left lower e    VASCULAR SURGERY  4/21/2015 [facility] cont    extremity. Mynx closure right common femoral artery puncture site.  VASCULAR SURGERY  03/04/2020    S. Percutaneous cannulation left common femoral artery with 5 Telugu glide sheath. Suprarenal abdominal aortogram with bilateral iliofemoral arteriogram.Bilateral lower extremity arteriogram.Minx closure left common femoral artery puncture site.        Family History  Family History   Problem Relation Age of Onset    Coronary Art Dis Father     High Blood Pressure Father     Coronary Art Dis Brother     High Blood Pressure Mother     Cancer Mother     High Blood Pressure Sister        Social History  Social History     Socioeconomic History    Marital status: Single     Spouse name: Not on file    Number of children: Not on file    Years of education: Not on file    Highest education level: Not on file   Occupational History    Not on file   Tobacco Use    Smoking status: Former Smoker     Packs/day: 0.00     Years: 45.00     Pack years: 0.00     Types: 8. 1* 7.8*     CBC:   Recent Labs     03/01/22 0237 03/02/22 0154 03/03/22 0219   WBC 15.4* 16.0* 17.2*   HGB 12.3* 12.2* 11.8*   HCT 37.4* 36.9* 36.3*   MCV 92.6 91.8 92.4   * 457* 413*     LIVER PROFILE:   Recent Labs     03/01/22 0237 03/02/22 0154 03/03/22 0219   AST 74* 51* 30   ALT 88* 68* 47*   BILITOT 0.3 0.3 <0.2   ALKPHOS 214* 241* 188*     PT/INR: No results for input(s): PROTIME, INR in the last 72 hours. APTT: No results for input(s): APTT in the last 72 hours. BNP:  No results for input(s): BNP in the last 72 hours. Ionized Calcium:No results for input(s): IONCA in the last 72 hours. Magnesium:  Recent Labs     03/01/22 0237 03/02/22 0154 03/03/22 0219   MG 1.8 1.8 1.9     Phosphorus:No results for input(s): PHOS in the last 72 hours. HgbA1C: No results for input(s): LABA1C in the last 72 hours. Hepatic:   Recent Labs     03/01/22 0237 03/02/22 0154 03/03/22 0219   ALKPHOS 214* 241* 188*   ALT 88* 68* 47*   AST 74* 51* 30   PROT 5.3* 5.2* 5.0*   BILITOT 0.3 0.3 <0.2   LABALBU 2.5* 2.6* 2.6*     Lactic Acid: No results for input(s): LACTA in the last 72 hours. Troponin: No results for input(s): CKTOTAL, CKMB, TROPONINT in the last 72 hours. ABGs: No results for input(s): PH, PCO2, PO2, HCO3, O2SAT in the last 72 hours. CRP:  No results for input(s): CRP in the last 72 hours. Sed Rate:  No results for input(s): SEDRATE in the last 72 hours. Cultures:   No results for input(s): CULTURE in the last 72 hours. No results for input(s): BC, [place] in the last 72 hours. No results for input(s): CXSURG in the last 72 hours.     Radiology reports as per the Radiologist  Radiology: ECHO Complete 2D W Doppler W Color    Result Date: 2/28/2022  Transthoracic Echocardiography Report (TTE)  Demographics   Patient Name  CASI Snell Date of Study          02/28/2022   MRN           [id number]          Gender                 Male   Date of Birth 1950      Room Number MHL-0737   Age           66 year(s)   Height:       72 inches       Referring Physician    Brain Valencia DO   Weight:       140 pounds      Sonographer            [facility]   BSA:          1.83 m^2        Interpreting Physician Tana Chacko MD   BMI:          18.99 kg/m^2  Procedure Type of Study   TTE procedure:ECHO 2D W/DOPPLER/COLOR/CONTRAST. Study Location: Portable Technical Quality: Adequate visualization Patient Status: Inpatient BP: 110/69 mmHg Indications:Congestive heart failure, systolic dysfunction and Mitral regurgitation. Conclusions   Summary  Limited echocardiographic study. LV is moderate to severely dilated with severely reduced LV systolic  function. LV ejection fraction estimated at less than 20%. Probable grade 3 diastolic dysfunction. RV appears mildly dilated with moderately reduced RV systolic function  (TAPSE equals 1.6 cm). Severe left atrial enlargement. Left atrial pressure is elevated. Severe right atrial enlargement. Aortic valve not well studied. No significant stenosis. Probable mild to  moderate aortic regurgitation. Mitral valve appears moderately thickened with preserved leaflet mobility. Severe central mitral regurgitation is noted. Mitral regurgitant jet  velocity suggests significantly elevated left atrial pressure. No significant pericardial effusion noted. Signature   ----------------------------------------------------------------  Electronically signed by Tana Chacko MD(Interpreting physician)  on 02/28/2022 07:14 PM  ----------------------------------------------------------------  Allergies   - Statins.  M-Mode Measurements (cm)   LVIDd: 5.64 cm                                  LVIDs: 5.11 cm  IVSd: 1.2 cm  LVPWd: 1.2 cm  % Ejection Fraction: 20 %  Doppler Measurements:    MV Peak E-Wave: 86.1 cm/s   MV Peak A-Wave: 27.9 cm/s   MV E/A Ratio: 3.09 %   MV Peak Gradient: 2.97 mmHg   MV P1/2t: 48 msec   MVA by PHT4.58 cm^2      XR CHEST PORTABLE    Result Date: 2/28/2022  Examination. XR CHEST PORTABLE 2/27/2022 11:30 PM History: Chest pain. A single frontal portable semiupright view of the chest is compared with the previous study dated 2/2/2022. There is a significant improvement in the bilateral lung infiltrate since the previous study. There is a small residual infiltrate at bilateral lung bases and a small left basal pleural effusion. There is persistent moderate cardiomegaly. Atheromatous changes thoracic aorta are noted. No pulmonary congestion or pneumothorax. No acute bony abnormality. 1. Improvement. Signed by Dr Diane Dailey     Systolic heart failure secondary to ischemic cardiomyopathy. S/P AICD.     Coronary artery disease involving native coronary artery of native heart with angina pectoris. Continue ongoing medical management.     Tobacco abuse. Encourage cessation.     Hyperlipidemia. Continue medical management.     Severe left ventricular systolic dysfunction. Continue medical management.     Please document 38 minutes of critical care time for patient assessment, chart review, discussion with staff, .       Falguni Mckeon DO

## 2022-03-03 NOTE — PROGRESS NOTES
Physical Therapy  Name: Paul Forrester  MRN:  [id number]  Date of service:  3/3/2022     03/03/22 0844   Restrictions/Precautions   Restrictions/Precautions Fall Risk;Isolation   Required Braces or Orthoses? No   Position Activity Restriction   Other position/activity restrictions droplet +   General   Chart Reviewed Yes   Subjective   Subjective Pt requires MAX encouragement to participate but finally agrees to get up to the chair. Pain Screening   Patient Currently in Pain Yes   Pain Assessment   Pain Assessment 0-10   Pain Level 5   Pain Location Generalized   Pain Descriptors Sore   Pain Frequency Continuous   Response to Pain Intervention None   Functional Pain Assessment Activities are not prevented   Non-Pharmaceutical Pain Intervention(s) Ambulation/Increased Activity;Repositioned; Rest   Bed Mobility   Supine to Sit Stand by assistance   Comment Pt able to sit EOB unsupported by therapist   Transfers   Sit to Stand Contact guard assistance   Stand to sit Contact guard assistance   Ambulation   Ambulation? Yes   Ambulation 1   Surface level tile   Device Rolling Walker   Assistance Contact guard assistance  (assist with multiple lines)   Quality of Gait steady with RW   Gait Deviations Decreased step length;Decreased step height   Distance 5'   Short term goals   Time Frame for Short term goals 2 wks   Short term goal 1 supine to sit indep   Short term goal 2 sit to stand indep   Short term goal 3 amb. 48' with RW SBA   Conditions Requiring Skilled Therapeutic Intervention   Body structures, Functions, Activity limitations Decreased functional mobility ; Decreased strength;Decreased endurance;Decreased posture;Decreased balance;Decreased coordination; Increased pain   Assessment Pt tolerated tx session well, able to sit EOB and stand to walk to chair with rwx. Pt exhibits decreased endurance and requires additional time and effort for all movements.  Pt up to recliner and positioned for comfort with all needs in reach.    Activity Tolerance   Activity Tolerance Patient limited by endurance   Safety Devices   Type of devices Call light within reach;Gait belt;Left in chair;Nurse notified         Electronically signed by Charly Tse PTA on 3/3/2022 at 8:47 AM

## 2022-03-03 NOTE — PROGRESS NOTES
Cardiology Daily Note Chana Dias MD      Patient:  Veto Garcia  [id number]    Patient Active Problem List    Diagnosis Date Noted    Systolic heart failure secondary to hypertrophic cardiomyopathy ([address] 02/28/2022    Severe left ventricular systolic dysfunction 78/72/4521    Acute respiratory failure with hypoxia ([address]     Acute respiratory failure with hypoxia and hypercarbia ([address] 02/02/2022    Sepsis ([address] 02/02/2022    Acute exacerbation of CHF (congestive heart failure) ([address] 05/03/5501    Acute systolic (congestive) heart failure ([address] 02/02/2022    PVD (peripheral vascular disease) ([address]     Bilateral carotid artery stenosis 01/06/2020    Statin intolerance 02/14/2018    Inferior MI ([address] 01/07/2018    ST elevation myocardial infarction (STEMI) ([address]     Atherosclerosis of native artery of extremity with intermittent claudication ([address] 04/15/2015    Paroxysmal A-fib ([address]     Tobacco abuse     Chest pain     Hyperlipidemia     Palpitations     HTN (hypertension)     Coronary artery disease involving native coronary artery of native heart with angina pectoris ([address]        Admit Date:  2/27/2022    Admission Problem List: Present on Admission:   Systolic heart failure secondary to hypertrophic cardiomyopathy ([address]   Severe left ventricular systolic dysfunction   Tobacco abuse   Paroxysmal A-fib ([address]   Hyperlipidemia   Coronary artery disease involving native coronary artery of native heart with angina pectoris [facility])      Cardiac Specific Data:  Specialty Problems        Cardiology Problems    Coronary artery disease involving native coronary artery of native heart with angina pectoris (HCC)        HTN (hypertension)        Chest pain        Hyperlipidemia        Paroxysmal A-fib (HCC)        Atherosclerosis of native artery of extremity with intermittent claudication (HCC)        Inferior MI ([address]        ST elevation myocardial infarction (STEMI) ([address]        Bilateral carotid 03/01/22  0237 03/02/22  0154 03/03/22  0219   * 134* 134*   K 4.8 5.0 4.6   CL 99 97* 99   CO2 23 23 23   BUN 28* 33* 32*   CREATININE 0.7 0.8 0.8     LIVER PROFILE:   Recent Labs     03/01/22  0237 03/02/22  0154 03/03/22  0219   AST 74* 51* 30   ALT 88* 68* 47*   BILITOT 0.3 0.3 <0.2   ALKPHOS 214* 241* 188*     PT/INR: No results for input(s): PROTIME, INR in the last 72 hours. APTT: No results for input(s): APTT in the last 72 hours. BNP:  No results for input(s): BNP in the last 72 hours. CK, CKMB, Troponin: @LABRCNT (CKTOTAL:3, CKMB:3, TROPONINI:3)@    IMAGING:  ECHO Complete 2D W Doppler W Color    Result Date: 2/28/2022  Transthoracic Echocardiography Report (TTE)  Demographics   Patient Name  CASI Pisano Date of Study          02/28/2022   MRN           [id number]          Gender                 Male   Date of Birth 1950      Room Number            HSJ-1530   Age           66 year(s)   Height:       72 inches       Referring Physician    Elaina Elizabeth DO   Weight:       140 pounds      Sonographer            Bebe Painter [facility]   BSA:          1.83 m^2        Interpreting Physician Aurora Chacko MD   BMI:          18.99 kg/m^2  Procedure Type of Study   TTE procedure:ECHO 2D W/DOPPLER/COLOR/CONTRAST. Study Location: Portable Technical Quality: Adequate visualization Patient Status: Inpatient BP: 110/69 mmHg Indications:Congestive heart failure, systolic dysfunction and Mitral regurgitation. Conclusions   Summary  Limited echocardiographic study. LV is moderate to severely dilated with severely reduced LV systolic  function. LV ejection fraction estimated at less than 20%. Probable grade 3 diastolic dysfunction. RV appears mildly dilated with moderately reduced RV systolic function  (TAPSE equals 1.6 cm). Severe left atrial enlargement. Left atrial pressure is elevated. Severe right atrial enlargement. Aortic valve not well studied. No significant stenosis.  Probable mild to  moderate aortic regurgitation. Mitral valve appears moderately thickened with preserved leaflet mobility. Severe central mitral regurgitation is noted. Mitral regurgitant jet  velocity suggests significantly elevated left atrial pressure. No significant pericardial effusion noted. Signature   ----------------------------------------------------------------  Electronically signed by Aurora Chacko MD(Interpreting physician)  on 02/28/2022 07:14 PM  ----------------------------------------------------------------  Allergies   - Statins. M-Mode Measurements (cm)   LVIDd: 5.64 cm                                  LVIDs: 5.11 cm  IVSd: 1.2 cm  LVPWd: 1.2 cm  % Ejection Fraction: 20 %  Doppler Measurements:    MV Peak E-Wave: 86.1 cm/s   MV Peak A-Wave: 27.9 cm/s   MV E/A Ratio: 3.09 %   MV Peak Gradient: 2.97 mmHg   MV P1/2t: 48 msec   MVA by PHT4.58 cm^2      ECHO Complete 2D W Doppler W Color    Result Date: 2/3/2022  Transthoracic Echocardiography Report (TTE)  Demographics   Patient Name  CASI Pisano Date of Study         02/03/2022   MRN           [id number]          Gender                Male   Date of Birth 1950      Room Number           MHL-0712   Age           66 year(s)   Height:       72 inches       Referring Physician   Jacques Tate   Weight:       150 pounds      Sonographer           Luly Donaldson [facility]   BSA:          1.89 m^2        Interpreting          Stephany Juares MD                                Physician   BMI:          20.34 kg/m^2  Procedure Type of Study   TTE procedure:ECHO 2D W/DOPPLER/COLOR/CONTRAST. Study Location: Echo Lab Technical Quality: Adequate visualization Patient Status: Inpatient Contrast Medium: Definity. Amount - 8 ml BP: 114/77 mmHg Indications:Congestive heart failure.   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  0.79 cm, ERO more than 0.4 cm^2, jet width more than 50% of the left  atrial area  Central jet of the mitral regurgitation suggestive of functional secondary  mitral regurgitation  Moderately to severely dilated left atrium  Moderately dilated right atrium  Moderate aortic insufficiency  Moderate tricuspid regurgitation with estimated right ventricular systolic  pressure of 40 to 45 mm which is mildly elevated  Dilated IVC with impaired respiratory variation  Aortic sclerosis   Signature   ----------------------------------------------------------------  Electronically signed by Hector Fried MD(Interpreting physician)  on 02/03/2022 12:31 PM  ----------------------------------------------------------------  Allergies   - Statins. M-Mode Measurements (cm)   LVIDd: 5.76 cm                         LVIDs: 5.18 cm  IVSd: 0.98 cm  LVPWd: 1.23 cm                         AO Root Dimension: 2.4 cm  Rt. Vent.  Dimension: 3.38 cm           LA: 4.6 cm  % Ejection Fraction: 20 %              LVOT: 2 cm  Doppler Measurements:   AV Peak Velocity:145 cm/s            MV Peak E-Wave: 102 cm/s  AV Peak Gradient: 8.41 mmHg          MV Peak A-Wave: 38.9 cm/s  AV Mean Gradient: 5 mmHg             MV E/A Ratio: 2.62 %  AV Area (Continuity):2.34 cm^2       MV Peak Gradient: 4.16 mmHg  TR Velocity:269 cm/s  TR Gradient:28.94 mmHg  Estimated RAP:10 mmHg  RVSP:40 mmHg      VL Extremity Venous Left    Result Date: 3/2/2022  Vascular Upper Extremities Veins Procedure  Demographics   Patient Name    CASI Schmitt  Age                  66                  S   Patient Number  [id number]         Gender               Male   Visit Number    [id number]      Interpreting         Richie Joseph                                 Physician   Date of Birth   1950 Referring Physician  Shy Basilio MD   Accession       [id number]     [address], [place],  Number                                              RDMS  Procedure Type of Study:   Veins:Upper Extremities Veins, US Doppler Venous Upper Extremity  Unilateral.  Indications for Study:Pacemaker. Risk Factors   - The patient's last creatinine was 0.8 mg/dl. Allergies   - Statins. Impression   successful Left subclavian vein access   Signature   ----------------------------------------------------------------  Electronically signed by Ronny Guerin(Interpreting  physician) on 03/02/2022 12:23 PM  ----------------------------------------------------------------      XR CHEST PORTABLE    Result Date: 3/2/2022  XR CHEST PORTABLE 3/2/2022 1:43 PM HISTORY: Pacemaker  Technique: Single AP view of the chest COMPARISONS: 2/27/2022 FINDINGS: Bibasilar atelectasis. Cardiomegaly which is stable. Pulmonary vasculature are nondilated. No pneumothorax. New left chest wall dual chamber pacer defibrillator. No acute bony abnormality. 1. New left chest wall pacemaker. No pneumothorax. 2. Bibasilar atelectasis. Signed by Dr Capri Juarez    XR CHEST PORTABLE    Result Date: 2/28/2022  Examination. XR CHEST PORTABLE 2/27/2022 11:30 PM History: Chest pain. A single frontal portable semiupright view of the chest is compared with the previous study dated 2/2/2022. There is a significant improvement in the bilateral lung infiltrate since the previous study. There is a small residual infiltrate at bilateral lung bases and a small left basal pleural effusion. There is persistent moderate cardiomegaly. Atheromatous changes thoracic aorta are noted. No pulmonary congestion or pneumothorax. No acute bony abnormality. 1. Improvement. Signed by Dr Marielos Gaytan    Result Date: 2/2/2022  Examination. XR CHEST PORTABLE 2/2/2022 3:21 AM History: Shortness of breath.  Frontal portable upright view of the chest is compared with the previous study dated 1/7/2018. There is extensive interstitial infiltrate in the lungs bilaterally more severely in the mid and lower lungs. There is bilateral lower lobar consolidation. There is a small bibasal pleural effusion. There is no pneumothorax. The heart size is not evaluated due to complete obliteration of the cardiac border by the adjacent infiltrate. Atheromatous changes thoracic aorta are noted. There is evidence of prior cardiac surgery. No acute bony abnormality. 1. The extensive bilateral lung infiltrate. Bilateral lower lobar consolidation and bibasilar pleural effusion. Signed by Dr Kandis Dominguez:  1. ?  Supraventricular tachycardia of the past 5 to 6 days with rates 150s to 170s  2. LifeVest  3. Ischemic cardiomyopathy  4. Cardiac arrest on admission 2/27/2022 with rapid wide-complex tachycardia subsequent bradycardia and asystole requiring 2-minute CPR  5. Complaints of chest pain since CPR worse with direct pressure inspiration or cough  6. History of hypertension  7. History tobacco abuse  8. Peripheral arterial disease previous stent placement  9. Paroxysmal atrial fibrillation  10. History of inferior STEMI  11. Statin intolerance  12. Bilateral carotid artery stenosis  13. Cardiac cath with stent placement 1/7/2018 [employer] vision 3.5 x 28  14. Status post CABG x4 grafts 2011  15. Chronic systolic diastolic congestive heart failure  16. Cardiac cath 2/3/2022 significant LV systolic dysfunction LIMA graft atretic 2 vein grafts occluded 1 of which appears recent right coronary artery saphenous vein graft moderately severe stenosis in the proximal to mid segment status post stent placement in the mid body of the vein graft of the distal right coronary artery 4.0 x 22 mm resolute, 2.5 x 38 resolute integrity stent mid LAD, PTCA, vein graft LAD into the distal LAD at the distal anastomosis  17.  Echocardiogram 2/27/2022 EF less than 20% grade 3 diastolic dysfunction RV dilatation severe biatrial enlargement mild to moderate AR severe central MR jet noted  18. Elevated troponins this admission 0.15 now down to 0.09.  19. Chronic anticoagulation with apixaban  20. Chronic antiarrhythmic therapy with sotalol  21. Elevated liver function test improving  22. Status post ICD dual-chamber placement 3/3/2022    Plan:  1.  Increase activity as tolerated stable from a cardiac standpoint    Carol Shannon MD, MD 3/3/2022 9:39 AM

## 2022-03-04 LAB
ALBUMIN SERPL-MCNC: 2.5 G/DL (ref 3.5–5.2)
ALP BLD-CCNC: 160 U/L (ref 40–130)
ALT SERPL-CCNC: 27 U/L (ref 5–41)
ANION GAP SERPL CALCULATED.3IONS-SCNC: 15 MMOL/L (ref 7–19)
AST SERPL-CCNC: 27 U/L (ref 5–40)
BASOPHILS ABSOLUTE: 0 K/UL (ref 0–0.2)
BASOPHILS RELATIVE PERCENT: 0.1 % (ref 0–1)
BILIRUB SERPL-MCNC: <0.2 MG/DL (ref 0.2–1.2)
BUN BLDV-MCNC: 30 MG/DL (ref 8–23)
CALCIUM SERPL-MCNC: 7.7 MG/DL (ref 8.8–10.2)
CHLORIDE BLD-SCNC: 98 MMOL/L (ref 98–111)
CO2: 21 MMOL/L (ref 22–29)
CREAT SERPL-MCNC: 0.7 MG/DL (ref 0.5–1.2)
EOSINOPHILS ABSOLUTE: 0 K/UL (ref 0–0.6)
EOSINOPHILS RELATIVE PERCENT: 0 % (ref 0–5)
GFR AFRICAN AMERICAN: >59
GFR NON-AFRICAN AMERICAN: >60
GLUCOSE BLD-MCNC: 141 MG/DL (ref 74–109)
HCT VFR BLD CALC: 34 % (ref 42–52)
HEMOGLOBIN: 11.2 G/DL (ref 14–18)
IMMATURE GRANULOCYTES #: 0.1 K/UL
LYMPHOCYTES ABSOLUTE: 1 K/UL (ref 1.1–4.5)
LYMPHOCYTES RELATIVE PERCENT: 8 % (ref 20–40)
MAGNESIUM: 1.9 MG/DL (ref 1.6–2.4)
MCH RBC QN AUTO: 30.7 PG (ref 27–31)
MCHC RBC AUTO-ENTMCNC: 32.9 G/DL (ref 33–37)
MCV RBC AUTO: 93.2 FL (ref 80–94)
MONOCYTES ABSOLUTE: 0.7 K/UL (ref 0–0.9)
MONOCYTES RELATIVE PERCENT: 5.5 % (ref 0–10)
NEUTROPHILS ABSOLUTE: 11.3 K/UL (ref 1.5–7.5)
NEUTROPHILS RELATIVE PERCENT: 85.9 % (ref 50–65)
PDW BLD-RTO: 14.3 % (ref 11.5–14.5)
PLATELET # BLD: 324 K/UL (ref 130–400)
PMV BLD AUTO: 9.9 FL (ref 9.4–12.4)
POTASSIUM SERPL-SCNC: 4.1 MMOL/L (ref 3.5–5)
RBC # BLD: 3.65 M/UL (ref 4.7–6.1)
SODIUM BLD-SCNC: 134 MMOL/L (ref 136–145)
TOTAL PROTEIN: 4.8 G/DL (ref 6.6–8.7)
TROPONIN: 0.06 NG/ML (ref 0–0.03)
WBC # BLD: 13.1 K/UL (ref 4.8–10.8)

## 2022-03-04 PROCEDURE — 6370000000 HC RX 637 (ALT 250 FOR IP): Performed by: INTERNAL MEDICINE

## 2022-03-04 PROCEDURE — 2580000003 HC RX 258: Performed by: INTERNAL MEDICINE

## 2022-03-04 PROCEDURE — 6360000002 HC RX W HCPCS: Performed by: INTERNAL MEDICINE

## 2022-03-04 PROCEDURE — 2100000000 HC CCU R&B

## 2022-03-04 PROCEDURE — 84484 ASSAY OF TROPONIN QUANT: CPT

## 2022-03-04 PROCEDURE — 97530 THERAPEUTIC ACTIVITIES: CPT

## 2022-03-04 PROCEDURE — 36415 COLL VENOUS BLD VENIPUNCTURE: CPT

## 2022-03-04 PROCEDURE — 83735 ASSAY OF MAGNESIUM: CPT

## 2022-03-04 PROCEDURE — 99024 POSTOP FOLLOW-UP VISIT: CPT | Performed by: INTERNAL MEDICINE

## 2022-03-04 PROCEDURE — 85025 COMPLETE CBC W/AUTO DIFF WBC: CPT

## 2022-03-04 PROCEDURE — 80053 COMPREHEN METABOLIC PANEL: CPT

## 2022-03-04 RX ORDER — HYDROCODONE BITARTRATE AND ACETAMINOPHEN 7.5; 325 MG/1; MG/1
1 TABLET ORAL EVERY 4 HOURS PRN
Status: DISCONTINUED | OUTPATIENT
Start: 2022-03-04 | End: 2022-03-10 | Stop reason: HOSPADM

## 2022-03-04 RX ORDER — FUROSEMIDE 40 MG/1
40 TABLET ORAL 2 TIMES DAILY
Status: DISCONTINUED | OUTPATIENT
Start: 2022-03-06 | End: 2022-03-04

## 2022-03-04 RX ORDER — AMIODARONE HYDROCHLORIDE 200 MG/1
200 TABLET ORAL 2 TIMES DAILY
Status: DISCONTINUED | OUTPATIENT
Start: 2022-03-04 | End: 2022-03-10 | Stop reason: HOSPADM

## 2022-03-04 RX ORDER — FUROSEMIDE 10 MG/ML
40 INJECTION INTRAMUSCULAR; INTRAVENOUS EVERY 12 HOURS
Status: COMPLETED | OUTPATIENT
Start: 2022-03-04 | End: 2022-03-06

## 2022-03-04 RX ORDER — MORPHINE SULFATE 2 MG/ML
2 INJECTION, SOLUTION INTRAMUSCULAR; INTRAVENOUS
Status: DISCONTINUED | OUTPATIENT
Start: 2022-03-04 | End: 2022-03-10 | Stop reason: HOSPADM

## 2022-03-04 RX ADMIN — SODIUM CHLORIDE, PRESERVATIVE FREE 10 ML: 5 INJECTION INTRAVENOUS at 09:00

## 2022-03-04 RX ADMIN — ALBUTEROL SULFATE 2 PUFF: 90 AEROSOL, METERED RESPIRATORY (INHALATION) at 01:00

## 2022-03-04 RX ADMIN — CLOPIDOGREL BISULFATE 75 MG: 75 TABLET ORAL at 07:49

## 2022-03-04 RX ADMIN — AZITHROMYCIN MONOHYDRATE 500 MG: 500 INJECTION, POWDER, LYOPHILIZED, FOR SOLUTION INTRAVENOUS at 12:11

## 2022-03-04 RX ADMIN — AMIODARONE HYDROCHLORIDE 200 MG: 200 TABLET ORAL at 10:26

## 2022-03-04 RX ADMIN — GUAIFENESIN 400 MG: 200 TABLET ORAL at 02:58

## 2022-03-04 RX ADMIN — IPRATROPIUM BROMIDE 2 PUFF: 17 AEROSOL, METERED RESPIRATORY (INHALATION) at 01:00

## 2022-03-04 RX ADMIN — HYDROCODONE BITARTRATE AND ACETAMINOPHEN 1 TABLET: 7.5; 325 TABLET ORAL at 15:47

## 2022-03-04 RX ADMIN — SACUBITRIL AND VALSARTAN 1 TABLET: 49; 51 TABLET, FILM COATED ORAL at 07:48

## 2022-03-04 RX ADMIN — ALBUTEROL SULFATE 2 PUFF: 90 AEROSOL, METERED RESPIRATORY (INHALATION) at 09:19

## 2022-03-04 RX ADMIN — METHYLPREDNISOLONE SODIUM SUCCINATE 40 MG: 40 INJECTION, POWDER, FOR SOLUTION INTRAMUSCULAR; INTRAVENOUS at 12:24

## 2022-03-04 RX ADMIN — Medication 2000 UNITS: at 07:49

## 2022-03-04 RX ADMIN — MORPHINE SULFATE 2 MG: 2 INJECTION, SOLUTION INTRAMUSCULAR; INTRAVENOUS at 02:49

## 2022-03-04 RX ADMIN — IPRATROPIUM BROMIDE 2 PUFF: 17 AEROSOL, METERED RESPIRATORY (INHALATION) at 05:00

## 2022-03-04 RX ADMIN — GUAIFENESIN 400 MG: 200 TABLET ORAL at 19:26

## 2022-03-04 RX ADMIN — ZINC SULFATE 220 MG (50 MG) CAPSULE 50 MG: CAPSULE at 07:49

## 2022-03-04 RX ADMIN — MORPHINE SULFATE 2 MG: 2 INJECTION, SOLUTION INTRAMUSCULAR; INTRAVENOUS at 13:42

## 2022-03-04 RX ADMIN — SPIRONOLACTONE 25 MG: 25 TABLET ORAL at 07:49

## 2022-03-04 RX ADMIN — ALBUTEROL SULFATE 2 PUFF: 90 AEROSOL, METERED RESPIRATORY (INHALATION) at 05:00

## 2022-03-04 RX ADMIN — AMIODARONE HYDROCHLORIDE 200 MG: 200 TABLET ORAL at 19:27

## 2022-03-04 RX ADMIN — AMIODARONE HYDROCHLORIDE 1 MG/MIN: 50 INJECTION, SOLUTION INTRAVENOUS at 08:25

## 2022-03-04 RX ADMIN — LORAZEPAM 0.25 MG: 2 INJECTION INTRAMUSCULAR; INTRAVENOUS at 07:50

## 2022-03-04 RX ADMIN — IPRATROPIUM BROMIDE 2 PUFF: 17 AEROSOL, METERED RESPIRATORY (INHALATION) at 18:13

## 2022-03-04 RX ADMIN — IPRATROPIUM BROMIDE 2 PUFF: 17 AEROSOL, METERED RESPIRATORY (INHALATION) at 09:20

## 2022-03-04 RX ADMIN — HYDROCODONE BITARTRATE AND ACETAMINOPHEN 1 TABLET: 7.5; 325 TABLET ORAL at 19:26

## 2022-03-04 RX ADMIN — IPRATROPIUM BROMIDE 2 PUFF: 17 AEROSOL, METERED RESPIRATORY (INHALATION) at 20:00

## 2022-03-04 RX ADMIN — APIXABAN 5 MG: 5 TABLET, FILM COATED ORAL at 07:48

## 2022-03-04 RX ADMIN — LORAZEPAM 0.25 MG: 2 INJECTION INTRAMUSCULAR; INTRAVENOUS at 18:09

## 2022-03-04 RX ADMIN — SODIUM CHLORIDE, PRESERVATIVE FREE 10 ML: 5 INJECTION INTRAVENOUS at 20:00

## 2022-03-04 RX ADMIN — METHYLPREDNISOLONE SODIUM SUCCINATE 40 MG: 40 INJECTION, POWDER, FOR SOLUTION INTRAMUSCULAR; INTRAVENOUS at 02:58

## 2022-03-04 RX ADMIN — OXYCODONE HYDROCHLORIDE AND ACETAMINOPHEN 500 MG: 500 TABLET ORAL at 07:49

## 2022-03-04 RX ADMIN — SOTALOL HYDROCHLORIDE 80 MG: 80 TABLET ORAL at 07:49

## 2022-03-04 RX ADMIN — ASPIRIN 81 MG: 81 TABLET, COATED ORAL at 07:49

## 2022-03-04 RX ADMIN — APIXABAN 5 MG: 5 TABLET, FILM COATED ORAL at 19:26

## 2022-03-04 RX ADMIN — MORPHINE SULFATE 2 MG: 2 INJECTION, SOLUTION INTRAMUSCULAR; INTRAVENOUS at 10:27

## 2022-03-04 RX ADMIN — POLYETHYLENE GLYCOL 3350 17 G: 17 POWDER, FOR SOLUTION ORAL at 18:13

## 2022-03-04 RX ADMIN — FAMOTIDINE 20 MG: 20 TABLET ORAL at 07:49

## 2022-03-04 RX ADMIN — SODIUM CHLORIDE, PRESERVATIVE FREE 10 ML: 5 INJECTION INTRAVENOUS at 19:27

## 2022-03-04 RX ADMIN — ALBUTEROL SULFATE 2 PUFF: 90 AEROSOL, METERED RESPIRATORY (INHALATION) at 20:00

## 2022-03-04 RX ADMIN — LORAZEPAM 0.25 MG: 2 INJECTION INTRAMUSCULAR; INTRAVENOUS at 02:49

## 2022-03-04 RX ADMIN — IPRATROPIUM BROMIDE 2 PUFF: 17 AEROSOL, METERED RESPIRATORY (INHALATION) at 13:05

## 2022-03-04 RX ADMIN — ISOSORBIDE MONONITRATE 30 MG: 30 TABLET, EXTENDED RELEASE ORAL at 07:49

## 2022-03-04 RX ADMIN — ATORVASTATIN CALCIUM 40 MG: 40 TABLET, FILM COATED ORAL at 19:26

## 2022-03-04 RX ADMIN — FUROSEMIDE 40 MG: 10 INJECTION, SOLUTION INTRAMUSCULAR; INTRAVENOUS at 19:27

## 2022-03-04 RX ADMIN — CALCIUM CARBONATE 500 MG: 500 TABLET, CHEWABLE ORAL at 13:43

## 2022-03-04 RX ADMIN — ALBUTEROL SULFATE 2 PUFF: 90 AEROSOL, METERED RESPIRATORY (INHALATION) at 13:05

## 2022-03-04 RX ADMIN — FUROSEMIDE 40 MG: 40 TABLET ORAL at 07:49

## 2022-03-04 RX ADMIN — HYDROCODONE BITARTRATE AND ACETAMINOPHEN 1 TABLET: 7.5; 325 TABLET ORAL at 23:40

## 2022-03-04 RX ADMIN — OXYCODONE HYDROCHLORIDE AND ACETAMINOPHEN 500 MG: 500 TABLET ORAL at 19:26

## 2022-03-04 RX ADMIN — ALBUTEROL SULFATE 2 PUFF: 90 AEROSOL, METERED RESPIRATORY (INHALATION) at 18:13

## 2022-03-04 RX ADMIN — SACUBITRIL AND VALSARTAN 1 TABLET: 49; 51 TABLET, FILM COATED ORAL at 19:26

## 2022-03-04 RX ADMIN — GUAIFENESIN 400 MG: 200 TABLET ORAL at 12:23

## 2022-03-04 RX ADMIN — CEFTRIAXONE 1000 MG: 1 INJECTION, POWDER, FOR SOLUTION INTRAMUSCULAR; INTRAVENOUS at 12:11

## 2022-03-04 ASSESSMENT — PAIN SCALES - GENERAL
PAINLEVEL_OUTOF10: 2
PAINLEVEL_OUTOF10: 8
PAINLEVEL_OUTOF10: 0
PAINLEVEL_OUTOF10: 6
PAINLEVEL_OUTOF10: 8
PAINLEVEL_OUTOF10: 3
PAINLEVEL_OUTOF10: 7
PAINLEVEL_OUTOF10: 9
PAINLEVEL_OUTOF10: 0
PAINLEVEL_OUTOF10: 9

## 2022-03-04 ASSESSMENT — PAIN DESCRIPTION - LOCATION
LOCATION: CHEST
LOCATION: BACK

## 2022-03-04 ASSESSMENT — PAIN DESCRIPTION - FREQUENCY
FREQUENCY: CONTINUOUS
FREQUENCY: INTERMITTENT

## 2022-03-04 ASSESSMENT — PAIN DESCRIPTION - PAIN TYPE
TYPE: CHRONIC PAIN
TYPE: ACUTE PAIN
TYPE: CHRONIC PAIN
TYPE: ACUTE PAIN

## 2022-03-04 ASSESSMENT — ENCOUNTER SYMPTOMS
COLOR CHANGE: 0
CONSTIPATION: 0
DIARRHEA: 0
NAUSEA: 0
SHORTNESS OF BREATH: 0
BACK PAIN: 0
VOICE CHANGE: 0
VOMITING: 0

## 2022-03-04 ASSESSMENT — PAIN DESCRIPTION - PROGRESSION
CLINICAL_PROGRESSION: NOT CHANGED
CLINICAL_PROGRESSION: NOT CHANGED

## 2022-03-04 ASSESSMENT — PAIN - FUNCTIONAL ASSESSMENT
PAIN_FUNCTIONAL_ASSESSMENT: ACTIVITIES ARE NOT PREVENTED
PAIN_FUNCTIONAL_ASSESSMENT: PREVENTS OR INTERFERES SOME ACTIVE ACTIVITIES AND ADLS
PAIN_FUNCTIONAL_ASSESSMENT: ACTIVITIES ARE NOT PREVENTED

## 2022-03-04 ASSESSMENT — PAIN DESCRIPTION - ONSET
ONSET: ON-GOING

## 2022-03-04 ASSESSMENT — PAIN SCALES - WONG BAKER: WONGBAKER_NUMERICALRESPONSE: 4

## 2022-03-04 ASSESSMENT — PAIN DESCRIPTION - DESCRIPTORS
DESCRIPTORS: SORE
DESCRIPTORS: SORE
DESCRIPTORS: DULL;DISCOMFORT;SORE
DESCRIPTORS: DISCOMFORT

## 2022-03-04 ASSESSMENT — PAIN DESCRIPTION - ORIENTATION
ORIENTATION: MID
ORIENTATION: MID
ORIENTATION: UPPER
ORIENTATION: MID;LOWER;UPPER

## 2022-03-04 NOTE — PLAN OF CARE
Problem: Airway Clearance - Ineffective  Goal: Achieve or maintain patent airway  Outcome: Ongoing     Problem: Gas Exchange - Impaired  Goal: Absence of hypoxia  Outcome: Ongoing  Goal: Promote optimal lung function  Outcome: Ongoing     Problem: Breathing Pattern - Ineffective  Goal: Ability to achieve and maintain a regular respiratory rate  Outcome: Ongoing     Problem:  Body Temperature -  Risk of, Imbalanced  Goal: Ability to maintain a body temperature within defined limits  Outcome: Ongoing  Goal: Will regain or maintain usual level of consciousness  Outcome: Ongoing  Goal: Complications related to the disease process, condition or treatment will be avoided or minimized  Outcome: Ongoing     Problem: Isolation Precautions - Risk of Spread of Infection  Goal: Prevent transmission of infection  Outcome: Ongoing     Problem: Nutrition Deficits  Goal: Optimize nutritional status  Outcome: Ongoing     Problem: Risk for Fluid Volume Deficit  Goal: Maintain normal heart rhythm  Outcome: Ongoing  Goal: Maintain absence of muscle cramping  Outcome: Ongoing  Goal: Maintain normal serum potassium, sodium, calcium, phosphorus, and pH  Outcome: Ongoing     Problem: Loneliness or Risk for Loneliness  Goal: Demonstrate positive use of time alone when socialization is not possible  Outcome: Ongoing     Problem: Fatigue  Goal: Verbalize increase energy and improved vitality  Outcome: Ongoing     Problem: Patient Education: Go to Patient Education Activity  Goal: Patient/Family Education  Outcome: Ongoing     Problem: Pain:  Goal: Pain level will decrease  Description: Pain level will decrease  Outcome: Ongoing  Goal: Control of acute pain  Description: Control of acute pain  Outcome: Ongoing  Goal: Control of chronic pain  Description: Control of chronic pain  Outcome: Ongoing     Problem: Falls - Risk of:  Goal: Will remain free from falls  Description: Will remain free from falls  Outcome: Ongoing  Goal: Absence of

## 2022-03-04 NOTE — PROGRESS NOTES
Cardiology Daily Note Teri Nathan MD      Patient:  Javier Aguilar  [id number]    Patient Active Problem List    Diagnosis Date Noted    Systolic heart failure secondary to hypertrophic cardiomyopathy ([address] 02/28/2022    Severe left ventricular systolic dysfunction 05/44/7848    Acute respiratory failure with hypoxia ([address]     Acute respiratory failure with hypoxia and hypercarbia ([address] 02/02/2022    Sepsis ([address] 02/02/2022    Acute exacerbation of CHF (congestive heart failure) ([address] 08/01/9453    Acute systolic (congestive) heart failure ([address] 02/02/2022    PVD (peripheral vascular disease) ([address]     Bilateral carotid artery stenosis 01/06/2020    Statin intolerance 02/14/2018    Inferior MI ([address] 01/07/2018    ST elevation myocardial infarction (STEMI) ([address]     Atherosclerosis of native artery of extremity with intermittent claudication ([address] 04/15/2015    Paroxysmal A-fib ([address]     Tobacco abuse     Chest pain     Hyperlipidemia     Palpitations     HTN (hypertension)     Coronary artery disease involving native coronary artery of native heart with angina pectoris ([address]        Admit Date:  2/27/2022    Admission Problem List: Present on Admission:   Systolic heart failure secondary to hypertrophic cardiomyopathy ([address]   Severe left ventricular systolic dysfunction   Tobacco abuse   Paroxysmal A-fib ([address]   Hyperlipidemia   Coronary artery disease involving native coronary artery of native heart with angina pectoris [facility])      Cardiac Specific Data:  Specialty Problems        Cardiology Problems    Coronary artery disease involving native coronary artery of native heart with angina pectoris (HCC)        HTN (hypertension)        Chest pain        Hyperlipidemia        Paroxysmal A-fib (HCC)        Atherosclerosis of native artery of extremity with intermittent claudication (HCC)        Inferior MI ([address]        ST elevation myocardial infarction (STEMI) ([address]        Bilateral carotid artery stenosis        PVD (peripheral vascular disease) ([facility])        Acute exacerbation of CHF (congestive heart failure) (HCC)        Acute systolic (congestive) heart failure ([facility])        Severe left ventricular systolic dysfunction        * (Principal) Systolic heart failure secondary to hypertrophic cardiomyopathy ([facility]              Subjective:  Mr. Ivett Figueroa seen today developed rapid irregular tachycardia device just interrogated appears to be atrial fibrillation IV amiodarone ordered along with oral amiodarone will discontinue sotalol. Dyspnea stable denies chest pain. Blood pressure 102/61 heart rate 132. Objective:   /61   Pulse 132   Temp 97.2 °F (36.2 °C) (Temporal)   Resp 19   Ht 6' (1.829 m)   Wt 140 lb (63.5 kg)   SpO2 93%   BMI 18.99 kg/m²       Intake/Output Summary (Last 24 hours) at 3/4/2022 0902  Last data filed at 3/3/2022 2000  Gross per 24 hour   Intake 1653.68 ml   Output 1000 ml   Net 653.68 ml       Prior to Admission medications    Medication Sig Start Date End Date Taking?  Authorizing Provider   furosemide (LASIX) 40 MG tablet Take 1 tablet by mouth 2 times daily 2/9/22   Robert Nagel MD   atorvastatin (LIPITOR) 80 MG tablet Take 0.5 tablets by mouth nightly 2/9/22   Robert Nagel MD   spironolactone (ALDACTONE) 25 MG tablet Take 1 tablet by mouth daily 2/9/22   Robert Nagel MD   clopidogrel (PLAVIX) 75 MG tablet Take 1 tablet by mouth daily 2/9/22   Robert Nagel MD   apixaban Beryl Border) 5 MG TABS tablet Take 1 tablet by mouth 2 times daily 2/9/22   LUIS Restrepo   isosorbide mononitrate (IMDUR) 30 MG extended release tablet Take 1 tablet by mouth daily 2/9/22   LUIS Restrepo   sacubitril-valsartan (ENTRESTO) 49-51 MG per tablet Take 1 tablet by mouth 2 times daily 2/8/22   LUIS Restrepo   Vitamin D (CHOLECALCIFEROL) 25 MCG (1000 UT) TABS tablet Take 5 tablets by mouth daily 2/9/22   Vickie Mishra LUIS   nitroGLYCERIN (NITROSTAT) 0.4 MG SL tablet Place 1 tablet under the tongue every 5 minutes as needed for Chest pain up to max of 3 total doses. If no relief after 1 dose, call 911. 6/8/21   LUIS Barrett   sotalol (BETAPACE) 160 MG tablet Take 1 tablet by mouth twice daily 6/8/21   LUIS Barrett   nitroGLYCERIN (NITROSTAT) 0.4 MG SL tablet Place 1 tablet under the tongue every 5 minutes as needed for Chest pain 6/8/21   LUIS Barrett   aspirin 81 MG EC tablet Take 81 mg by mouth daily.       Historical Provider, MD        amiodarone  200 mg Oral BID    apixaban  5 mg Oral BID    chlorhexidine gluconate   Topical Once    famotidine  20 mg Oral Daily    sodium chloride flush  5-40 mL IntraVENous 2 times per day    aspirin  81 mg Oral Daily    atorvastatin  40 mg Oral Nightly    clopidogrel  75 mg Oral Daily    furosemide  40 mg Oral BID    isosorbide mononitrate  30 mg Oral Daily    sacubitril-valsartan  1 tablet Oral BID    spironolactone  25 mg Oral Daily    methylPREDNISolone  40 mg IntraVENous Q8H    cefTRIAXone (ROCEPHIN) IV  1,000 mg IntraVENous Q24H    azithromycin  500 mg IntraVENous Q24H    zinc sulfate  50 mg Oral Daily    Vitamin D  2,000 Units Oral Daily    vitamin C  500 mg Oral BID    guaiFENesin  400 mg Oral Q8H    albuterol sulfate HFA  2 puff Inhalation Q4H    And    ipratropium  2 puff Inhalation Q4H       TELEMETRY: Atrial fibrillation     Physical Exam:      Physical Exam      General:  Awake, alert, NAD  Skin:  Warm and dry  Neck:  no jvd , no carotid bruits  Chest:  Clear to auscultation, no wheezing or rales  Cardiovascular:  IRRR N2F2 no murmurs, clicks, gallups, or rubs  Abdomen:  Soft nontender, nondistended, bowel sounds present  Extremities:  Edema: none       Lab Data:  CBC:   Recent Labs     03/02/22  0154 03/03/22  0219 03/04/22  0254   WBC 16.0* 17.2* 13.1*   HGB 12.2* 11.8* 11.2*   HCT 36.9* 36.3* 34.0*   MCV 91.8 92.4 93.2   * 413* 324     BMP:   Recent Labs     03/02/22  0154 03/03/22  0219 03/04/22  0254   * 134* 134*   K 5.0 4.6 4.1   CL 97* 99 98   CO2 23 23 21*   BUN 33* 32* 30*   CREATININE 0.8 0.8 0.7     LIVER PROFILE:   Recent Labs     03/02/22  0154 03/03/22  0219 03/04/22  0254   AST 51* 30 27   ALT 68* 47* 27   BILITOT 0.3 <0.2 <0.2   ALKPHOS 241* 188* 160*     PT/INR: No results for input(s): PROTIME, INR in the last 72 hours. APTT: No results for input(s): APTT in the last 72 hours. BNP:  No results for input(s): BNP in the last 72 hours. CK, CKMB, Troponin: @LABRCNT (CKTOTAL:3, CKMB:3, TROPONINI:3)@    IMAGING:  ECHO Complete 2D W Doppler W Color    Result Date: 2/28/2022  Transthoracic Echocardiography Report (TTE)  Demographics   Patient Name  CASI Lopez Date of Study          02/28/2022   MRN           [id number]          Gender                 Male   Date of Birth 1950      Room Number            [id number]   Age           66 year(s)   Height:       72 inches       Referring Physician    Faith Lee DO   Weight:       140 pounds      Sonographer            Bebe Painter [facility]   BSA:          1.83 m^2        Interpreting Physician Braxton Chacko MD   BMI:          18.99 kg/m^2  Procedure Type of Study   TTE procedure:ECHO 2D W/DOPPLER/COLOR/CONTRAST. Study Location: Portable Technical Quality: Adequate visualization Patient Status: Inpatient BP: 110/69 mmHg Indications:Congestive heart failure, systolic dysfunction and Mitral regurgitation. Conclusions   Summary  Limited echocardiographic study. LV is moderate to severely dilated with severely reduced LV systolic  function. LV ejection fraction estimated at less than 20%. Probable grade 3 diastolic dysfunction. RV appears mildly dilated with moderately reduced RV systolic function  (TAPSE equals 1.6 cm). Severe left atrial enlargement. Left atrial pressure is elevated. Severe right atrial enlargement. Aortic valve not well studied.  No significant 20%  Severe global hypokinesis  Grade 3 diastolic dysfunction  Right ventricle is dilated with severe global hypokinesis  Severe biventricular failure  Severe mitral regurgitation with the following criteria: Vena contractor  0.79 cm, ERO more than 0.4 cm^2, jet width more than 50% of the left  atrial area  Central jet of the mitral regurgitation suggestive of functional secondary  mitral regurgitation  Moderately to severely dilated left atrium  Moderately dilated right atrium  Moderate aortic insufficiency  Moderate tricuspid regurgitation with estimated right ventricular systolic  pressure of 40 to 45 mm which is mildly elevated  Dilated IVC with impaired respiratory variation  Aortic sclerosis   Signature   ----------------------------------------------------------------  Electronically signed by Brain Bob MD(Interpreting physician)  on 02/03/2022 12:31 PM  ----------------------------------------------------------------  Allergies   - Statins. M-Mode Measurements (cm)   LVIDd: 5.76 cm                         LVIDs: 5.18 cm  IVSd: 0.98 cm  LVPWd: 1.23 cm                         AO Root Dimension: 2.4 cm  Rt. Vent.  Dimension: 3.38 cm           LA: 4.6 cm  % Ejection Fraction: 20 %              LVOT: 2 cm  Doppler Measurements:   AV Peak Velocity:145 cm/s            MV Peak E-Wave: 102 cm/s  AV Peak Gradient: 8.41 mmHg          MV Peak A-Wave: 38.9 cm/s  AV Mean Gradient: 5 mmHg             MV E/A Ratio: 2.62 %  AV Area (Continuity):2.34 cm^2       MV Peak Gradient: 4.16 mmHg  TR Velocity:269 cm/s  TR Gradient:28.94 mmHg  Estimated RAP:10 mmHg  RVSP:40 mmHg      VL Extremity Venous Left    Result Date: 3/2/2022  Vascular Upper Extremities Veins Procedure  Demographics   Patient Name    CASI Cobb Organ  Age                  66                  S   Patient Number  [id number]         Gender               Male   Visit Number    [id number]      Interpreting         Katie Rodriguez                                 Physician Date of Birth   1950     Referring Physician  Darryle Langton MD   Accession       [id number]     [address], RVT,  Number                                              RDMS  Procedure Type of Study:   Veins:Upper Extremities Veins, US Doppler Venous Upper Extremity  Unilateral.  Indications for Study:Pacemaker. Risk Factors   - The patient's last creatinine was 0.8 mg/dl. Allergies   - Statins. Impression   successful Left subclavian vein access   Signature   ----------------------------------------------------------------  Electronically signed by Sabrina Guerin(Interpreting  physician) on 03/02/2022 12:23 PM  ----------------------------------------------------------------      XR CHEST PORTABLE    Result Date: 3/2/2022  XR CHEST PORTABLE 3/2/2022 1:43 PM HISTORY: Pacemaker  Technique: Single AP view of the chest COMPARISONS: 2/27/2022 FINDINGS: Bibasilar atelectasis. Cardiomegaly which is stable. Pulmonary vasculature are nondilated. No pneumothorax. New left chest wall dual chamber pacer defibrillator. No acute bony abnormality. 1. New left chest wall pacemaker. No pneumothorax. 2. Bibasilar atelectasis. Signed by Dr Ethan Conn    XR CHEST PORTABLE    Result Date: 2/28/2022  Examination. XR CHEST PORTABLE 2/27/2022 11:30 PM History: Chest pain. A single frontal portable semiupright view of the chest is compared with the previous study dated 2/2/2022. There is a significant improvement in the bilateral lung infiltrate since the previous study. There is a small residual infiltrate at bilateral lung bases and a small left basal pleural effusion. There is persistent moderate cardiomegaly. Atheromatous changes thoracic aorta are noted. No pulmonary congestion or pneumothorax. No acute bony abnormality. 1. Improvement. Signed by Dr Sheri Fournier:  1. ?  Supraventricular tachycardia of the past 5 to 6 days with rates 150s to 170s  2. LifeVest  3.  Ischemic cardiomyopathy  4. Cardiac arrest on admission 2/27/2022 with rapid wide-complex tachycardia subsequent bradycardia and asystole requiring 2-minute CPR  5. Complaints of chest pain since CPR worse with direct pressure inspiration or cough  6. History of hypertension  7. History tobacco abuse  8. Peripheral arterial disease previous stent placement  9. Paroxysmal atrial fibrillation  10. History of inferior STEMI  11. Statin intolerance  12. Bilateral carotid artery stenosis  13. Cardiac cath with stent placement 1/7/2018 MultiLink vision 3.5 x 28  14. Status post CABG x4 grafts 2011  15. Chronic systolic diastolic congestive heart failure  16. Cardiac cath 2/3/2022 significant LV systolic dysfunction LIMA graft atretic 2 vein grafts occluded 1 of which appears recent right coronary artery saphenous vein graft moderately severe stenosis in the proximal to mid segment status post stent placement in the mid body of the vein graft of the distal right coronary artery 4.0 x 22 mm resolute, 2.5 x 38 resolute integrity stent mid LAD, PTCA, vein graft LAD into the distal LAD at the distal anastomosis  17. Echocardiogram 2/27/2022 EF less than 83% grade 3 diastolic dysfunction RV dilatation severe biatrial enlargement mild to moderate AR severe central MR jet noted  18. Elevated troponins this admission 0.15 now down to 0.09.  19. Chronic anticoagulation with apixaban  20. Chronic antiarrhythmic therapy with sotalol  21. Elevated liver function test improving  22. Status post ICD dual-chamber placement 3/3/2022  23. Episode of atrial fibrillation this morning       Plan:  1. Discontinue sotalol  2. Suggest intravenous amiodarone and start oral amiodarone 200 p.o. twice daily  3. We will try increased IV diuresis over the weekend hopefully will be in a better position to be discharged early next week  4. Consideration for mitral clip when CHF improved atrial arrhythmias stable discussed with Dr. Naima Lester  5.  I will be gone over the weekend coverage available    Felicitas Dow MD, MD 3/4/2022 9:02 AM

## 2022-03-04 NOTE — PROGRESS NOTES
Pt converted to normal sinus on telemetry monitor at 1741. HR 70-80's. amio drip turned off at this time.     Electronically signed by Mau Morse RN on 3/4/2022 at 5:51 PM

## 2022-03-04 NOTE — PROGRESS NOTES
able to sit EOB and stand x 1 before stating he needed to lay back down. He is able to perform most bed mobility with little assist this date. He was positioned in supine to comfort with all needs in reach. Will continue to follow. Activity Tolerance   Activity Tolerance Patient limited by endurance   Safety Devices   Type of devices Call light within reach; Bed alarm in place;Gait belt;Left in bed;Nurse notified       Electronically signed by Vanesa Hinds PTA on 3/4/2022 at 3:43 PM

## 2022-03-04 NOTE — PROGRESS NOTES
Hospitalist Progress Note    Patient:  Sekou Engle  YOB: 1950  Date of Service: 3/4/2022  MRN: [id number]   Acct: [de-identified]   Primary Care Physician: Janiya Corcoran MD  Advance Directive: Full Code  Admit Date: 2/27/2022       Hospital Day: 4  Referring Provider: Adia Carrasco DO    Patient Seen, Chart, Consults, Notes, Labs, Radiology studies reviewed. Subjective:  Sekou Engle is a 66 y.o. male  whom we are following for ischemic cardiomyopathy, EF of 10%, paroxysmal atrial fibrillation. He developed atrial fibrillation overnight. He was already on oral amiodarone. He was started on amiodarone infusion. No evidence of ventricular arrhythmias. His AICD is in place. No new complaints.     Allergies:  Statins    Medicines:  Current Facility-Administered Medications   Medication Dose Route Frequency Provider Last Rate Last Admin    amiodarone (CORDARONE) tablet 200 mg  200 mg Oral BID Karina Zimmer MD   200 mg at 03/04/22 1026    furosemide (LASIX) injection 40 mg  40 mg IntraVENous Q12H Karina Zimmer MD        HYDROcodone-acetaminophen [facility]) 7.5-325 MG per tablet 1 tablet  1 tablet Oral Q4H PRN Adia Carrasco DO   1 tablet at 03/04/22 1547    morphine (PF) injection 2 mg  2 mg IntraVENous Q2H PRN Adia Carrasco DO   2 mg at 03/04/22 1342    apixaban (ELIQUIS) tablet 5 mg  5 mg Oral BID Karina Zimmer MD   5 mg at 03/04/22 0748    0.9 % sodium chloride infusion   IntraVENous Continuous Karina Zimmer MD        HYDROmorphone HCl PF (DILAUDID) injection 0.25 mg  0.25 mg IntraVENous Q5 Min PRN Cuba Ramos MD        HYDROmorphone HCl PF (DILAUDID) injection 0.5 mg  0.5 mg IntraVENous Q5 Min PRN Cuba Ramos MD        0.9 % sodium chloride infusion   IntraVENous Continuous Karina Zimmer MD 75 mL/hr at 03/03/22 0726 New Bag at 03/03/22 0726    chlorhexidine gluconate (ANTISEPTIC SKIN CLEANSER) 4 % solution   Topical Once Taylor Mccloud KAYLENE Ryan        famotidine (PEPCID) tablet 20 mg  20 mg Oral Daily Patience Quirino, DO   20 mg at 03/04/22 0749    amiodarone (CORDARONE) 450 mg in dextrose 5 % 250 mL infusion  0.5 mg/min IntraVENous Continuous Julisa Hopkins MD 33.3 mL/hr at 03/04/22 0825 1 mg/min at 03/04/22 0825    sodium chloride flush 0.9 % injection 5-40 mL  5-40 mL IntraVENous 2 times per day Patience Quirino, DO   10 mL at 03/04/22 0900    sodium chloride flush 0.9 % injection 5-40 mL  5-40 mL IntraVENous PRN Patience Quirino, DO        ondansetron (ZOFRAN-ODT) disintegrating tablet 4 mg  4 mg Oral Q8H PRN Patience Quirino, DO        Or    ondansetron (ZOFRAN) injection 4 mg  4 mg IntraVENous Q6H PRN Patience Quirino, DO        polyethylene glycol (GLYCOLAX) packet 17 g  17 g Oral Daily PRN Patience Quirino, DO   17 g at 03/03/22 1422    aspirin EC tablet 81 mg  81 mg Oral Daily Patience Quirino, DO   81 mg at 03/04/22 0749    atorvastatin (LIPITOR) tablet 40 mg  40 mg Oral Nightly Patience Quirino, DO   40 mg at 03/03/22 2015    clopidogrel (PLAVIX) tablet 75 mg  75 mg Oral Daily Patience Quirino, DO   75 mg at 03/04/22 0749    isosorbide mononitrate (IMDUR) extended release tablet 30 mg  30 mg Oral Daily Patience Quirino, DO   30 mg at 03/04/22 0749    nitroGLYCERIN (NITROSTAT) SL tablet 0.4 mg  0.4 mg SubLINGual Q5 Min PRN Patience Quirino, DO        nitroGLYCERIN (NITROSTAT) SL tablet 0.4 mg  0.4 mg SubLINGual Q5 Min PRN Patience Quirino, DO        sacubitril-valsartan (ENTRESTO) 49-51 MG per tablet 1 tablet  1 tablet Oral BID Patience Quirino, DO   1 tablet at 03/04/22 0748    spironolactone (ALDACTONE) tablet 25 mg  25 mg Oral Daily Patience Quirino, DO   25 mg at 03/04/22 0749    LORazepam (ATIVAN) injection 0.25 mg  0.25 mg IntraVENous Q6H PRN Tony Alfaro MD   0.25 mg at 03/04/22 0750    cefTRIAXone (ROCEPHIN) 1,000 mg in sterile water 10 mL IV syringe  1,000 mg IntraVENous Q24H Tony Alfaro MD   1,000 mg at 03/04/22 1211  azithromycin (ZITHROMAX) 500 mg in D5W 250ml Vial Mate  500 mg IntraVENous Q24H Svetlana Ruth MD   Stopped at 03/04/22 1311    zinc sulfate (ZINCATE) capsule 50 mg  50 mg Oral Daily Svetlana Ruth MD   50 mg at 03/04/22 0749    Vitamin D (CHOLECALCIFEROL) tablet 2,000 Units  2,000 Units Oral Daily Svetlana Ruth MD   2,000 Units at 03/04/22 0749    ascorbic acid (VITAMIN C) tablet 500 mg  500 mg Oral BID Svetlana Ruth MD   500 mg at 03/04/22 5843    guaiFENesin tablet 400 mg  400 mg Oral Q8H Svetlana Ruth MD   400 mg at 03/04/22 1223    albuterol sulfate  (90 Base) MCG/ACT inhaler 2 puff  2 puff Inhalation Q4H Svetlana Ruth MD   2 puff at 03/04/22 1305    And    ipratropium (ATROVENT HFA) 17 MCG/ACT inhaler 2 puff  2 puff Inhalation Q4H Svetlana Ruth MD   2 puff at 03/04/22 1305    calcium carbonate (TUMS) chewable tablet 500 mg  500 mg Oral TID PRN Svetlana Ruth MD   500 mg at 03/04/22 1343       Past Medical History:  Past Medical History:   Diagnosis Date    Acute exacerbation of CHF (congestive heart failure) ([facility] [address] 02/02/2022    Atrial fibrillation ([facility] [address]     Bilateral carotid artery stenosis 01/06/2020    CAD (coronary artery disease), native coronary artery     acute inferior MI on 5/15/09, s/p emeergent CABG x4 5/15/09    Chest pain     HTN (hypertension)     Hyperlipidemia     Dr. Drake Montiel manages    Palliative care patient 02/28/2022    Palpitations     Statin intolerance 02/14/2018    Tobacco abuse     Transient atrial fibrillation or flutter     post op       Past Surgical History:  Past Surgical History:   Procedure Laterality Date    CARDIAC CATHETERIZATION  4/23/12    with stent to left circ, and LAD    CARDIAC CATHETERIZATION  01/07/2018    PTCA/BJ to VG to LAD;  atherectomy and 3 BMS to VG to 1st Circ    CORONARY ARTERY BYPASS GRAFT  05/15/09    x4    DIAGNOSTIC CARDIAC CATH LAB PROCEDURE  05/15/09    left heart cath, left ventr, selective coronary arteriography  FRACTURE SURGERY Left     Leg    VASCULAR SURGERY  4/21/2015 [facility]    Percutaneous cannulation right common femoral artery with 5-Dominican and later 6-Dominican pinnacle destination sheath. Suprarenal abdominal aortogram with bilateral iliofemoral arteriograms. Bilateral lower extremity arteriograms. Left popliteal/tibioperoneal trunk artery balloon angioplasty with 4 mm x 15 mm cutting balloon.  VASCULAR SURGERY  4/21/2015 [facility] cont    Arteriograms after balloon angioplasty. Balloon angioplasty left popliteal artery/tibioperoneal trunk with 5 mm x 40 mm russell balloon. Arteriograms after balloon angioplasty. Left superficial femoral artery balloon angioplasty with 7 mm x 100 mm  balloon. Left superficial femoral artery stent placement idev supera 6.5 x 100 mm self-expanding nitinol stent. Completion arteriograms left lower e    VASCULAR SURGERY  4/21/2015 [facility] cont    extremity. Mynx closure right common femoral artery puncture site.  VASCULAR SURGERY  03/04/2020    S. Percutaneous cannulation left common femoral artery with 5 Dominican glide sheath. Suprarenal abdominal aortogram with bilateral iliofemoral arteriogram.Bilateral lower extremity arteriogram.Minx closure left common femoral artery puncture site.        Family History  Family History   Problem Relation Age of Onset    Coronary Art Dis Father     High Blood Pressure Father     Coronary Art Dis Brother     High Blood Pressure Mother     Cancer Mother     High Blood Pressure Sister        Social History  Social History     Socioeconomic History    Marital status: Single     Spouse name: Not on file    Number of children: Not on file    Years of education: Not on file    Highest education level: Not on file   Occupational History    Not on file   Tobacco Use    Smoking status: Former Smoker     Packs/day: 0.00     Years: 45.00     Pack years: 0.00     Types: Cigarettes     Start date: 1964    Smokeless tobacco: Never Used    Tobacco comment: STATES,\"I VAP\"   Vaping Use    Vaping Use: Every day   Substance and Sexual Activity    Alcohol use: No    Drug use: No    Sexual activity: Not on file   Other Topics Concern    Not on file   Social History Narrative    Not on file     Social Determinants of Health     Financial Resource Strain:     Difficulty of Paying Living Expenses: Not on file   Food Insecurity:     Worried About Running Out of Food in the Last Year: Not on file    Eve of Food in the Last Year: Not on file   Transportation Needs:     Lack of Transportation (Medical): Not on file    Lack of Transportation (Non-Medical): Not on file   Physical Activity:     Days of Exercise per Week: Not on file    Minutes of Exercise per Session: Not on file   Stress:     Feeling of Stress : Not on file   Social Connections:     Frequency of Communication with Friends and Family: Not on file    Frequency of Social Gatherings with Friends and Family: Not on file    Attends Zoroastrianism Services: Not on file    Active Member of [address] [place] [address] [employer] or Organizations: Not on file    Attends Club or Organization Meetings: Not on file    Marital Status: Not on file   Intimate Partner Violence:     Fear of Current or Ex-Partner: Not on file    Emotionally Abused: Not on file    Physically Abused: Not on file    Sexually Abused: Not on file   Housing Stability:     Unable to Pay for Housing in the Last Year: Not on file    Number of [place] in the Last Year: Not on file    Unstable Housing in the Last Year: Not on file         Review of Systems:    Review of Systems   Constitutional: Negative for activity change and fever. HENT: Negative for congestion and voice change. Eyes: Negative for visual disturbance. Respiratory: Negative for shortness of breath. Cardiovascular: Negative for chest pain and leg swelling. Gastrointestinal: Negative for constipation, diarrhea, nausea and vomiting. Endocrine: Negative for polyuria.    Genitourinary: Negative for difficulty urinating and dysuria. Musculoskeletal: Negative for back pain and neck pain. Skin: Negative for color change. Allergic/Immunologic: Negative for immunocompromised state. Neurological: Negative for dizziness and light-headedness. Psychiatric/Behavioral: The patient is not nervous/anxious. Objective:  Blood pressure 109/75, pulse 118, temperature 97 °F (36.1 °C), temperature source Temporal, resp. rate 15, height 6' (1.829 m), weight 140 lb (63.5 kg), SpO2 95 %. Intake/Output Summary (Last 24 hours) at 3/4/2022 1651  Last data filed at 3/4/2022 1245  Gross per 24 hour   Intake 1772.83 ml   Output 900 ml   Net 872.83 ml       Physical Exam  Vitals and nursing note reviewed. HENT:      Head: Normocephalic and atraumatic. Right Ear: External ear normal.      Left Ear: External ear normal.      Nose: Nose normal.      Mouth/Throat:      Mouth: Mucous membranes are moist.   Eyes:      Conjunctiva/sclera: Conjunctivae normal.      Pupils: Pupils are equal, round, and reactive to light. Cardiovascular:      Rate and Rhythm: Normal rate and regular rhythm. Heart sounds: Normal heart sounds. Pulmonary:      Effort: Pulmonary effort is normal.      Breath sounds: Normal breath sounds. Abdominal:      General: Abdomen is flat. Palpations: Abdomen is soft. Musculoskeletal:         General: No swelling. Cervical back: Neck supple. No rigidity. Skin:     General: Skin is warm and dry. Neurological:      Mental Status: He is oriented to person, place, and time. Psychiatric:         Mood and Affect: Mood normal.         Thought Content:  Thought content normal.         Labs:  BMP:   Recent Labs     03/02/22 0154 03/03/22 0219 03/04/22 0254   * 134* 134*   K 5.0 4.6 4.1   CL 97* 99 98   CO2 23 23 21*   BUN 33* 32* 30*   CREATININE 0.8 0.8 0.7   CALCIUM 8.1* 7.8* 7.7*     CBC:   Recent Labs     03/02/22 0154 03/03/22 0219 03/04/22  0254   WBC 16.0* 17.2* 13.1*   HGB 12.2* 11.8* 11.2*   HCT 36.9* 36.3* 34.0*   MCV 91.8 92.4 93.2   * 413* 324     LIVER PROFILE:   Recent Labs     03/02/22 0154 03/03/22 0219 03/04/22 0254   AST 51* 30 27   ALT 68* 47* 27   BILITOT 0.3 <0.2 <0.2   ALKPHOS 241* 188* 160*     PT/INR: No results for input(s): PROTIME, INR in the last 72 hours. APTT: No results for input(s): APTT in the last 72 hours. BNP:  No results for input(s): BNP in the last 72 hours. Ionized Calcium:No results for input(s): IONCA in the last 72 hours. Magnesium:  Recent Labs     03/02/22 0154 03/03/22 0219 03/04/22 0254   MG 1.8 1.9 1.9     Phosphorus:No results for input(s): PHOS in the last 72 hours. HgbA1C: No results for input(s): LABA1C in the last 72 hours. Hepatic:   Recent Labs     03/02/22 0154 03/03/22 0219 03/04/22 0254   ALKPHOS 241* 188* 160*   ALT 68* 47* 27   AST 51* 30 27   PROT 5.2* 5.0* 4.8*   BILITOT 0.3 <0.2 <0.2   LABALBU 2.6* 2.6* 2.5*     Lactic Acid: No results for input(s): LACTA in the last 72 hours. Troponin: No results for input(s): CKTOTAL, CKMB, TROPONINT in the last 72 hours. ABGs: No results for input(s): PH, PCO2, PO2, HCO3, O2SAT in the last 72 hours. CRP:  No results for input(s): CRP in the last 72 hours. Sed Rate:  No results for input(s): SEDRATE in the last 72 hours. Cultures:   No results for input(s): CULTURE in the last 72 hours. No results for input(s): BC, [place] Due in the last 72 hours. No results for input(s): CXSURG in the last 72 hours.     Radiology reports as per the Radiologist  Radiology: ECHO Complete 2D W Doppler W Color    Result Date: 2/28/2022  Transthoracic Echocardiography Report (TTE)  Demographics   Patient Name  CASI Lennon Arm Date of Study          02/28/2022   MRN           [id number]          Gender                 Male   Date of Birth 1950      Room Number            VBD-3028   Age           66 year(s)   Height:       72 inches       Referring Physician Russ Wong DO   Weight:       140 pounds      Sonographer            Bebe Painter [facility]   BSA:          1.83 m^2        Interpreting Physician Anjelica Chacko MD   BMI:          18.99 kg/m^2  Procedure Type of Study   TTE procedure:ECHO 2D W/DOPPLER/COLOR/CONTRAST. Study Location: Portable Technical Quality: Adequate visualization Patient Status: Inpatient BP: 110/69 mmHg Indications:Congestive heart failure, systolic dysfunction and Mitral regurgitation. Conclusions   Summary  Limited echocardiographic study. LV is moderate to severely dilated with severely reduced LV systolic  function. LV ejection fraction estimated at less than 20%. Probable grade 3 diastolic dysfunction. RV appears mildly dilated with moderately reduced RV systolic function  (TAPSE equals 1.6 cm). Severe left atrial enlargement. Left atrial pressure is elevated. Severe right atrial enlargement. Aortic valve not well studied. No significant stenosis. Probable mild to  moderate aortic regurgitation. Mitral valve appears moderately thickened with preserved leaflet mobility. Severe central mitral regurgitation is noted. Mitral regurgitant jet  velocity suggests significantly elevated left atrial pressure. No significant pericardial effusion noted. Signature   ----------------------------------------------------------------  Electronically signed by Anjelica Chacko MD(Interpreting physician)  on 02/28/2022 07:14 PM  ----------------------------------------------------------------  Allergies   - Statins. M-Mode Measurements (cm)   LVIDd: 5.64 cm                                  LVIDs: 5.11 cm  IVSd: 1.2 cm  LVPWd: 1.2 cm  % Ejection Fraction: 20 %  Doppler Measurements:    MV Peak E-Wave: 86.1 cm/s   MV Peak A-Wave: 27.9 cm/s   MV E/A Ratio: 3.09 %   MV Peak Gradient: 2.97 mmHg   MV P1/2t: 48 msec   MVA by PHT4.58 cm^2      XR CHEST PORTABLE    Result Date: 2/28/2022  Examination. XR CHEST PORTABLE 2/27/2022 11:30 PM History: Chest pain.  A single frontal portable semiupright view of the chest is compared with the previous study dated 2/2/2022. There is a significant improvement in the bilateral lung infiltrate since the previous study. There is a small residual infiltrate at bilateral lung bases and a small left basal pleural effusion. There is persistent moderate cardiomegaly. Atheromatous changes thoracic aorta are noted. No pulmonary congestion or pneumothorax. No acute bony abnormality. 1. Improvement. Signed by Dr Trinity Still     Systolic heart failure secondary to ischemic cardiomyopathy. S/P AICD.     Coronary artery disease involving native coronary artery of native heart with angina pectoris. Continue ongoing medical management.     Tobacco abuse. Encourage cessation.     Hyperlipidemia. Continue medical management.     Severe left ventricular systolic dysfunction. Continue medical management.     Please document 37 minutes of critical care time for patient assessment, chart review, discussion with staff, .       Luke Canales DO

## 2022-03-05 LAB
ALBUMIN SERPL-MCNC: 2.6 G/DL (ref 3.5–5.2)
ALP BLD-CCNC: 146 U/L (ref 40–130)
ALT SERPL-CCNC: 25 U/L (ref 5–41)
ANION GAP SERPL CALCULATED.3IONS-SCNC: 12 MMOL/L (ref 7–19)
AST SERPL-CCNC: 30 U/L (ref 5–40)
BASOPHILS ABSOLUTE: 0 K/UL (ref 0–0.2)
BASOPHILS RELATIVE PERCENT: 0.1 % (ref 0–1)
BILIRUB SERPL-MCNC: <0.2 MG/DL (ref 0.2–1.2)
BUN BLDV-MCNC: 33 MG/DL (ref 8–23)
CALCIUM SERPL-MCNC: 7.7 MG/DL (ref 8.8–10.2)
CHLORIDE BLD-SCNC: 100 MMOL/L (ref 98–111)
CO2: 24 MMOL/L (ref 22–29)
CREAT SERPL-MCNC: 0.8 MG/DL (ref 0.5–1.2)
EOSINOPHILS ABSOLUTE: 0 K/UL (ref 0–0.6)
EOSINOPHILS RELATIVE PERCENT: 0 % (ref 0–5)
GFR AFRICAN AMERICAN: >59
GFR NON-AFRICAN AMERICAN: >60
GLUCOSE BLD-MCNC: 100 MG/DL (ref 74–109)
HCT VFR BLD CALC: 31.8 % (ref 42–52)
HEMOGLOBIN: 10.6 G/DL (ref 14–18)
IMMATURE GRANULOCYTES #: 0.1 K/UL
LYMPHOCYTES ABSOLUTE: 1.5 K/UL (ref 1.1–4.5)
LYMPHOCYTES RELATIVE PERCENT: 11.9 % (ref 20–40)
MAGNESIUM: 2 MG/DL (ref 1.6–2.4)
MCH RBC QN AUTO: 30.1 PG (ref 27–31)
MCHC RBC AUTO-ENTMCNC: 33.3 G/DL (ref 33–37)
MCV RBC AUTO: 90.3 FL (ref 80–94)
MONOCYTES ABSOLUTE: 0.9 K/UL (ref 0–0.9)
MONOCYTES RELATIVE PERCENT: 6.8 % (ref 0–10)
NEUTROPHILS ABSOLUTE: 10.4 K/UL (ref 1.5–7.5)
NEUTROPHILS RELATIVE PERCENT: 80.5 % (ref 50–65)
PDW BLD-RTO: 14.3 % (ref 11.5–14.5)
PLATELET # BLD: 280 K/UL (ref 130–400)
PMV BLD AUTO: 10 FL (ref 9.4–12.4)
POTASSIUM SERPL-SCNC: 4 MMOL/L (ref 3.5–5)
RBC # BLD: 3.52 M/UL (ref 4.7–6.1)
SODIUM BLD-SCNC: 136 MMOL/L (ref 136–145)
TOTAL PROTEIN: 4.9 G/DL (ref 6.6–8.7)
WBC # BLD: 12.9 K/UL (ref 4.8–10.8)

## 2022-03-05 PROCEDURE — 80053 COMPREHEN METABOLIC PANEL: CPT

## 2022-03-05 PROCEDURE — 85025 COMPLETE CBC W/AUTO DIFF WBC: CPT

## 2022-03-05 PROCEDURE — 99232 SBSQ HOSP IP/OBS MODERATE 35: CPT | Performed by: INTERNAL MEDICINE

## 2022-03-05 PROCEDURE — 6360000002 HC RX W HCPCS: Performed by: INTERNAL MEDICINE

## 2022-03-05 PROCEDURE — 2580000003 HC RX 258: Performed by: INTERNAL MEDICINE

## 2022-03-05 PROCEDURE — 83735 ASSAY OF MAGNESIUM: CPT

## 2022-03-05 PROCEDURE — 6370000000 HC RX 637 (ALT 250 FOR IP): Performed by: INTERNAL MEDICINE

## 2022-03-05 PROCEDURE — 36415 COLL VENOUS BLD VENIPUNCTURE: CPT

## 2022-03-05 PROCEDURE — 2100000000 HC CCU R&B

## 2022-03-05 RX ORDER — CARVEDILOL 6.25 MG/1
6.25 TABLET ORAL 2 TIMES DAILY WITH MEALS
Status: DISCONTINUED | OUTPATIENT
Start: 2022-03-05 | End: 2022-03-06

## 2022-03-05 RX ORDER — FUROSEMIDE 40 MG/1
40 TABLET ORAL 2 TIMES DAILY
Status: DISCONTINUED | OUTPATIENT
Start: 2022-03-06 | End: 2022-03-06

## 2022-03-05 RX ADMIN — ASPIRIN 81 MG: 81 TABLET, COATED ORAL at 08:45

## 2022-03-05 RX ADMIN — CLOPIDOGREL BISULFATE 75 MG: 75 TABLET ORAL at 08:45

## 2022-03-05 RX ADMIN — CALCIUM CARBONATE 500 MG: 500 TABLET, CHEWABLE ORAL at 13:48

## 2022-03-05 RX ADMIN — SACUBITRIL AND VALSARTAN 1 TABLET: 49; 51 TABLET, FILM COATED ORAL at 20:11

## 2022-03-05 RX ADMIN — CARVEDILOL 6.25 MG: 6.25 TABLET, FILM COATED ORAL at 12:30

## 2022-03-05 RX ADMIN — CEFTRIAXONE 1000 MG: 1 INJECTION, POWDER, FOR SOLUTION INTRAMUSCULAR; INTRAVENOUS at 12:30

## 2022-03-05 RX ADMIN — ALBUTEROL SULFATE 2 PUFF: 90 AEROSOL, METERED RESPIRATORY (INHALATION) at 12:47

## 2022-03-05 RX ADMIN — IPRATROPIUM BROMIDE 2 PUFF: 17 AEROSOL, METERED RESPIRATORY (INHALATION) at 12:47

## 2022-03-05 RX ADMIN — APIXABAN 5 MG: 5 TABLET, FILM COATED ORAL at 08:45

## 2022-03-05 RX ADMIN — ISOSORBIDE MONONITRATE 30 MG: 30 TABLET, EXTENDED RELEASE ORAL at 08:45

## 2022-03-05 RX ADMIN — SPIRONOLACTONE 25 MG: 25 TABLET ORAL at 08:45

## 2022-03-05 RX ADMIN — AMIODARONE HYDROCHLORIDE 200 MG: 200 TABLET ORAL at 20:11

## 2022-03-05 RX ADMIN — MORPHINE SULFATE 2 MG: 2 INJECTION, SOLUTION INTRAMUSCULAR; INTRAVENOUS at 16:58

## 2022-03-05 RX ADMIN — ZINC SULFATE 220 MG (50 MG) CAPSULE 50 MG: CAPSULE at 08:45

## 2022-03-05 RX ADMIN — HYDROCODONE BITARTRATE AND ACETAMINOPHEN 1 TABLET: 7.5; 325 TABLET ORAL at 20:20

## 2022-03-05 RX ADMIN — ATORVASTATIN CALCIUM 40 MG: 40 TABLET, FILM COATED ORAL at 20:11

## 2022-03-05 RX ADMIN — AZITHROMYCIN MONOHYDRATE 500 MG: 500 INJECTION, POWDER, LYOPHILIZED, FOR SOLUTION INTRAVENOUS at 12:33

## 2022-03-05 RX ADMIN — IPRATROPIUM BROMIDE 2 PUFF: 17 AEROSOL, METERED RESPIRATORY (INHALATION) at 01:00

## 2022-03-05 RX ADMIN — HYDROCODONE BITARTRATE AND ACETAMINOPHEN 1 TABLET: 7.5; 325 TABLET ORAL at 13:48

## 2022-03-05 RX ADMIN — SODIUM CHLORIDE, PRESERVATIVE FREE 10 ML: 5 INJECTION INTRAVENOUS at 10:37

## 2022-03-05 RX ADMIN — FUROSEMIDE 40 MG: 10 INJECTION, SOLUTION INTRAMUSCULAR; INTRAVENOUS at 08:44

## 2022-03-05 RX ADMIN — FAMOTIDINE 20 MG: 20 TABLET ORAL at 08:45

## 2022-03-05 RX ADMIN — GUAIFENESIN 400 MG: 200 TABLET ORAL at 12:30

## 2022-03-05 RX ADMIN — IPRATROPIUM BROMIDE 2 PUFF: 17 AEROSOL, METERED RESPIRATORY (INHALATION) at 08:45

## 2022-03-05 RX ADMIN — ALBUTEROL SULFATE 2 PUFF: 90 AEROSOL, METERED RESPIRATORY (INHALATION) at 17:08

## 2022-03-05 RX ADMIN — ALBUTEROL SULFATE 2 PUFF: 90 AEROSOL, METERED RESPIRATORY (INHALATION) at 01:00

## 2022-03-05 RX ADMIN — OXYCODONE HYDROCHLORIDE AND ACETAMINOPHEN 500 MG: 500 TABLET ORAL at 20:11

## 2022-03-05 RX ADMIN — IPRATROPIUM BROMIDE 2 PUFF: 17 AEROSOL, METERED RESPIRATORY (INHALATION) at 20:10

## 2022-03-05 RX ADMIN — MORPHINE SULFATE 2 MG: 2 INJECTION, SOLUTION INTRAMUSCULAR; INTRAVENOUS at 05:38

## 2022-03-05 RX ADMIN — CALCIUM CARBONATE 500 MG: 500 TABLET, CHEWABLE ORAL at 20:11

## 2022-03-05 RX ADMIN — LORAZEPAM 0.25 MG: 2 INJECTION INTRAMUSCULAR; INTRAVENOUS at 02:12

## 2022-03-05 RX ADMIN — ALBUTEROL SULFATE 2 PUFF: 90 AEROSOL, METERED RESPIRATORY (INHALATION) at 08:45

## 2022-03-05 RX ADMIN — HYDROCODONE BITARTRATE AND ACETAMINOPHEN 1 TABLET: 7.5; 325 TABLET ORAL at 04:30

## 2022-03-05 RX ADMIN — MORPHINE SULFATE 2 MG: 2 INJECTION, SOLUTION INTRAMUSCULAR; INTRAVENOUS at 02:14

## 2022-03-05 RX ADMIN — AMIODARONE HYDROCHLORIDE 200 MG: 200 TABLET ORAL at 08:45

## 2022-03-05 RX ADMIN — SODIUM CHLORIDE, PRESERVATIVE FREE 10 ML: 5 INJECTION INTRAVENOUS at 20:12

## 2022-03-05 RX ADMIN — FUROSEMIDE 40 MG: 10 INJECTION, SOLUTION INTRAMUSCULAR; INTRAVENOUS at 20:11

## 2022-03-05 RX ADMIN — IPRATROPIUM BROMIDE 2 PUFF: 17 AEROSOL, METERED RESPIRATORY (INHALATION) at 17:08

## 2022-03-05 RX ADMIN — CARVEDILOL 6.25 MG: 6.25 TABLET, FILM COATED ORAL at 16:58

## 2022-03-05 RX ADMIN — LORAZEPAM 0.25 MG: 2 INJECTION INTRAMUSCULAR; INTRAVENOUS at 16:58

## 2022-03-05 RX ADMIN — IPRATROPIUM BROMIDE 2 PUFF: 17 AEROSOL, METERED RESPIRATORY (INHALATION) at 05:35

## 2022-03-05 RX ADMIN — Medication 2000 UNITS: at 08:45

## 2022-03-05 RX ADMIN — LORAZEPAM 0.25 MG: 2 INJECTION INTRAMUSCULAR; INTRAVENOUS at 10:37

## 2022-03-05 RX ADMIN — SACUBITRIL AND VALSARTAN 1 TABLET: 49; 51 TABLET, FILM COATED ORAL at 08:45

## 2022-03-05 RX ADMIN — HYDROCODONE BITARTRATE AND ACETAMINOPHEN 1 TABLET: 7.5; 325 TABLET ORAL at 08:44

## 2022-03-05 RX ADMIN — APIXABAN 5 MG: 5 TABLET, FILM COATED ORAL at 20:11

## 2022-03-05 RX ADMIN — GUAIFENESIN 400 MG: 200 TABLET ORAL at 04:30

## 2022-03-05 RX ADMIN — OXYCODONE HYDROCHLORIDE AND ACETAMINOPHEN 500 MG: 500 TABLET ORAL at 08:45

## 2022-03-05 RX ADMIN — ALBUTEROL SULFATE 2 PUFF: 90 AEROSOL, METERED RESPIRATORY (INHALATION) at 20:10

## 2022-03-05 RX ADMIN — MORPHINE SULFATE 2 MG: 2 INJECTION, SOLUTION INTRAMUSCULAR; INTRAVENOUS at 10:37

## 2022-03-05 RX ADMIN — GUAIFENESIN 400 MG: 200 TABLET ORAL at 20:11

## 2022-03-05 RX ADMIN — ALBUTEROL SULFATE 2 PUFF: 90 AEROSOL, METERED RESPIRATORY (INHALATION) at 05:35

## 2022-03-05 ASSESSMENT — PAIN DESCRIPTION - DESCRIPTORS
DESCRIPTORS: ACHING

## 2022-03-05 ASSESSMENT — ENCOUNTER SYMPTOMS
VOMITING: 0
BACK PAIN: 0
CONSTIPATION: 0
COLOR CHANGE: 0
DIARRHEA: 0
SHORTNESS OF BREATH: 0
VOICE CHANGE: 0
NAUSEA: 0

## 2022-03-05 ASSESSMENT — PAIN DESCRIPTION - FREQUENCY
FREQUENCY: CONTINUOUS

## 2022-03-05 ASSESSMENT — PAIN SCALES - GENERAL
PAINLEVEL_OUTOF10: 9
PAINLEVEL_OUTOF10: 9
PAINLEVEL_OUTOF10: 0
PAINLEVEL_OUTOF10: 0
PAINLEVEL_OUTOF10: 9
PAINLEVEL_OUTOF10: 9
PAINLEVEL_OUTOF10: 0
PAINLEVEL_OUTOF10: 9
PAINLEVEL_OUTOF10: 0
PAINLEVEL_OUTOF10: 9
PAINLEVEL_OUTOF10: 0
PAINLEVEL_OUTOF10: 9
PAINLEVEL_OUTOF10: 0

## 2022-03-05 ASSESSMENT — PAIN DESCRIPTION - ORIENTATION
ORIENTATION: UPPER

## 2022-03-05 ASSESSMENT — PAIN DESCRIPTION - PAIN TYPE
TYPE: ACUTE PAIN

## 2022-03-05 ASSESSMENT — PAIN DESCRIPTION - LOCATION
LOCATION: CHEST

## 2022-03-05 ASSESSMENT — PAIN DESCRIPTION - ONSET
ONSET: ON-GOING

## 2022-03-05 ASSESSMENT — PAIN DESCRIPTION - PROGRESSION
CLINICAL_PROGRESSION: GRADUALLY WORSENING
CLINICAL_PROGRESSION: GRADUALLY WORSENING

## 2022-03-05 ASSESSMENT — PAIN - FUNCTIONAL ASSESSMENT
PAIN_FUNCTIONAL_ASSESSMENT: PREVENTS OR INTERFERES SOME ACTIVE ACTIVITIES AND ADLS

## 2022-03-05 NOTE — PROGRESS NOTES
Hospitalist Progress Note    Patient:  Eric Stallworth  YOB: 1950  Date of Service: 3/5/2022  MRN: [id number]   Acct: [de-identified]   Primary Care Physician: Tye Mustafa MD  Advance Directive: Full Code  Admit Date: 2/27/2022       Hospital Day: 5  Referring Provider: Lunette Litten, DO    Patient Seen, Chart, Consults, Notes, Labs, Radiology studies reviewed. Subjective:  Eric Stallworth is a 66 y.o. male  whom we are following for ischemic cardiomyopathy, EF 10%, paroxysmal atrial fibrillation. No new events overnight. Hemodynamics are stable. No ventricular arrhythmias.     Allergies:  Statins    Medicines:  Current Facility-Administered Medications   Medication Dose Route Frequency Provider Last Rate Last Admin    carvedilol (COREG) tablet 6.25 mg  6.25 mg Oral BID  Jeovany Reeves MD   6.25 mg at 03/05/22 1230    amiodarone (CORDARONE) tablet 200 mg  200 mg Oral BID Mele Marx MD   200 mg at 03/05/22 0845    furosemide (LASIX) injection 40 mg  40 mg IntraVENous Q12H Mele Marx MD   40 mg at 03/05/22 0844    HYDROcodone-acetaminophen (NORCO) 7.5-325 MG per tablet 1 tablet  1 tablet Oral Q4H PRN Lunette Litten, DO   1 tablet at 03/05/22 1348    morphine (PF) injection 2 mg  2 mg IntraVENous Q2H PRN Lunette Litten, DO   2 mg at 03/05/22 1037    apixaban (ELIQUIS) tablet 5 mg  5 mg Oral BID Mele Marx MD   5 mg at 03/05/22 0845    0.9 % sodium chloride infusion   IntraVENous Continuous Mele Marx MD        HYDROmorphone HCl PF (DILAUDID) injection 0.25 mg  0.25 mg IntraVENous Q5 Min PRN Irasema Queen MD        HYDROmorphone HCl PF (DILAUDID) injection 0.5 mg  0.5 mg IntraVENous Q5 Min PRN Ephsheryl Queen MD        0.9 % sodium chloride infusion   IntraVENous Continuous Mele Marx MD   Paused at 03/04/22 5003    chlorhexidine gluconate (ANTISEPTIC SKIN CLEANSER) 4 % solution   Topical Once Lizzette Aldana RN        famotidine (PEPCID) tablet 20 mg  20 mg Oral Daily Patience Quirino, DO   20 mg at 03/05/22 0845    amiodarone (CORDARONE) 450 mg in dextrose 5 % 250 mL infusion  0.5 mg/min IntraVENous Continuous Meme Marin MD   Stopped at 03/04/22 1740    sodium chloride flush 0.9 % injection 5-40 mL  5-40 mL IntraVENous 2 times per day Patience Quirino, DO   10 mL at 03/05/22 1037    sodium chloride flush 0.9 % injection 5-40 mL  5-40 mL IntraVENous PRN Patience Quirino, DO        ondansetron (ZOFRAN-ODT) disintegrating tablet 4 mg  4 mg Oral Q8H PRN Patience Quirino, DO        Or    ondansetron (ZOFRAN) injection 4 mg  4 mg IntraVENous Q6H PRN Patience Quirino, DO        polyethylene glycol (GLYCOLAX) packet 17 g  17 g Oral Daily PRN Patience Quirino, DO   17 g at 03/04/22 1813    aspirin EC tablet 81 mg  81 mg Oral Daily Patience Quirino, DO   81 mg at 03/05/22 0845    atorvastatin (LIPITOR) tablet 40 mg  40 mg Oral Nightly Patience Quirino, DO   40 mg at 03/04/22 1926    clopidogrel (PLAVIX) tablet 75 mg  75 mg Oral Daily Patience Quirino, DO   75 mg at 03/05/22 0845    isosorbide mononitrate (IMDUR) extended release tablet 30 mg  30 mg Oral Daily Patience Quirino, DO   30 mg at 03/05/22 0845    nitroGLYCERIN (NITROSTAT) SL tablet 0.4 mg  0.4 mg SubLINGual Q5 Min PRN Patience Quirino, DO        nitroGLYCERIN (NITROSTAT) SL tablet 0.4 mg  0.4 mg SubLINGual Q5 Min PRN Patience Quirino, DO        sacubitril-valsartan (ENTRESTO) 49-51 MG per tablet 1 tablet  1 tablet Oral BID Patience Quirino, DO   1 tablet at 03/05/22 0845    spironolactone (ALDACTONE) tablet 25 mg  25 mg Oral Daily Patience Quirino, DO   25 mg at 03/05/22 0845    LORazepam (ATIVAN) injection 0.25 mg  0.25 mg IntraVENous Q6H PRN Mack Og MD   0.25 mg at 03/05/22 1037    cefTRIAXone (ROCEPHIN) 1,000 mg in sterile water 10 mL IV syringe  1,000 mg IntraVENous Q24H Mack Og MD   1,000 mg at 03/05/22 1230    azithromycin (ZITHROMAX) 500 mg in D5W 250ml Vial Mate 500 mg IntraVENous Q24H Romina Vaca MD   Stopped at 03/05/22 1506    zinc sulfate (ZINCATE) capsule 50 mg  50 mg Oral Daily Romina Vaca MD   50 mg at 03/05/22 0845    Vitamin D (CHOLECALCIFEROL) tablet 2,000 Units  2,000 Units Oral Daily Romina Vaca MD   2,000 Units at 03/05/22 0845    ascorbic acid (VITAMIN C) tablet 500 mg  500 mg Oral BID Romina Vaca MD   500 mg at 03/05/22 0845    guaiFENesin tablet 400 mg  400 mg Oral Q8H Romina Vaca MD   400 mg at 03/05/22 1230    albuterol sulfate  (90 Base) MCG/ACT inhaler 2 puff  2 puff Inhalation Q4H Romina Vaca MD   2 puff at 03/05/22 1247    And    ipratropium (ATROVENT HFA) 17 MCG/ACT inhaler 2 puff  2 puff Inhalation Q4H Romina Vaca MD   2 puff at 03/05/22 1247    calcium carbonate (TUMS) chewable tablet 500 mg  500 mg Oral TID PRN Romina Vaca MD   500 mg at 03/05/22 1348       Past Medical History:  Past Medical History:   Diagnosis Date    Acute exacerbation of CHF (congestive heart failure) ([facility] [address] 02/02/2022    Atrial fibrillation ([facility] [address]     Bilateral carotid artery stenosis 01/06/2020    CAD (coronary artery disease), native coronary artery     acute inferior MI on 5/15/09, s/p emeergent CABG x4 5/15/09    Chest pain     HTN (hypertension)     Hyperlipidemia     Dr. Bossman Carlos manages    Palliative care patient 02/28/2022    Palpitations     Statin intolerance 02/14/2018    Tobacco abuse     Transient atrial fibrillation or flutter     post op       Past Surgical History:  Past Surgical History:   Procedure Laterality Date    CARDIAC CATHETERIZATION  4/23/12    with stent to left circ, and LAD    CARDIAC CATHETERIZATION  01/07/2018    PTCA/BJ to VG to LAD;  atherectomy and 3 BMS to VG to 1st Circ    CORONARY ARTERY BYPASS GRAFT  05/15/09    x4    DIAGNOSTIC CARDIAC CATH LAB PROCEDURE  05/15/09    left heart cath, left ventr, selective coronary arteriography     FRACTURE SURGERY Left     Leg    VASCULAR SURGERY 4/21/2015 [facility]    Percutaneous cannulation right common femoral artery with 5-Bel[place]ian and later 6-[place]ian pinnacle destination sheath. Suprarenal abdominal aortogram with bilateral iliofemoral arteriograms. Bilateral lower extremity arteriograms. Left popliteal/tibioperoneal trunk artery balloon angioplasty with 4 mm x 15 mm cutting balloon.  VASCULAR SURGERY  4/21/2015 [facility] cont    Arteriograms after balloon angioplasty. Balloon angioplasty left popliteal artery/tibioperoneal trunk with 5 mm x 40 mm russell balloon. Arteriograms after balloon angioplasty. Left superficial femoral artery balloon angioplasty with 7 mm x 100 mm  balloon. Left superficial femoral artery stent placement idev supera 6.5 x 100 mm self-expanding nitinol stent. Completion arteriograms left lower e    VASCULAR SURGERY  4/21/2015 [facility] cont    extremity. Mynx closure right common femoral artery puncture site.  VASCULAR SURGERY  03/04/2020    [facility]. Percutaneous cannulation left common femoral artery with 5 [place]ian glide sheath. Suprarenal abdominal aortogram with bilateral iliofemoral arteriogram.Bilateral lower extremity arteriogram.Minx closure left common femoral artery puncture site.        Family History  Family History   Problem Relation Age of Onset    Coronary Art Dis Father     High Blood Pressure Father     Coronary Art Dis Brother     High Blood Pressure Mother     Cancer Mother     High Blood Pressure Sister        Social History  Social History     Socioeconomic History    Marital status: Single     Spouse name: Not on file    Number of children: Not on file    Years of education: Not on file    Highest education level: Not on file   Occupational History    Not on file   Tobacco Use    Smoking status: Former Smoker     Packs/day: 0.00     Years: 45.00     Pack years: 0.00     Types: Cigarettes     Start date: 1964    Smokeless tobacco: Never Used    Tobacco comment: STATES,\"I VAP\"   Vaping Use    Vaping Use: Every day Substance and Sexual Activity    Alcohol use: No    Drug use: No    Sexual activity: Not on file   Other Topics Concern    Not on file   Social History Narrative    Not on file     Social Determinants of Health     Financial Resource Strain:     Difficulty of Paying Living Expenses: Not on file   Food Insecurity:     Worried About Running Out of Food in the Last Year: Not on file    Eve of Food in the Last Year: Not on file   Transportation Needs:     Lack of Transportation (Medical): Not on file    Lack of Transportation (Non-Medical): Not on file   Physical Activity:     Days of Exercise per Week: Not on file    Minutes of Exercise per Session: Not on file   Stress:     Feeling of Stress : Not on file   Social Connections:     Frequency of Communication with Friends and Family: Not on file    Frequency of Social Gatherings with Friends and Family: Not on file    Attends [employer] Services: Not on file    Active Member of [address] [place] [address] or Organizations: Not on file    Attends Club or Organization Meetings: Not on file    Marital Status: Not on file   Intimate Partner Violence:     Fear of Current or Ex-Partner: Not on file    Emotionally Abused: Not on file    Physically Abused: Not on file    Sexually Abused: Not on file   Housing Stability:     Unable to Pay for Housing in the Last Year: Not on file    Number of [place] in the Last Year: Not on file    Unstable Housing in the Last Year: Not on file         Review of Systems:    Review of Systems   Constitutional: Positive for activity change. Negative for fever. HENT: Negative for congestion and voice change. Eyes: Negative for visual disturbance. Respiratory: Negative for shortness of breath. Cardiovascular: Positive for chest pain. Negative for leg swelling. Gastrointestinal: Negative for constipation, diarrhea, nausea and vomiting. Endocrine: Negative for polyuria.    Genitourinary: Negative for difficulty urinating and dysuria. Musculoskeletal: Negative for back pain and neck pain. Skin: Negative for color change. Allergic/Immunologic: Negative for immunocompromised state. Neurological: Positive for weakness. Negative for dizziness and light-headedness. Psychiatric/Behavioral: The patient is not nervous/anxious. Objective:  Blood pressure 122/69, pulse 70, temperature 97.4 °F (36.3 °C), temperature source Temporal, resp. rate 12, height 6' (1.829 m), weight 140 lb (63.5 kg), SpO2 93 %. Intake/Output Summary (Last 24 hours) at 3/5/2022 1639  Last data filed at 3/5/2022 1200  Gross per 24 hour   Intake 1458.03 ml   Output 1650 ml   Net -191.97 ml       Physical Exam  Vitals and nursing note reviewed. HENT:      Head: Normocephalic and atraumatic. Right Ear: External ear normal.      Left Ear: External ear normal.      Nose: Nose normal.      Mouth/Throat:      Mouth: Mucous membranes are moist.   Eyes:      Conjunctiva/sclera: Conjunctivae normal.      Pupils: Pupils are equal, round, and reactive to light. Cardiovascular:      Rate and Rhythm: Normal rate and regular rhythm. Heart sounds: Normal heart sounds. Pulmonary:      Effort: Pulmonary effort is normal.      Breath sounds: Normal breath sounds. Abdominal:      General: Abdomen is flat. Palpations: Abdomen is soft. Musculoskeletal:         General: No swelling. Cervical back: Neck supple. No rigidity. Skin:     General: Skin is warm and dry. Neurological:      Mental Status: He is oriented to person, place, and time. Psychiatric:         Mood and Affect: Mood normal.         Thought Content:  Thought content normal.         Labs:  BMP:   Recent Labs     03/03/22 0219 03/04/22 0254 03/05/22  0204   * 134* 136   K 4.6 4.1 4.0   CL 99 98 100   CO2 23 21* 24   BUN 32* 30* 33*   CREATININE 0.8 0.7 0.8   CALCIUM 7.8* 7.7* 7.7*     CBC:   Recent Labs     03/03/22 0219 03/04/22 0254 03/05/22  0204   WBC 17.2* 13.1* 12.9*   HGB 11.8* 11.2* 10.6*   HCT 36.3* 34.0* 31.8*   MCV 92.4 93.2 90.3   * 324 280     LIVER PROFILE:   Recent Labs     03/03/22 0219 03/04/22 0254 03/05/22 0204   AST 30 27 30   ALT 47* 27 25   BILITOT <0.2 <0.2 <0.2   ALKPHOS 188* 160* 146*     PT/INR: No results for input(s): PROTIME, INR in the last 72 hours. APTT: No results for input(s): APTT in the last 72 hours. BNP:  No results for input(s): BNP in the last 72 hours. Ionized Calcium:No results for input(s): IONCA in the last 72 hours. Magnesium:  Recent Labs     03/03/22 0219 03/04/22 0254 03/05/22 0204   MG 1.9 1.9 2.0     Phosphorus:No results for input(s): PHOS in the last 72 hours. HgbA1C: No results for input(s): LABA1C in the last 72 hours. Hepatic:   Recent Labs     03/03/22 0219 03/04/22 0254 03/05/22 0204   ALKPHOS 188* 160* 146*   ALT 47* 27 25   AST 30 27 30   PROT 5.0* 4.8* 4.9*   BILITOT <0.2 <0.2 <0.2   LABALBU 2.6* 2.5* 2.6*     Lactic Acid: No results for input(s): LACTA in the last 72 hours. Troponin: No results for input(s): CKTOTAL, CKMB, TROPONINT in the last 72 hours. ABGs: No results for input(s): PH, PCO2, PO2, HCO3, O2SAT in the last 72 hours. CRP:  No results for input(s): CRP in the last 72 hours. Sed Rate:  No results for input(s): SEDRATE in the last 72 hours. Cultures:   No results for input(s): CULTURE in the last 72 hours. No results for input(s): BC, [place] in the last 72 hours. No results for input(s): CXSURG in the last 72 hours.     Radiology reports as per the Radiologist  Radiology: VL Extremity Venous Left    Result Date: 3/2/2022  Vascular Upper Extremities Veins Procedure  Demographics   Patient Name    CASI Cordero Come  Age                  66                  S   Patient Number  [id number]         Gender               Male   Visit Number    [id number]      Interpreting         Ledy Hernandez                                 Physician   Date of Birth   1950     Referring Physician  Eudora Dakin MD   Accession       [id number]     [address], RVT,  Number                                              RDMS  Procedure Type of Study:   Veins:Upper Extremities Veins, US Doppler Venous Upper Extremity  Unilateral.  Indications for Study:Pacemaker. Risk Factors   - The patient's last creatinine was 0.8 mg/dl. Allergies   - Statins. Impression   successful Left subclavian vein access   Signature   ----------------------------------------------------------------  Electronically signed by Chad Guerin(Interpreting  physician) on 03/02/2022 12:23 PM  ----------------------------------------------------------------      XR CHEST PORTABLE    Result Date: 3/2/2022  XR CHEST PORTABLE 3/2/2022 1:43 PM HISTORY: Pacemaker  Technique: Single AP view of the chest COMPARISONS: 2/27/2022 FINDINGS: Bibasilar atelectasis. Cardiomegaly which is stable. Pulmonary vasculature are nondilated. No pneumothorax. New left chest wall dual chamber pacer defibrillator. No acute bony abnormality. 1. New left chest wall pacemaker. No pneumothorax. 2. Bibasilar atelectasis. Signed by Dr Serina Mcdowell     Systolic heart failure secondary to ischemic cardiomyopathy. S/P AICD.     Coronary artery disease involving native coronary artery of native heart with angina pectoris. Continue ongoing medical management.     Tobacco abuse. Encourage cessation.     Hyperlipidemia. Continue medical management.     Severe left ventricular systolic dysfunction. Continue medical management.     Please document 38 minutes of critical care time for patient assessment, chart review, discussion with staff, .       Kanwal Headings, DO

## 2022-03-05 NOTE — PROGRESS NOTES
Cardiology Progress Note Murali Fay MD      Patient:  Nan Zhang  [id number]    Patient Active Problem List    Diagnosis Date Noted    Systolic heart failure secondary to hypertrophic cardiomyopathy ([facility] 02/28/2022     Priority: Low    Severe left ventricular systolic dysfunction 20/04/9226     Priority: Low    Acute respiratory failure with hypoxia (HCC)      Priority: Low    Acute respiratory failure with hypoxia and hypercarbia (HCC) 02/02/2022     Priority: Low    Sepsis ([facility] 02/02/2022     Priority: Low    Acute exacerbation of CHF (congestive heart failure) ([facility] 02/02/2022     Priority: Low    Acute systolic (congestive) heart failure ([facility] 02/02/2022     Priority: Low    PVD (peripheral vascular disease) ([facility]      Priority: Low    Bilateral carotid artery stenosis 01/06/2020     Priority: Low    Statin intolerance 02/14/2018     Priority: Low    Inferior MI ([facility] 01/07/2018     Priority: Low    ST elevation myocardial infarction (STEMI) [facility])      Priority: Low    Atherosclerosis of native artery of extremity with intermittent claudication ([facility] 04/15/2015     Priority: Low    Paroxysmal A-fib ([facility]      Priority: Low     Overview Note:     post op      Tobacco abuse      Priority: Low    Chest pain      Priority: Low    Hyperlipidemia      Priority: Low    Palpitations      Priority: Low    HTN (hypertension)      Priority: Low    Coronary artery disease involving native coronary artery of native heart with angina pectoris ([facility]      Priority: Low       Admit Date:  2/27/2022    Admission Problem List: Present on Admission:   Systolic heart failure secondary to hypertrophic cardiomyopathy ([facility] [address]   Severe left ventricular systolic dysfunction   Tobacco abuse   Paroxysmal A-fib ([facility]   Hyperlipidemia   Coronary artery disease involving native coronary artery of native heart with angina pectoris [facility])      Cardiac Specific Data:  Specialty Problems        Cardiology Problems Coronary artery disease involving native coronary artery of native heart with angina pectoris (HCC)        HTN (hypertension)        Chest pain        Hyperlipidemia        Paroxysmal A-fib (HCC)        Atherosclerosis of native artery of extremity with intermittent claudication ([facility])        Inferior MI ([facility] [facility]        ST elevation myocardial infarction (STEMI) ([facility]        Bilateral carotid artery stenosis        PVD (peripheral vascular disease) (HCC)        Acute exacerbation of CHF (congestive heart failure) (HCC)        Acute systolic (congestive) heart failure (HCC)        Severe left ventricular systolic dysfunction        * (Principal) Systolic heart failure secondary to hypertrophic cardiomyopathy ([facility] [address]            1.  Ischemic cardiomyopathy, prior CABG 5/15/2009, four-vessel grafting (SVG to OM, SVG to LAD, SVG to RPDA, LIMA to diagonal), prior PCI to proximal diagonal and distal circumflex 4/2012, prior inferolateral STEMI 1/7/2018 with multiple BMS to occluded SVG to OM, BJ to SVG to LAD at anastomosis, non-STEMI presentation with heart failure 2/2/2022 with catheterization 2/3/2022 showing stenotic SVG to RPDA, occluded vein grafts to LAD and OM as well as known occluded LIMA, ejection fraction 20% with severe eccentric posterior directed mitral regurgitation, status post PCI 2/7/2020 with BJ to SVG to RCA and mid LAD. 2.  Longstanding and ongoing tobacco use with probable COPD. 3.  Peripheral arterial disease with multiple peripheral interventions. 4.  Paroxysmal atrial fibrillation on Betapace. 5.  Worsening shortness of breath with positive Covid infection, bradycardic arrest 2/27/2022 with brief CPR, status post ICD placement 3/2/2022.     Subjective:  Mr. Rupert Kendall reports he slept better with Lortab. Requesting pain medications nightly. Still has cough with baseline chest wall discomfort.     Objective:   /80   Pulse 69   Temp 97.4 °F (36.3 °C) (Temporal)   Resp 14   Ht 6' (1.829 m) Wt 140 lb (63.5 kg)   SpO2 98%   BMI 18.99 kg/m²       Intake/Output Summary (Last 24 hours) at 3/5/2022 1421  Last data filed at 3/5/2022 1200  Gross per 24 hour   Intake 1458.03 ml   Output 1650 ml   Net -191.97 ml       Prior to Admission medications    Medication Sig Start Date End Date Taking? Authorizing Provider   furosemide (LASIX) 40 MG tablet Take 1 tablet by mouth 2 times daily 2/9/22   Ines Sanchez MD   atorvastatin (LIPITOR) 80 MG tablet Take 0.5 tablets by mouth nightly 2/9/22   Ines Sanchez MD   spironolactone (ALDACTONE) 25 MG tablet Take 1 tablet by mouth daily 2/9/22   Ines Sanchez MD   clopidogrel (PLAVIX) 75 MG tablet Take 1 tablet by mouth daily 2/9/22   Ines Sanchez MD   apixaban [place]) 5 MG TABS tablet Take 1 tablet by mouth 2 times daily 2/9/22   LUIS Mclaughlin   isosorbide mononitrate (IMDUR) 30 MG extended release tablet Take 1 tablet by mouth daily 2/9/22   LUIS Mclaughlin   sacubitril-valsartan (ENTRESTO) 49-51 MG per tablet Take 1 tablet by mouth 2 times daily 2/8/22   LUIS Mclaughlin   Vitamin D (CHOLECALCIFEROL) 25 MCG (1000 UT) TABS tablet Take 5 tablets by mouth daily 2/9/22   LUIS Mclaughlin   nitroGLYCERIN (NITROSTAT) 0.4 MG SL tablet Place 1 tablet under the tongue every 5 minutes as needed for Chest pain up to max of 3 total doses. If no relief after 1 dose, call 911. 6/8/21   LUIS Mackay   sotalol (BETAPACE) 160 MG tablet Take 1 tablet by mouth twice daily 6/8/21   LUIS Mackay   nitroGLYCERIN (NITROSTAT) 0.4 MG SL tablet Place 1 tablet under the tongue every 5 minutes as needed for Chest pain 6/8/21   LUIS Mackay   aspirin 81 MG EC tablet Take 81 mg by mouth daily.       Historical Provider, MD        carvedilol  6.25 mg Oral BID WC    amiodarone  200 mg Oral BID    furosemide  40 mg IntraVENous Q12H    apixaban  5 mg Oral BID    chlorhexidine gluconate   Topical Once  famotidine  20 mg Oral Daily    sodium chloride flush  5-40 mL IntraVENous 2 times per day    aspirin  81 mg Oral Daily    atorvastatin  40 mg Oral Nightly    clopidogrel  75 mg Oral Daily    isosorbide mononitrate  30 mg Oral Daily    sacubitril-valsartan  1 tablet Oral BID    spironolactone  25 mg Oral Daily    cefTRIAXone (ROCEPHIN) IV  1,000 mg IntraVENous Q24H    azithromycin  500 mg IntraVENous Q24H    zinc sulfate  50 mg Oral Daily    Vitamin D  2,000 Units Oral Daily    vitamin C  500 mg Oral BID    guaiFENesin  400 mg Oral Q8H    albuterol sulfate HFA  2 puff Inhalation Q4H    And    ipratropium  2 puff Inhalation Q4H       TELEMETRY: Sinus     Physical Exam:      Physical Exam  HENT:      Mouth/Throat:      Pharynx: No oropharyngeal exudate. Eyes:      General: No scleral icterus. Right eye: No discharge. Left eye: No discharge. Neck:      Thyroid: No thyromegaly. Vascular: No JVD. Cardiovascular:      Rate and Rhythm: Normal rate and regular rhythm. Heart sounds: No murmur heard. No friction rub. No gallop. Comments: Jugular venous distention is mild  No edema  Extremities are warm  ICD site clean. No swelling or drainage  Pulmonary:      Effort: No respiratory distress. Breath sounds: No stridor. No wheezing or rales. Abdominal:      General: Bowel sounds are normal. There is no distension. Palpations: Abdomen is soft. There is no mass. Tenderness: There is no abdominal tenderness. There is no guarding or rebound. Comments: No palpable organomegaly   Musculoskeletal:         General: No deformity. Skin:     General: Skin is warm. Coloration: Skin is not pale. Findings: No erythema or rash. Neurological:      Mental Status: He is alert and oriented to person, place, and time. Motor: No abnormal muscle tone.       Coordination: Coordination normal.      Deep Tendon Reflexes: Reflexes normal. Lab Data:  CBC:   Recent Labs     03/03/22 0219 03/04/22 0254 03/05/22  0204   WBC 17.2* 13.1* 12.9*   HGB 11.8* 11.2* 10.6*   HCT 36.3* 34.0* 31.8*   MCV 92.4 93.2 90.3   * 324 280     BMP:   Recent Labs     03/03/22 0219 03/04/22 0254 03/05/22  0204   * 134* 136   K 4.6 4.1 4.0   CL 99 98 100   CO2 23 21* 24   BUN 32* 30* 33*   CREATININE 0.8 0.7 0.8     LIVER PROFILE:   Recent Labs     03/03/22 0219 03/04/22 0254 03/05/22  0204   AST 30 27 30   ALT 47* 27 25   BILITOT <0.2 <0.2 <0.2   ALKPHOS 188* 160* 146*     PT/INR: No results for input(s): PROTIME, INR in the last 72 hours. APTT: No results for input(s): APTT in the last 72 hours. BNP:  No results for input(s): BNP in the last 72 hours. CK, CKMB, Troponin: @LABRCNT (CKTOTAL:3, CKMB:3, TROPONINI:3)@    IMAGING:  ECHO Complete 2D W Doppler W Color    Result Date: 2/28/2022  Transthoracic Echocardiography Report (TTE)  Demographics   Patient Name  CASI [place] Date of Study          02/28/2022   MRN           [id number]          Gender                 Male   Date of Birth 1950      Room Number            DDU-6333   Age           66 year(s)   Height:       72 inches       Referring Physician    Blank Brannon DO   Weight:       140 pounds      Sonographer            Bebe Painter [facility]   BSA:          1.83 m^2        Interpreting Physician Nicolas Chacko MD   BMI:          18.99 kg/m^2  Procedure Type of Study   TTE procedure:ECHO 2D W/DOPPLER/COLOR/CONTRAST. Study Location: Portable Technical Quality: Adequate visualization Patient Status: Inpatient BP: 110/69 mmHg Indications:Congestive heart failure, systolic dysfunction and Mitral regurgitation. Conclusions   Summary  Limited echocardiographic study. LV is moderate to severely dilated with severely reduced LV systolic  function. LV ejection fraction estimated at less than 20%. Probable grade 3 diastolic dysfunction.   RV appears mildly dilated with moderately reduced RV systolic function  (TAPSE equals 1.6 cm). Severe left atrial enlargement. Left atrial pressure is elevated. Severe right atrial enlargement. Aortic valve not well studied. No significant stenosis. Probable mild to  moderate aortic regurgitation. Mitral valve appears moderately thickened with preserved leaflet mobility. Severe central mitral regurgitation is noted. Mitral regurgitant jet  velocity suggests significantly elevated left atrial pressure. No significant pericardial effusion noted. Signature   ----------------------------------------------------------------  Electronically signed by Jaspreet Chacko MD(Interpreting physician)  on 02/28/2022 07:14 PM  ----------------------------------------------------------------  Allergies   - Statins. M-Mode Measurements (cm)   LVIDd: 5.64 cm                                  LVIDs: 5.11 cm  IVSd: 1.2 cm  LVPWd: 1.2 cm  % Ejection Fraction: 20 %  Doppler Measurements:    MV Peak E-Wave: 86.1 cm/s   MV Peak A-Wave: 27.9 cm/s   MV E/A Ratio: 3.09 %   MV Peak Gradient: 2.97 mmHg   MV P1/2t: 48 msec   MVA by PHT4.58 cm^2      VL Extremity Venous Left    Result Date: 3/2/2022  Vascular Upper Extremities Veins Procedure  Demographics   Patient Name    CASI Cordero Come  Age                  66                  S   Patient Number  [id number]         Gender               Male   Visit Number    [id number]      Interpreting         Ledy Hernandez                                 Physician   Date of Birth   1950     Referring Physician  Nirmal Velasquez MD   Accession       [id number]     [address], RVT,  Number                                              RDMS  Procedure Type of Study:   Veins:Upper Extremities Veins, US Doppler Venous Upper Extremity  Unilateral.  Indications for Study:Pacemaker. Risk Factors   - The patient's last creatinine was 0.8 mg/dl. Allergies   - Statins.   Impression   successful Left subclavian vein access   Signature ----------------------------------------------------------------  Electronically signed by Jesus Manuel Guerin(Interpreting  physician) on 03/02/2022 12:23 PM  ----------------------------------------------------------------      XR CHEST PORTABLE    Result Date: 3/2/2022  XR CHEST PORTABLE 3/2/2022 1:43 PM HISTORY: Pacemaker  Technique: Single AP view of the chest COMPARISONS: 2/27/2022 FINDINGS: Bibasilar atelectasis. Cardiomegaly which is stable. Pulmonary vasculature are nondilated. No pneumothorax. New left chest wall dual chamber pacer defibrillator. No acute bony abnormality. 1. New left chest wall pacemaker. No pneumothorax. 2. Bibasilar atelectasis. Signed by Dr Ponce Olvera    XR CHEST PORTABLE    Result Date: 2/28/2022  Examination. XR CHEST PORTABLE 2/27/2022 11:30 PM History: Chest pain. A single frontal portable semiupright view of the chest is compared with the previous study dated 2/2/2022. There is a significant improvement in the bilateral lung infiltrate since the previous study. There is a small residual infiltrate at bilateral lung bases and a small left basal pleural effusion. There is persistent moderate cardiomegaly. Atheromatous changes thoracic aorta are noted. No pulmonary congestion or pneumothorax. No acute bony abnormality. 1. Improvement.  Signed by Dr Autumn Au and Plan:    78-year-old gentleman with past medical history of coronary artery disease with prior CABG with four-vessel grafting 5/15/2009, prior stents in distal circumflex and proximal diagonal 4/2012, inferolateral STEMI 1/7/2019 with intervention to vein grafts to LAD and OM, paroxysmal atrial fibrillation on Betapace, longstanding and ongoing tobacco use with probable COPD, peripheral arterial disease with multiple endovascular interventions presenting with non-STEMI and heart failure 2/2/2022 found to have ejection fraction of 20% with severe mitral regurgitation, occluded vein grafts to LAD and OM, stenotic SVG to RPDA and known LIMA occlusion to diagonal, turndown for repeat CABG, status post PCI to SVG to RPDA and mid LAD 2/7/2022 and referral for possible MitraClip placement, now presenting with worsening shortness of breath, Covid positive with brief bradycardic arrest and CPR, status post ICD placement 3/2/2022.     1. Doing better but still not mobile from bed. With get patient out of bed to chair and trial ambulation. Betapace discontinued and currently on amiodarone. Remains in sinus rhythm. Would add Coreg 6.25 mg twice daily. Currently on IV Lasix. Will likely change to p.o. Lasix possibly tomorrow. 2.  Follow-up with Dr. Colten Dawson and structural cardiology with possible need of mitral valve repair/mitral clip.       Lauri Espinosa MD, MD 3/5/2022 2:21 PM

## 2022-03-06 LAB
ALBUMIN SERPL-MCNC: 2.7 G/DL (ref 3.5–5.2)
ALP BLD-CCNC: 146 U/L (ref 40–130)
ALT SERPL-CCNC: 23 U/L (ref 5–41)
ANION GAP SERPL CALCULATED.3IONS-SCNC: 11 MMOL/L (ref 7–19)
AST SERPL-CCNC: 25 U/L (ref 5–40)
BASOPHILS ABSOLUTE: 0 K/UL (ref 0–0.2)
BASOPHILS RELATIVE PERCENT: 0.1 % (ref 0–1)
BILIRUB SERPL-MCNC: 0.3 MG/DL (ref 0.2–1.2)
BUN BLDV-MCNC: 22 MG/DL (ref 8–23)
CALCIUM SERPL-MCNC: 7.7 MG/DL (ref 8.8–10.2)
CHLORIDE BLD-SCNC: 99 MMOL/L (ref 98–111)
CO2: 27 MMOL/L (ref 22–29)
CREAT SERPL-MCNC: 0.7 MG/DL (ref 0.5–1.2)
EOSINOPHILS ABSOLUTE: 0 K/UL (ref 0–0.6)
EOSINOPHILS RELATIVE PERCENT: 0.2 % (ref 0–5)
GFR AFRICAN AMERICAN: >59
GFR NON-AFRICAN AMERICAN: >60
GLUCOSE BLD-MCNC: 91 MG/DL (ref 74–109)
HCT VFR BLD CALC: 36.4 % (ref 42–52)
HEMOGLOBIN: 12.2 G/DL (ref 14–18)
IMMATURE GRANULOCYTES #: 0.1 K/UL
LYMPHOCYTES ABSOLUTE: 1.7 K/UL (ref 1.1–4.5)
LYMPHOCYTES RELATIVE PERCENT: 11.6 % (ref 20–40)
MAGNESIUM: 1.9 MG/DL (ref 1.6–2.4)
MCH RBC QN AUTO: 30.5 PG (ref 27–31)
MCHC RBC AUTO-ENTMCNC: 33.5 G/DL (ref 33–37)
MCV RBC AUTO: 91 FL (ref 80–94)
MONOCYTES ABSOLUTE: 0.8 K/UL (ref 0–0.9)
MONOCYTES RELATIVE PERCENT: 5.4 % (ref 0–10)
NEUTROPHILS ABSOLUTE: 11.9 K/UL (ref 1.5–7.5)
NEUTROPHILS RELATIVE PERCENT: 82 % (ref 50–65)
PDW BLD-RTO: 14.3 % (ref 11.5–14.5)
PLATELET # BLD: 296 K/UL (ref 130–400)
PMV BLD AUTO: 10.1 FL (ref 9.4–12.4)
POTASSIUM SERPL-SCNC: 3.6 MMOL/L (ref 3.5–5)
RBC # BLD: 4 M/UL (ref 4.7–6.1)
SODIUM BLD-SCNC: 137 MMOL/L (ref 136–145)
TOTAL PROTEIN: 4.9 G/DL (ref 6.6–8.7)
WBC # BLD: 14.5 K/UL (ref 4.8–10.8)

## 2022-03-06 PROCEDURE — 2100000000 HC CCU R&B

## 2022-03-06 PROCEDURE — 2580000003 HC RX 258: Performed by: INTERNAL MEDICINE

## 2022-03-06 PROCEDURE — 6360000002 HC RX W HCPCS: Performed by: INTERNAL MEDICINE

## 2022-03-06 PROCEDURE — 85025 COMPLETE CBC W/AUTO DIFF WBC: CPT

## 2022-03-06 PROCEDURE — 6370000000 HC RX 637 (ALT 250 FOR IP): Performed by: INTERNAL MEDICINE

## 2022-03-06 PROCEDURE — 2500000003 HC RX 250 WO HCPCS: Performed by: INTERNAL MEDICINE

## 2022-03-06 PROCEDURE — 99232 SBSQ HOSP IP/OBS MODERATE 35: CPT | Performed by: INTERNAL MEDICINE

## 2022-03-06 PROCEDURE — 80053 COMPREHEN METABOLIC PANEL: CPT

## 2022-03-06 PROCEDURE — 83735 ASSAY OF MAGNESIUM: CPT

## 2022-03-06 PROCEDURE — 36415 COLL VENOUS BLD VENIPUNCTURE: CPT

## 2022-03-06 RX ORDER — CARVEDILOL 12.5 MG/1
12.5 TABLET ORAL 2 TIMES DAILY WITH MEALS
Status: DISCONTINUED | OUTPATIENT
Start: 2022-03-06 | End: 2022-03-08

## 2022-03-06 RX ORDER — FUROSEMIDE 40 MG/1
60 TABLET ORAL 2 TIMES DAILY
Status: DISCONTINUED | OUTPATIENT
Start: 2022-03-06 | End: 2022-03-10 | Stop reason: HOSPADM

## 2022-03-06 RX ORDER — DILTIAZEM HYDROCHLORIDE 5 MG/ML
10 INJECTION INTRAVENOUS ONCE
Status: COMPLETED | OUTPATIENT
Start: 2022-03-06 | End: 2022-03-06

## 2022-03-06 RX ADMIN — IPRATROPIUM BROMIDE 2 PUFF: 17 AEROSOL, METERED RESPIRATORY (INHALATION) at 01:43

## 2022-03-06 RX ADMIN — IPRATROPIUM BROMIDE 2 PUFF: 17 AEROSOL, METERED RESPIRATORY (INHALATION) at 20:44

## 2022-03-06 RX ADMIN — ALBUTEROL SULFATE 2 PUFF: 90 AEROSOL, METERED RESPIRATORY (INHALATION) at 05:41

## 2022-03-06 RX ADMIN — CARVEDILOL 12.5 MG: 12.5 TABLET, FILM COATED ORAL at 17:22

## 2022-03-06 RX ADMIN — IPRATROPIUM BROMIDE 2 PUFF: 17 AEROSOL, METERED RESPIRATORY (INHALATION) at 08:45

## 2022-03-06 RX ADMIN — OXYCODONE HYDROCHLORIDE AND ACETAMINOPHEN 500 MG: 500 TABLET ORAL at 08:46

## 2022-03-06 RX ADMIN — ALBUTEROL SULFATE 2 PUFF: 90 AEROSOL, METERED RESPIRATORY (INHALATION) at 20:44

## 2022-03-06 RX ADMIN — FUROSEMIDE 40 MG: 10 INJECTION, SOLUTION INTRAMUSCULAR; INTRAVENOUS at 08:45

## 2022-03-06 RX ADMIN — AMIODARONE HYDROCHLORIDE 1 MG/MIN: 50 INJECTION, SOLUTION INTRAVENOUS at 12:59

## 2022-03-06 RX ADMIN — ASPIRIN 81 MG: 81 TABLET, COATED ORAL at 08:45

## 2022-03-06 RX ADMIN — AMIODARONE HYDROCHLORIDE 1 MG/MIN: 50 INJECTION, SOLUTION INTRAVENOUS at 21:09

## 2022-03-06 RX ADMIN — ALBUTEROL SULFATE 2 PUFF: 90 AEROSOL, METERED RESPIRATORY (INHALATION) at 08:45

## 2022-03-06 RX ADMIN — HYDROCODONE BITARTRATE AND ACETAMINOPHEN 1 TABLET: 7.5; 325 TABLET ORAL at 08:46

## 2022-03-06 RX ADMIN — MORPHINE SULFATE 2 MG: 2 INJECTION, SOLUTION INTRAMUSCULAR; INTRAVENOUS at 01:42

## 2022-03-06 RX ADMIN — FUROSEMIDE 60 MG: 40 TABLET ORAL at 17:22

## 2022-03-06 RX ADMIN — SODIUM CHLORIDE, PRESERVATIVE FREE 10 ML: 5 INJECTION INTRAVENOUS at 09:02

## 2022-03-06 RX ADMIN — CLOPIDOGREL BISULFATE 75 MG: 75 TABLET ORAL at 08:45

## 2022-03-06 RX ADMIN — FAMOTIDINE 20 MG: 20 TABLET ORAL at 08:46

## 2022-03-06 RX ADMIN — ISOSORBIDE MONONITRATE 30 MG: 30 TABLET, EXTENDED RELEASE ORAL at 08:46

## 2022-03-06 RX ADMIN — LORAZEPAM 0.25 MG: 2 INJECTION INTRAMUSCULAR; INTRAVENOUS at 01:42

## 2022-03-06 RX ADMIN — IPRATROPIUM BROMIDE 2 PUFF: 17 AEROSOL, METERED RESPIRATORY (INHALATION) at 17:22

## 2022-03-06 RX ADMIN — ATORVASTATIN CALCIUM 40 MG: 40 TABLET, FILM COATED ORAL at 20:45

## 2022-03-06 RX ADMIN — ALBUTEROL SULFATE 2 PUFF: 90 AEROSOL, METERED RESPIRATORY (INHALATION) at 17:22

## 2022-03-06 RX ADMIN — APIXABAN 5 MG: 5 TABLET, FILM COATED ORAL at 08:45

## 2022-03-06 RX ADMIN — GUAIFENESIN 400 MG: 200 TABLET ORAL at 14:57

## 2022-03-06 RX ADMIN — CALCIUM CARBONATE 500 MG: 500 TABLET, CHEWABLE ORAL at 05:41

## 2022-03-06 RX ADMIN — LORAZEPAM 0.25 MG: 2 INJECTION INTRAMUSCULAR; INTRAVENOUS at 18:12

## 2022-03-06 RX ADMIN — SODIUM CHLORIDE, PRESERVATIVE FREE 10 ML: 5 INJECTION INTRAVENOUS at 20:46

## 2022-03-06 RX ADMIN — SACUBITRIL AND VALSARTAN 1 TABLET: 49; 51 TABLET, FILM COATED ORAL at 08:46

## 2022-03-06 RX ADMIN — ZINC SULFATE 220 MG (50 MG) CAPSULE 50 MG: CAPSULE at 08:45

## 2022-03-06 RX ADMIN — MORPHINE SULFATE 2 MG: 2 INJECTION, SOLUTION INTRAMUSCULAR; INTRAVENOUS at 10:37

## 2022-03-06 RX ADMIN — ALBUTEROL SULFATE 2 PUFF: 90 AEROSOL, METERED RESPIRATORY (INHALATION) at 01:42

## 2022-03-06 RX ADMIN — HYDROCODONE BITARTRATE AND ACETAMINOPHEN 1 TABLET: 7.5; 325 TABLET ORAL at 14:57

## 2022-03-06 RX ADMIN — OXYCODONE HYDROCHLORIDE AND ACETAMINOPHEN 500 MG: 500 TABLET ORAL at 20:45

## 2022-03-06 RX ADMIN — AMIODARONE HYDROCHLORIDE 200 MG: 200 TABLET ORAL at 08:46

## 2022-03-06 RX ADMIN — SACUBITRIL AND VALSARTAN 1 TABLET: 49; 51 TABLET, FILM COATED ORAL at 20:45

## 2022-03-06 RX ADMIN — Medication 2000 UNITS: at 08:45

## 2022-03-06 RX ADMIN — SPIRONOLACTONE 25 MG: 25 TABLET ORAL at 08:45

## 2022-03-06 RX ADMIN — DEXTROSE MONOHYDRATE 150 MG: 50 INJECTION, SOLUTION INTRAVENOUS at 12:44

## 2022-03-06 RX ADMIN — MORPHINE SULFATE 2 MG: 2 INJECTION, SOLUTION INTRAMUSCULAR; INTRAVENOUS at 18:12

## 2022-03-06 RX ADMIN — IPRATROPIUM BROMIDE 2 PUFF: 17 AEROSOL, METERED RESPIRATORY (INHALATION) at 14:57

## 2022-03-06 RX ADMIN — GUAIFENESIN 400 MG: 200 TABLET ORAL at 20:44

## 2022-03-06 RX ADMIN — DILTIAZEM HYDROCHLORIDE 10 MG: 5 INJECTION INTRAVENOUS at 18:22

## 2022-03-06 RX ADMIN — AMIODARONE HYDROCHLORIDE 200 MG: 200 TABLET ORAL at 20:45

## 2022-03-06 RX ADMIN — ALBUTEROL SULFATE 2 PUFF: 90 AEROSOL, METERED RESPIRATORY (INHALATION) at 14:57

## 2022-03-06 RX ADMIN — LORAZEPAM 0.25 MG: 2 INJECTION INTRAMUSCULAR; INTRAVENOUS at 10:37

## 2022-03-06 RX ADMIN — GUAIFENESIN 400 MG: 200 TABLET ORAL at 05:41

## 2022-03-06 RX ADMIN — CARVEDILOL 6.25 MG: 6.25 TABLET, FILM COATED ORAL at 08:45

## 2022-03-06 RX ADMIN — APIXABAN 5 MG: 5 TABLET, FILM COATED ORAL at 20:45

## 2022-03-06 RX ADMIN — DILTIAZEM HYDROCHLORIDE 10 MG/HR: 5 INJECTION INTRAVENOUS at 18:36

## 2022-03-06 RX ADMIN — IPRATROPIUM BROMIDE 2 PUFF: 17 AEROSOL, METERED RESPIRATORY (INHALATION) at 05:41

## 2022-03-06 ASSESSMENT — PAIN DESCRIPTION - FREQUENCY
FREQUENCY: INTERMITTENT
FREQUENCY: CONTINUOUS

## 2022-03-06 ASSESSMENT — ENCOUNTER SYMPTOMS
COLOR CHANGE: 0
DIARRHEA: 0
CONSTIPATION: 0
BACK PAIN: 0
NAUSEA: 0
VOICE CHANGE: 0
VOMITING: 0
SHORTNESS OF BREATH: 0

## 2022-03-06 ASSESSMENT — PAIN DESCRIPTION - LOCATION
LOCATION: CHEST

## 2022-03-06 ASSESSMENT — PAIN - FUNCTIONAL ASSESSMENT: PAIN_FUNCTIONAL_ASSESSMENT: PREVENTS OR INTERFERES SOME ACTIVE ACTIVITIES AND ADLS

## 2022-03-06 ASSESSMENT — PAIN SCALES - GENERAL
PAINLEVEL_OUTOF10: 0
PAINLEVEL_OUTOF10: 0
PAINLEVEL_OUTOF10: 9
PAINLEVEL_OUTOF10: 0
PAINLEVEL_OUTOF10: 0
PAINLEVEL_OUTOF10: 9
PAINLEVEL_OUTOF10: 0
PAINLEVEL_OUTOF10: 9
PAINLEVEL_OUTOF10: 0
PAINLEVEL_OUTOF10: 0
PAINLEVEL_OUTOF10: 9
PAINLEVEL_OUTOF10: 0

## 2022-03-06 ASSESSMENT — PAIN DESCRIPTION - PAIN TYPE
TYPE: ACUTE PAIN

## 2022-03-06 ASSESSMENT — PAIN DESCRIPTION - ONSET
ONSET: ON-GOING

## 2022-03-06 ASSESSMENT — PAIN DESCRIPTION - ORIENTATION
ORIENTATION: UPPER

## 2022-03-06 ASSESSMENT — PAIN DESCRIPTION - DESCRIPTORS
DESCRIPTORS: ACHING

## 2022-03-06 NOTE — PROGRESS NOTES
Hospitalist Progress Note    Patient:  Eric Stallworth  YOB: 1950  Date of Service: 3/6/2022  MRN: [id number]   Acct: [de-identified]   Primary Care Physician: Tye Mustafa MD  Advance Directive: Full Code  Admit Date: 2/27/2022       Hospital Day: 6  Referring Provider: Lunette Litten, DO    Patient Seen, Chart, Consults, Notes, Labs, Radiology studies reviewed. Subjective:  Eric Stallworth is a 66 y.o. male  whom we are following for ischemic cardiomyopathy, EF of 10%, paroxysmal atrial fibrillation. He developed atrial fibrillation again earlier this morning. He has been started back on amiodarone infusion. He is not doing much from a physical standpoint. I am not sure he will be able to return home.     Allergies:  Statins    Medicines:  Current Facility-Administered Medications   Medication Dose Route Frequency Provider Last Rate Last Admin    amiodarone (CORDARONE) 450 mg in dextrose 5 % 250 mL infusion  1 mg/min IntraVENous Continuous Jeovany Reeves MD 33.3 mL/hr at 03/06/22 1259 1 mg/min at 03/06/22 1259    furosemide (LASIX) tablet 60 mg  60 mg Oral BID Jeovany Reeves MD        carvedilol (COREG) tablet 12.5 mg  12.5 mg Oral BID  Jeovany Reeves MD        amiodarone (CORDARONE) tablet 200 mg  200 mg Oral BID Mele Marx MD   200 mg at 03/06/22 0846    HYDROcodone-acetaminophen (NORCO) 7.5-325 MG per tablet 1 tablet  1 tablet Oral Q4H PRN Lunette Litten, DO   1 tablet at 03/06/22 0846    morphine (PF) injection 2 mg  2 mg IntraVENous Q2H PRN Lunette Litten, DO   2 mg at 03/06/22 1037    apixaban (ELIQUIS) tablet 5 mg  5 mg Oral BID Mele Marx MD   5 mg at 03/06/22 0845    0.9 % sodium chloride infusion   IntraVENous Continuous Mele Marx MD        HYDROmorphone HCl PF (DILAUDID) injection 0.25 mg  0.25 mg IntraVENous Q5 Min PRN Irasema Queen MD        HYDROmorphone HCl PF (DILAUDID) injection 0.5 mg  0.5 mg IntraVENous Q5 Min PRN Irasema Queen MD  0.9 % sodium chloride infusion   IntraVENous Continuous Blue Rachel MD   Paused at 03/04/22 1188    chlorhexidine gluconate (ANTISEPTIC SKIN CLEANSER) 4 % solution   Topical Once Meet Rowley RN        famotidine (PEPCID) tablet 20 mg  20 mg Oral Daily Patience Quirino, DO   20 mg at 03/06/22 0846    sodium chloride flush 0.9 % injection 5-40 mL  5-40 mL IntraVENous 2 times per day Patience Quirino, DO   10 mL at 03/06/22 0902    sodium chloride flush 0.9 % injection 5-40 mL  5-40 mL IntraVENous PRN Patience Quirino, DO        ondansetron (ZOFRAN-ODT) disintegrating tablet 4 mg  4 mg Oral Q8H PRN Patience Quirino, DO        Or    ondansetron (ZOFRAN) injection 4 mg  4 mg IntraVENous Q6H PRN Patience Quirino, DO        polyethylene glycol (GLYCOLAX) packet 17 g  17 g Oral Daily PRN Patience Quirino, DO   17 g at 03/04/22 1813    aspirin EC tablet 81 mg  81 mg Oral Daily Patience Quirino, DO   81 mg at 03/06/22 0845    atorvastatin (LIPITOR) tablet 40 mg  40 mg Oral Nightly Patience Quirino, DO   40 mg at 03/05/22 2011    clopidogrel (PLAVIX) tablet 75 mg  75 mg Oral Daily Patience Quirino, DO   75 mg at 03/06/22 0845    isosorbide mononitrate (IMDUR) extended release tablet 30 mg  30 mg Oral Daily Patience Quirino, DO   30 mg at 03/06/22 0846    nitroGLYCERIN (NITROSTAT) SL tablet 0.4 mg  0.4 mg SubLINGual Q5 Min PRN Patience Quirino, DO        nitroGLYCERIN (NITROSTAT) SL tablet 0.4 mg  0.4 mg SubLINGual Q5 Min PRN Patience Quirino, DO        sacubitril-valsartan (ENTRESTO) 49-51 MG per tablet 1 tablet  1 tablet Oral BID Patience Quirino, DO   1 tablet at 03/06/22 0846    spironolactone (ALDACTONE) tablet 25 mg  25 mg Oral Daily Patience Quirino, DO   25 mg at 03/06/22 0845    LORazepam (ATIVAN) injection 0.25 mg  0.25 mg IntraVENous Q6H PRN Mary Du MD   0.25 mg at 03/06/22 1037    zinc sulfate (ZINCATE) capsule 50 mg  50 mg Oral Daily Mary Du MD   50 mg at 03/06/22 0845    Vitamin D (CHOLECALCIFEROL) tablet 2,000 Units  2,000 Units Oral Daily Ian Duong MD   2,000 Units at 03/06/22 0845    ascorbic acid (VITAMIN C) tablet 500 mg  500 mg Oral BID Ian Duong MD   500 mg at 03/06/22 5834    guaiFENesin tablet 400 mg  400 mg Oral Q8H Ian Duong MD   400 mg at 03/06/22 0541    albuterol sulfate  (90 Base) MCG/ACT inhaler 2 puff  2 puff Inhalation Q4H Ian Duong MD   2 puff at 03/06/22 0845    And    ipratropium (ATROVENT HFA) 17 MCG/ACT inhaler 2 puff  2 puff Inhalation Q4H Ian Duong MD   2 puff at 03/06/22 0845    calcium carbonate (TUMS) chewable tablet 500 mg  500 mg Oral TID PRN Ian Duong MD   500 mg at 03/06/22 0541       Past Medical History:  Past Medical History:   Diagnosis Date    Acute exacerbation of CHF (congestive heart failure) ([facility] [address] 02/02/2022    Atrial fibrillation ([facility] [address]     Bilateral carotid artery stenosis 01/06/2020    CAD (coronary artery disease), native coronary artery     acute inferior MI on 5/15/09, s/p emeergent CABG x4 5/15/09    Chest pain     HTN (hypertension)     Hyperlipidemia     Dr. Rupesh rPuitt manages    Palliative care patient 02/28/2022    Palpitations     Statin intolerance 02/14/2018    Tobacco abuse     Transient atrial fibrillation or flutter     post op       Past Surgical History:  Past Surgical History:   Procedure Laterality Date    CARDIAC CATHETERIZATION  4/23/12    with stent to left circ, and LAD    CARDIAC CATHETERIZATION  01/07/2018    PTCA/BJ to VG to LAD;  atherectomy and 3 BMS to VG to 1st Circ    CORONARY ARTERY BYPASS GRAFT  05/15/09    x4    DIAGNOSTIC CARDIAC CATH LAB PROCEDURE  05/15/09    left heart cath, left ventr, selective coronary arteriography     FRACTURE SURGERY Left     Leg    VASCULAR SURGERY  4/21/2015 [facility]    Percutaneous cannulation right common femoral artery with 5-Syrian and later 6-Syrian pinnacle destination sheath. Suprarenal abdominal aortogram with bilateral iliofemoral arteriograms. Bilateral lower extremity arteriograms. Left popliteal/tibioperoneal trunk artery balloon angioplasty with 4 mm x 15 mm cutting balloon.  VASCULAR SURGERY  4/21/2015 [facility] cont    Arteriograms after balloon angioplasty. Balloon angioplasty left popliteal artery/tibioperoneal trunk with 5 mm x 40 mm russell balloon. Arteriograms after balloon angioplasty. Left superficial femoral artery balloon angioplasty with 7 mm x 100 mm  balloon. Left superficial femoral artery stent placement idev supera 6.5 x 100 mm self-expanding nitinol stent. Completion arteriograms left lower e    VASCULAR SURGERY  4/21/2015 [facility] cont    extremity. Mynx closure right common femoral artery puncture site.  VASCULAR SURGERY  03/04/2020    [facility]. Percutaneous cannulation left common femoral artery with 5 Tajik glide sheath. Suprarenal abdominal aortogram with bilateral iliofemoral arteriogram.Bilateral lower extremity arteriogram.Minx closure left common femoral artery puncture site.        Family History  Family History   Problem Relation Age of Onset    Coronary Art Dis Father     High Blood Pressure Father     Coronary Art Dis Brother     High Blood Pressure Mother     Cancer Mother     High Blood Pressure Sister        Social History  Social History     Socioeconomic History    Marital status: Single     Spouse name: Not on file    Number of children: Not on file    Years of education: Not on file    Highest education level: Not on file   Occupational History    Not on file   Tobacco Use    Smoking status: Former Smoker     Packs/day: 0.00     Years: 45.00     Pack years: 0.00     Types: Cigarettes     Start date: 1964    Smokeless tobacco: Never Used    Tobacco comment: STATES,\"I VAP\"   Vaping Use    Vaping Use: Every day   Substance and Sexual Activity    Alcohol use: No    Drug use: No    Sexual activity: Not on file   Other Topics Concern    Not on file   Social History Narrative    Not on file     Social Determinants of Health     Financial Resource Strain:     Difficulty of Paying Living Expenses: Not on file   Food Insecurity:     Worried About Running Out of Food in the Last Year: Not on file    Eve of Food in the Last Year: Not on file   Transportation Needs:     Lack of Transportation (Medical): Not on file    Lack of Transportation (Non-Medical): Not on file   Physical Activity:     Days of Exercise per Week: Not on file    Minutes of Exercise per Session: Not on file   Stress:     Feeling of Stress : Not on file   Social Connections:     Frequency of Communication with Friends and Family: Not on file    Frequency of Social Gatherings with Friends and Family: Not on file    Attends Ten[employer] Services: Not on file    Active Member of [address] [place] [address] [employer] or Organizations: Not on file    Attends Club or Organization Meetings: Not on file    Marital Status: Not on file   Intimate Partner Violence:     Fear of Current or Ex-Partner: Not on file    Emotionally Abused: Not on file    Physically Abused: Not on file    Sexually Abused: Not on file   Housing Stability:     Unable to Pay for Housing in the Last Year: Not on file    Number of [place] in the Last Year: Not on file    Unstable Housing in the Last Year: Not on file         Review of Systems:    Review of Systems   Constitutional: Negative for activity change and fever. HENT: Negative for congestion and voice change. Eyes: Negative for visual disturbance. Respiratory: Negative for shortness of breath. Cardiovascular: Positive for chest pain. Negative for leg swelling. Gastrointestinal: Negative for constipation, diarrhea, nausea and vomiting. Endocrine: Negative for polyuria. Genitourinary: Negative for difficulty urinating and dysuria. Musculoskeletal: Positive for gait problem. Negative for back pain and neck pain. Skin: Negative for color change.    Allergic/Immunologic: Negative for immunocompromised state.   Neurological: Positive for weakness. Negative for dizziness and light-headedness. Psychiatric/Behavioral: The patient is not nervous/anxious. Objective:  Blood pressure 92/64, pulse 126, temperature 97.6 °F (36.4 °C), temperature source Temporal, resp. rate 10, height 6' (1.829 m), weight 140 lb (63.5 kg), SpO2 94 %. Intake/Output Summary (Last 24 hours) at 3/6/2022 1444  Last data filed at 3/6/2022 1200  Gross per 24 hour   Intake 220 ml   Output 2825 ml   Net -2605 ml       Physical Exam  Vitals and nursing note reviewed. Constitutional:       Appearance: He is ill-appearing. HENT:      Head: Normocephalic and atraumatic. Right Ear: External ear normal.      Left Ear: External ear normal.      Nose: Nose normal.      Mouth/Throat:      Mouth: Mucous membranes are moist.   Eyes:      Conjunctiva/sclera: Conjunctivae normal.      Pupils: Pupils are equal, round, and reactive to light. Cardiovascular:      Rate and Rhythm: Normal rate and regular rhythm. Heart sounds: Normal heart sounds. Pulmonary:      Effort: Pulmonary effort is normal.      Breath sounds: Normal breath sounds. Abdominal:      General: Abdomen is flat. Palpations: Abdomen is soft. Musculoskeletal:         General: No swelling. Cervical back: Neck supple. No rigidity. Skin:     General: Skin is warm and dry. Neurological:      Mental Status: He is oriented to person, place, and time. Psychiatric:         Mood and Affect: Mood normal.         Thought Content:  Thought content normal.         Labs:  BMP:   Recent Labs     03/04/22 0254 03/05/22 0204 03/06/22  0136   * 136 137   K 4.1 4.0 3.6   CL 98 100 99   CO2 21* 24 27   BUN 30* 33* 22   CREATININE 0.7 0.8 0.7   CALCIUM 7.7* 7.7* 7.7*     CBC:   Recent Labs     03/04/22 0254 03/05/22  0204 03/06/22  0136   WBC 13.1* 12.9* 14.5*   HGB 11.2* 10.6* 12.2*   HCT 34.0* 31.8* 36.4*   MCV 93.2 90.3 91.0    280 296     LIVER [facility]  Procedure Type of Study:   Veins:Upper Extremities Veins, US Doppler Venous Upper Extremity  Unilateral.  Indications for Study:Pacemaker. Risk Factors   - The patient's last creatinine was 0.8 mg/dl. Allergies   - Statins. Impression   successful Left subclavian vein access   Signature   ----------------------------------------------------------------  Electronically signed by Collette Liming Victoria(Interpreting  physician) on 03/02/2022 12:23 PM  ----------------------------------------------------------------      XR CHEST PORTABLE    Result Date: 3/2/2022  XR CHEST PORTABLE 3/2/2022 1:43 PM HISTORY: Pacemaker  Technique: Single AP view of the chest COMPARISONS: 2/27/2022 FINDINGS: Bibasilar atelectasis. Cardiomegaly which is stable. Pulmonary vasculature are nondilated. No pneumothorax. New left chest wall dual chamber pacer defibrillator. No acute bony abnormality. 1. New left chest wall pacemaker. No pneumothorax. 2. Bibasilar atelectasis. Signed by Dr Milton Lenz     Systolic heart failure secondary to ischemic cardiomyopathy. S/P AICD.     Coronary artery disease involving native coronary artery of native heart with angina pectoris. Continue ongoing medical management.     Tobacco abuse. Encourage cessation.     Hyperlipidemia. Continue medical management.     Severe left ventricular systolic dysfunction. Continue medical management.     Please document 36 minutes of critical care time for patient assessment, chart review, discussion with staff, .       Jessica Drake DO

## 2022-03-06 NOTE — PROGRESS NOTES
Cardiology Progress Note Petr Dave MD      Patient:  Darrell Carrel  [id number]    Patient Active Problem List    Diagnosis Date Noted    Systolic heart failure secondary to hypertrophic cardiomyopathy ([facility] [address] 02/28/2022     Priority: Low    Severe left ventricular systolic dysfunction 49/14/2701     Priority: Low    Acute respiratory failure with hypoxia (HCC)      Priority: Low    Acute respiratory failure with hypoxia and hypercarbia (HCC) 02/02/2022     Priority: Low    Sepsis ([facility] [address] 02/02/2022     Priority: Low    Acute exacerbation of CHF (congestive heart failure) ([facility] [address] 02/02/2022     Priority: Low    Acute systolic (congestive) heart failure ([facility] [address] 02/02/2022     Priority: Low    PVD (peripheral vascular disease) ([facility] [address]      Priority: Low    Bilateral carotid artery stenosis 01/06/2020     Priority: Low    Statin intolerance 02/14/2018     Priority: Low    Inferior MI ([facility] [address] 01/07/2018     Priority: Low    ST elevation myocardial infarction (STEMI) [facility])      Priority: Low    Atherosclerosis of native artery of extremity with intermittent claudication ([facility] [address] 04/15/2015     Priority: Low    [facility]ox[facility]fib [facility]      Priority: Low     Overview Note:     post op      Tobacco abuse      Priority: Low    Chest pain      Priority: Low    Hyperlipidemia      Priority: Low    Palpitations      Priority: Low    HTN (hypertension)      Priority: Low    Coronary artery disease involving native coronary artery of native heart with angina pectoris ([facility] [address]      Priority: Low       Admit Date:  2/27/2022    Admission Problem List: Present on Admission:   Systolic heart failure secondary to hypertrophic cardiomyopathy ([facility] [address]   Severe left ventricular systolic dysfunction   Tobacco abuse   Paroxysmal A-fib ([facility] [address]   Hyperlipidemia   Coronary artery disease involving native coronary artery of native heart with angina pectoris [facility])      Cardiac Specific Data:  Specialty Problems        Cardiology Problems Coronary artery disease involving native coronary artery of native heart with angina pectoris (HCC)        HTN (hypertension)        Chest pain        Hyperlipidemia        Paroxysmal A-fib (HCC)        Atherosclerosis of native artery of extremity with intermittent claudication ([facility])        Inferior MI ([facility] [address]        ST elevation myocardial infarction (STEMI) ([facility] [address]        Bilateral carotid artery stenosis        PVD (peripheral vascular disease) (HCC)        Acute exacerbation of CHF (congestive heart failure) (HCC)        Acute systolic (congestive) heart failure (HCC)        Severe left ventricular systolic dysfunction        * (Principal) Systolic heart failure secondary to hypertrophic cardiomyopathy ([facility] [address]            1.  Ischemic cardiomyopathy, prior CABG 5/15/2009, four-vessel grafting (SVG to OM, SVG to LAD, SVG to RPDA, LIMA to diagonal), prior PCI to proximal diagonal and distal circumflex 4/2012, prior inferolateral STEMI 1/7/2018 with multiple BMS to occluded SVG to OM, BJ to SVG to LAD at anastomosis, non-STEMI presentation with heart failure 2/2/2022 with catheterization 2/3/2022 showing stenotic SVG to RPDA, occluded vein grafts to LAD and OM as well as known occluded LIMA, ejection fraction 20% with severe eccentric posterior directed mitral regurgitation, status post PCI 2/7/2020 with BJ to SVG to RCA and mid LAD. 2.  Longstanding and ongoing tobacco use with probable COPD. 3.  Peripheral arterial disease with multiple peripheral interventions. 4.  Paroxysmal atrial fibrillation on Betapace. 5.  Worsening shortness of breath with positive Covid infection, bradycardic arrest 2/27/2022 with brief CPR, status post ICD placement 3/2/2022.     Subjective:  Mr. Reji Chang continues to complain that he wants more Lortab for chest discomfort. Noted to have gone into atrial fibrillation this afternoon.     Objective:   /71   Pulse 135   Temp 97.6 °F (36.4 °C) (Temporal)   Resp 18   Ht 6' (1.829 m) Wt 140 lb (63.5 kg)   SpO2 93%   BMI 18.99 kg/m²       Intake/Output Summary (Last 24 hours) at 3/6/2022 1400  Last data filed at 3/6/2022 1200  Gross per 24 hour   Intake 220 ml   Output 2825 ml   Net -2605 ml       Prior to Admission medications    Medication Sig Start Date End Date Taking? Authorizing Provider   furosemide (LASIX) 40 MG tablet Take 1 tablet by mouth 2 times daily 2/9/22   Sahil Reed MD   atorvastatin (LIPITOR) 80 MG tablet Take 0.5 tablets by mouth nightly 2/9/22   Sahil Reed MD   spironolactone (ALDACTONE) 25 MG tablet Take 1 tablet by mouth daily 2/9/22   Sahil Reed MD   clopidogrel (PLAVIX) 75 MG tablet Take 1 tablet by mouth daily 2/9/22   Sahil Reed MD   apixaban Kathlyne Hy) 5 MG TABS tablet Take 1 tablet by mouth 2 times daily 2/9/22   LUIS Cheng   isosorbide mononitrate (IMDUR) 30 MG extended release tablet Take 1 tablet by mouth daily 2/9/22   LUIS Cheng   sacubitril-valsartan (ENTRESTO) 49-51 MG per tablet Take 1 tablet by mouth 2 times daily 2/8/22   LUIS Cheng   Vitamin D (CHOLECALCIFEROL) 25 MCG (1000 UT) TABS tablet Take 5 tablets by mouth daily 2/9/22   LUIS Cheng   nitroGLYCERIN (NITROSTAT) 0.4 MG SL tablet Place 1 tablet under the tongue every 5 minutes as needed for Chest pain up to max of 3 total doses. If no relief after 1 dose, call 911. 6/8/21   LUIS Nguyen   sotalol (BETAPACE) 160 MG tablet Take 1 tablet by mouth twice daily 6/8/21   LUIS Nguyen   nitroGLYCERIN (NITROSTAT) 0.4 MG SL tablet Place 1 tablet under the tongue every 5 minutes as needed for Chest pain 6/8/21   LUIS Nguyen   aspirin 81 MG EC tablet Take 81 mg by mouth daily.       Historical Provider, MD        carvedilol  6.25 mg Oral BID     furosemide  40 mg Oral BID    amiodarone  200 mg Oral BID    apixaban  5 mg Oral BID    chlorhexidine gluconate   Topical Once    famotidine  20 mg Oral Daily    sodium chloride flush  5-40 mL IntraVENous 2 times per day    aspirin  81 mg Oral Daily    atorvastatin  40 mg Oral Nightly    clopidogrel  75 mg Oral Daily    isosorbide mononitrate  30 mg Oral Daily    sacubitril-valsartan  1 tablet Oral BID    spironolactone  25 mg Oral Daily    zinc sulfate  50 mg Oral Daily    Vitamin D  2,000 Units Oral Daily    vitamin C  500 mg Oral BID    guaiFENesin  400 mg Oral Q8H    albuterol sulfate HFA  2 puff Inhalation Q4H    And    ipratropium  2 puff Inhalation Q4H       TELEMETRY: Sinus and Atrial fibrillation     Physical Exam:      Physical Exam  Constitutional:       Comments: Thinly built and frail    HENT:      Mouth/Throat:      Pharynx: No oropharyngeal exudate. Eyes:      General: No scleral icterus. Right eye: No discharge. Left eye: No discharge. Neck:      Thyroid: No thyromegaly. Vascular: No JVD. Cardiovascular:      Rate and Rhythm: Normal rate and regular rhythm. Heart sounds: No friction rub. No gallop. Comments: No significant jugular venous distention appreciated  No edema    Pulmonary:      Effort: No respiratory distress. Breath sounds: No stridor. No wheezing or rales. Abdominal:      General: Bowel sounds are normal. There is no distension. Palpations: Abdomen is soft. There is no mass. Tenderness: There is no abdominal tenderness. There is no guarding or rebound. Comments: No palpable organomegaly   Musculoskeletal:         General: No deformity. Skin:     General: Skin is warm. Coloration: Skin is not pale. Findings: No erythema or rash. Neurological:      Mental Status: He is alert and oriented to person, place, and time. Motor: No abnormal muscle tone.       Coordination: Coordination normal.      Deep Tendon Reflexes: Reflexes normal.                 Lab Data:  CBC:   Recent Labs     03/04/22  0254 03/05/22  0204 03/06/22  0136   WBC 13.1* 12.9* 14.5*   HGB 11.2* 10.6* 12.2*   HCT 34.0* 31.8* 36.4*   MCV 93.2 90.3 91.0    280 296     BMP:   Recent Labs     03/04/22  0254 03/05/22  0204 03/06/22  0136   * 136 137   K 4.1 4.0 3.6   CL 98 100 99   CO2 21* 24 27   BUN 30* 33* 22   CREATININE 0.7 0.8 0.7     LIVER PROFILE:   Recent Labs     03/04/22  0254 03/05/22  0204 03/06/22  0136   AST 27 30 25   ALT 27 25 23   BILITOT <0.2 <0.2 0.3   ALKPHOS 160* 146* 146*     PT/INR: No results for input(s): PROTIME, INR in the last 72 hours. APTT: No results for input(s): APTT in the last 72 hours. BNP:  No results for input(s): BNP in the last 72 hours. CK, CKMB, Troponin: @LABRCNT (CKTOTAL:3, CKMB:3, TROPONINI:3)@    IMAGING:  ECHO Complete 2D W Doppler W Color    Result Date: 2/28/2022  Transthoracic Echocardiography Report (TTE)  Demographics   Patient Name  GAMBLE Cherylene Angelucci Date of Study          02/28/2022   MRN           [id number]          Gender                 Male   Date of Birth 1950      Room Number            NDE-3914   Age           66 year(s)   Height:       72 inches       Referring Physician    Didier Hickman DO   Weight:       140 pounds      Sonographer            Bebe Painter ROSIO   BSA:          1.83 m^2        Interpreting Physician Fredy Chacko MD   BMI:          18.99 kg/m^2  Procedure Type of Study   TTE procedure:ECHO 2D W/DOPPLER/COLOR/CONTRAST. Study Location: Portable Technical Quality: Adequate visualization Patient Status: Inpatient BP: 110/69 mmHg Indications:Congestive heart failure, systolic dysfunction and Mitral regurgitation. Conclusions   Summary  Limited echocardiographic study. LV is moderate to severely dilated with severely reduced LV systolic  function. LV ejection fraction estimated at less than 20%. Probable grade 3 diastolic dysfunction. RV appears mildly dilated with moderately reduced RV systolic function  (TAPSE equals 1.6 cm). Severe left atrial enlargement.  Left atrial pressure is elevated. Severe right atrial enlargement. Aortic valve not well studied. No significant stenosis. Probable mild to  moderate aortic regurgitation. Mitral valve appears moderately thickened with preserved leaflet mobility. Severe central mitral regurgitation is noted. Mitral regurgitant jet  velocity suggests significantly elevated left atrial pressure. No significant pericardial effusion noted. Signature   ----------------------------------------------------------------  Electronically signed by Josefa Chacko MD(Interpreting physician)  on 02/28/2022 07:14 PM  ----------------------------------------------------------------  Allergies   - Statins. M-Mode Measurements (cm)   LVIDd: 5.64 cm                                  LVIDs: 5.11 cm  IVSd: 1.2 cm  LVPWd: 1.2 cm  % Ejection Fraction: 20 %  Doppler Measurements:    MV Peak E-Wave: 86.1 cm/s   MV Peak A-Wave: 27.9 cm/s   MV E/A Ratio: 3.09 %   MV Peak Gradient: 2.97 mmHg   MV P1/2t: 48 msec   MVA by PHT4.58 cm^2      VL Extremity Venous Left    Result Date: 3/2/2022  Vascular Upper Extremities Veins Procedure  Demographics   Patient Name    CASI Cobb Organ  Age                  66                  S   Patient Number  [id number]         Gender               Male   Visit Number    [id number]      Interpreting         Katie Rodriguez                                 Physician   Date of Birth   1950     Referring Physician  Maria Luisa Souza MD   Accession       [id number]     [address], RVT,  Number                                              RDMS  Procedure Type of Study:   Veins:Upper Extremities Veins, US Doppler Venous Upper Extremity  Unilateral.  Indications for Study:Pacemaker. Risk Factors   - The patient's last creatinine was 0.8 mg/dl. Allergies   - Statins.   Impression   successful Left subclavian vein access   Signature   ----------------------------------------------------------------  Electronically signed by Isa Macdonald Apoorva(Interpreting  physician) on 03/02/2022 12:23 PM  ----------------------------------------------------------------      XR CHEST PORTABLE    Result Date: 3/2/2022  XR CHEST PORTABLE 3/2/2022 1:43 PM HISTORY: Pacemaker  Technique: Single AP view of the chest COMPARISONS: 2/27/2022 FINDINGS: Bibasilar atelectasis. Cardiomegaly which is stable. Pulmonary vasculature are nondilated. No pneumothorax. New left chest wall dual chamber pacer defibrillator. No acute bony abnormality. 1. New left chest wall pacemaker. No pneumothorax. 2. Bibasilar atelectasis. Signed by Dr Corey Archer    XR CHEST PORTABLE    Result Date: 2/28/2022  Examination. XR CHEST PORTABLE 2/27/2022 11:30 PM History: Chest pain. A single frontal portable semiupright view of the chest is compared with the previous study dated 2/2/2022. There is a significant improvement in the bilateral lung infiltrate since the previous study. There is a small residual infiltrate at bilateral lung bases and a small left basal pleural effusion. There is persistent moderate cardiomegaly. Atheromatous changes thoracic aorta are noted. No pulmonary congestion or pneumothorax. No acute bony abnormality. 1. Improvement.  Signed by Dr Marilyn Leiva and Plan:    67-year-old gentleman with past medical history of coronary artery disease with prior CABG with four-vessel grafting 5/15/2009, prior stents in distal circumflex and proximal diagonal 4/2012, inferolateral STEMI 1/7/2019 with intervention to vein grafts to LAD and OM, paroxysmal atrial fibrillation on Betapace, longstanding and ongoing tobacco use with probable COPD, peripheral arterial disease with multiple endovascular interventions presenting with non-STEMI and heart failure 2/2/2022 found to have ejection fraction of 20% with severe mitral regurgitation, occluded vein grafts to LAD and OM, stenotic SVG to RPDA and known LIMA occlusion to diagonal, turndown for repeat CABG, status post PCI to SVG to RPDA and mid LAD 2/7/2022 and referral for possible MitraClip placement, now presenting with worsening shortness of breath, Covid positive with brief bradycardic arrest and CPR, status post ICD placement 3/2/2022.     1. Recurrent bout of atrial fibrillation. Currently on amiodarone 200 mg p.o. twice daily. Will bolus 150 mg and restart IV amiodarone. Increase Coreg to 12.5 mg twice daily. 2.  Can transition to p.o. Lasix at 60 mg twice daily.       Leslie Borjas MD, MD 3/6/2022 2:00 PM

## 2022-03-07 LAB
ALBUMIN SERPL-MCNC: 2.6 G/DL (ref 3.5–5.2)
ALP BLD-CCNC: 131 U/L (ref 40–130)
ALT SERPL-CCNC: 19 U/L (ref 5–41)
ANION GAP SERPL CALCULATED.3IONS-SCNC: 12 MMOL/L (ref 7–19)
AST SERPL-CCNC: 21 U/L (ref 5–40)
BASOPHILS ABSOLUTE: 0 K/UL (ref 0–0.2)
BASOPHILS RELATIVE PERCENT: 0.2 % (ref 0–1)
BILIRUB SERPL-MCNC: 0.3 MG/DL (ref 0.2–1.2)
BUN BLDV-MCNC: 18 MG/DL (ref 8–23)
CALCIUM SERPL-MCNC: 7.5 MG/DL (ref 8.8–10.2)
CHLORIDE BLD-SCNC: 97 MMOL/L (ref 98–111)
CO2: 25 MMOL/L (ref 22–29)
CREAT SERPL-MCNC: 0.6 MG/DL (ref 0.5–1.2)
EOSINOPHILS ABSOLUTE: 0.1 K/UL (ref 0–0.6)
EOSINOPHILS RELATIVE PERCENT: 0.5 % (ref 0–5)
GFR AFRICAN AMERICAN: >59
GFR NON-AFRICAN AMERICAN: >60
GLUCOSE BLD-MCNC: 133 MG/DL (ref 74–109)
HCT VFR BLD CALC: 35.1 % (ref 42–52)
HEMOGLOBIN: 11.7 G/DL (ref 14–18)
IMMATURE GRANULOCYTES #: 0.1 K/UL
LYMPHOCYTES ABSOLUTE: 1.7 K/UL (ref 1.1–4.5)
LYMPHOCYTES RELATIVE PERCENT: 10.7 % (ref 20–40)
MAGNESIUM: 1.7 MG/DL (ref 1.6–2.4)
MCH RBC QN AUTO: 30.5 PG (ref 27–31)
MCHC RBC AUTO-ENTMCNC: 33.3 G/DL (ref 33–37)
MCV RBC AUTO: 91.4 FL (ref 80–94)
MONOCYTES ABSOLUTE: 0.8 K/UL (ref 0–0.9)
MONOCYTES RELATIVE PERCENT: 4.7 % (ref 0–10)
NEUTROPHILS ABSOLUTE: 13.3 K/UL (ref 1.5–7.5)
NEUTROPHILS RELATIVE PERCENT: 83.1 % (ref 50–65)
PDW BLD-RTO: 14.6 % (ref 11.5–14.5)
PLATELET # BLD: 266 K/UL (ref 130–400)
PMV BLD AUTO: 10.1 FL (ref 9.4–12.4)
POTASSIUM SERPL-SCNC: 3.3 MMOL/L (ref 3.5–5)
RBC # BLD: 3.84 M/UL (ref 4.7–6.1)
SODIUM BLD-SCNC: 134 MMOL/L (ref 136–145)
TOTAL PROTEIN: 4.7 G/DL (ref 6.6–8.7)
WBC # BLD: 16.1 K/UL (ref 4.8–10.8)

## 2022-03-07 PROCEDURE — 99024 POSTOP FOLLOW-UP VISIT: CPT | Performed by: INTERNAL MEDICINE

## 2022-03-07 PROCEDURE — 83735 ASSAY OF MAGNESIUM: CPT

## 2022-03-07 PROCEDURE — 6360000002 HC RX W HCPCS: Performed by: INTERNAL MEDICINE

## 2022-03-07 PROCEDURE — 6370000000 HC RX 637 (ALT 250 FOR IP): Performed by: INTERNAL MEDICINE

## 2022-03-07 PROCEDURE — 36415 COLL VENOUS BLD VENIPUNCTURE: CPT

## 2022-03-07 PROCEDURE — 2580000003 HC RX 258: Performed by: INTERNAL MEDICINE

## 2022-03-07 PROCEDURE — 85025 COMPLETE CBC W/AUTO DIFF WBC: CPT

## 2022-03-07 PROCEDURE — 2100000000 HC CCU R&B

## 2022-03-07 PROCEDURE — 80053 COMPREHEN METABOLIC PANEL: CPT

## 2022-03-07 PROCEDURE — 97116 GAIT TRAINING THERAPY: CPT

## 2022-03-07 PROCEDURE — 97110 THERAPEUTIC EXERCISES: CPT

## 2022-03-07 RX ORDER — DILTIAZEM HYDROCHLORIDE 120 MG/1
120 CAPSULE, COATED, EXTENDED RELEASE ORAL DAILY
Status: DISCONTINUED | OUTPATIENT
Start: 2022-03-07 | End: 2022-03-09

## 2022-03-07 RX ORDER — CLONAZEPAM 0.5 MG/1
0.5 TABLET ORAL 2 TIMES DAILY PRN
Status: DISCONTINUED | OUTPATIENT
Start: 2022-03-07 | End: 2022-03-10 | Stop reason: HOSPADM

## 2022-03-07 RX ORDER — POTASSIUM CHLORIDE 20 MEQ/1
40 TABLET, EXTENDED RELEASE ORAL PRN
Status: DISCONTINUED | OUTPATIENT
Start: 2022-03-07 | End: 2022-03-10 | Stop reason: HOSPADM

## 2022-03-07 RX ORDER — POTASSIUM CHLORIDE 7.45 MG/ML
10 INJECTION INTRAVENOUS PRN
Status: DISCONTINUED | OUTPATIENT
Start: 2022-03-07 | End: 2022-03-10 | Stop reason: HOSPADM

## 2022-03-07 RX ADMIN — IPRATROPIUM BROMIDE 2 PUFF: 17 AEROSOL, METERED RESPIRATORY (INHALATION) at 07:58

## 2022-03-07 RX ADMIN — ALBUTEROL SULFATE 2 PUFF: 90 AEROSOL, METERED RESPIRATORY (INHALATION) at 04:38

## 2022-03-07 RX ADMIN — FUROSEMIDE 60 MG: 40 TABLET ORAL at 17:05

## 2022-03-07 RX ADMIN — OXYCODONE HYDROCHLORIDE AND ACETAMINOPHEN 500 MG: 500 TABLET ORAL at 19:55

## 2022-03-07 RX ADMIN — APIXABAN 5 MG: 5 TABLET, FILM COATED ORAL at 07:58

## 2022-03-07 RX ADMIN — ALBUTEROL SULFATE 2 PUFF: 90 AEROSOL, METERED RESPIRATORY (INHALATION) at 12:38

## 2022-03-07 RX ADMIN — SACUBITRIL AND VALSARTAN 1 TABLET: 49; 51 TABLET, FILM COATED ORAL at 19:55

## 2022-03-07 RX ADMIN — GUAIFENESIN 400 MG: 200 TABLET ORAL at 12:38

## 2022-03-07 RX ADMIN — ALBUTEROL SULFATE 2 PUFF: 90 AEROSOL, METERED RESPIRATORY (INHALATION) at 00:55

## 2022-03-07 RX ADMIN — IPRATROPIUM BROMIDE 2 PUFF: 17 AEROSOL, METERED RESPIRATORY (INHALATION) at 00:55

## 2022-03-07 RX ADMIN — ALBUTEROL SULFATE 2 PUFF: 90 AEROSOL, METERED RESPIRATORY (INHALATION) at 17:06

## 2022-03-07 RX ADMIN — MORPHINE SULFATE 2 MG: 2 INJECTION, SOLUTION INTRAMUSCULAR; INTRAVENOUS at 08:32

## 2022-03-07 RX ADMIN — CARVEDILOL 12.5 MG: 12.5 TABLET, FILM COATED ORAL at 17:05

## 2022-03-07 RX ADMIN — ISOSORBIDE MONONITRATE 30 MG: 30 TABLET, EXTENDED RELEASE ORAL at 07:58

## 2022-03-07 RX ADMIN — ASPIRIN 81 MG: 81 TABLET, COATED ORAL at 07:58

## 2022-03-07 RX ADMIN — POTASSIUM CHLORIDE 40 MEQ: 1500 TABLET, EXTENDED RELEASE ORAL at 07:57

## 2022-03-07 RX ADMIN — APIXABAN 5 MG: 5 TABLET, FILM COATED ORAL at 19:55

## 2022-03-07 RX ADMIN — ATORVASTATIN CALCIUM 40 MG: 40 TABLET, FILM COATED ORAL at 19:55

## 2022-03-07 RX ADMIN — AMIODARONE HYDROCHLORIDE 1 MG/MIN: 50 INJECTION, SOLUTION INTRAVENOUS at 13:07

## 2022-03-07 RX ADMIN — CLONAZEPAM 0.5 MG: 0.5 TABLET ORAL at 14:28

## 2022-03-07 RX ADMIN — IPRATROPIUM BROMIDE 2 PUFF: 17 AEROSOL, METERED RESPIRATORY (INHALATION) at 04:38

## 2022-03-07 RX ADMIN — LORAZEPAM 0.25 MG: 2 INJECTION INTRAMUSCULAR; INTRAVENOUS at 08:32

## 2022-03-07 RX ADMIN — CLOPIDOGREL BISULFATE 75 MG: 75 TABLET ORAL at 07:58

## 2022-03-07 RX ADMIN — HYDROCODONE BITARTRATE AND ACETAMINOPHEN 1 TABLET: 7.5; 325 TABLET ORAL at 12:38

## 2022-03-07 RX ADMIN — IPRATROPIUM BROMIDE 2 PUFF: 17 AEROSOL, METERED RESPIRATORY (INHALATION) at 20:02

## 2022-03-07 RX ADMIN — ALBUTEROL SULFATE 2 PUFF: 90 AEROSOL, METERED RESPIRATORY (INHALATION) at 07:58

## 2022-03-07 RX ADMIN — Medication 2000 UNITS: at 07:58

## 2022-03-07 RX ADMIN — IPRATROPIUM BROMIDE 2 PUFF: 17 AEROSOL, METERED RESPIRATORY (INHALATION) at 12:38

## 2022-03-07 RX ADMIN — CLONAZEPAM 0.5 MG: 0.5 TABLET ORAL at 21:00

## 2022-03-07 RX ADMIN — IPRATROPIUM BROMIDE 2 PUFF: 17 AEROSOL, METERED RESPIRATORY (INHALATION) at 17:06

## 2022-03-07 RX ADMIN — SACUBITRIL AND VALSARTAN 1 TABLET: 49; 51 TABLET, FILM COATED ORAL at 07:58

## 2022-03-07 RX ADMIN — HYDROCODONE BITARTRATE AND ACETAMINOPHEN 1 TABLET: 7.5; 325 TABLET ORAL at 19:55

## 2022-03-07 RX ADMIN — GUAIFENESIN 400 MG: 200 TABLET ORAL at 19:55

## 2022-03-07 RX ADMIN — DILTIAZEM HYDROCHLORIDE 120 MG: 120 CAPSULE, COATED, EXTENDED RELEASE ORAL at 17:06

## 2022-03-07 RX ADMIN — ZINC SULFATE 220 MG (50 MG) CAPSULE 50 MG: CAPSULE at 07:57

## 2022-03-07 RX ADMIN — GUAIFENESIN 400 MG: 200 TABLET ORAL at 04:38

## 2022-03-07 RX ADMIN — ALBUTEROL SULFATE 2 PUFF: 90 AEROSOL, METERED RESPIRATORY (INHALATION) at 20:02

## 2022-03-07 RX ADMIN — SODIUM CHLORIDE, PRESERVATIVE FREE 10 ML: 5 INJECTION INTRAVENOUS at 08:02

## 2022-03-07 RX ADMIN — SPIRONOLACTONE 25 MG: 25 TABLET ORAL at 07:58

## 2022-03-07 RX ADMIN — OXYCODONE HYDROCHLORIDE AND ACETAMINOPHEN 500 MG: 500 TABLET ORAL at 07:57

## 2022-03-07 RX ADMIN — CARVEDILOL 12.5 MG: 12.5 TABLET, FILM COATED ORAL at 07:57

## 2022-03-07 RX ADMIN — AMIODARONE HYDROCHLORIDE 1 MG/MIN: 50 INJECTION, SOLUTION INTRAVENOUS at 05:28

## 2022-03-07 RX ADMIN — AMIODARONE HYDROCHLORIDE 200 MG: 200 TABLET ORAL at 07:58

## 2022-03-07 RX ADMIN — FUROSEMIDE 60 MG: 40 TABLET ORAL at 07:57

## 2022-03-07 RX ADMIN — HYDROCODONE BITARTRATE AND ACETAMINOPHEN 1 TABLET: 7.5; 325 TABLET ORAL at 04:45

## 2022-03-07 RX ADMIN — FAMOTIDINE 20 MG: 20 TABLET ORAL at 07:58

## 2022-03-07 RX ADMIN — AMIODARONE HYDROCHLORIDE 200 MG: 200 TABLET ORAL at 19:57

## 2022-03-07 RX ADMIN — CALCIUM CARBONATE 500 MG: 500 TABLET, CHEWABLE ORAL at 20:10

## 2022-03-07 ASSESSMENT — PAIN DESCRIPTION - PROGRESSION: CLINICAL_PROGRESSION: GRADUALLY WORSENING

## 2022-03-07 ASSESSMENT — PAIN DESCRIPTION - ONSET
ONSET: ON-GOING

## 2022-03-07 ASSESSMENT — PAIN SCALES - GENERAL
PAINLEVEL_OUTOF10: 9
PAINLEVEL_OUTOF10: 9
PAINLEVEL_OUTOF10: 8
PAINLEVEL_OUTOF10: 0
PAINLEVEL_OUTOF10: 7
PAINLEVEL_OUTOF10: 0
PAINLEVEL_OUTOF10: 9
PAINLEVEL_OUTOF10: 10
PAINLEVEL_OUTOF10: 0
PAINLEVEL_OUTOF10: 0

## 2022-03-07 ASSESSMENT — ENCOUNTER SYMPTOMS
NAUSEA: 0
VOMITING: 0
BACK PAIN: 0
SHORTNESS OF BREATH: 0
DIARRHEA: 0
VOICE CHANGE: 0
CONSTIPATION: 0
COLOR CHANGE: 0

## 2022-03-07 ASSESSMENT — PAIN DESCRIPTION - DESCRIPTORS
DESCRIPTORS: ACHING

## 2022-03-07 ASSESSMENT — PAIN DESCRIPTION - LOCATION
LOCATION: CHEST

## 2022-03-07 ASSESSMENT — PAIN DESCRIPTION - ORIENTATION
ORIENTATION: UPPER
ORIENTATION: MID

## 2022-03-07 ASSESSMENT — PAIN DESCRIPTION - FREQUENCY
FREQUENCY: CONTINUOUS

## 2022-03-07 ASSESSMENT — PAIN DESCRIPTION - PAIN TYPE
TYPE: ACUTE PAIN

## 2022-03-07 ASSESSMENT — PAIN - FUNCTIONAL ASSESSMENT: PAIN_FUNCTIONAL_ASSESSMENT: PREVENTS OR INTERFERES SOME ACTIVE ACTIVITIES AND ADLS

## 2022-03-07 NOTE — CARE COORDINATION
Pt offered bed at [facility] and facility able to admit Weds 3/9 with pt stability. DC transport pending further therapy to determine pt's ability.      [facility]  [phone]

## 2022-03-07 NOTE — PROGRESS NOTES
Hospitalist Progress Note    Patient:  Karis Cheadle  YOB: 1950  Date of Service: 3/7/2022  MRN: [id number]   Acct: [de-identified]   Primary Care Physician: Dusty Vásquez MD  Advance Directive: Full Code  Admit Date: 2/27/2022       Hospital Day: 7  Referring Provider: Beryl Nelson DO    Patient Seen, Chart, Consults, Notes, Labs, Radiology studies reviewed. Subjective:  Karis Cheadle is a 66 y.o. male  whom we are following for ischemic cardiomyopathy, EF 10%, paroxysmal atrial fibrillation. He has remained in atrial fibrillation. His rate has finally come down with additional amiodarone. Hemodynamics are stable.  is looking at skilled nursing placement.     Allergies:  Statins    Medicines:  Current Facility-Administered Medications   Medication Dose Route Frequency Provider Last Rate Last Admin    potassium chloride (KLOR-CON M) extended release tablet 40 mEq  40 mEq Oral PRN Beryl Nelson, DO   40 mEq at 03/07/22 3520    Or    potassium bicarb-citric acid (EFFER-K) effervescent tablet 40 mEq  40 mEq Oral PRN Beryl Nelson DO        Or    potassium chloride 10 mEq/100 mL IVPB (Peripheral Line)  10 mEq IntraVENous PRN Beryl Nelson, DO        clonazePAM Tanya Patter) tablet 0.5 mg  0.5 mg Oral BID PRN Beryl Nelson, DO        amiodarone (CORDARONE) 450 mg in dextrose 5 % 250 mL infusion  1 mg/min IntraVENous Continuous Marck Claudio MD 33.3 mL/hr at 03/07/22 1307 1 mg/min at 03/07/22 1307    furosemide (LASIX) tablet 60 mg  60 mg Oral BID Marck Claudio MD   60 mg at 03/07/22 0757    carvedilol (COREG) tablet 12.5 mg  12.5 mg Oral BID WC Marck Claudio MD   12.5 mg at 03/07/22 0757    dilTIAZem 125 mg in dextrose 5 % 125 mL infusion  2.5-15 mg/hr IntraVENous Continuous Marck Claudio MD   Stopped at 03/07/22 0521    amiodarone (CORDARONE) tablet 200 mg  200 mg Oral BID General Elodia MD   200 mg at 03/07/22 0758    HYDROcodone-acetaminophen (Marlo Daily) 7.5-325 MG per tablet 1 tablet  1 tablet Oral Q4H PRN Jose Nieves, DO   1 tablet at 03/07/22 1238    morphine (PF) injection 2 mg  2 mg IntraVENous Q2H PRN Jose Nieves, DO   2 mg at 03/07/22 1038    apixaban (ELIQUIS) tablet 5 mg  5 mg Oral BID Jose Richard MD   5 mg at 03/07/22 0758    HYDROmorphone HCl PF (DILAUDID) injection 0.25 mg  0.25 mg IntraVENous Q5 Min PRN Lesley Cruz MD        HYDROmorphone HCl PF (DILAUDID) injection 0.5 mg  0.5 mg IntraVENous Q5 Min PRN Lesley rCuz MD        chlorhexidine gluconate (ANTISEPTIC SKIN CLEANSER) 4 % solution   Topical Once Harpreet Mccarthy RN        famotidine (PEPCID) tablet 20 mg  20 mg Oral Daily Patience Quirino, DO   20 mg at 03/07/22 0758    sodium chloride flush 0.9 % injection 5-40 mL  5-40 mL IntraVENous 2 times per day Patience Quirino, DO   10 mL at 03/07/22 0802    sodium chloride flush 0.9 % injection 5-40 mL  5-40 mL IntraVENous PRN Patience Quirino, DO        ondansetron (ZOFRAN-ODT) disintegrating tablet 4 mg  4 mg Oral Q8H PRN Patience Quirino, DO        Or    ondansetron (ZOFRAN) injection 4 mg  4 mg IntraVENous Q6H PRN Patience Quirino, DO        polyethylene glycol (GLYCOLAX) packet 17 g  17 g Oral Daily PRN Patience Quirino, DO   17 g at 03/04/22 1813    aspirin EC tablet 81 mg  81 mg Oral Daily Patience Quirino, DO   81 mg at 03/07/22 0758    atorvastatin (LIPITOR) tablet 40 mg  40 mg Oral Nightly Patience Quirino, DO   40 mg at 03/06/22 2045    clopidogrel (PLAVIX) tablet 75 mg  75 mg Oral Daily Patience Quirino, DO   75 mg at 03/07/22 0758    isosorbide mononitrate (IMDUR) extended release tablet 30 mg  30 mg Oral Daily Patience Quirino, DO   30 mg at 03/07/22 0758    nitroGLYCERIN (NITROSTAT) SL tablet 0.4 mg  0.4 mg SubLINGual Q5 Min PRN Patience Quirino, DO        nitroGLYCERIN (NITROSTAT) SL tablet 0.4 mg  0.4 mg SubLINGual Q5 Min PRN Patience Quirino, DO        sacubitril-valsartan (ENTRESTO) 49-51 MG per tablet 1 tablet  1 tablet Oral BID Patience Quirino, DO   1 tablet at 03/07/22 0758    spironolactone (ALDACTONE) tablet 25 mg  25 mg Oral Daily Patience Quirino, DO   25 mg at 03/07/22 0758    zinc sulfate (ZINCATE) capsule 50 mg  50 mg Oral Daily Harl Prior, MD   50 mg at 03/07/22 0757    Vitamin D (CHOLECALCIFEROL) tablet 2,000 Units  2,000 Units Oral Daily Harl Prior, MD   2,000 Units at 03/07/22 2142    ascorbic acid (VITAMIN C) tablet 500 mg  500 mg Oral BID Harl Prior, MD   500 mg at 03/07/22 0757    guaiFENesin tablet 400 mg  400 mg Oral Q8H Harl Prior, MD   400 mg at 03/07/22 1238    albuterol sulfate  (90 Base) MCG/ACT inhaler 2 puff  2 puff Inhalation Q4H Harl Prior, MD   2 puff at 03/07/22 1238    And    ipratropium (ATROVENT HFA) 17 MCG/ACT inhaler 2 puff  2 puff Inhalation Q4H Harl Prior, MD   2 puff at 03/07/22 1238    calcium carbonate (TUMS) chewable tablet 500 mg  500 mg Oral TID PRN Harl Prior, MD   500 mg at 03/06/22 0541       Past Medical History:  Past Medical History:   Diagnosis Date    Acute exacerbation of CHF (congestive heart failure) ([facility] [address] 02/02/2022    Atrial fibrillation ([facility] [address]     Bilateral carotid artery stenosis 01/06/2020    CAD (coronary artery disease), native coronary artery     acute inferior MI on 5/15/09, s/p emeergent CABG x4 5/15/09    Chest pain     HTN (hypertension)     Hyperlipidemia     Dr. Grant Meyer manages    Palliative care patient 02/28/2022    Palpitations     Statin intolerance 02/14/2018    Tobacco abuse     Transient atrial fibrillation or flutter     post op       Past Surgical History:  Past Surgical History:   Procedure Laterality Date    CARDIAC CATHETERIZATION  4/23/12    with stent to left circ, and LAD    CARDIAC CATHETERIZATION  01/07/2018    PTCA/BJ to VG to LAD;  atherectomy and 3 BMS to VG to 1st Circ    CORONARY ARTERY BYPASS GRAFT  05/15/09    x4    DIAGNOSTIC CARDIAC CATH LAB PROCEDURE  05/15/09 left heart cath, left ventr, selective coronary arteriography     FRACTURE SURGERY Left     Leg    VASCULAR SURGERY  4/21/2015 [facility]    Percutaneous cannulation right common femoral artery with 5-Lux[place]bourgish and later 6-[place]ux[place]bourgish pinnacle destination sheath. Suprarenal abdominal aortogram with bilateral iliofemoral arteriograms. Bilateral lower extremity arteriograms. Left popliteal/tibioperoneal trunk artery balloon angioplasty with 4 mm x 15 mm cutting balloon.  VASCULAR SURGERY  4/21/2015 [facility] cont    Arteriograms after balloon angioplasty. Balloon angioplasty left popliteal artery/tibioperoneal trunk with 5 mm x 40 mm russell balloon. Arteriograms after balloon angioplasty. Left superficial femoral artery balloon angioplasty with 7 mm x 100 mm  balloon. Left superficial femoral artery stent placement idev supera 6.5 x 100 mm self-expanding nitinol stent. Completion arteriograms left lower e    VASCULAR SURGERY  4/21/2015 [facility] cont    extremity. Mynx closure right common femoral artery puncture site.  VASCULAR SURGERY  03/04/2020    S. Percutaneous cannulation left common femoral artery with 5 Luxembourgish glide sheath. Suprarenal abdominal aortogram with bilateral iliofemoral arteriogram.Bilateral lower extremity arteriogram.Minx closure left common femoral artery puncture site.        Family History  Family History   Problem Relation Age of Onset    Coronary Art Dis Father     High Blood Pressure Father     Coronary Art Dis Brother     High Blood Pressure Mother     Cancer Mother     High Blood Pressure Sister        Social History  Social History     Socioeconomic History    Marital status: Single     Spouse name: Not on file    Number of children: Not on file    Years of education: Not on file    Highest education level: Not on file   Occupational History    Not on file   Tobacco Use    Smoking status: Former Smoker     Packs/day: 0.00     Years: 45.00     Pack years: 0.00     Types: Cigarettes     Start date: 1964    Smokeless tobacco: Never Used    Tobacco comment: STATES,\"I VAP\"   Vaping Use    Vaping Use: Every day   Substance and Sexual Activity    Alcohol use: No    Drug use: No    Sexual activity: Not on file   Other Topics Concern    Not on file   Social History Narrative    Not on file     Social Determinants of Health     Financial Resource Strain:     Difficulty of Paying Living Expenses: Not on file   Food Insecurity:     Worried About Running Out of Food in the Last Year: Not on file    Eve of Food in the Last Year: Not on file   Transportation Needs:     Lack of Transportation (Medical): Not on file    Lack of Transportation (Non-Medical): Not on file   Physical Activity:     Days of Exercise per Week: Not on file    Minutes of Exercise per Session: Not on file   Stress:     Feeling of Stress : Not on file   Social Connections:     Frequency of Communication with Friends and Family: Not on file    Frequency of Social Gatherings with Friends and Family: Not on file    Attends Mandaen Services: Not on file    Active Member of [address] [place] [address] or Organizations: Not on file    Attends Club or Organization Meetings: Not on file    Marital Status: Not on file   Intimate Partner Violence:     Fear of Current or Ex-Partner: Not on file    Emotionally Abused: Not on file    Physically Abused: Not on file    Sexually Abused: Not on file   Housing Stability:     Unable to Pay for Housing in the Last Year: Not on file    Number of [place] in the Last Year: Not on file    Unstable Housing in the Last Year: Not on file         Review of Systems:    Review of Systems   Constitutional: Negative for activity change and fever. HENT: Negative for congestion and voice change. Eyes: Negative for visual disturbance. Respiratory: Negative for shortness of breath. Cardiovascular: Positive for chest pain. Negative for leg swelling.    Gastrointestinal: Negative for constipation, diarrhea, nausea and vomiting. Endocrine: Negative for polyuria. Genitourinary: Negative for difficulty urinating and dysuria. Musculoskeletal: Positive for gait problem. Negative for back pain and neck pain. Skin: Negative for color change. Allergic/Immunologic: Negative for immunocompromised state. Neurological: Positive for weakness. Negative for dizziness and light-headedness. Psychiatric/Behavioral: The patient is not nervous/anxious. Objective:  Blood pressure 92/71, pulse 108, temperature 98.2 °F (36.8 °C), temperature source Temporal, resp. rate 13, height 6' (1.829 m), weight 140 lb (63.5 kg), SpO2 97 %. Intake/Output Summary (Last 24 hours) at 3/7/2022 1405  Last data filed at 3/7/2022 1230  Gross per 24 hour   Intake 1926.91 ml   Output 1750 ml   Net 176.91 ml       Physical Exam  Vitals and nursing note reviewed. HENT:      Head: Normocephalic and atraumatic. Right Ear: External ear normal.      Left Ear: External ear normal.      Nose: Nose normal.      Mouth/Throat:      Mouth: Mucous membranes are moist.   Eyes:      Conjunctiva/sclera: Conjunctivae normal.      Pupils: Pupils are equal, round, and reactive to light. Cardiovascular:      Rate and Rhythm: Normal rate and regular rhythm. Heart sounds: Normal heart sounds. Pulmonary:      Effort: Pulmonary effort is normal.      Breath sounds: Normal breath sounds. Abdominal:      General: Abdomen is flat. Palpations: Abdomen is soft. Musculoskeletal:         General: No swelling. Cervical back: Neck supple. No rigidity. Skin:     General: Skin is warm and dry. Neurological:      Mental Status: He is oriented to person, place, and time. Psychiatric:         Mood and Affect: Mood normal.         Thought Content:  Thought content normal.         Labs:  BMP:   Recent Labs     03/05/22  0204 03/06/22  0136 03/07/22  0200    137 134*   K 4.0 3.6 3.3*    99 97*   CO2 24 27 25   BUN 33* 22 18 CREATININE 0.8 0.7 0.6   CALCIUM 7.7* 7.7* 7.5*     CBC:   Recent Labs     03/05/22  0204 03/06/22 0136 03/07/22  0200   WBC 12.9* 14.5* 16.1*   HGB 10.6* 12.2* 11.7*   HCT 31.8* 36.4* 35.1*   MCV 90.3 91.0 91.4    296 266     LIVER PROFILE:   Recent Labs     03/05/22 0204 03/06/22 0136 03/07/22 0200   AST 30 25 21   ALT 25 23 19   BILITOT <0.2 0.3 0.3   ALKPHOS 146* 146* 131*     PT/INR: No results for input(s): PROTIME, INR in the last 72 hours. APTT: No results for input(s): APTT in the last 72 hours. BNP:  No results for input(s): BNP in the last 72 hours. Ionized Calcium:No results for input(s): IONCA in the last 72 hours. Magnesium:  Recent Labs     03/05/22 0204 03/06/22 0136 03/07/22  0200   MG 2.0 1.9 1.7     Phosphorus:No results for input(s): PHOS in the last 72 hours. HgbA1C: No results for input(s): LABA1C in the last 72 hours. Hepatic:   Recent Labs     03/05/22 0204 03/06/22 0136 03/07/22  0200   ALKPHOS 146* 146* 131*   ALT 25 23 19   AST 30 25 21   PROT 4.9* 4.9* 4.7*   BILITOT <0.2 0.3 0.3   LABALBU 2.6* 2.7* 2.6*     Lactic Acid: No results for input(s): LACTA in the last 72 hours. Troponin: No results for input(s): CKTOTAL, CKMB, TROPONINT in the last 72 hours. ABGs: No results for input(s): PH, PCO2, PO2, HCO3, O2SAT in the last 72 hours. CRP:  No results for input(s): CRP in the last 72 hours. Sed Rate:  No results for input(s): SEDRATE in the last 72 hours. Cultures:   No results for input(s): CULTURE in the last 72 hours. No results for input(s): BC, Khalida Jonesan in the last 72 hours. No results for input(s): CXSURG in the last 72 hours.     Radiology reports as per the Radiologist  Radiology: VL Extremity Venous Left    Result Date: 3/2/2022  Vascular Upper Extremities Veins Procedure  Demographics   Patient Name    GAMBLE Scherry Spurling  Age                  66                  S   Patient Number  [id number]         Gender               Male   Visit Number    [id number] Interpreting         Olaf Munoz                                 Physician   Date of Birth   1950     Referring Physician  Arpita Leo MD   Accession       [id number]     [address], RVT,  Number                                              RDMS  Procedure Type of Study:   Veins:Upper Extremities Veins, US Doppler Venous Upper Extremity  Unilateral.  Indications for Study:Pacemaker. Risk Factors   - The patient's last creatinine was 0.8 mg/dl. Allergies   - Statins. Impression   successful Left subclavian vein access   Signature   ----------------------------------------------------------------  Electronically signed by Blanca Guerin(Interpreting  physician) on 03/02/2022 12:23 PM  ----------------------------------------------------------------      XR CHEST PORTABLE    Result Date: 3/2/2022  XR CHEST PORTABLE 3/2/2022 1:43 PM HISTORY: Pacemaker  Technique: Single AP view of the chest COMPARISONS: 2/27/2022 FINDINGS: Bibasilar atelectasis. Cardiomegaly which is stable. Pulmonary vasculature are nondilated. No pneumothorax. New left chest wall dual chamber pacer defibrillator. No acute bony abnormality. 1. New left chest wall pacemaker. No pneumothorax. 2. Bibasilar atelectasis. Signed by Dr Alta Weiss     Systolic heart failure secondary to ischemic cardiomyopathy. S/P AICD.     Coronary artery disease involving native coronary artery of native heart with angina pectoris. Continue ongoing medical management.     Tobacco abuse. Encourage cessation.     Hyperlipidemia. Continue medical management.     Severe left ventricular systolic dysfunction. Continue medical management. Paroxysmal atrial fibrillation. Continue amiodarone.     Please document 38 minutes of critical care time for patient assessment, chart review, discussion with staff, .       Qiana Sarah DO

## 2022-03-07 NOTE — PROGRESS NOTES
This  was asked by pt's wife to visit with pt. Pt is in the Covid unit and this  visited from the doorway. Provided spiritual care with sustaining presence, nurtured hope, and prayer. Pt expressed gratitude for spiritual care and asked for continued visits.      Electronically signed by Eddie Sofia on 3/7/2022 at 3:48 PM

## 2022-03-07 NOTE — PROGRESS NOTES
Cardiology Daily Note Mary Lyles MD      Patient:  Guillaume Weiner  [id number]    Patient Active Problem List    Diagnosis Date Noted    Systolic heart failure secondary to hypertrophic cardiomyopathy ([address] 02/28/2022    Severe left ventricular systolic dysfunction 55/60/2067    Acute respiratory failure with hypoxia ([address]     Acute respiratory failure with hypoxia and hypercarbia ([address] 02/02/2022    Sepsis ([address] 02/02/2022    Acute exacerbation of CHF (congestive heart failure) ([address] 57/80/1004    Acute systolic (congestive) heart failure ([address] 02/02/2022    PVD (peripheral vascular disease) ([address]     Bilateral carotid artery stenosis 01/06/2020    Statin intolerance 02/14/2018    Inferior MI ([address] 01/07/2018    ST elevation myocardial infarction (STEMI) ([address]     Atherosclerosis of native artery of extremity with intermittent claudication ([address] 04/15/2015    Paroxysmal A-fib ([address]     Tobacco abuse     Chest pain     Hyperlipidemia     Palpitations     HTN (hypertension)     Coronary artery disease involving native coronary artery of native heart with angina pectoris ([address]        Admit Date:  2/27/2022    Admission Problem List: Present on Admission:   Systolic heart failure secondary to hypertrophic cardiomyopathy ([address]   Severe left ventricular systolic dysfunction   Tobacco abuse   Paroxysmal A-fib ([address]   Hyperlipidemia   Coronary artery disease involving native coronary artery of native heart with angina pectoris [facility])      Cardiac Specific Data:  Specialty Problems        Cardiology Problems    Coronary artery disease involving native coronary artery of native heart with angina pectoris (HCC)        HTN (hypertension)        Chest pain        Hyperlipidemia        Paroxysmal A-fib (HCC)        Atherosclerosis of native artery of extremity with intermittent claudication (HCC)        Inferior MI ([address]        ST elevation myocardial infarction (STEMI) ([address]        Bilateral carotid artery stenosis        PVD (peripheral vascular disease) (HCC)        Acute exacerbation of CHF (congestive heart failure) (HCC)        Acute systolic (congestive) heart failure ([facility])        Severe left ventricular systolic dysfunction        * (Principal) Systolic heart failure secondary to hypertrophic cardiomyopathy ([facility]              Subjective:  Mr. Savanna Sheffield seen today progress over the weekend reviewed he was in sinus rhythm on Saturday but went back in atrial fibrillation placed back on intravenous amiodarone denies chest pain or dyspnea. Blood pressure 106/65 heart rate 106. Potassium 3.3 this morning been corrected. Objective:   /65   Pulse 106   Temp 98 °F (36.7 °C) (Temporal)   Resp 12   Ht 6' (1.829 m)   Wt 140 lb (63.5 kg)   SpO2 95%   BMI 18.99 kg/m²       Intake/Output Summary (Last 24 hours) at 3/7/2022 0910  Last data filed at 3/7/2022 0600  Gross per 24 hour   Intake 1411.8 ml   Output 2050 ml   Net -638.2 ml       Prior to Admission medications    Medication Sig Start Date End Date Taking?  Authorizing Provider   furosemide (LASIX) 40 MG tablet Take 1 tablet by mouth 2 times daily 2/9/22   Kt Son MD   atorvastatin (LIPITOR) 80 MG tablet Take 0.5 tablets by mouth nightly 2/9/22   Kt Son MD   spironolactone (ALDACTONE) 25 MG tablet Take 1 tablet by mouth daily 2/9/22   Kt Son MD   clopidogrel (PLAVIX) 75 MG tablet Take 1 tablet by mouth daily 2/9/22   Kt Son MD   apixaban Alphonsus Barn) 5 MG TABS tablet Take 1 tablet by mouth 2 times daily 2/9/22   [place], APRCHRISTINA   isosorbide mononitrate (IMDUR) 30 MG extended release tablet Take 1 tablet by mouth daily 2/9/22   [place], APRCHRISTINA   sacubitril-valsartan (ENTRESTO) 49-51 MG per tablet Take 1 tablet by mouth 2 times daily 2/8/22   [place], APRCHRISTINA   Vitamin D (CHOLECALCIFEROL) 25 MCG (1000 UT) TABS tablet Take 5 tablets by mouth daily 2/9/22   Ham Gibbons LUIS Duff   nitroGLYCERIN (NITROSTAT) 0.4 MG SL tablet Place 1 tablet under the tongue every 5 minutes as needed for Chest pain up to max of 3 total doses. If no relief after 1 dose, call 911. 6/8/21   LUIS Luque   sotalol (BETAPACE) 160 MG tablet Take 1 tablet by mouth twice daily 6/8/21   LUIS Luque   nitroGLYCERIN (NITROSTAT) 0.4 MG SL tablet Place 1 tablet under the tongue every 5 minutes as needed for Chest pain 6/8/21   LUIS Luque   aspirin 81 MG EC tablet Take 81 mg by mouth daily.       Historical Provider, MD        furosemide  60 mg Oral BID    carvedilol  12.5 mg Oral BID WC    amiodarone  200 mg Oral BID    apixaban  5 mg Oral BID    chlorhexidine gluconate   Topical Once    famotidine  20 mg Oral Daily    sodium chloride flush  5-40 mL IntraVENous 2 times per day    aspirin  81 mg Oral Daily    atorvastatin  40 mg Oral Nightly    clopidogrel  75 mg Oral Daily    isosorbide mononitrate  30 mg Oral Daily    sacubitril-valsartan  1 tablet Oral BID    spironolactone  25 mg Oral Daily    zinc sulfate  50 mg Oral Daily    Vitamin D  2,000 Units Oral Daily    vitamin C  500 mg Oral BID    guaiFENesin  400 mg Oral Q8H    albuterol sulfate HFA  2 puff Inhalation Q4H    And    ipratropium  2 puff Inhalation Q4H       TELEMETRY: Atrial fibrillation     Physical Exam:      Physical Exam      General:  Awake, alert, NAD  Skin:  Warm and dry  Neck:  no jvd , no carotid bruits  Chest:  Clear to auscultation, no wheezing or rales  Cardiovascular:  iRRR X2M3 no murmurs, clicks, gallups, or rubs  Abdomen:  Soft nontender, nondistended, bowel sounds present  Extremities:  Edema: none      Lab Data:  CBC:   Recent Labs     03/05/22  0204 03/06/22 0136 03/07/22  0200   WBC 12.9* 14.5* 16.1*   HGB 10.6* 12.2* 11.7*   HCT 31.8* 36.4* 35.1*   MCV 90.3 91.0 91.4    296 266     BMP:   Recent Labs     03/05/22  0204 03/06/22  0136 03/07/22  0200    137 134*   K 4.0 3.6 3.3*    99 97*   CO2 24 27 25   BUN 33* 22 18   CREATININE 0.8 0.7 0.6     LIVER PROFILE:   Recent Labs     03/05/22  0204 03/06/22  0136 03/07/22  0200   AST 30 25 21   ALT 25 23 19   BILITOT <0.2 0.3 0.3   ALKPHOS 146* 146* 131*     PT/INR: No results for input(s): PROTIME, INR in the last 72 hours. APTT: No results for input(s): APTT in the last 72 hours. BNP:  No results for input(s): BNP in the last 72 hours. CK, CKMB, Troponin: @LABRCNT (CKTOTAL:3, CKMB:3, TROPONINI:3)@    IMAGING:  ECHO Complete 2D W Doppler W Color    Result Date: 2/28/2022  Transthoracic Echocardiography Report (TTE)  Demographics   Patient Name  CASI [place] Date of Study          02/28/2022   MRN           [id number]          Gender                 Male   Date of Birth 1950      Room Number            KPY-9358   Age           66 year(s)   Height:       72 inches       Referring Physician    Blank Brannon DO   Weight:       140 pounds      Sonographer            Bebe Painter [facility]   BSA:          1.83 m^2        Interpreting Physician Nicolas Chacko MD   BMI:          18.99 kg/m^2  Procedure Type of Study   TTE procedure:ECHO 2D W/DOPPLER/COLOR/CONTRAST. Study Location: Portable Technical Quality: Adequate visualization Patient Status: Inpatient BP: 110/69 mmHg Indications:Congestive heart failure, systolic dysfunction and Mitral regurgitation. Conclusions   Summary  Limited echocardiographic study. LV is moderate to severely dilated with severely reduced LV systolic  function. LV ejection fraction estimated at less than 20%. Probable grade 3 diastolic dysfunction. RV appears mildly dilated with moderately reduced RV systolic function  (TAPSE equals 1.6 cm). Severe left atrial enlargement. Left atrial pressure is elevated. Severe right atrial enlargement. Aortic valve not well studied. No significant stenosis. Probable mild to  moderate aortic regurgitation.   Mitral valve appears moderately thickened with preserved leaflet mobility. Severe central mitral regurgitation is noted. Mitral regurgitant jet  velocity suggests significantly elevated left atrial pressure. No significant pericardial effusion noted. Signature   ----------------------------------------------------------------  Electronically signed by Ranjith Chacko MD(Interpreting physician)  on 02/28/2022 07:14 PM  ----------------------------------------------------------------  Allergies   - Statins. M-Mode Measurements (cm)   LVIDd: 5.64 cm                                  LVIDs: 5.11 cm  IVSd: 1.2 cm  LVPWd: 1.2 cm  % Ejection Fraction: 20 %  Doppler Measurements:    MV Peak E-Wave: 86.1 cm/s   MV Peak A-Wave: 27.9 cm/s   MV E/A Ratio: 3.09 %   MV Peak Gradient: 2.97 mmHg   MV P1/2t: 48 msec   MVA by PHT4.58 cm^2      VL Extremity Venous Left    Result Date: 3/2/2022  Vascular Upper Extremities Veins Procedure  Demographics   Patient Name    CASI Reyes  Age                  66                  S   Patient Number  [id number]         Gender               Male   Visit Number    [id number]      Interpreting         Antonio Balderas                                 Physician   Date of Birth   1950     Referring Physician  Mylo Heimlich MD   Accession       [id number]     [address], RVT,  Number                                              RDMS  Procedure Type of Study:   Veins:Upper Extremities Veins, US Doppler Venous Upper Extremity  Unilateral.  Indications for Study:Pacemaker. Risk Factors   - The patient's last creatinine was 0.8 mg/dl. Allergies   - Statins.   Impression   successful Left subclavian vein access   Signature   ----------------------------------------------------------------  Electronically signed by Camilo Guerin(Interpreting  physician) on 03/02/2022 12:23 PM  ----------------------------------------------------------------      XR CHEST PORTABLE    Result Date: 3/2/2022  XR CHEST PORTABLE 3/2/2022 1:43 PM HISTORY: Pacemaker  Technique: Single AP view of the chest COMPARISONS: 2/27/2022 FINDINGS: Bibasilar atelectasis. Cardiomegaly which is stable. Pulmonary vasculature are nondilated. No pneumothorax. New left chest wall dual chamber pacer defibrillator. No acute bony abnormality. 1. New left chest wall pacemaker. No pneumothorax. 2. Bibasilar atelectasis. Signed by Dr Srinivasa Farmer    XR CHEST PORTABLE    Result Date: 2/28/2022  Examination. XR CHEST PORTABLE 2/27/2022 11:30 PM History: Chest pain. A single frontal portable semiupright view of the chest is compared with the previous study dated 2/2/2022. There is a significant improvement in the bilateral lung infiltrate since the previous study. There is a small residual infiltrate at bilateral lung bases and a small left basal pleural effusion. There is persistent moderate cardiomegaly. Atheromatous changes thoracic aorta are noted. No pulmonary congestion or pneumothorax. No acute bony abnormality. 1. Improvement. Signed by Dr Flower Drought:  1. ?  Supraventricular tachycardia of the past 5 to 6 days with rates 150s to 170s  2. LifeVest  3. Ischemic cardiomyopathy  4. Cardiac arrest on admission 2/27/2022 with rapid wide-complex tachycardia subsequent bradycardia and asystole requiring 2-minute CPR  5. Complaints of chest pain since CPR worse with direct pressure inspiration or cough  6. History of hypertension  7. History tobacco abuse  8. Peripheral arterial disease previous stent placement  9. Paroxysmal atrial fibrillation  10. History of inferior STEMI  11. Statin intolerance  12. Bilateral carotid artery stenosis  13. Cardiac cath with stent placement 1/7/2018 Plyfe vision 3.5 x 28  14. Status post CABG x4 grafts 2011  15. Chronic systolic diastolic congestive heart failure  16.  Cardiac cath 2/3/2022 significant LV systolic dysfunction LIMA graft atretic 2 vein grafts occluded 1 of which appears recent right coronary artery saphenous vein graft moderately severe stenosis in the proximal to mid segment status post stent placement in the mid body of the vein graft of the distal right coronary artery 4.0 x 22 mm resolute, 2.5 x 38 resolute integrity stent mid LAD, PTCA, vein graft LAD into the distal LAD at the distal anastomosis  17. Echocardiogram 2/27/2022 EF less than 00% grade 3 diastolic dysfunction RV dilatation severe biatrial enlargement mild to moderate AR severe central MR jet noted  18. Elevated troponins this admission 0.15 now down to 0.09.  19. Chronic anticoagulation with apixaban  20. Chronic antiarrhythmic therapy with sotalol  21. Elevated liver function test improving  22. Status post ICD dual-chamber placement 3/3/2022  23. Episode of atrial fibrillation this morning  24. Hypokalemia 3.3 this morning       Plan:  1.  Continue intravenous amiodarone and monitor    Ronald Chowdhury MD, MD 3/7/2022 9:10 AM

## 2022-03-07 NOTE — CARE COORDINATION
Spoke with pt and spouse present at bedside re: therapy needed for pt upon dc. Pt and spouse both requested review by 8th [facility] which SW reviewed therapy notes and discussed likelihood the pt would not be able to endure the required therapy for AR and suggested SNF therapy as more appropriate. Pt and spouse agreed stating reside very closely to [facility] and would prefer a referral there for services upon dc. SW explained would be a isolation period for the pt upon admit to the facility as pt is not covid vaccinated. Pt and spouse both verbalized understanding and SW confirmed with the facility the isolation period has been changed to 7 days upon admit and pt can have visitors during that period. SW made the referral and awaiting response at this time.      [facility]  [phone]

## 2022-03-07 NOTE — PROGRESS NOTES
03/07/22 1652   Restrictions/Precautions   Restrictions/Precautions Fall Risk;Isolation   Required Braces or Orthoses? No   Position Activity Restriction   Other position/activity restrictions droplet +   General   Chart Reviewed Yes   Subjective   Subjective My chest is hurting where they  did CPR they really did a number on me.    Pain Screening   Patient Currently in Pain Yes   Intervention List Patient able to continue with treatment   Pain Assessment   Pain Assessment 0-10   Pain Level 8   Patient's Stated Pain Goal No pain   Pain Type Acute pain   Pain Location Chest   Pain Descriptors Aching   Pain Frequency Continuous   Pain Onset On-going   Pain Orientation Upper   Functional Pain Assessment Prevents or interferes some active activities and ADLs   Non-Pharmaceutical Pain Intervention(s) Rest   Oxygen Therapy   SpO2 98 %   O2 Device None (Room air)   Bed Mobility   Supine to Sit Modified independent   Transfers   Sit to Stand Contact guard assistance   Stand to sit Contact guard assistance   Bed to Chair Contact guard assistance   Ambulation 1   Device No Device   Other Apparatus   (Multiple lines)   Assistance Contact guard assistance   Quality of Gait Steady NO LOB   Gait Deviations Slow Paloma;Decreased step length   Distance 4'   Comments Patient in chair all lines attached   Balance   Standing - Dynamic Fair;-   Exercises   Heelslides 10   Hip Flexion 10   Knee Long Arc Quad 10   Knee Active Range of Motion yes   Ankle Pumps 10   Activity Tolerance   Activity Tolerance Patient Tolerated treatment well   Safety Devices   Type of devices Left in chair;Call light within reach   Physical Therapy    Electronically signed by Jose Cast PTA on 3/7/2022 at 5:01 PM

## 2022-03-07 NOTE — PLAN OF CARE
Nutrition Problem #1: Inadequate oral intake  Intervention: Food and/or Nutrient Delivery: Continue Current Diet,Start Oral Nutrition Supplement  Nutritional Goals: PO intake >75% meals and ONS    Problem: Nutrition  Goal: Optimal nutrition therapy  Outcome: Ongoing

## 2022-03-07 NOTE — PROGRESS NOTES
Comprehensive Nutrition Assessment    Type and Reason for Visit:  Initial,RD Nutrition Re-Screen/LOS    Nutrition Recommendations/Plan: Trial Ensure HP ONS BID to promote oral intake. Nutrition Assessment:  Following pt for LOS day 7. Pt is at risk for nutrition compromise AEB po intake <50% most meals. However, recent po improvement noted. Glu . Pt is s/p AICD placement. No new wt since 2/27/2022. Will trial Ensure HP ONS BID to promote oral intake. Possible d/c today. Will continue to monitor nutrition status while inpatient. Malnutrition Assessment:  Malnutrition Status: At risk for malnutrition (Comment)    Context:  Acute Illness     Findings of the 6 clinical characteristics of malnutrition:  Energy Intake:  Mild decrease in energy intake (Comment)  Weight Loss:  No significant weight loss     Body Fat Loss:  No significant body fat loss     Muscle Mass Loss:  No significant muscle mass loss    Fluid Accumulation:  No significant fluid accumulation Extremities   Strength:  Not Performed    Estimated Daily Nutrient Needs:  Energy (kcal):  6653-0322 kcal/d (30-35 kcal/kg); Weight Used for Energy Requirements:  Current     Protein (g):  95 g/d (1.5 g/kg); Weight Used for Protein Requirements:  Current        Fluid (ml/day):  9849-8581 ml/d; Method Used for Fluid Requirements:  1 ml/kcal      Nutrition Related Findings:  thin, BLE/BUE trace edema, +1 facial edema, proBNP correcting, Na+ 134, K+ 3.3      Wounds:  Surgical Incision       Current Nutrition Therapies:    ADULT DIET;  Regular    Anthropometric Measures:  · Height: 6' (182.9 cm)  · Current Body Weight: 140 lb (63.5 kg)   · Admission Body Weight: 140 lb (63.5 kg)    · Usual Body Weight: 145 lb (65.8 kg) (6/8/2021)     · Ideal Body Weight: 178 lbs; % Ideal Body Weight 78.7 %   · BMI: 19  · BMI Categories: Underweight (BMI less than 22) age over 72       Nutrition Diagnosis:   · Inadequate oral intake related to increase demand for energy/nutrients as evidenced by wounds,intake 26-50%,intake 0-25%,lab values    Nutrition Interventions:   Food and/or Nutrient Delivery:  Continue Current Diet,Start Oral Nutrition Supplement  Nutrition Education/Counseling:  No recommendation at this time   Coordination of Nutrition Care:  Continue to monitor while inpatient    Goals:  PO intake >75% meals and ONS       Nutrition Monitoring and Evaluation:   Food/Nutrient Intake Outcomes:  Food and Nutrient Intake,Supplement Intake  Physical Signs/Symptoms Outcomes:  Biochemical Data,Fluid Status or Edema,Hemodynamic Status,Nutrition Focused Physical Findings,Weight,Skin     Electronically signed by Bertin Hunt on 3/7/22 at 10:16 AM CST    Contact: [phone]

## 2022-03-08 LAB
ALBUMIN SERPL-MCNC: 2.6 G/DL (ref 3.5–5.2)
ALP BLD-CCNC: 127 U/L (ref 40–130)
ALT SERPL-CCNC: 17 U/L (ref 5–41)
ANION GAP SERPL CALCULATED.3IONS-SCNC: 13 MMOL/L (ref 7–19)
AST SERPL-CCNC: 21 U/L (ref 5–40)
BASOPHILS ABSOLUTE: 0 K/UL (ref 0–0.2)
BASOPHILS RELATIVE PERCENT: 0.3 % (ref 0–1)
BILIRUB SERPL-MCNC: 0.4 MG/DL (ref 0.2–1.2)
BUN BLDV-MCNC: 17 MG/DL (ref 8–23)
CALCIUM SERPL-MCNC: 7.8 MG/DL (ref 8.8–10.2)
CHLORIDE BLD-SCNC: 98 MMOL/L (ref 98–111)
CO2: 26 MMOL/L (ref 22–29)
CREAT SERPL-MCNC: 0.8 MG/DL (ref 0.5–1.2)
EOSINOPHILS ABSOLUTE: 0.1 K/UL (ref 0–0.6)
EOSINOPHILS RELATIVE PERCENT: 0.9 % (ref 0–5)
GFR AFRICAN AMERICAN: >59
GFR NON-AFRICAN AMERICAN: >60
GLUCOSE BLD-MCNC: 116 MG/DL (ref 74–109)
HCT VFR BLD CALC: 35.1 % (ref 42–52)
HEMOGLOBIN: 11.8 G/DL (ref 14–18)
IMMATURE GRANULOCYTES #: 0.1 K/UL
LYMPHOCYTES ABSOLUTE: 1.8 K/UL (ref 1.1–4.5)
LYMPHOCYTES RELATIVE PERCENT: 13 % (ref 20–40)
MAGNESIUM: 1.7 MG/DL (ref 1.6–2.4)
MCH RBC QN AUTO: 30.7 PG (ref 27–31)
MCHC RBC AUTO-ENTMCNC: 33.6 G/DL (ref 33–37)
MCV RBC AUTO: 91.4 FL (ref 80–94)
MONOCYTES ABSOLUTE: 0.7 K/UL (ref 0–0.9)
MONOCYTES RELATIVE PERCENT: 5.1 % (ref 0–10)
NEUTROPHILS ABSOLUTE: 11.3 K/UL (ref 1.5–7.5)
NEUTROPHILS RELATIVE PERCENT: 79.8 % (ref 50–65)
PDW BLD-RTO: 14.6 % (ref 11.5–14.5)
PLATELET # BLD: 253 K/UL (ref 130–400)
PMV BLD AUTO: 10.5 FL (ref 9.4–12.4)
POTASSIUM SERPL-SCNC: 3.6 MMOL/L (ref 3.5–5)
RBC # BLD: 3.84 M/UL (ref 4.7–6.1)
SODIUM BLD-SCNC: 137 MMOL/L (ref 136–145)
TOTAL PROTEIN: 5 G/DL (ref 6.6–8.7)
WBC # BLD: 14.1 K/UL (ref 4.8–10.8)

## 2022-03-08 PROCEDURE — 97530 THERAPEUTIC ACTIVITIES: CPT

## 2022-03-08 PROCEDURE — 6370000000 HC RX 637 (ALT 250 FOR IP): Performed by: INTERNAL MEDICINE

## 2022-03-08 PROCEDURE — 2580000003 HC RX 258: Performed by: INTERNAL MEDICINE

## 2022-03-08 PROCEDURE — 2100000000 HC CCU R&B

## 2022-03-08 PROCEDURE — 36415 COLL VENOUS BLD VENIPUNCTURE: CPT

## 2022-03-08 PROCEDURE — 85025 COMPLETE CBC W/AUTO DIFF WBC: CPT

## 2022-03-08 PROCEDURE — 99024 POSTOP FOLLOW-UP VISIT: CPT | Performed by: INTERNAL MEDICINE

## 2022-03-08 PROCEDURE — 6360000002 HC RX W HCPCS: Performed by: INTERNAL MEDICINE

## 2022-03-08 PROCEDURE — 83735 ASSAY OF MAGNESIUM: CPT

## 2022-03-08 PROCEDURE — 80053 COMPREHEN METABOLIC PANEL: CPT

## 2022-03-08 RX ORDER — CARVEDILOL 6.25 MG/1
6.25 TABLET ORAL ONCE
Status: COMPLETED | OUTPATIENT
Start: 2022-03-08 | End: 2022-03-08

## 2022-03-08 RX ADMIN — GUAIFENESIN 400 MG: 200 TABLET ORAL at 20:57

## 2022-03-08 RX ADMIN — CLONAZEPAM 0.5 MG: 0.5 TABLET ORAL at 20:58

## 2022-03-08 RX ADMIN — HYDROCODONE BITARTRATE AND ACETAMINOPHEN 1 TABLET: 7.5; 325 TABLET ORAL at 07:29

## 2022-03-08 RX ADMIN — AMIODARONE HYDROCHLORIDE 0.5 MG/MIN: 50 INJECTION, SOLUTION INTRAVENOUS at 01:37

## 2022-03-08 RX ADMIN — IPRATROPIUM BROMIDE 2 PUFF: 17 AEROSOL, METERED RESPIRATORY (INHALATION) at 17:06

## 2022-03-08 RX ADMIN — AMIODARONE HYDROCHLORIDE 200 MG: 200 TABLET ORAL at 20:58

## 2022-03-08 RX ADMIN — CLONAZEPAM 0.5 MG: 0.5 TABLET ORAL at 09:04

## 2022-03-08 RX ADMIN — AMIODARONE HYDROCHLORIDE 200 MG: 200 TABLET ORAL at 07:29

## 2022-03-08 RX ADMIN — ALBUTEROL SULFATE 2 PUFF: 90 AEROSOL, METERED RESPIRATORY (INHALATION) at 07:31

## 2022-03-08 RX ADMIN — CARVEDILOL 12.5 MG: 12.5 TABLET, FILM COATED ORAL at 07:29

## 2022-03-08 RX ADMIN — ISOSORBIDE MONONITRATE 30 MG: 30 TABLET, EXTENDED RELEASE ORAL at 07:29

## 2022-03-08 RX ADMIN — HYDROCODONE BITARTRATE AND ACETAMINOPHEN 1 TABLET: 7.5; 325 TABLET ORAL at 02:55

## 2022-03-08 RX ADMIN — SODIUM CHLORIDE, PRESERVATIVE FREE 10 ML: 5 INJECTION INTRAVENOUS at 20:58

## 2022-03-08 RX ADMIN — OXYCODONE HYDROCHLORIDE AND ACETAMINOPHEN 500 MG: 500 TABLET ORAL at 07:28

## 2022-03-08 RX ADMIN — HYDROCODONE BITARTRATE AND ACETAMINOPHEN 1 TABLET: 7.5; 325 TABLET ORAL at 15:42

## 2022-03-08 RX ADMIN — SACUBITRIL AND VALSARTAN 1 TABLET: 49; 51 TABLET, FILM COATED ORAL at 20:58

## 2022-03-08 RX ADMIN — OXYCODONE HYDROCHLORIDE AND ACETAMINOPHEN 500 MG: 500 TABLET ORAL at 20:58

## 2022-03-08 RX ADMIN — GUAIFENESIN 400 MG: 200 TABLET ORAL at 06:08

## 2022-03-08 RX ADMIN — DILTIAZEM HYDROCHLORIDE 120 MG: 120 CAPSULE, COATED, EXTENDED RELEASE ORAL at 07:29

## 2022-03-08 RX ADMIN — APIXABAN 5 MG: 5 TABLET, FILM COATED ORAL at 07:30

## 2022-03-08 RX ADMIN — CARVEDILOL 18.75 MG: 12.5 TABLET, FILM COATED ORAL at 17:06

## 2022-03-08 RX ADMIN — IPRATROPIUM BROMIDE 2 PUFF: 17 AEROSOL, METERED RESPIRATORY (INHALATION) at 06:10

## 2022-03-08 RX ADMIN — GUAIFENESIN 400 MG: 200 TABLET ORAL at 13:51

## 2022-03-08 RX ADMIN — ATORVASTATIN CALCIUM 40 MG: 40 TABLET, FILM COATED ORAL at 20:58

## 2022-03-08 RX ADMIN — FAMOTIDINE 20 MG: 20 TABLET ORAL at 07:29

## 2022-03-08 RX ADMIN — FUROSEMIDE 60 MG: 40 TABLET ORAL at 07:29

## 2022-03-08 RX ADMIN — SPIRONOLACTONE 25 MG: 25 TABLET ORAL at 07:29

## 2022-03-08 RX ADMIN — ALBUTEROL SULFATE 2 PUFF: 90 AEROSOL, METERED RESPIRATORY (INHALATION) at 06:10

## 2022-03-08 RX ADMIN — IPRATROPIUM BROMIDE 2 PUFF: 17 AEROSOL, METERED RESPIRATORY (INHALATION) at 20:57

## 2022-03-08 RX ADMIN — CLOPIDOGREL BISULFATE 75 MG: 75 TABLET ORAL at 07:29

## 2022-03-08 RX ADMIN — HYDROCODONE BITARTRATE AND ACETAMINOPHEN 1 TABLET: 7.5; 325 TABLET ORAL at 20:58

## 2022-03-08 RX ADMIN — ASPIRIN 81 MG: 81 TABLET, COATED ORAL at 07:29

## 2022-03-08 RX ADMIN — SACUBITRIL AND VALSARTAN 1 TABLET: 49; 51 TABLET, FILM COATED ORAL at 07:32

## 2022-03-08 RX ADMIN — ZINC SULFATE 220 MG (50 MG) CAPSULE 50 MG: CAPSULE at 07:29

## 2022-03-08 RX ADMIN — Medication 2000 UNITS: at 07:29

## 2022-03-08 RX ADMIN — CARVEDILOL 6.25 MG: 6.25 TABLET, FILM COATED ORAL at 09:38

## 2022-03-08 RX ADMIN — FUROSEMIDE 60 MG: 40 TABLET ORAL at 17:06

## 2022-03-08 RX ADMIN — IPRATROPIUM BROMIDE 2 PUFF: 17 AEROSOL, METERED RESPIRATORY (INHALATION) at 07:31

## 2022-03-08 RX ADMIN — APIXABAN 5 MG: 5 TABLET, FILM COATED ORAL at 20:58

## 2022-03-08 RX ADMIN — ALBUTEROL SULFATE 2 PUFF: 90 AEROSOL, METERED RESPIRATORY (INHALATION) at 20:57

## 2022-03-08 RX ADMIN — ALBUTEROL SULFATE 2 PUFF: 90 AEROSOL, METERED RESPIRATORY (INHALATION) at 17:06

## 2022-03-08 ASSESSMENT — PAIN DESCRIPTION - ONSET: ONSET: ON-GOING

## 2022-03-08 ASSESSMENT — PAIN DESCRIPTION - FREQUENCY: FREQUENCY: CONTINUOUS

## 2022-03-08 ASSESSMENT — PAIN DESCRIPTION - LOCATION: LOCATION: GENERALIZED

## 2022-03-08 ASSESSMENT — ENCOUNTER SYMPTOMS
CONSTIPATION: 0
VOICE CHANGE: 0
NAUSEA: 0
VOMITING: 0
DIARRHEA: 0
SHORTNESS OF BREATH: 0
BACK PAIN: 0
COLOR CHANGE: 0

## 2022-03-08 ASSESSMENT — PAIN SCALES - GENERAL
PAINLEVEL_OUTOF10: 5
PAINLEVEL_OUTOF10: 9
PAINLEVEL_OUTOF10: 4
PAINLEVEL_OUTOF10: 3

## 2022-03-08 ASSESSMENT — PAIN DESCRIPTION - DESCRIPTORS: DESCRIPTORS: ACHING

## 2022-03-08 ASSESSMENT — PAIN DESCRIPTION - PAIN TYPE: TYPE: CHRONIC PAIN

## 2022-03-08 NOTE — PROGRESS NOTES
03/08/22 1616   Restrictions/Precautions   Restrictions/Precautions Fall Risk;Isolation   Required Braces or Orthoses? No   Position Activity Restriction   Other position/activity restrictions droplet +   General   Chart Reviewed Yes   Family / Caregiver Present No   Subjective   Subjective That chair is too low it is to get up from. You can try those pillows. Now I thing I need to have a BM   General Comment   Comments Bill Holstein, KAYLENE [facility]. Chair dressed for patient and [facility]  acquired   Pain Screening   Patient Currently in Pain No   Intervention List Patient able to continue with treatment   Oxygen Therapy   O2 Device None (Room air)   Bed Mobility   Supine to Sit Modified independent   Transfers   Sit to Stand Contact guard assistance   Stand to sit Contact guard assistance   Bed to Chair Contact guard assistance   Comment Patient stepped to [facility]. Call light left to summlion MASTERS when done   Ambulation   Ambulation?  No   Activity Tolerance   Activity Tolerance Patient Tolerated treatment well   Safety Devices   Type of devices Left in chair;Call light within reach  (On [facility])   Physical Therapy    Electronically signed by Bere Covarrubias PTA on 3/8/2022 at 4:28 PM

## 2022-03-08 NOTE — PROGRESS NOTES
Cardiology Daily Note Kandis Redmond MD      Patient:  Neftaly Sawyer  [id number]    Patient Active Problem List    Diagnosis Date Noted    Systolic heart failure secondary to hypertrophic cardiomyopathy ([address] 02/28/2022    Severe left ventricular systolic dysfunction 72/33/9557    Acute respiratory failure with hypoxia ([address]     Acute respiratory failure with hypoxia and hypercarbia ([address] 02/02/2022    Sepsis ([address] 02/02/2022    Acute exacerbation of CHF (congestive heart failure) ([address] 22/65/8752    Acute systolic (congestive) heart failure ([address] 02/02/2022    PVD (peripheral vascular disease) ([address]     Bilateral carotid artery stenosis 01/06/2020    Statin intolerance 02/14/2018    Inferior MI ([address] 01/07/2018    ST elevation myocardial infarction (STEMI) ([address]     Atherosclerosis of native artery of extremity with intermittent claudication ([address] 04/15/2015    Paroxysmal A-fib ([address]     Tobacco abuse     Chest pain     Hyperlipidemia     Palpitations     HTN (hypertension)     Coronary artery disease involving native coronary artery of native heart with angina pectoris ([address]        Admit Date:  2/27/2022    Admission Problem List: Present on Admission:   Systolic heart failure secondary to hypertrophic cardiomyopathy ([address]   Severe left ventricular systolic dysfunction   Tobacco abuse   Paroxysmal A-fib ([address]   Hyperlipidemia   Coronary artery disease involving native coronary artery of native heart with angina pectoris [facility])      Cardiac Specific Data:  Specialty Problems        Cardiology Problems    Coronary artery disease involving native coronary artery of native heart with angina pectoris (HCC)        HTN (hypertension)        Chest pain        Hyperlipidemia        Paroxysmal A-fib (HCC)        Atherosclerosis of native artery of extremity with intermittent claudication (HCC)        Inferior MI ([address]        ST elevation myocardial infarction (STEMI) ([address]        Bilateral carotid artery stenosis        PVD (peripheral vascular disease) (HCC)        Acute exacerbation of CHF (congestive heart failure) (HCC)        Acute systolic (congestive) heart failure (HCC)        Severe left ventricular systolic dysfunction        * (Principal) Systolic heart failure secondary to hypertrophic cardiomyopathy ([facility]              Subjective:  Mr. Catalino Fox seen today heart rate still elevated 100-1 10 remains in atrial fibrillation feels about the same some fatigue denies chest pain or dyspnea no other complaints. Blood pressure 111/92    Objective:   BP (!) 111/92   Pulse 108   Temp 98 °F (36.7 °C) (Temporal)   Resp 14   Ht 6' (1.829 m)   Wt 129 lb 6.4 oz (58.7 kg)   SpO2 95%   BMI 17.55 kg/m²       Intake/Output Summary (Last 24 hours) at 3/8/2022 0906  Last data filed at 3/8/2022 0816  Gross per 24 hour   Intake 1324.23 ml   Output 1800 ml   Net -475.77 ml       Prior to Admission medications    Medication Sig Start Date End Date Taking?  Authorizing Provider   furosemide (LASIX) 40 MG tablet Take 1 tablet by mouth 2 times daily 2/9/22   Adelita Russo MD   atorvastatin (LIPITOR) 80 MG tablet Take 0.5 tablets by mouth nightly 2/9/22   Adelita Russo MD   spironolactone (ALDACTONE) 25 MG tablet Take 1 tablet by mouth daily 2/9/22   Adelita Russo MD   clopidogrel (PLAVIX) 75 MG tablet Take 1 tablet by mouth daily 2/9/22   Adelita Russo MD   apixaban (ELIQUIS) 5 MG TABS tablet Take 1 tablet by mouth 2 times daily 2/9/22   Jimi Lucia, APRCHRISTINA   isosorbide mononitrate (IMDUR) 30 MG extended release tablet Take 1 tablet by mouth daily 2/9/22   Jimi Lucia, LUIS   sacubitril-valsartan (ENTRESTO) 49-51 MG per tablet Take 1 tablet by mouth 2 times daily 2/8/22   Jimi Lucia, APRCHRISTINA   Vitamin D (CHOLECALCIFEROL) 25 MCG (1000 UT) TABS tablet Take 5 tablets by mouth daily 2/9/22   Jimi Lucia, LUIS   nitroGLYCERIN (NITROSTAT) 0.4 MG SL tablet Place 1 tablet under the tongue every 5 minutes as needed for Chest pain up to max of 3 total doses. If no relief after 1 dose, call 911. 6/8/21   LUIS Brian   sotalol (BETAPACE) 160 MG tablet Take 1 tablet by mouth twice daily 6/8/21   LUIS Brian   nitroGLYCERIN (NITROSTAT) 0.4 MG SL tablet Place 1 tablet under the tongue every 5 minutes as needed for Chest pain 6/8/21   LUIS Brian   aspirin 81 MG EC tablet Take 81 mg by mouth daily.       Historical Provider, MD        carvedilol  18.75 mg Oral BID     carvedilol  6.25 mg Oral Once    dilTIAZem  120 mg Oral Daily    furosemide  60 mg Oral BID    amiodarone  200 mg Oral BID    apixaban  5 mg Oral BID    chlorhexidine gluconate   Topical Once    famotidine  20 mg Oral Daily    sodium chloride flush  5-40 mL IntraVENous 2 times per day    aspirin  81 mg Oral Daily    atorvastatin  40 mg Oral Nightly    clopidogrel  75 mg Oral Daily    isosorbide mononitrate  30 mg Oral Daily    sacubitril-valsartan  1 tablet Oral BID    spironolactone  25 mg Oral Daily    zinc sulfate  50 mg Oral Daily    Vitamin D  2,000 Units Oral Daily    vitamin C  500 mg Oral BID    guaiFENesin  400 mg Oral Q8H    albuterol sulfate HFA  2 puff Inhalation Q4H    And    ipratropium  2 puff Inhalation Q4H       TELEMETRY: Atrial fibrillation     Physical Exam:      Physical Exam      General:  Awake, alert, NAD  Skin:  Warm and dry  Neck:  no jvd , no carotid bruits  Chest:  Clear to auscultation, no wheezing or rales  Cardiovascular:  IRRR V2X1 no murmurs, clicks, gallups, or rubs  Abdomen:  Soft nontender, nondistended, bowel sounds present  Extremities:  Edema: none        Lab Data:  CBC:   Recent Labs     03/06/22 0136 03/07/22  0200 03/08/22  0141   WBC 14.5* 16.1* 14.1*   HGB 12.2* 11.7* 11.8*   HCT 36.4* 35.1* 35.1*   MCV 91.0 91.4 91.4    266 253     BMP:   Recent Labs     03/06/22 0136 03/07/22  0200 03/08/22  0141    134* 137   K 3.6 3.3* 3.6   CL 99 97* 98   CO2 27 25 26   BUN 22 18 17   CREATININE 0.7 0.6 0.8     LIVER PROFILE:   Recent Labs     03/06/22  0136 03/07/22  0200 03/08/22  0141   AST 25 21 21   ALT 23 19 17   BILITOT 0.3 0.3 0.4   ALKPHOS 146* 131* 127     PT/INR: No results for input(s): PROTIME, INR in the last 72 hours. APTT: No results for input(s): APTT in the last 72 hours. BNP:  No results for input(s): BNP in the last 72 hours. CK, CKMB, Troponin: @LABRCNT (CKTOTAL:3, CKMB:3, TROPONINI:3)@    IMAGING:  ECHO Complete 2D W Doppler W Color    Result Date: 2/28/2022  Transthoracic Echocardiography Report (TTE)  Demographics   Patient Name  CASI Sarmiento Date of Study          02/28/2022   MRN           [id number]          Gender                 Male   Date of Birth 1950      Room Number            DWD-7710   Age           66 year(s)   Height:       72 inches       Referring Physician    Deborah Miller DO   Weight:       140 pounds      Sonographer            Bebe Painter [facility]   BSA:          1.83 m^2        Interpreting Physician Nelson Chacko MD   BMI:          18.99 kg/m^2  Procedure Type of Study   TTE procedure:ECHO 2D W/DOPPLER/COLOR/CONTRAST. Study Location: Portable Technical Quality: Adequate visualization Patient Status: Inpatient BP: 110/69 mmHg Indications:Congestive heart failure, systolic dysfunction and Mitral regurgitation. Conclusions   Summary  Limited echocardiographic study. LV is moderate to severely dilated with severely reduced LV systolic  function. LV ejection fraction estimated at less than 20%. Probable grade 3 diastolic dysfunction. RV appears mildly dilated with moderately reduced RV systolic function  (TAPSE equals 1.6 cm). Severe left atrial enlargement. Left atrial pressure is elevated. Severe right atrial enlargement. Aortic valve not well studied. No significant stenosis. Probable mild to  moderate aortic regurgitation.   Mitral valve appears moderately thickened with preserved Pacemaker  Technique: Single AP view of the chest COMPARISONS: 2/27/2022 FINDINGS: Bibasilar atelectasis. Cardiomegaly which is stable. Pulmonary vasculature are nondilated. No pneumothorax. New left chest wall dual chamber pacer defibrillator. No acute bony abnormality. 1. New left chest wall pacemaker. No pneumothorax. 2. Bibasilar atelectasis. Signed by Dr Clary Willis    XR CHEST PORTABLE    Result Date: 2/28/2022  Examination. XR CHEST PORTABLE 2/27/2022 11:30 PM History: Chest pain. A single frontal portable semiupright view of the chest is compared with the previous study dated 2/2/2022. There is a significant improvement in the bilateral lung infiltrate since the previous study. There is a small residual infiltrate at bilateral lung bases and a small left basal pleural effusion. There is persistent moderate cardiomegaly. Atheromatous changes thoracic aorta are noted. No pulmonary congestion or pneumothorax. No acute bony abnormality. 1. Improvement. Signed by Dr Cassius Jamison:  1. ?  Supraventricular tachycardia of the past 5 to 6 days with rates 150s to 170s  2. LifeVest  3. Ischemic cardiomyopathy  4. Cardiac arrest on admission 2/27/2022 with rapid wide-complex tachycardia subsequent bradycardia and asystole requiring 2-minute CPR  5. Complaints of chest pain since CPR worse with direct pressure inspiration or cough  6. History of hypertension  7. History tobacco abuse  8. Peripheral arterial disease previous stent placement  9. Paroxysmal atrial fibrillation  10. History of inferior STEMI  11. Statin intolerance  12. Bilateral carotid artery stenosis  13. Cardiac cath with stent placement 1/7/2018 Yodio vision 3.5 x 28  14. Status post CABG x4 grafts 2011  15. Chronic systolic diastolic congestive heart failure  16.  Cardiac cath 2/3/2022 significant LV systolic dysfunction LIMA graft atretic 2 vein grafts occluded 1 of which appears recent right coronary artery saphenous vein graft moderately severe stenosis in the proximal to mid segment status post stent placement in the mid body of the vein graft of the distal right coronary artery 4.0 x 22 mm resolute, 2.5 x 38 resolute integrity stent mid LAD, PTCA, vein graft LAD into the distal LAD at the distal anastomosis  17. Echocardiogram 2/27/2022 EF less than 69% grade 3 diastolic dysfunction RV dilatation severe biatrial enlargement mild to moderate AR severe central MR jet noted  18. Elevated troponins this admission 0.15 now down to 0.09.  19. Chronic anticoagulation with apixaban  20. Chronic antiarrhythmic therapy with sotalol  21. Elevated liver function test improving  22. Status post ICD dual-chamber placement 3/3/2022  23. Episode of atrial fibrillation this morning  24. Hypokalemia 3.3 this morning       Plan:  1.  Increase Coreg to 18.75 mg p.o. twice daily      Daisy Raygoza MD, MD 3/8/2022 9:06 AM

## 2022-03-09 ENCOUNTER — TELEPHONE (OUTPATIENT)
Dept: CARDIOLOGY CLINIC | Age: 72
End: 2022-03-09

## 2022-03-09 LAB
ALBUMIN SERPL-MCNC: 2.6 G/DL (ref 3.5–5.2)
ALP BLD-CCNC: 120 U/L (ref 40–130)
ALT SERPL-CCNC: 19 U/L (ref 5–41)
ANION GAP SERPL CALCULATED.3IONS-SCNC: 11 MMOL/L (ref 7–19)
AST SERPL-CCNC: 22 U/L (ref 5–40)
BASOPHILS ABSOLUTE: 0 K/UL (ref 0–0.2)
BASOPHILS RELATIVE PERCENT: 0.2 % (ref 0–1)
BILIRUB SERPL-MCNC: 0.4 MG/DL (ref 0.2–1.2)
BUN BLDV-MCNC: 24 MG/DL (ref 8–23)
CALCIUM SERPL-MCNC: 8.1 MG/DL (ref 8.8–10.2)
CHLORIDE BLD-SCNC: 96 MMOL/L (ref 98–111)
CO2: 27 MMOL/L (ref 22–29)
CREAT SERPL-MCNC: 0.8 MG/DL (ref 0.5–1.2)
EOSINOPHILS ABSOLUTE: 0.1 K/UL (ref 0–0.6)
EOSINOPHILS RELATIVE PERCENT: 1 % (ref 0–5)
GFR AFRICAN AMERICAN: >59
GFR NON-AFRICAN AMERICAN: >60
GLUCOSE BLD-MCNC: 96 MG/DL (ref 74–109)
HCT VFR BLD CALC: 36.7 % (ref 42–52)
HEMOGLOBIN: 11.8 G/DL (ref 14–18)
IMMATURE GRANULOCYTES #: 0.1 K/UL
LYMPHOCYTES ABSOLUTE: 2 K/UL (ref 1.1–4.5)
LYMPHOCYTES RELATIVE PERCENT: 15.9 % (ref 20–40)
MAGNESIUM: 1.7 MG/DL (ref 1.6–2.4)
MCH RBC QN AUTO: 30.2 PG (ref 27–31)
MCHC RBC AUTO-ENTMCNC: 32.2 G/DL (ref 33–37)
MCV RBC AUTO: 93.9 FL (ref 80–94)
MONOCYTES ABSOLUTE: 0.7 K/UL (ref 0–0.9)
MONOCYTES RELATIVE PERCENT: 5.8 % (ref 0–10)
NEUTROPHILS ABSOLUTE: 9.4 K/UL (ref 1.5–7.5)
NEUTROPHILS RELATIVE PERCENT: 76.2 % (ref 50–65)
PDW BLD-RTO: 15.1 % (ref 11.5–14.5)
PLATELET # BLD: 279 K/UL (ref 130–400)
PMV BLD AUTO: 10.8 FL (ref 9.4–12.4)
POTASSIUM SERPL-SCNC: 3.9 MMOL/L (ref 3.5–5)
PRO-BNP: 1606 PG/ML (ref 0–900)
RBC # BLD: 3.91 M/UL (ref 4.7–6.1)
SODIUM BLD-SCNC: 134 MMOL/L (ref 136–145)
TOTAL PROTEIN: 4.9 G/DL (ref 6.6–8.7)
WBC # BLD: 12.3 K/UL (ref 4.8–10.8)

## 2022-03-09 PROCEDURE — 83880 ASSAY OF NATRIURETIC PEPTIDE: CPT

## 2022-03-09 PROCEDURE — 36415 COLL VENOUS BLD VENIPUNCTURE: CPT

## 2022-03-09 PROCEDURE — 6370000000 HC RX 637 (ALT 250 FOR IP): Performed by: INTERNAL MEDICINE

## 2022-03-09 PROCEDURE — 2580000003 HC RX 258: Performed by: INTERNAL MEDICINE

## 2022-03-09 PROCEDURE — 85025 COMPLETE CBC W/AUTO DIFF WBC: CPT

## 2022-03-09 PROCEDURE — 80053 COMPREHEN METABOLIC PANEL: CPT

## 2022-03-09 PROCEDURE — 99232 SBSQ HOSP IP/OBS MODERATE 35: CPT | Performed by: INTERNAL MEDICINE

## 2022-03-09 PROCEDURE — 97110 THERAPEUTIC EXERCISES: CPT

## 2022-03-09 PROCEDURE — 83735 ASSAY OF MAGNESIUM: CPT

## 2022-03-09 PROCEDURE — 2100000000 HC CCU R&B

## 2022-03-09 PROCEDURE — 97116 GAIT TRAINING THERAPY: CPT

## 2022-03-09 RX ORDER — DILTIAZEM HYDROCHLORIDE 120 MG/1
120 CAPSULE, COATED, EXTENDED RELEASE ORAL 2 TIMES DAILY
Status: DISCONTINUED | OUTPATIENT
Start: 2022-03-09 | End: 2022-03-10 | Stop reason: HOSPADM

## 2022-03-09 RX ORDER — MIDODRINE HYDROCHLORIDE 10 MG/1
10 TABLET ORAL
Status: DISCONTINUED | OUTPATIENT
Start: 2022-03-09 | End: 2022-03-09

## 2022-03-09 RX ORDER — CARVEDILOL 12.5 MG/1
12.5 TABLET ORAL 2 TIMES DAILY WITH MEALS
Status: DISCONTINUED | OUTPATIENT
Start: 2022-03-09 | End: 2022-03-10 | Stop reason: HOSPADM

## 2022-03-09 RX ADMIN — ASPIRIN 81 MG: 81 TABLET, COATED ORAL at 07:27

## 2022-03-09 RX ADMIN — CARVEDILOL 12.5 MG: 12.5 TABLET, FILM COATED ORAL at 17:17

## 2022-03-09 RX ADMIN — FUROSEMIDE 60 MG: 40 TABLET ORAL at 17:17

## 2022-03-09 RX ADMIN — GUAIFENESIN 400 MG: 200 TABLET ORAL at 11:17

## 2022-03-09 RX ADMIN — OXYCODONE HYDROCHLORIDE AND ACETAMINOPHEN 500 MG: 500 TABLET ORAL at 07:27

## 2022-03-09 RX ADMIN — CLONAZEPAM 0.5 MG: 0.5 TABLET ORAL at 11:18

## 2022-03-09 RX ADMIN — DILTIAZEM HYDROCHLORIDE 120 MG: 120 CAPSULE, COATED, EXTENDED RELEASE ORAL at 07:27

## 2022-03-09 RX ADMIN — GUAIFENESIN 400 MG: 200 TABLET ORAL at 20:43

## 2022-03-09 RX ADMIN — HYDROCODONE BITARTRATE AND ACETAMINOPHEN 1 TABLET: 7.5; 325 TABLET ORAL at 20:52

## 2022-03-09 RX ADMIN — OXYCODONE HYDROCHLORIDE AND ACETAMINOPHEN 500 MG: 500 TABLET ORAL at 20:43

## 2022-03-09 RX ADMIN — ALBUTEROL SULFATE 2 PUFF: 90 AEROSOL, METERED RESPIRATORY (INHALATION) at 20:43

## 2022-03-09 RX ADMIN — SODIUM CHLORIDE, PRESERVATIVE FREE 10 ML: 5 INJECTION INTRAVENOUS at 20:43

## 2022-03-09 RX ADMIN — IPRATROPIUM BROMIDE 2 PUFF: 17 AEROSOL, METERED RESPIRATORY (INHALATION) at 12:30

## 2022-03-09 RX ADMIN — IPRATROPIUM BROMIDE 2 PUFF: 17 AEROSOL, METERED RESPIRATORY (INHALATION) at 07:27

## 2022-03-09 RX ADMIN — AMIODARONE HYDROCHLORIDE 200 MG: 200 TABLET ORAL at 07:27

## 2022-03-09 RX ADMIN — APIXABAN 5 MG: 5 TABLET, FILM COATED ORAL at 20:43

## 2022-03-09 RX ADMIN — IPRATROPIUM BROMIDE 2 PUFF: 17 AEROSOL, METERED RESPIRATORY (INHALATION) at 20:43

## 2022-03-09 RX ADMIN — ALBUTEROL SULFATE 2 PUFF: 90 AEROSOL, METERED RESPIRATORY (INHALATION) at 07:26

## 2022-03-09 RX ADMIN — FAMOTIDINE 20 MG: 20 TABLET ORAL at 07:27

## 2022-03-09 RX ADMIN — ATORVASTATIN CALCIUM 40 MG: 40 TABLET, FILM COATED ORAL at 20:42

## 2022-03-09 RX ADMIN — CLOPIDOGREL BISULFATE 75 MG: 75 TABLET ORAL at 07:27

## 2022-03-09 RX ADMIN — ALBUTEROL SULFATE 2 PUFF: 90 AEROSOL, METERED RESPIRATORY (INHALATION) at 18:14

## 2022-03-09 RX ADMIN — SACUBITRIL AND VALSARTAN 1 TABLET: 24; 26 TABLET, FILM COATED ORAL at 20:43

## 2022-03-09 RX ADMIN — HYDROCODONE BITARTRATE AND ACETAMINOPHEN 1 TABLET: 7.5; 325 TABLET ORAL at 17:17

## 2022-03-09 RX ADMIN — AMIODARONE HYDROCHLORIDE 200 MG: 200 TABLET ORAL at 20:42

## 2022-03-09 RX ADMIN — Medication 2000 UNITS: at 07:28

## 2022-03-09 RX ADMIN — DILTIAZEM HYDROCHLORIDE 120 MG: 120 CAPSULE, COATED, EXTENDED RELEASE ORAL at 20:43

## 2022-03-09 RX ADMIN — ALBUTEROL SULFATE 2 PUFF: 90 AEROSOL, METERED RESPIRATORY (INHALATION) at 12:30

## 2022-03-09 RX ADMIN — SPIRONOLACTONE 25 MG: 25 TABLET ORAL at 07:28

## 2022-03-09 RX ADMIN — IPRATROPIUM BROMIDE 2 PUFF: 17 AEROSOL, METERED RESPIRATORY (INHALATION) at 18:15

## 2022-03-09 RX ADMIN — ZINC SULFATE 220 MG (50 MG) CAPSULE 50 MG: CAPSULE at 07:28

## 2022-03-09 RX ADMIN — CLONAZEPAM 0.5 MG: 0.5 TABLET ORAL at 20:53

## 2022-03-09 RX ADMIN — HYDROCODONE BITARTRATE AND ACETAMINOPHEN 1 TABLET: 7.5; 325 TABLET ORAL at 04:30

## 2022-03-09 RX ADMIN — APIXABAN 5 MG: 5 TABLET, FILM COATED ORAL at 07:27

## 2022-03-09 RX ADMIN — SACUBITRIL AND VALSARTAN 1 TABLET: 49; 51 TABLET, FILM COATED ORAL at 07:27

## 2022-03-09 RX ADMIN — MIDODRINE HYDROCHLORIDE 10 MG: 10 TABLET ORAL at 01:13

## 2022-03-09 ASSESSMENT — PAIN SCALES - GENERAL
PAINLEVEL_OUTOF10: 5
PAINLEVEL_OUTOF10: 3
PAINLEVEL_OUTOF10: 9

## 2022-03-09 ASSESSMENT — ENCOUNTER SYMPTOMS
VOMITING: 0
BACK PAIN: 0
NAUSEA: 0
COLOR CHANGE: 0
VOICE CHANGE: 0
SHORTNESS OF BREATH: 0
CONSTIPATION: 0
DIARRHEA: 0

## 2022-03-09 ASSESSMENT — PAIN DESCRIPTION - LOCATION: LOCATION: GENERALIZED

## 2022-03-09 ASSESSMENT — PAIN DESCRIPTION - PAIN TYPE: TYPE: CHRONIC PAIN

## 2022-03-09 NOTE — PROGRESS NOTES
Hospitalist Progress Note    Patient:  Bethanie Russo  YOB: 1950  Date of Service: 3/9/2022  MRN: [id number]   Acct: [de-identified]   Primary Care Physician: Beatriz Montoya MD  Advance Directive: Full Code  Admit Date: 2/27/2022       Hospital Day: 9  Referring Provider: Tasha Samson DO    Patient Seen, Chart, Consults, Notes, Labs, Radiology studies reviewed. Subjective:  Bethanie Russo is a 66 y.o. male  whom we are following for ischemic cardiomyopathy, EF 10%, PAF. Some changes were made to his medications. We will hold on transfer to SNF.     Allergies:  Statins    Medicines:  Current Facility-Administered Medications   Medication Dose Route Frequency Provider Last Rate Last Admin    sacubitril-valsartan (ENTRESTO) 24-26 MG per tablet 1 tablet  1 tablet Oral BID Bambi Salinas MD        dilTIAZem (CARDIZEM CD) extended release capsule 120 mg  120 mg Oral BID Bambi Salinas MD        carvedilol (COREG) tablet 12.5 mg  12.5 mg Oral BID  Bambi Salinas MD        potassium chloride (KLOR-CON M) extended release tablet 40 mEq  40 mEq Oral PRN Tasha Samson, DO   40 mEq at 03/07/22 0757    Or    potassium bicarb-citric acid (EFFER-K) effervescent tablet 40 mEq  40 mEq Oral PRN Tasha Court, DO        Or    potassium chloride 10 mEq/100 mL IVPB (Peripheral Line)  10 mEq IntraVENous PRN Tasha Samson, DO        clonazePAM Piotr ) tablet 0.5 mg  0.5 mg Oral BID PRN Tasha Samson, DO   0.5 mg at 03/09/22 1118    amiodarone (CORDARONE) 450 mg in dextrose 5 % 250 mL infusion  0.5 mg/min IntraVENous Continuous Bambi Salinas MD   Stopped at 03/08/22 1026    furosemide (LASIX) tablet 60 mg  60 mg Oral BID Greyson Velez MD   60 mg at 03/08/22 1706    dilTIAZem 125 mg in dextrose 5 % 125 mL infusion  2.5-15 mg/hr IntraVENous Continuous Greyson Velez MD   Stopped at 03/07/22 0521    amiodarone (CORDARONE) tablet 200 mg  200 mg Oral BID Saira Price Joel Saavedra MD   200 mg at 03/09/22 0727    HYDROcodone-acetaminophen (NORCO) 7.5-325 MG per tablet 1 tablet  1 tablet Oral Q4H PRN Beryl Leslie, DO   1 tablet at 03/09/22 0430    morphine (PF) injection 2 mg  2 mg IntraVENous Q2H PRN Beryl Leslie, DO   2 mg at 03/07/22 8096    apixaban (ELIQUIS) tablet 5 mg  5 mg Oral BID General Elodia MD   5 mg at 03/09/22 0727    HYDROmorphone HCl PF (DILAUDID) injection 0.25 mg  0.25 mg IntraVENous Q5 Min PRN Yennifer Fulton MD        HYDROmorphone HCl PF (DILAUDID) injection 0.5 mg  0.5 mg IntraVENous Q5 Min PRN Yennifer Fulton MD        chlorhexidine gluconate (ANTISEPTIC SKIN CLEANSER) 4 % solution   Topical Once Franci KAYLENE Wang        famotidine (PEPCID) tablet 20 mg  20 mg Oral Daily Patience Quirino, DO   20 mg at 03/09/22 0727    sodium chloride flush 0.9 % injection 5-40 mL  5-40 mL IntraVENous 2 times per day Patience Quirino, DO   10 mL at 03/08/22 2058    sodium chloride flush 0.9 % injection 5-40 mL  5-40 mL IntraVENous PRN Patience Quirino, DO        ondansetron (ZOFRAN-ODT) disintegrating tablet 4 mg  4 mg Oral Q8H PRN Patience Quirino, DO        Or    ondansetron (ZOFRAN) injection 4 mg  4 mg IntraVENous Q6H PRN Patience Quirino, DO        polyethylene glycol (GLYCOLAX) packet 17 g  17 g Oral Daily PRN Patience Quirino, DO   17 g at 03/04/22 1813    aspirin EC tablet 81 mg  81 mg Oral Daily Patience Quirino, DO   81 mg at 03/09/22 0727    atorvastatin (LIPITOR) tablet 40 mg  40 mg Oral Nightly Patience Quirino, DO   40 mg at 03/08/22 2058    clopidogrel (PLAVIX) tablet 75 mg  75 mg Oral Daily Patience Quirino, DO   75 mg at 03/09/22 0727    nitroGLYCERIN (NITROSTAT) SL tablet 0.4 mg  0.4 mg SubLINGual Q5 Min PRN Patience Quirino, DO        nitroGLYCERIN (NITROSTAT) SL tablet 0.4 mg  0.4 mg SubLINGual Q5 Min PRN Patience Quirino, DO        spironolactone (ALDACTONE) tablet 25 mg  25 mg Oral Daily Patience Quirino, DO   25 mg at 03/09/22 1771    zinc sulfate (ZINCATE) capsule 50 mg  50 mg Oral Daily Jonas Currie MD   50 mg at 03/09/22 7252    Vitamin D (CHOLECALCIFEROL) tablet 2,000 Units  2,000 Units Oral Daily Jonas Currie MD   2,000 Units at 03/09/22 5203    ascorbic acid (VITAMIN C) tablet 500 mg  500 mg Oral BID Jonas Currie MD   500 mg at 03/09/22 6155    guaiFENesin tablet 400 mg  400 mg Oral Q8H Jonas Currie MD   400 mg at 03/09/22 1117    albuterol sulfate  (90 Base) MCG/ACT inhaler 2 puff  2 puff Inhalation Q4H Jonas Currie MD   2 puff at 03/09/22 1230    And    ipratropium (ATROVENT HFA) 17 MCG/ACT inhaler 2 puff  2 puff Inhalation Q4H Jonas Currie MD   2 puff at 03/09/22 1230    calcium carbonate (TUMS) chewable tablet 500 mg  500 mg Oral TID PRN Jonas Currie MD   500 mg at 03/07/22 2010       Past Medical History:  Past Medical History:   Diagnosis Date    Acute exacerbation of CHF (congestive heart failure) ([facility] [address] 02/02/2022    Atrial fibrillation ([facility] [address]     Bilateral carotid artery stenosis 01/06/2020    CAD (coronary artery disease), native coronary artery     acute inferior MI on 5/15/09, s/p emeergent CABG x4 5/15/09    Chest pain     HTN (hypertension)     Hyperlipidemia     Dr. Huy Paul manages    Palliative care patient 02/28/2022    Palpitations     Statin intolerance 02/14/2018    Tobacco abuse     Transient atrial fibrillation or flutter     post op       Past Surgical History:  Past Surgical History:   Procedure Laterality Date    CARDIAC CATHETERIZATION  4/23/12    with stent to left circ, and LAD    CARDIAC CATHETERIZATION  01/07/2018    PTCA/BJ to VG to LAD;  atherectomy and 3 BMS to VG to 1st Circ    CORONARY ARTERY BYPASS GRAFT  05/15/09    x4    DIAGNOSTIC CARDIAC CATH LAB PROCEDURE  05/15/09    left heart cath, left ventr, selective coronary arteriography     FRACTURE SURGERY Left     Leg    VASCULAR SURGERY  4/21/2015 [facility]    Percutaneous cannulation right common femoral artery with 5-Chilean and later 6-Chilean pinnacle destination sheath. Suprarenal abdominal aortogram with bilateral iliofemoral arteriograms. Bilateral lower extremity arteriograms. Left popliteal/tibioperoneal trunk artery balloon angioplasty with 4 mm x 15 mm cutting balloon.  VASCULAR SURGERY  4/21/2015 [facility] cont    Arteriograms after balloon angioplasty. Balloon angioplasty left popliteal artery/tibioperoneal trunk with 5 mm x 40 mm russell balloon. Arteriograms after balloon angioplasty. Left superficial femoral artery balloon angioplasty with 7 mm x 100 mm  balloon. Left superficial femoral artery stent placement idev supera 6.5 x 100 mm self-expanding nitinol stent. Completion arteriograms left lower e    VASCULAR SURGERY  4/21/2015 [facility] cont    extremity. Mynx closure right common femoral artery puncture site.  VASCULAR SURGERY  03/04/2020    [facility]. Percutaneous cannulation left common femoral artery with 5 Chilean glide sheath. Suprarenal abdominal aortogram with bilateral iliofemoral arteriogram.Bilateral lower extremity arteriogram.Minx closure left common femoral artery puncture site.        Family History  Family History   Problem Relation Age of Onset    Coronary Art Dis Father     High Blood Pressure Father     Coronary Art Dis Brother     High Blood Pressure Mother     Cancer Mother     High Blood Pressure Sister        Social History  Social History     Socioeconomic History    Marital status: Single     Spouse name: Not on file    Number of children: Not on file    Years of education: Not on file    Highest education level: Not on file   Occupational History    Not on file   Tobacco Use    Smoking status: Former Smoker     Packs/day: 0.00     Years: 45.00     Pack years: 0.00     Types: Cigarettes     Start date: 1964    Smokeless tobacco: Never Used    Tobacco comment: STATES,\"I VAP\"   Vaping Use    Vaping Use: Every day   Substance and Sexual Activity    Alcohol use: No    Drug use: No    Sexual activity: Not on file   Other Topics Concern    Not on file   Social History Narrative    Not on file     Social Determinants of Health     Financial Resource Strain:     Difficulty of Paying Living Expenses: Not on file   Food Insecurity:     Worried About Running Out of Food in the Last Year: Not on file    Eve of Food in the Last Year: Not on file   Transportation Needs:     Lack of Transportation (Medical): Not on file    Lack of Transportation (Non-Medical): Not on file   Physical Activity:     Days of Exercise per Week: Not on file    Minutes of Exercise per Session: Not on file   Stress:     Feeling of Stress : Not on file   Social Connections:     Frequency of Communication with Friends and Family: Not on file    Frequency of Social Gatherings with Friends and Family: Not on file    Attends Sikhism Services: Not on file    Active Member of [employer] or Organizations: Not on file    Attends Club or Organization Meetings: Not on file    Marital Status: Not on file   Intimate Partner Violence:     Fear of Current or Ex-Partner: Not on file    Emotionally Abused: Not on file    Physically Abused: Not on file    Sexually Abused: Not on file   Housing Stability:     Unable to Pay for Housing in the Last Year: Not on file    Number of [place] in the Last Year: Not on file    Unstable Housing in the Last Year: Not on file         Review of Systems:    Review of Systems   Constitutional: Negative for activity change and fever. HENT: Negative for congestion and voice change. Eyes: Negative for visual disturbance. Respiratory: Negative for shortness of breath. Cardiovascular: Negative for chest pain and leg swelling. Gastrointestinal: Negative for constipation, diarrhea, nausea and vomiting. Endocrine: Negative for polyuria. Genitourinary: Negative for difficulty urinating and dysuria. Musculoskeletal: Positive for gait problem.  Negative for back pain and MCV 91.4 91.4 93.9    253 279     LIVER PROFILE:   Recent Labs     03/07/22  0200 03/08/22  0141 03/09/22  0204   AST 21 21 22   ALT 19 17 19   BILITOT 0.3 0.4 0.4   ALKPHOS 131* 127 120     PT/INR: No results for input(s): PROTIME, INR in the last 72 hours. APTT: No results for input(s): APTT in the last 72 hours. BNP:  No results for input(s): BNP in the last 72 hours. Ionized Calcium:No results for input(s): IONCA in the last 72 hours. Magnesium:  Recent Labs     03/07/22 0200 03/08/22 0141 03/09/22 0204   MG 1.7 1.7 1.7     Phosphorus:No results for input(s): PHOS in the last 72 hours. HgbA1C: No results for input(s): LABA1C in the last 72 hours. Hepatic:   Recent Labs     03/07/22 0200 03/08/22  0141 03/09/22  0204   ALKPHOS 131* 127 120   ALT 19 17 19   AST 21 21 22   PROT 4.7* 5.0* 4.9*   BILITOT 0.3 0.4 0.4   LABALBU 2.6* 2.6* 2.6*     Lactic Acid: No results for input(s): LACTA in the last 72 hours. Troponin: No results for input(s): CKTOTAL, CKMB, TROPONINT in the last 72 hours. ABGs: No results for input(s): PH, PCO2, PO2, HCO3, O2SAT in the last 72 hours. CRP:  No results for input(s): CRP in the last 72 hours. Sed Rate:  No results for input(s): SEDRATE in the last 72 hours. Cultures:   No results for input(s): CULTURE in the last 72 hours. No results for input(s): BC, Hussein Serna in the last 72 hours. No results for input(s): CXSURG in the last 72 hours.     Radiology reports as per the Radiologist  Radiology: VL Extremity Venous Left    Result Date: 3/2/2022  Vascular Upper Extremities Veins Procedure  Demographics   Patient Name    CASI Hu  Age                  66                  S   Patient Number  [id number]         Gender               Male   Visit Number    [id number]      Robe Choi                                 Physician   Date of Birth   1950     Referring Physician  Keiry Moreno MD   Accession       [id number]     Delicia Carmine Diamond RVT,  Number                                              RDMS  Procedure Type of Study:   Veins:Upper Extremities Veins, US Doppler Venous Upper Extremity  Unilateral.  Indications for Study:Pacemaker. Risk Factors   - The patient's last creatinine was 0.8 mg/dl. Allergies   - Statins. Impression   successful Left subclavian vein access   Signature   ----------------------------------------------------------------  Electronically signed by Odilia Guerin(Interpreting  physician) on 03/02/2022 12:23 PM  ----------------------------------------------------------------      XR CHEST PORTABLE    Result Date: 3/2/2022  XR CHEST PORTABLE 3/2/2022 1:43 PM HISTORY: Pacemaker  Technique: Single AP view of the chest COMPARISONS: 2/27/2022 FINDINGS: Bibasilar atelectasis. Cardiomegaly which is stable. Pulmonary vasculature are nondilated. No pneumothorax. New left chest wall dual chamber pacer defibrillator. No acute bony abnormality. 1. New left chest wall pacemaker. No pneumothorax. 2. Bibasilar atelectasis. Signed by Dr Guido Finney     Systolic heart failure secondary to ischemic cardiomyopathy. S/P AICD.     Coronary artery disease involving native coronary artery of native heart with angina pectoris. Continue ongoing medical management.     Tobacco abuse. Encourage cessation.     Hyperlipidemia. Continue medical management.     Severe left ventricular systolic dysfunction. Continue medical management.     Paroxysmal atrial fibrillation. Continue amiodarone.     Please document 36 minutes of critical care time for patient assessment, chart review, discussion with staff, .       Debi Daley, DO

## 2022-03-09 NOTE — CARE COORDINATION
Pt had meds changes and dc delayed until tomorrow pending pt status. Informed admissions for [facility] and pt able to admit tomorrow. Transport expected as EMS.      [facility] [address] [facility]  [phone]

## 2022-03-09 NOTE — PROGRESS NOTES
This  visited with pt to provide spiritual care. Pt's wife was present and visiting with pt. This  provided spiritual care with sustaining presence, nurtured hope, and prayer. Pt's wife asked for pt to receive communion and pt was agreeable. This  will offer the pt communion in the morning. Pt and wife expressed gratitude for spiritual care.     Electronically signed by Meredith Ivory on 3/9/2022 at 11:45 AM

## 2022-03-09 NOTE — PROGRESS NOTES
03/09/22 1600   Restrictions/Precautions   Restrictions/Precautions Fall Risk;Isolation   Required Braces or Orthoses? No   Position Activity Restriction   Other position/activity restrictions droplet +   General   Chart Reviewed Yes   Subjective   Subjective Patient in chair agrees to work with therapy. General Comment   Comments KAYLENE [place]   Pain Screening   Patient Currently in Pain No   Intervention List Patient able to continue with treatment   Oxygen Therapy   SpO2 95 %   O2 Device None (Room air)   Bed Mobility   Rolling Modified independent   Supine to Sit Modified independent   Transfers   Sit to Stand Contact guard assistance   Stand to sit Contact guard assistance   Bed to Chair Contact guard assistance   Ambulation   Ambulation?  Yes   Ambulation 1   Device No Device   Other Apparatus   (Monitor lines)   Assistance Contact guard assistance   Quality of Gait Steady NO LOB   Gait Deviations Slow Paloma;Decreased step length   Distance 20'   Comments Patient in chair all lines attached   Exercises   Heelslides 15   Hip Flexion 15   Knee Long Arc Quad 15   Knee Active Range of Motion yes   Ankle Pumps 15   Activity Tolerance   Activity Tolerance Patient Tolerated treatment well   Safety Devices   Type of devices Left in chair;Call light within reach   Physical Therapy    Electronically signed by Jus Pa PTA on 3/9/2022 at 4:07 PM

## 2022-03-09 NOTE — PROGRESS NOTES
Cardiology Daily Note Tequila Nichols MD      Patient:  Abdi Machuca  [id number]    Patient Active Problem List    Diagnosis Date Noted    Systolic heart failure secondary to hypertrophic cardiomyopathy ([address] 02/28/2022    Severe left ventricular systolic dysfunction 46/76/3110    Acute respiratory failure with hypoxia ([address]     Acute respiratory failure with hypoxia and hypercarbia ([address] 02/02/2022    Sepsis ([address] 02/02/2022    Acute exacerbation of CHF (congestive heart failure) ([address] 39/13/3848    Acute systolic (congestive) heart failure ([address] 02/02/2022    PVD (peripheral vascular disease) ([address]     Bilateral carotid artery stenosis 01/06/2020    Statin intolerance 02/14/2018    Inferior MI ([address] 01/07/2018    ST elevation myocardial infarction (STEMI) ([address]     Atherosclerosis of native artery of extremity with intermittent claudication ([address] 04/15/2015    Paroxysmal A-fib ([address]     Tobacco abuse     Chest pain     Hyperlipidemia     Palpitations     HTN (hypertension)     Coronary artery disease involving native coronary artery of native heart with angina pectoris ([address]        Admit Date:  2/27/2022    Admission Problem List: Present on Admission:   Systolic heart failure secondary to hypertrophic cardiomyopathy ([address]   Severe left ventricular systolic dysfunction   Tobacco abuse   Paroxysmal A-fib ([address]   Hyperlipidemia   Coronary artery disease involving native coronary artery of native heart with angina pectoris [facility])      Cardiac Specific Data:  Specialty Problems        Cardiology Problems    Coronary artery disease involving native coronary artery of native heart with angina pectoris (HCC)        HTN (hypertension)        Chest pain        Hyperlipidemia        Paroxysmal A-fib (HCC)        Atherosclerosis of native artery of extremity with intermittent claudication (HCC)        Inferior MI ([address]        ST elevation myocardial infarction (STEMI) ([address]        Bilateral carotid artery stenosis        PVD (peripheral vascular disease) (HCC)        Acute exacerbation of CHF (congestive heart failure) (HCC)        Acute systolic (congestive) heart failure ([facility])        Severe left ventricular systolic dysfunction        * (Principal) Systolic heart failure secondary to hypertrophic cardiomyopathy ([facility]              Subjective:  Mr. Sven Mccauley seen today resting comfortably blood pressure dropped somewhat during the night after larger dose of Coreg given. Next dose held blood pressure now was 89/52 heart rate 115 overall feels well except slight generalized weakness. No chest pain dyspnea stable. No other complaints or issues reported. Objective:   BP (!) 89/52   Pulse 115   Temp 97.8 °F (36.6 °C) (Temporal)   Resp 21   Ht 6' (1.829 m)   Wt 129 lb 6.4 oz (58.7 kg)   SpO2 94%   BMI 17.55 kg/m²       Intake/Output Summary (Last 24 hours) at 3/9/2022 1000  Last data filed at 3/9/2022 0800  Gross per 24 hour   Intake 600 ml   Output 2350 ml   Net -1750 ml       Prior to Admission medications    Medication Sig Start Date End Date Taking?  Authorizing Provider   furosemide (LASIX) 40 MG tablet Take 1 tablet by mouth 2 times daily 2/9/22   Terrance Og MD   atorvastatin (LIPITOR) 80 MG tablet Take 0.5 tablets by mouth nightly 2/9/22   Terrance Og MD   spironolactone (ALDACTONE) 25 MG tablet Take 1 tablet by mouth daily 2/9/22   Terrance Og MD   clopidogrel (PLAVIX) 75 MG tablet Take 1 tablet by mouth daily 2/9/22   Terrance Og MD   apixaban Russellstefanie Vasqueson) 5 MG TABS tablet Take 1 tablet by mouth 2 times daily 2/9/22   LUIS Rodas   isosorbide mononitrate (IMDUR) 30 MG extended release tablet Take 1 tablet by mouth daily 2/9/22   LUIS Rodas   sacubitril-valsartan (ENTRESTO) 49-51 MG per tablet Take 1 tablet by mouth 2 times daily 2/8/22   LUIS Rodas   Vitamin D (CHOLECALCIFEROL) 25 MCG (1000 UT) TABS tablet Take 5 tablets by mouth daily 2/9/22   LUIS Mendez   nitroGLYCERIN (NITROSTAT) 0.4 MG SL tablet Place 1 tablet under the tongue every 5 minutes as needed for Chest pain up to max of 3 total doses. If no relief after 1 dose, call 911. 6/8/21   LUIS Navarro   sotalol (BETAPACE) 160 MG tablet Take 1 tablet by mouth twice daily 6/8/21   LUIS Navarro   nitroGLYCERIN (NITROSTAT) 0.4 MG SL tablet Place 1 tablet under the tongue every 5 minutes as needed for Chest pain 6/8/21   LUIS Navarro   aspirin 81 MG EC tablet Take 81 mg by mouth daily.       Historical Provider, MD        sacubitril-valsartan  1 tablet Oral BID    dilTIAZem  120 mg Oral BID    carvedilol  12.5 mg Oral BID WC    furosemide  60 mg Oral BID    amiodarone  200 mg Oral BID    apixaban  5 mg Oral BID    chlorhexidine gluconate   Topical Once    famotidine  20 mg Oral Daily    sodium chloride flush  5-40 mL IntraVENous 2 times per day    aspirin  81 mg Oral Daily    atorvastatin  40 mg Oral Nightly    clopidogrel  75 mg Oral Daily    spironolactone  25 mg Oral Daily    zinc sulfate  50 mg Oral Daily    Vitamin D  2,000 Units Oral Daily    vitamin C  500 mg Oral BID    guaiFENesin  400 mg Oral Q8H    albuterol sulfate HFA  2 puff Inhalation Q4H    And    ipratropium  2 puff Inhalation Q4H       TELEMETRY: Sinus     Physical Exam:      Physical Exam      General:  Awake, alert, NAD  Skin:  Warm and dry  Neck:  no jvd , no carotid bruits  Chest:  Clear to auscultation, no wheezing or rales  Cardiovascular:  RRR [id number] no murmurs, clicks, gallups, or rubs  Abdomen:  Soft nontender, nondistended, bowel sounds present  Extremities:  Edema: none        Lab Data:  CBC:   Recent Labs     03/07/22  0200 03/08/22 0141 03/09/22  0204   WBC 16.1* 14.1* 12.3*   HGB 11.7* 11.8* 11.8*   HCT 35.1* 35.1* 36.7*   MCV 91.4 91.4 93.9    253 279     BMP:   Recent Labs     03/07/22  0200 03/08/22  0141 03/09/22  0204   * 137 134*   K 3.3* 3.6 3.9   CL 97* 98 96*   CO2 25 26 27   BUN 18 17 24*   CREATININE 0.6 0.8 0.8     LIVER PROFILE:   Recent Labs     03/07/22  0200 03/08/22  0141 03/09/22  0204   AST 21 21 22   ALT 19 17 19   BILITOT 0.3 0.4 0.4   ALKPHOS 131* 127 120     PT/INR: No results for input(s): PROTIME, INR in the last 72 hours. APTT: No results for input(s): APTT in the last 72 hours. BNP:  No results for input(s): BNP in the last 72 hours. CK, CKMB, Troponin: @LABRCNT (CKTOTAL:3, CKMB:3, TROPONINI:3)@    IMAGING:  ECHO Complete 2D W Doppler W Color    Result Date: 2/28/2022  Transthoracic Echocardiography Report (TTE)  Demographics   Patient Name  CASI Anderson Date of Study          02/28/2022   MRN           [id number]          Gender                 Male   Date of Birth 1950      Room Number            VAS-0946   Age           66 year(s)   Height:       72 inches       Referring Physician    Alisson Jasmine DO   Weight:       140 pounds      Sonographer            Bebe Painter [facility]   BSA:          1.83 m^2        Interpreting Physician Gina Chacko MD   BMI:          18.99 kg/m^2  Procedure Type of Study   TTE procedure:ECHO 2D W/DOPPLER/COLOR/CONTRAST. Study Location: Portable Technical Quality: Adequate visualization Patient Status: Inpatient BP: 110/69 mmHg Indications:Congestive heart failure, systolic dysfunction and Mitral regurgitation. Conclusions   Summary  Limited echocardiographic study. LV is moderate to severely dilated with severely reduced LV systolic  function. LV ejection fraction estimated at less than 20%. Probable grade 3 diastolic dysfunction. RV appears mildly dilated with moderately reduced RV systolic function  (TAPSE equals 1.6 cm). Severe left atrial enlargement. Left atrial pressure is elevated. Severe right atrial enlargement. Aortic valve not well studied. No significant stenosis. Probable mild to  moderate aortic regurgitation.   Mitral valve appears moderately thickened with preserved leaflet mobility. Severe central mitral regurgitation is noted. Mitral regurgitant jet  velocity suggests significantly elevated left atrial pressure. No significant pericardial effusion noted. Signature   ----------------------------------------------------------------  Electronically signed by Fredy Chacko MD(Interpreting physician)  on 02/28/2022 07:14 PM  ----------------------------------------------------------------  Allergies   - Statins. M-Mode Measurements (cm)   LVIDd: 5.64 cm                                  LVIDs: 5.11 cm  IVSd: 1.2 cm  LVPWd: 1.2 cm  % Ejection Fraction: 20 %  Doppler Measurements:    MV Peak E-Wave: 86.1 cm/s   MV Peak A-Wave: 27.9 cm/s   MV E/A Ratio: 3.09 %   MV Peak Gradient: 2.97 mmHg   MV P1/2t: 48 msec   MVA by PHT4.58 cm^2      VL Extremity Venous Left    Result Date: 3/2/2022  Vascular Upper Extremities Veins Procedure  Demographics   Patient Name    CASI Quiroz Grand  Age                  66                  S   Patient Number  [id number]         Gender               Male   Visit Number    [id number]      Interpreting         Ty Caryn                                 Physician   Date of Birth   1950     Referring Physician  Randon Saint MD   Accession       [id number]     [address], RVT,  Number                                              RDMS  Procedure Type of Study:   Veins:Upper Extremities Veins, US Doppler Venous Upper Extremity  Unilateral.  Indications for Study:Pacemaker. Risk Factors   - The patient's last creatinine was 0.8 mg/dl. Allergies   - Statins.   Impression   successful Left subclavian vein access   Signature   ----------------------------------------------------------------  Electronically signed by Pat Guerin(Interpreting  physician) on 03/02/2022 12:23 PM  ----------------------------------------------------------------      XR CHEST PORTABLE    Result Date: 3/2/2022  XR CHEST PORTABLE 3/2/2022 1:43 PM HISTORY: Pacemaker  Technique: Single AP view of the chest COMPARISONS: 2/27/2022 FINDINGS: Bibasilar atelectasis. Cardiomegaly which is stable. Pulmonary vasculature are nondilated. No pneumothorax. New left chest wall dual chamber pacer defibrillator. No acute bony abnormality. 1. New left chest wall pacemaker. No pneumothorax. 2. Bibasilar atelectasis. Signed by Dr Orta Daily    XR CHEST PORTABLE    Result Date: 2/28/2022  Examination. XR CHEST PORTABLE 2/27/2022 11:30 PM History: Chest pain. A single frontal portable semiupright view of the chest is compared with the previous study dated 2/2/2022. There is a significant improvement in the bilateral lung infiltrate since the previous study. There is a small residual infiltrate at bilateral lung bases and a small left basal pleural effusion. There is persistent moderate cardiomegaly. Atheromatous changes thoracic aorta are noted. No pulmonary congestion or pneumothorax. No acute bony abnormality. 1. Improvement. Signed by Dr Andrey Pabon:  1. ?  Supraventricular tachycardia of the past 5 to 6 days with rates 150s to 170s  2. LifeVest  3. Ischemic cardiomyopathy  4. Cardiac arrest on admission 2/27/2022 with rapid wide-complex tachycardia subsequent bradycardia and asystole requiring 2-minute CPR  5. Complaints of chest pain since CPR worse with direct pressure inspiration or cough  6. History of hypertension  7. History tobacco abuse  8. Peripheral arterial disease previous stent placement  9. Paroxysmal atrial fibrillation  10. History of inferior STEMI  11. Statin intolerance  12. Bilateral carotid artery stenosis  13. Cardiac cath with stent placement 1/7/2018 [employer] vision 3.5 x 28  14. Status post CABG x4 grafts 2011  15. Chronic systolic diastolic congestive heart failure  16.  Cardiac cath 2/3/2022 significant LV systolic dysfunction LIMA graft atretic 2 vein grafts occluded 1 of which appears recent right coronary artery saphenous vein graft moderately severe stenosis in the proximal to mid segment status post stent placement in the mid body of the vein graft of the distal right coronary artery 4.0 x 22 mm resolute, 2.5 x 38 resolute integrity stent mid LAD, PTCA, vein graft LAD into the distal LAD at the distal anastomosis  17. Echocardiogram 2/27/2022 EF less than 37% grade 3 diastolic dysfunction RV dilatation severe biatrial enlargement mild to moderate AR severe central MR jet noted  18. Elevated troponins this admission 0.15 now down to 0.09.  19. Chronic anticoagulation with apixaban  20. Chronic antiarrhythmic therapy with sotalol  21. Elevated liver function test improving  22. Status post ICD dual-chamber placement 3/3/2022  23. Episode of atrial fibrillation this morning  24. Hypokalemia 3.3 this morning       Plan:  1. Check a repeat proBNP level  2. Reduce Coreg 12.5 p.o. twice daily  3. Increase Cardizem  p.o. twice daily  4. Discontinue Imdur  5. Reduce dose of Entresto to 1 p.o. twice daily 24/26 mg  6.  Further comments to follow    Annel Aceves MD, MD 3/9/2022 10:00 AM

## 2022-03-10 ENCOUNTER — TELEPHONE (OUTPATIENT)
Dept: CARDIOLOGY CLINIC | Age: 72
End: 2022-03-10

## 2022-03-10 VITALS
TEMPERATURE: 97.8 F | RESPIRATION RATE: 17 BRPM | SYSTOLIC BLOOD PRESSURE: 113 MMHG | HEIGHT: 72 IN | BODY MASS INDEX: 17.53 KG/M2 | WEIGHT: 129.4 LBS | OXYGEN SATURATION: 95 % | DIASTOLIC BLOOD PRESSURE: 60 MMHG | HEART RATE: 91 BPM

## 2022-03-10 PROBLEM — I50.20 SYSTOLIC HEART FAILURE SECONDARY TO HYPERTROPHIC CARDIOMYOPATHY (HCC): Status: RESOLVED | Noted: 2022-02-28 | Resolved: 2022-03-10

## 2022-03-10 PROBLEM — I42.2 SYSTOLIC HEART FAILURE SECONDARY TO HYPERTROPHIC CARDIOMYOPATHY (HCC): Status: RESOLVED | Noted: 2022-02-28 | Resolved: 2022-03-10

## 2022-03-10 PROBLEM — I51.89 SEVERE LEFT VENTRICULAR SYSTOLIC DYSFUNCTION: Status: RESOLVED | Noted: 2022-02-28 | Resolved: 2022-03-10

## 2022-03-10 PROBLEM — I51.9 SEVERE LEFT VENTRICULAR SYSTOLIC DYSFUNCTION: Status: RESOLVED | Noted: 2022-02-28 | Resolved: 2022-03-10

## 2022-03-10 LAB
ALBUMIN SERPL-MCNC: 2.7 G/DL (ref 3.5–5.2)
ALP BLD-CCNC: 123 U/L (ref 40–130)
ALT SERPL-CCNC: 23 U/L (ref 5–41)
ANION GAP SERPL CALCULATED.3IONS-SCNC: 12 MMOL/L (ref 7–19)
AST SERPL-CCNC: 25 U/L (ref 5–40)
BASOPHILS ABSOLUTE: 0 K/UL (ref 0–0.2)
BASOPHILS RELATIVE PERCENT: 0.3 % (ref 0–1)
BILIRUB SERPL-MCNC: 0.4 MG/DL (ref 0.2–1.2)
BUN BLDV-MCNC: 28 MG/DL (ref 8–23)
CALCIUM SERPL-MCNC: 8.3 MG/DL (ref 8.8–10.2)
CHLORIDE BLD-SCNC: 99 MMOL/L (ref 98–111)
CO2: 28 MMOL/L (ref 22–29)
CREAT SERPL-MCNC: 0.7 MG/DL (ref 0.5–1.2)
EOSINOPHILS ABSOLUTE: 0.1 K/UL (ref 0–0.6)
EOSINOPHILS RELATIVE PERCENT: 1.1 % (ref 0–5)
GFR AFRICAN AMERICAN: >59
GFR NON-AFRICAN AMERICAN: >60
GLUCOSE BLD-MCNC: 85 MG/DL (ref 74–109)
HCT VFR BLD CALC: 36.6 % (ref 42–52)
HEMOGLOBIN: 11.8 G/DL (ref 14–18)
IMMATURE GRANULOCYTES #: 0.1 K/UL
LYMPHOCYTES ABSOLUTE: 2.1 K/UL (ref 1.1–4.5)
LYMPHOCYTES RELATIVE PERCENT: 15.4 % (ref 20–40)
MAGNESIUM: 1.7 MG/DL (ref 1.6–2.4)
MCH RBC QN AUTO: 30.5 PG (ref 27–31)
MCHC RBC AUTO-ENTMCNC: 32.2 G/DL (ref 33–37)
MCV RBC AUTO: 94.6 FL (ref 80–94)
MONOCYTES ABSOLUTE: 0.9 K/UL (ref 0–0.9)
MONOCYTES RELATIVE PERCENT: 6.5 % (ref 0–10)
NEUTROPHILS ABSOLUTE: 10.1 K/UL (ref 1.5–7.5)
NEUTROPHILS RELATIVE PERCENT: 75.7 % (ref 50–65)
PDW BLD-RTO: 15.2 % (ref 11.5–14.5)
PLATELET # BLD: 291 K/UL (ref 130–400)
PMV BLD AUTO: 10.6 FL (ref 9.4–12.4)
POTASSIUM SERPL-SCNC: 4 MMOL/L (ref 3.5–5)
RBC # BLD: 3.87 M/UL (ref 4.7–6.1)
SODIUM BLD-SCNC: 139 MMOL/L (ref 136–145)
TOTAL PROTEIN: 5.3 G/DL (ref 6.6–8.7)
WBC # BLD: 13.3 K/UL (ref 4.8–10.8)

## 2022-03-10 PROCEDURE — 6370000000 HC RX 637 (ALT 250 FOR IP): Performed by: INTERNAL MEDICINE

## 2022-03-10 PROCEDURE — 36415 COLL VENOUS BLD VENIPUNCTURE: CPT

## 2022-03-10 PROCEDURE — 2580000003 HC RX 258: Performed by: INTERNAL MEDICINE

## 2022-03-10 PROCEDURE — 80053 COMPREHEN METABOLIC PANEL: CPT

## 2022-03-10 PROCEDURE — 99232 SBSQ HOSP IP/OBS MODERATE 35: CPT | Performed by: INTERNAL MEDICINE

## 2022-03-10 PROCEDURE — 83735 ASSAY OF MAGNESIUM: CPT

## 2022-03-10 PROCEDURE — 85025 COMPLETE CBC W/AUTO DIFF WBC: CPT

## 2022-03-10 RX ORDER — CARVEDILOL 12.5 MG/1
12.5 TABLET ORAL 2 TIMES DAILY WITH MEALS
Qty: 60 TABLET | Refills: 3 | Status: SHIPPED | OUTPATIENT
Start: 2022-03-10 | End: 2022-04-14 | Stop reason: DRUGHIGH

## 2022-03-10 RX ORDER — HYDROCODONE BITARTRATE AND ACETAMINOPHEN 7.5; 325 MG/1; MG/1
1 TABLET ORAL EVERY 4 HOURS PRN
Qty: 18 TABLET | Refills: 0 | Status: SHIPPED | OUTPATIENT
Start: 2022-03-10 | End: 2022-03-13

## 2022-03-10 RX ORDER — DILTIAZEM HYDROCHLORIDE 120 MG/1
120 CAPSULE, COATED, EXTENDED RELEASE ORAL 2 TIMES DAILY
Qty: 30 CAPSULE | Refills: 3 | Status: SHIPPED | OUTPATIENT
Start: 2022-03-10 | End: 2022-07-06

## 2022-03-10 RX ORDER — FUROSEMIDE 20 MG/1
60 TABLET ORAL 2 TIMES DAILY
Qty: 60 TABLET | Refills: 3 | Status: SHIPPED | OUTPATIENT
Start: 2022-03-10 | End: 2022-07-06

## 2022-03-10 RX ORDER — AMIODARONE HYDROCHLORIDE 200 MG/1
200 TABLET ORAL 2 TIMES DAILY
Qty: 60 TABLET | Refills: 1 | Status: SHIPPED | OUTPATIENT
Start: 2022-03-10 | End: 2022-04-14 | Stop reason: DRUGHIGH

## 2022-03-10 RX ORDER — CLONAZEPAM 0.5 MG/1
0.5 TABLET ORAL 2 TIMES DAILY PRN
Qty: 6 TABLET | Refills: 0 | Status: SHIPPED | OUTPATIENT
Start: 2022-03-10 | End: 2022-07-06

## 2022-03-10 RX ADMIN — SACUBITRIL AND VALSARTAN 1 TABLET: 24; 26 TABLET, FILM COATED ORAL at 08:04

## 2022-03-10 RX ADMIN — HYDROCODONE BITARTRATE AND ACETAMINOPHEN 1 TABLET: 7.5; 325 TABLET ORAL at 16:16

## 2022-03-10 RX ADMIN — HYDROCODONE BITARTRATE AND ACETAMINOPHEN 1 TABLET: 7.5; 325 TABLET ORAL at 05:41

## 2022-03-10 RX ADMIN — DILTIAZEM HYDROCHLORIDE 120 MG: 120 CAPSULE, COATED, EXTENDED RELEASE ORAL at 08:04

## 2022-03-10 RX ADMIN — ZINC SULFATE 220 MG (50 MG) CAPSULE 50 MG: CAPSULE at 08:04

## 2022-03-10 RX ADMIN — OXYCODONE HYDROCHLORIDE AND ACETAMINOPHEN 500 MG: 500 TABLET ORAL at 08:04

## 2022-03-10 RX ADMIN — IPRATROPIUM BROMIDE 2 PUFF: 17 AEROSOL, METERED RESPIRATORY (INHALATION) at 12:38

## 2022-03-10 RX ADMIN — SODIUM CHLORIDE, PRESERVATIVE FREE 10 ML: 5 INJECTION INTRAVENOUS at 08:06

## 2022-03-10 RX ADMIN — ALBUTEROL SULFATE 2 PUFF: 90 AEROSOL, METERED RESPIRATORY (INHALATION) at 08:05

## 2022-03-10 RX ADMIN — CLOPIDOGREL BISULFATE 75 MG: 75 TABLET ORAL at 08:04

## 2022-03-10 RX ADMIN — CARVEDILOL 12.5 MG: 12.5 TABLET, FILM COATED ORAL at 08:05

## 2022-03-10 RX ADMIN — SPIRONOLACTONE 25 MG: 25 TABLET ORAL at 08:04

## 2022-03-10 RX ADMIN — HYDROCODONE BITARTRATE AND ACETAMINOPHEN 1 TABLET: 7.5; 325 TABLET ORAL at 01:15

## 2022-03-10 RX ADMIN — CARVEDILOL 12.5 MG: 12.5 TABLET, FILM COATED ORAL at 16:16

## 2022-03-10 RX ADMIN — AMIODARONE HYDROCHLORIDE 200 MG: 200 TABLET ORAL at 08:05

## 2022-03-10 RX ADMIN — FUROSEMIDE 60 MG: 40 TABLET ORAL at 08:05

## 2022-03-10 RX ADMIN — CLONAZEPAM 0.5 MG: 0.5 TABLET ORAL at 10:22

## 2022-03-10 RX ADMIN — FAMOTIDINE 20 MG: 20 TABLET ORAL at 08:05

## 2022-03-10 RX ADMIN — ALBUTEROL SULFATE 2 PUFF: 90 AEROSOL, METERED RESPIRATORY (INHALATION) at 16:17

## 2022-03-10 RX ADMIN — IPRATROPIUM BROMIDE 2 PUFF: 17 AEROSOL, METERED RESPIRATORY (INHALATION) at 08:05

## 2022-03-10 RX ADMIN — Medication 2000 UNITS: at 08:04

## 2022-03-10 RX ADMIN — ALBUTEROL SULFATE 2 PUFF: 90 AEROSOL, METERED RESPIRATORY (INHALATION) at 12:38

## 2022-03-10 RX ADMIN — IPRATROPIUM BROMIDE 2 PUFF: 17 AEROSOL, METERED RESPIRATORY (INHALATION) at 16:17

## 2022-03-10 RX ADMIN — FUROSEMIDE 60 MG: 40 TABLET ORAL at 16:16

## 2022-03-10 RX ADMIN — ASPIRIN 81 MG: 81 TABLET, COATED ORAL at 08:04

## 2022-03-10 RX ADMIN — APIXABAN 5 MG: 5 TABLET, FILM COATED ORAL at 08:04

## 2022-03-10 RX ADMIN — GUAIFENESIN 400 MG: 200 TABLET ORAL at 12:37

## 2022-03-10 ASSESSMENT — PAIN DESCRIPTION - PROGRESSION: CLINICAL_PROGRESSION: GRADUALLY WORSENING

## 2022-03-10 ASSESSMENT — PAIN SCALES - GENERAL
PAINLEVEL_OUTOF10: 8
PAINLEVEL_OUTOF10: 8
PAINLEVEL_OUTOF10: 9
PAINLEVEL_OUTOF10: 4

## 2022-03-10 ASSESSMENT — PAIN DESCRIPTION - PAIN TYPE: TYPE: OTHER (COMMENT)

## 2022-03-10 ASSESSMENT — PAIN DESCRIPTION - LOCATION: LOCATION: CHEST

## 2022-03-10 ASSESSMENT — PAIN DESCRIPTION - FREQUENCY: FREQUENCY: CONTINUOUS

## 2022-03-10 ASSESSMENT — PAIN DESCRIPTION - ORIENTATION: ORIENTATION: MID

## 2022-03-10 ASSESSMENT — PAIN DESCRIPTION - DESCRIPTORS: DESCRIPTORS: ACHING

## 2022-03-10 ASSESSMENT — PAIN DESCRIPTION - ONSET: ONSET: ON-GOING

## 2022-03-10 NOTE — PROGRESS NOTES
Cardiology Daily Note Maik Best MD      Patient:  Long Srinivasan  [id number]    Patient Active Problem List    Diagnosis Date Noted    Acute respiratory failure with hypoxia ([address]     Acute respiratory failure with hypoxia and hypercarbia ([address] 02/02/2022    Sepsis ([address] 02/02/2022    Acute exacerbation of CHF (congestive heart failure) ([address] 72/09/7574    Acute systolic (congestive) heart failure ([address] 02/02/2022    PVD (peripheral vascular disease) ([address]     Bilateral carotid artery stenosis 01/06/2020    Statin intolerance 02/14/2018    Inferior MI ([address] 01/07/2018    ST elevation myocardial infarction (STEMI) ([address]     Atherosclerosis of native artery of extremity with intermittent claudication ([address] 04/15/2015    Chest pain     Palpitations     HTN (hypertension)        Admit Date:  2/27/2022    Admission Problem List: Present on Admission:   (Resolved) Systolic heart failure secondary to hypertrophic cardiomyopathy ([address]   (Resolved) Severe left ventricular systolic dysfunction   (Resolved) Tobacco abuse   (Resolved) Paroxysmal A-fib ([address]   (Resolved) Hyperlipidemia   (Resolved) Coronary artery disease involving native coronary artery of native heart with angina pectoris [facility])      Cardiac Specific Data:  Specialty Problems        Cardiology Problems    HTN (hypertension)        Chest pain        Atherosclerosis of native artery of extremity with intermittent claudication (HCC)        Inferior MI ([address]        ST elevation myocardial infarction (STEMI) ([address]        Bilateral carotid artery stenosis        PVD (peripheral vascular disease) (HCC)        Acute exacerbation of CHF (congestive heart failure) (HCC)        Acute systolic (congestive) heart failure (HCC)              Subjective:  Mr. Rupert Kendall seen today blood pressure 130/97 heart rate 100-105 denies chest pain denies dyspnea fatigue improved no other complaints or issues reported.     Objective:   BP (!) 154/131   Pulse 106 Temp 96.8 °F (36 °C)   Resp 18   Ht 6' (1.829 m)   Wt 129 lb 6.4 oz (58.7 kg)   SpO2 92%   BMI 17.55 kg/m²       Intake/Output Summary (Last 24 hours) at 3/10/2022 0947  Last data filed at 3/10/2022 0919  Gross per 24 hour   Intake 120 ml   Output 525 ml   Net -405 ml       Prior to Admission medications    Medication Sig Start Date End Date Taking? Authorizing Provider   HYDROcodone-acetaminophen (NORCO) 7.5-325 MG per tablet Take 1 tablet by mouth every 4 hours as needed for Pain for up to 3 days. 3/10/22 3/13/22 Yes Nick Hercules DO   amiodarone (CORDARONE) 200 MG tablet Take 1 tablet by mouth 2 times daily 3/10/22  Yes Nick Hercules DO   clonazePAM (KLONOPIN) 0.5 MG tablet Take 1 tablet by mouth 2 times daily as needed for Anxiety for up to 3 days.  3/10/22 3/13/22 Yes Nick Hercules DO   carvedilol (COREG) 12.5 MG tablet Take 1 tablet by mouth 2 times daily (with meals) 3/10/22  Yes Nick Hercules DO   dilTIAZem (CARDIZEM CD) 120 MG extended release capsule Take 1 capsule by mouth in the morning and at bedtime 3/10/22  Yes Nick Hercules DO   sacubitril-valsartan (ENTRESTO) 24-26 MG per tablet Take 1 tablet by mouth 2 times daily 3/10/22  Yes Nick Hercules DO   furosemide (LASIX) 20 MG tablet Take 3 tablets by mouth 2 times daily 3/10/22  Yes Nick Hercules DO   atorvastatin (LIPITOR) 80 MG tablet Take 0.5 tablets by mouth nightly 2/9/22   Dez Christopher MD   spironolactone (ALDACTONE) 25 MG tablet Take 1 tablet by mouth daily 2/9/22   Dez Christopher MD   clopidogrel (PLAVIX) 75 MG tablet Take 1 tablet by mouth daily 2/9/22   Dez Christopher MD   apixaban Lennox Cowboy) 5 MG TABS tablet Take 1 tablet by mouth 2 times daily 2/9/22   LUIS Madison   isosorbide mononitrate (IMDUR) 30 MG extended release tablet Take 1 tablet by mouth daily 2/9/22   LUIS Madison   Vitamin D (CHOLECALCIFEROL) 25 MCG (1000 UT) TABS tablet Take 5 tablets by mouth daily 2/9/22   LUIS Mora   nitroGLYCERIN (NITROSTAT) 0.4 MG SL tablet Place 1 tablet under the tongue every 5 minutes as needed for Chest pain up to max of 3 total doses. If no relief after 1 dose, call 911. 6/8/21   LUIS Griffin   nitroGLYCERIN (NITROSTAT) 0.4 MG SL tablet Place 1 tablet under the tongue every 5 minutes as needed for Chest pain 6/8/21   LUIS Griffin   aspirin 81 MG EC tablet Take 81 mg by mouth daily.       Historical Provider, MD        sacubitril-valsartan  1 tablet Oral BID    dilTIAZem  120 mg Oral BID    carvedilol  12.5 mg Oral BID WC    furosemide  60 mg Oral BID    amiodarone  200 mg Oral BID    apixaban  5 mg Oral BID    chlorhexidine gluconate   Topical Once    famotidine  20 mg Oral Daily    sodium chloride flush  5-40 mL IntraVENous 2 times per day    aspirin  81 mg Oral Daily    atorvastatin  40 mg Oral Nightly    clopidogrel  75 mg Oral Daily    spironolactone  25 mg Oral Daily    zinc sulfate  50 mg Oral Daily    Vitamin D  2,000 Units Oral Daily    vitamin C  500 mg Oral BID    guaiFENesin  400 mg Oral Q8H    albuterol sulfate HFA  2 puff Inhalation Q4H    And    ipratropium  2 puff Inhalation Q4H       TELEMETRY: Sinus     Physical Exam:      Physical Exam      General:  Awake, alert, NAD  Skin:  Warm and dry  Neck:  no jvd , no carotid bruits  Chest:  Clear to auscultation, no wheezing or rales  Cardiovascular:  RRR N8P3 no murmurs, clicks, gallups, or rubs  Abdomen:  Soft nontender, nondistended, bowel sounds present  Extremities:  Edema: Trace      Lab Data:  CBC:   Recent Labs     03/08/22  0141 03/09/22  0204 03/10/22  0154   WBC 14.1* 12.3* 13.3*   HGB 11.8* 11.8* 11.8*   HCT 35.1* 36.7* 36.6*   MCV 91.4 93.9 94.6*    279 291     BMP:   Recent Labs     03/08/22  0141 03/09/22  0204 03/10/22  0154    134* 139   K 3.6 3.9 4.0   CL 98 96* 99   CO2 26 27 28   BUN 17 24* 28*   CREATININE 0.8 0.8 0.7 LIVER PROFILE:   Recent Labs     03/08/22  0141 03/09/22  0204 03/10/22  0154   AST 21 22 25   ALT 17 19 23   BILITOT 0.4 0.4 0.4   ALKPHOS 127 120 123     PT/INR: No results for input(s): PROTIME, INR in the last 72 hours. APTT: No results for input(s): APTT in the last 72 hours. BNP:  No results for input(s): BNP in the last 72 hours. CK, CKMB, Troponin: @LABRCNT (CKTOTAL:3, CKMB:3, TROPONINI:3)@    IMAGING:  ECHO Complete 2D W Doppler W Color    Result Date: 2/28/2022  Transthoracic Echocardiography Report (TTE)  Demographics   Patient Name  CASI Ridley Date of Study          02/28/2022   MRN           [id number]          Gender                 Male   Date of Birth 1950      Room Number            IZX-2888   Age           66 year(s)   Height:       72 inches       Referring Physician    Manuel Hauser DO   Weight:       140 pounds      Sonographer            Bebe Painter ROSIO   BSA:          1.83 m^2        Interpreting Physician Josh Chacko MD   BMI:          18.99 kg/m^2  Procedure Type of Study   TTE procedure:ECHO 2D W/DOPPLER/COLOR/CONTRAST. Study Location: Portable Technical Quality: Adequate visualization Patient Status: Inpatient BP: 110/69 mmHg Indications:Congestive heart failure, systolic dysfunction and Mitral regurgitation. Conclusions   Summary  Limited echocardiographic study. LV is moderate to severely dilated with severely reduced LV systolic  function. LV ejection fraction estimated at less than 20%. Probable grade 3 diastolic dysfunction. RV appears mildly dilated with moderately reduced RV systolic function  (TAPSE equals 1.6 cm). Severe left atrial enlargement. Left atrial pressure is elevated. Severe right atrial enlargement. Aortic valve not well studied. No significant stenosis. Probable mild to  moderate aortic regurgitation. Mitral valve appears moderately thickened with preserved leaflet mobility. Severe central mitral regurgitation is noted.  Mitral regurgitant jet  velocity suggests significantly elevated left atrial pressure. No significant pericardial effusion noted. Signature   ----------------------------------------------------------------  Electronically signed by Henretta Dubin Mani,MD(Interpreting physician)  on 02/28/2022 07:14 PM  ----------------------------------------------------------------  Allergies   - Statins. M-Mode Measurements (cm)   LVIDd: 5.64 cm                                  LVIDs: 5.11 cm  IVSd: 1.2 cm  LVPWd: 1.2 cm  % Ejection Fraction: 20 %  Doppler Measurements:    MV Peak E-Wave: 86.1 cm/s   MV Peak A-Wave: 27.9 cm/s   MV E/A Ratio: 3.09 %   MV Peak Gradient: 2.97 mmHg   MV P1/2t: 48 msec   MVA by PHT4.58 cm^2      VL Extremity Venous Left    Result Date: 3/2/2022  Vascular Upper Extremities Veins Procedure  Demographics   Patient Name    CASI Aguilera  Age                  66                  S   Patient Number  [id number]         Gender               Male   Visit Number    [id number]      Interpreting         Rachele Randhawa                                 Physician   Date of Birth   1950     Referring Physician  Drake Moise MD   Accession       [id number]     [address] [facility], [place],  Number                                              RDMS  Procedure Type of Study:   Veins:Upper Extremities Veins, US Doppler Venous Upper Extremity  Unilateral.  Indications for Study:Pacemaker. Risk Factors   - The patient's last creatinine was 0.8 mg/dl. Allergies   - Statins.   Impression   successful Left subclavian vein access   Signature   ----------------------------------------------------------------  Electronically signed by Immanuel Guerin(Interpreting  physician) on 03/02/2022 12:23 PM  ----------------------------------------------------------------      XR CHEST PORTABLE    Result Date: 3/2/2022  XR CHEST PORTABLE 3/2/2022 1:43 PM HISTORY: Pacemaker  Technique: Single AP view of the chest COMPARISONS: 2/27/2022 FINDINGS: Bibasilar atelectasis. Cardiomegaly which is stable. Pulmonary vasculature are nondilated. No pneumothorax. New left chest wall dual chamber pacer defibrillator. No acute bony abnormality. 1. New left chest wall pacemaker. No pneumothorax. 2. Bibasilar atelectasis. Signed by Dr Rafat Wright    XR CHEST PORTABLE    Result Date: 2/28/2022  Examination. XR CHEST PORTABLE 2/27/2022 11:30 PM History: Chest pain. A single frontal portable semiupright view of the chest is compared with the previous study dated 2/2/2022. There is a significant improvement in the bilateral lung infiltrate since the previous study. There is a small residual infiltrate at bilateral lung bases and a small left basal pleural effusion. There is persistent moderate cardiomegaly. Atheromatous changes thoracic aorta are noted. No pulmonary congestion or pneumothorax. No acute bony abnormality. 1. Improvement. Signed by Dr Pemberton Home:  1. ?  Supraventricular tachycardia of the past 5 to 6 days with rates 150s to 170s  2. LifeVest  3. Ischemic cardiomyopathy  4. Cardiac arrest on admission 2/27/2022 with rapid wide-complex tachycardia subsequent bradycardia and asystole requiring 2-minute CPR  5. Complaints of chest pain since CPR worse with direct pressure inspiration or cough  6. History of hypertension  7. History tobacco abuse  8. Peripheral arterial disease previous stent placement  9. Paroxysmal atrial fibrillation  10. History of inferior STEMI  11. Statin intolerance  12. Bilateral carotid artery stenosis  13. Cardiac cath with stent placement 1/7/2018 MultiLink vision 3.5 x 28  14. Status post CABG x4 grafts 2011  15. Chronic systolic diastolic congestive heart failure  16.  Cardiac cath 2/3/2022 significant LV systolic dysfunction LIMA graft atretic 2 vein grafts occluded 1 of which appears recent right coronary artery saphenous vein graft moderately severe stenosis in the proximal to mid segment status post stent placement in the mid body of the vein graft of the distal right coronary artery 4.0 x 22 mm resolute, 2.5 x 38 resolute integrity stent mid LAD, PTCA, vein graft LAD into the distal LAD at the distal anastomosis  17. Echocardiogram 2/27/2022 EF less than 98% grade 3 diastolic dysfunction RV dilatation severe biatrial enlargement mild to moderate AR severe central MR jet noted  18. Elevated troponins this admission 0.15 now down to 0.09.  19. Chronic anticoagulation with apixaban  20. Chronic antiarrhythmic therapy with sotalol  21. Elevated liver function test improving  22. Status post ICD dual-chamber placement 3/3/2022  23. Episode of atrial fibrillation this morning  24. Hypokalemia 3.3 this morning       Plan:  1.  Much improved today heart rate minimally elevated blood pressure satisfactory feels improved stable from a cardiovascular standpoint can be discharged per primary service    Juan Monson MD, MD 3/10/2022 9:47 AM

## 2022-03-10 NOTE — PROGRESS NOTES
Visited with pt to follow up. Pt had asked for communion yesterday and this  visited with pt to provide communion. Pt was willing and able, according to his nurse Anitha, to receive the elements of communion. This  provided reflection, and elements, as well as prayer to provide communion for the pt. Pt received the communion elements and was thankful for spiritual care.     Electronically signed by Jacey Cano on 3/10/2022 at 11:24 AM

## 2022-03-10 NOTE — DISCHARGE SUMMARY
Discharge Summary    Javier Aguilar  :  1950  MRN:  [id number]    Admit date:  2022  Discharge date: 3/10/2022     Admitting Physician:  No admitting provider for patient encounter. Advance Directive: Full Code    Consults: cardiology    Primary Care Physician:  Latosha Barry MD    Discharge Diagnoses:  Principal Problem (Resolved):    Systolic heart failure secondary to hypertrophic cardiomyopathy [facility])  Active Problems:    * No active hospital problems. *  Resolved Problems:    Coronary artery disease involving native coronary artery of native heart with angina pectoris (HCC)    Tobacco abuse    Hyperlipidemia    Paroxysmal A-fib (HCC)    Severe left ventricular systolic dysfunction      Significant Diagnostic Studies:   VL Extremity Venous Left    Result Date: 3/2/2022  Vascular Upper Extremities Veins Procedure  Demographics   Patient Name    CASI Quiroz Grand  Age                  66                  S   Patient Number  [id number]         Gender               Male   Visit Number    [id number]      Interpreting         Vinnie Rubin                                 Physician   Date of Birth   1950     Referring Physician  Randon Saint MD   Accession       [id number]     [address],  Number                                              RDMS  Procedure Type of Study:   Veins:Upper Extremities Veins, US Doppler Venous Upper Extremity  Unilateral.  Indications for Study:Pacemaker. Risk Factors   - The patient's last creatinine was 0.8 mg/dl. Allergies   - Statins.   Impression   successful Left subclavian vein access   Signature   ----------------------------------------------------------------  Electronically signed by Pat Guerin(Interpreting  physician) on 2022 12:23 PM  ----------------------------------------------------------------      XR CHEST PORTABLE    Result Date: 3/2/2022  XR CHEST PORTABLE 3/2/2022 1:43 PM HISTORY: Pacemaker  Technique: Single AP view of the chest COMPARISONS: 2/27/2022 FINDINGS: Bibasilar atelectasis. Cardiomegaly which is stable. Pulmonary vasculature are nondilated. No pneumothorax. New left chest wall dual chamber pacer defibrillator. No acute bony abnormality. 1. New left chest wall pacemaker. No pneumothorax. 2. Bibasilar atelectasis. Signed by Dr Valentín Stout      CBC:   Recent Labs     03/08/22  0141 03/09/22  0204 03/10/22  0154   WBC 14.1* 12.3* 13.3*   HGB 11.8* 11.8* 11.8*    279 291     BMP:    Recent Labs     03/08/22  0141 03/09/22  0204 03/10/22  0154    134* 139   K 3.6 3.9 4.0   CL 98 96* 99   CO2 26 27 28   BUN 17 24* 28*   CREATININE 0.8 0.8 0.7   GLUCOSE 116* 96 85     INR: No results for input(s): INR in the last 72 hours. Lipids: No results for input(s): CHOL, HDL in the last 72 hours. Invalid input(s): LDLCALCU  ABGs:No results for input(s): PHART, IFK1ZLS, PO2ART, ACH7PYV, BEART, HGBAE, T7XJEGBC, CARBOXHGBART, 02THERAPY in the last 72 hours. HgBA1c:  No results for input(s): LABA1C in the last 72 hours. Procedures: AICD placement  Hospital Course: Mr. Savanna Sheffield was admitted on 2/28 with atrial fibrillation and chest discomfort. He was previously wearing a LifeVest and did not keep the device on while it was signaling for delivery of a shock. He also tested positive for COVID-19 however he did not have any pulmonary symptoms. He had a brief episode of asystole while in the emergency department. ACLS protocol was carried out and he had a very rapid ROSC. He had only just a minute or 2 of downtime. There was no neurologic sequela. He was admitted to CCU. Consultation was obtained with cardiology. Adjustments were made to his medications. On 3/2 he underwent dual-chamber AICD placement. Postprocedure he was having problems with persistent atrial fibrillation. Multiple medication adjustments were done over a series of days. He was finally stabilized.   He did develop some significant weakness. He was agreeable for us skilled nursing for rehabilitation. He will be transferred later this morning. Physical Exam:  Vital Signs: BP (!) 154/131   Pulse 106   Temp 96.8 °F (36 °C)   Resp 18   Ht 6' (1.829 m)   Wt 129 lb 6.4 oz (58.7 kg)   SpO2 92%   BMI 17.55 kg/m²   General appearance:. Alert and Cooperative   HEENT: Normocephalic. Chest: clear to auscultation bilaterally without wheezes or rhonchi. Cardiac: Normal heart tones with regular rate and rhythm, S1, S2 normal. No murmurs, gallops, or rubs auscultated. Abdomen:soft, non-tender; normal bowel sounds, no masses, no organomegaly. Extremities: No clubbing or cyanosis. No peripheral edema. Peripheral pulses palpable. Neurologic: Grossly intact. Discharge Medications:         Medication List      START taking these medications    amiodarone 200 MG tablet  Commonly known as: CORDARONE  Take 1 tablet by mouth 2 times daily     carvedilol 12.5 MG tablet  Commonly known as: COREG  Take 1 tablet by mouth 2 times daily (with meals)     clonazePAM 0.5 MG tablet  Commonly known as: KLONOPIN  Take 1 tablet by mouth 2 times daily as needed for Anxiety for up to 3 days. dilTIAZem 120 MG extended release capsule  Commonly known as: CARDIZEM CD  Take 1 capsule by mouth in the morning and at bedtime     HYDROcodone-acetaminophen 7.5-325 MG per tablet  Commonly known as: NORCO  Take 1 tablet by mouth every 4 hours as needed for Pain for up to 3 days.      sacubitril-valsartan 24-26 MG per tablet  Commonly known as: ENTRESTO  Take 1 tablet by mouth 2 times daily  Replaces: sacubitril-valsartan 49-51 MG per tablet        CHANGE how you take these medications    furosemide 20 MG tablet  Commonly known as: LASIX  Take 3 tablets by mouth 2 times daily  What changed:   · medication strength  · how much to take        CONTINUE taking these medications    apixaban 5 MG Tabs tablet  Commonly known as: ELIQUIS  Take 1 tablet by mouth 2 times daily aspirin 81 MG EC tablet     atorvastatin 80 MG tablet  Commonly known as: LIPITOR  Take 0.5 tablets by mouth nightly     clopidogrel 75 MG tablet  Commonly known as: PLAVIX  Take 1 tablet by mouth daily     isosorbide mononitrate 30 MG extended release tablet  Commonly known as: IMDUR  Take 1 tablet by mouth daily     * nitroGLYCERIN 0.4 MG SL tablet  Commonly known as: Nitrostat  Place 1 tablet under the tongue every 5 minutes as needed for Chest pain up to max of 3 total doses. If no relief after 1 dose, call 911. * nitroGLYCERIN 0.4 MG SL tablet  Commonly known as: Nitrostat  Place 1 tablet under the tongue every 5 minutes as needed for Chest pain     spironolactone 25 MG tablet  Commonly known as: ALDACTONE  Take 1 tablet by mouth daily     Vitamin D 25 MCG (1000 UT) Tabs tablet  Commonly known as: CHOLECALCIFEROL  Take 5 tablets by mouth daily         * This list has 2 medication(s) that are the same as other medications prescribed for you. Read the directions carefully, and ask your doctor or other care provider to review them with you. STOP taking these medications    sacubitril-valsartan 49-51 MG per tablet  Commonly known as: ENTRESTO  Replaced by: sacubitril-valsartan 24-26 MG per tablet     sotalol 160 MG tablet  Commonly known as: BETAPACE           Where to Get Your Medications      These medications were sent to [address] [place] [address] 03, [address] [facility] [address]    Phone: [phone]   · amiodarone 200 MG tablet  · carvedilol 12.5 MG tablet  · dilTIAZem 120 MG extended release capsule  · furosemide 20 MG tablet  · sacubitril-valsartan 24-26 MG per tablet     You can get these medications from any pharmacy    Bring a paper prescription for each of these medications  · clonazePAM 0.5 MG tablet  · HYDROcodone-acetaminophen 7.5-325 MG per tablet         Discharge Instructions:    Follow up with Jing Browne Marisa Valentino MD in 3-5 days. Take medications as directed. Resume activity as tolerated. Diet: ADULT ORAL NUTRITION SUPPLEMENT; Breakfast, Lunch, Dinner; Standard High Calorie/High Protein Oral Supplement  ADULT DIET; Regular  ADULT ORAL NUTRITION SUPPLEMENT; Dinner, Breakfast; Low Calorie/High Protein Oral Supplement     Disposition: Patient is medically stable and will be discharged skilled nursing. Time spent on discharge 40 minutes.     Signed:  Xavi Restrepo DO

## 2022-04-12 ENCOUNTER — TELEPHONE (OUTPATIENT)
Dept: CARDIOLOGY CLINIC | Age: 72
End: 2022-04-12

## 2022-04-12 NOTE — TELEPHONE ENCOUNTER
[facility] called to report they have not received carelink monitor for patients ICD. She stated he was still in the nursing home and he has a follow up scheduled with Dr. Camilo Hauser on Thursday. Ordered carelink monitor. Sung Oneil reported the patient has been having nose bleeds and the nursing home doctor ordered to stop the Plavix since patient was on Eliquis also. Reviewed chart. Patient had stents placed on 2/3/22. Sung Oneil thinks the nursing home doctor ordered to stop it yesterday. Sung Oneil stated patient is at [facility] [phone]. Will let Dr. Flor's MA know.

## 2022-04-12 NOTE — TELEPHONE ENCOUNTER
Dr. Manuel Logan said patient is to absolutely not stop Plavix especially with fresh stents that nursing home should never stop cardiac medications without consulted us first. Called and talked with Karon at facility states the physician there ordered the drug to be stopped for three days and tomorrow will be his day starting it back. Explained above and the extreme risks of not having that medication and advised physician is not to change any cardiac medications or alter without consulting Dr. Manuel Logan. Patient has OV on Thursday. Patient noted to have three nose bleeds.  Will discuss more at [address] on Th.

## 2022-04-14 ENCOUNTER — OFFICE VISIT (OUTPATIENT)
Dept: CARDIOLOGY CLINIC | Age: 72
End: 2022-04-14
Payer: MEDICARE

## 2022-04-14 VITALS — DIASTOLIC BLOOD PRESSURE: 54 MMHG | SYSTOLIC BLOOD PRESSURE: 96 MMHG

## 2022-04-14 DIAGNOSIS — Z95.1 STATUS POST AORTO-CORONARY ARTERY BYPASS GRAFT: ICD-10-CM

## 2022-04-14 DIAGNOSIS — Z95.5 H/O HEART ARTERY STENT: ICD-10-CM

## 2022-04-14 DIAGNOSIS — E78.2 MIXED HYPERLIPIDEMIA: ICD-10-CM

## 2022-04-14 DIAGNOSIS — I48.0 PAROXYSMAL ATRIAL FIBRILLATION (HCC): ICD-10-CM

## 2022-04-14 DIAGNOSIS — I70.213 ATHEROSCLEROSIS OF NATIVE ARTERY OF BOTH LOWER EXTREMITIES WITH INTERMITTENT CLAUDICATION (HCC): ICD-10-CM

## 2022-04-14 DIAGNOSIS — Z79.01 CHRONIC ANTICOAGULATION: ICD-10-CM

## 2022-04-14 DIAGNOSIS — Z79.899 LONG TERM CURRENT USE OF ANTIARRHYTHMIC DRUG: ICD-10-CM

## 2022-04-14 DIAGNOSIS — I25.5 ISCHEMIC CARDIOMYOPATHY: ICD-10-CM

## 2022-04-14 DIAGNOSIS — I25.10 CORONARY ARTERY DISEASE INVOLVING NATIVE CORONARY ARTERY OF NATIVE HEART WITHOUT ANGINA PECTORIS: Primary | ICD-10-CM

## 2022-04-14 DIAGNOSIS — I21.19 INFERIOR MI (HCC): ICD-10-CM

## 2022-04-14 PROCEDURE — G8419 CALC BMI OUT NRM PARAM NOF/U: HCPCS | Performed by: INTERNAL MEDICINE

## 2022-04-14 PROCEDURE — 1123F ACP DISCUSS/DSCN MKR DOCD: CPT | Performed by: INTERNAL MEDICINE

## 2022-04-14 PROCEDURE — 3017F COLORECTAL CA SCREEN DOC REV: CPT | Performed by: INTERNAL MEDICINE

## 2022-04-14 PROCEDURE — 4040F PNEUMOC VAC/ADMIN/RCVD: CPT | Performed by: INTERNAL MEDICINE

## 2022-04-14 PROCEDURE — G8427 DOCREV CUR MEDS BY ELIG CLIN: HCPCS | Performed by: INTERNAL MEDICINE

## 2022-04-14 PROCEDURE — 99024 POSTOP FOLLOW-UP VISIT: CPT | Performed by: INTERNAL MEDICINE

## 2022-04-14 PROCEDURE — 1036F TOBACCO NON-USER: CPT | Performed by: INTERNAL MEDICINE

## 2022-04-14 PROCEDURE — 93282 PRGRMG EVAL IMPLANTABLE DFB: CPT | Performed by: INTERNAL MEDICINE

## 2022-04-14 RX ORDER — CARVEDILOL 6.25 MG/1
6.25 TABLET ORAL 2 TIMES DAILY WITH MEALS
Qty: 60 TABLET | Refills: 3 | Status: SHIPPED | OUTPATIENT
Start: 2022-04-14 | End: 2022-07-06

## 2022-04-14 RX ORDER — AMIODARONE HYDROCHLORIDE 200 MG/1
200 TABLET ORAL DAILY
Qty: 30 TABLET | Refills: 3 | Status: SHIPPED | OUTPATIENT
Start: 2022-04-14

## 2022-04-14 ASSESSMENT — ENCOUNTER SYMPTOMS
RESPIRATORY NEGATIVE: 1
EYES NEGATIVE: 1
GASTROINTESTINAL NEGATIVE: 1
SHORTNESS OF BREATH: 0
DIARRHEA: 0
NAUSEA: 0
VOMITING: 0

## 2022-04-14 NOTE — PATIENT INSTRUCTIONS
Stop aspirin. Decrease Amiodarone to (1) tablet once a day. Start coreg 6.25 mg (1) tablet twice a day.

## 2022-04-14 NOTE — PROGRESS NOTES
[facility] Progress Note                            Date:  4/14/2022  Patient: Kim John  Age:  66 y. o., 1950      Reason for evaluation:         SUBJECTIVE:    Returns today for follow-up assessment for coronary artery disease ischemic cardiomyopathy recently placed ICD 3/3/2022 previous CABG chronic congestive heart failure etc.  Also paroxysmal atrial fibrillation on amiodarone and Eliquis. Has had intermittent episodes of epistaxis for quite some time and can easily relieve this with direct pressure for 10 minutes. We have discussed this before he does not wish to see ENT for further assessment of this. He has not had any episodes of atrial fibrillation since discharge from the hospital.  We will reduce his dose of amiodarone to once daily. We have previously discussed him taking aspirin Plavix and Eliquis together for just 1 month and then after 1 month if stable we can discontinue the aspirin which we will go ahead and do the mail. The nursing home had held his Plavix for 3 days recently due to concerns over the epistaxis but did not contact us for further guidance. Also they held his Coreg because of his blood pressure being low but he was not symptomatic. We will reinstitute at a lower dose of 6.25 p.o. twice daily and he states he checks his blood pressure frequently at home and it never gets low enough to cause symptoms. Blood pressure 96/54. Review of Systems   Constitutional: Negative. Negative for chills, fever and unexpected weight change. HENT: Negative. Eyes: Negative. Respiratory: Negative. Negative for shortness of breath. Cardiovascular: Negative. Negative for chest pain. Gastrointestinal: Negative. Negative for diarrhea, nausea and vomiting. Endocrine: Negative. Genitourinary: Negative. Musculoskeletal: Negative. Skin: Negative. Neurological: Negative. All other systems reviewed and are negative.         OBJECTIVE:     BP (!) 96/54     Labs:   CBC: No results for input(s): WBC, HGB, HCT, PLT in the last 72 hours. BMP:No results for input(s): NA, K, CO2, BUN, CREATININE, LABGLOM, GLUCOSE in the last 72 hours. BNP: No results for input(s): BNP in the last 72 hours. PT/INR: No results for input(s): PROTIME, INR in the last 72 hours. APTT:No results for input(s): APTT in the last 72 hours. CARDIAC ENZYMES:No results for input(s): CKTOTAL, CKMB, CKMBINDEX, TROPONINI in the last 72 hours. FASTING LIPID PANEL:  Lab Results   Component Value Date    HDL 54 02/03/2022    LDLDIRECT 141 04/17/2015    LDLCALC 81 02/03/2022    TRIG 49 02/03/2022     LIVER PROFILE:No results for input(s): AST, ALT, LABALBU in the last 72 hours. Past Medical History:   Diagnosis Date    Acute exacerbation of CHF (congestive heart failure) ([facility] [address] 02/02/2022    Atrial fibrillation (HCC)     Bilateral carotid artery stenosis 01/06/2020    CAD (coronary artery disease), native coronary artery     acute inferior MI on 5/15/09, s/p emeergent CABG x4 5/15/09    Chest pain     HTN (hypertension)     Hyperlipidemia     Dr. Grzegorz Tolentino manages    Palliative care patient 02/28/2022    Palpitations     Statin intolerance 02/14/2018    Tobacco abuse     Transient atrial fibrillation or flutter     post op     Past Surgical History:   Procedure Laterality Date    CARDIAC CATHETERIZATION  4/23/12    with stent to left circ, and LAD    CARDIAC CATHETERIZATION  01/07/2018    PTCA/BJ to VG to LAD;  atherectomy and 3 BMS to VG to 1st Circ    CORONARY ARTERY BYPASS GRAFT  05/15/09    x4    DIAGNOSTIC CARDIAC CATH LAB PROCEDURE  05/15/09    left heart cath, left ventr, selective coronary arteriography     FRACTURE SURGERY Left     Leg    VASCULAR SURGERY  4/21/2015 [facility]    Percutaneous cannulation right common femoral artery with 5-Monegasque and later 6-Monegasque pinnacle destination sheath. Suprarenal abdominal aortogram with bilateral iliofemoral arteriograms. Bilateral lower extremity arteriograms. Left popliteal/tibioperoneal trunk artery balloon angioplasty with 4 mm x 15 mm cutting balloon.  VASCULAR SURGERY  4/21/2015 [facility] cont    Arteriograms after balloon angioplasty. Balloon angioplasty left popliteal artery/tibioperoneal trunk with 5 mm x 40 mm russell balloon. Arteriograms after balloon angioplasty. Left superficial femoral artery balloon angioplasty with 7 mm x 100 mm  balloon. Left superficial femoral artery stent placement idev supera 6.5 x 100 mm self-expanding nitinol stent. Completion arteriograms left lower e    VASCULAR SURGERY  4/21/2015 [facility] cont    extremity. Mynx closure right common femoral artery puncture site.  VASCULAR SURGERY  03/04/2020    [facility]. Percutaneous cannulation left common femoral artery with 5 Latvian glide sheath. Suprarenal abdominal aortogram with bilateral iliofemoral arteriogram.Bilateral lower extremity arteriogram.Minx closure left common femoral artery puncture site.      Family History   Problem Relation Age of Onset    Coronary Art Dis Father     High Blood Pressure Father     Coronary Art Dis Brother     High Blood Pressure Mother     Cancer Mother     High Blood Pressure Sister      Allergies   Allergen Reactions    Statins Itching     myalagias     Current Outpatient Medications   Medication Sig Dispense Refill    carvedilol (COREG) 6.25 MG tablet Take 1 tablet by mouth 2 times daily (with meals) 60 tablet 3    amiodarone (CORDARONE) 200 MG tablet Take 1 tablet by mouth daily 30 tablet 3    dilTIAZem (CARDIZEM CD) 120 MG extended release capsule Take 1 capsule by mouth in the morning and at bedtime 30 capsule 3    sacubitril-valsartan (ENTRESTO) 24-26 MG per tablet Take 1 tablet by mouth 2 times daily 60 tablet 1    furosemide (LASIX) 20 MG tablet Take 3 tablets by mouth 2 times daily 60 tablet 3    atorvastatin (LIPITOR) 80 MG tablet Take 0.5 tablets by mouth nightly 30 tablet 0    spironolactone (ALDACTONE) 25 MG tablet Take 1 tablet by mouth daily 30 tablet 0    clopidogrel (PLAVIX) 75 MG tablet Take 1 tablet by mouth daily 30 tablet 0    apixaban (ELIQUIS) 5 MG TABS tablet Take 1 tablet by mouth 2 times daily 60 tablet 0    isosorbide mononitrate (IMDUR) 30 MG extended release tablet Take 1 tablet by mouth daily 30 tablet 3    nitroGLYCERIN (NITROSTAT) 0.4 MG SL tablet Place 1 tablet under the tongue every 5 minutes as needed for Chest pain up to max of 3 total doses. If no relief after 1 dose, call 911. 25 tablet 3    nitroGLYCERIN (NITROSTAT) 0.4 MG SL tablet Place 1 tablet under the tongue every 5 minutes as needed for Chest pain 25 tablet 3    clonazePAM (KLONOPIN) 0.5 MG tablet Take 1 tablet by mouth 2 times daily as needed for Anxiety for up to 3 days. 6 tablet 0    Vitamin D (CHOLECALCIFEROL) 25 MCG (1000 UT) TABS tablet Take 5 tablets by mouth daily 60 tablet 0     No current facility-administered medications for this visit.      Social History     Socioeconomic History    Marital status: Single     Spouse name: Not on file    Number of children: Not on file    Years of education: Not on file    Highest education level: Not on file   Occupational History    Not on file   Tobacco Use    Smoking status: Former Smoker     Packs/day: 0.00     Years: 45.00     Pack years: 0.00     Types: Cigarettes     Start date: 1964    Smokeless tobacco: Never Used    Tobacco comment: STATES,\"I VAP\"   Vaping Use    Vaping Use: Every day   Substance and Sexual Activity    Alcohol use: No    Drug use: No    Sexual activity: Not on file   Other Topics Concern    Not on file   Social History Narrative    Not on file     Social Determinants of Health     Financial Resource Strain:     Difficulty of Paying Living Expenses: Not on file   Food Insecurity:     Worried About [address] in the Last Year: Not on file    [address] in the Last Year: Not on file   Transportation Needs:     Lack of Transportation (Medical): Not on file    Lack of Transportation (Non-Medical): Not on file   Physical Activity:     Days of Exercise per Week: Not on file    Minutes of Exercise per Session: Not on file   Stress:     Feeling of Stress : Not on file   Social Connections:     Frequency of Communication with Friends and Family: Not on file    Frequency of Social Gatherings with Friends and Family: Not on file    Attends [employer] Services: Not on file    Active Member of [address] [place] [address] or Organizations: Not on file    Attends Club or Organization Meetings: Not on file    Marital Status: Not on file   Intimate Partner Violence:     Fear of Current or Ex-Partner: Not on file    Emotionally Abused: Not on file    Physically Abused: Not on file    Sexually Abused: Not on file   Housing Stability:     Unable to Pay for Housing in the Last Year: Not on file    Number of [place] in the Last Year: Not on file    Unstable Housing in the Last Year: Not on file       Physical Examination:  BP (!) 96/54   Physical Exam  Vitals reviewed. Constitutional:       Appearance: He is well-developed. Neck:      Vascular: No carotid bruit or JVD. Cardiovascular:      Rate and Rhythm: Normal rate and regular rhythm. Heart sounds: Normal heart sounds. No murmur heard. No friction rub. No gallop. Pulmonary:      Effort: Pulmonary effort is normal. No respiratory distress. Breath sounds: Normal breath sounds. No wheezing or rales. Abdominal:      General: There is no distension. Tenderness: There is no abdominal tenderness. Lymphadenopathy:      Cervical: No cervical adenopathy. Skin:     General: Skin is warm and dry. Diagnosis Orders   1. Coronary artery disease involving native coronary artery of native heart without angina pectoris     2. Inferior MI ([address]     3. Atherosclerosis of native artery of both lower extremities with intermittent claudication ([address]     4.  Mixed

## 2022-04-14 NOTE — PROGRESS NOTES
ICD interrogated  Presenting rhythm: AS/VS, AP 48.9%,  0.1%  Battery status: 10.5 years  Lead status: lead impedance within range and stable  Sensing: P waves 1.6 mV,  R waves 11.4 mV  Thresholds:  Atrial 0.500 V @ 0.4ms, ventricular 0.500 V @ 0.4ms  Observations:  1 monitored episode AT/AF (2.5% burden)  Reprogramming for sensitivity and threshold testing  Next [facility] home check scheduled for 07/14/22

## 2022-04-29 ENCOUNTER — TELEPHONE (OUTPATIENT)
Dept: CARDIOLOGY CLINIC | Age: 72
End: 2022-04-29

## 2022-04-29 NOTE — TELEPHONE ENCOUNTER
Patients friend Shima Santiago called stating that patients BP is 80/40 and he is sleeping a lot and looks pale. He is taking spironolactone 25 mg QD, imdur 30 mg qd, lasix 20 mg 3 tablets BID, coreg 6.25 mg 1 tablet BID, cardizem 120 mg BID, and entresto 24-26 mg bid. She is wanting to know if anything can be decreased. Spoke with Dr. Josephine Everett and he stated patient could hold imdur 30 mg and decrease coreg to half tablet BID. Shima Santiago caregiver notified and she voiced understanding. Advised to let us know how he is doing Monday. She voiced understanding.

## 2022-05-19 ENCOUNTER — OFFICE VISIT (OUTPATIENT)
Dept: CARDIOLOGY CLINIC | Age: 72
End: 2022-05-19
Payer: MEDICARE

## 2022-05-19 VITALS
HEIGHT: 72 IN | BODY MASS INDEX: 20.45 KG/M2 | SYSTOLIC BLOOD PRESSURE: 140 MMHG | DIASTOLIC BLOOD PRESSURE: 82 MMHG | WEIGHT: 151 LBS | HEART RATE: 81 BPM

## 2022-05-19 DIAGNOSIS — I50.21 ACUTE SYSTOLIC (CONGESTIVE) HEART FAILURE (HCC): ICD-10-CM

## 2022-05-19 DIAGNOSIS — I50.40 COMBINED SYSTOLIC AND DIASTOLIC ACC/AHA STAGE C CONGESTIVE HEART FAILURE (HCC): ICD-10-CM

## 2022-05-19 DIAGNOSIS — I42.0 DILATED CARDIOMYOPATHY (HCC): ICD-10-CM

## 2022-05-19 DIAGNOSIS — Z79.01 CHRONIC ANTICOAGULATION: ICD-10-CM

## 2022-05-19 DIAGNOSIS — I48.0 PAROXYSMAL ATRIAL FIBRILLATION (HCC): ICD-10-CM

## 2022-05-19 DIAGNOSIS — Z95.810 ICD (IMPLANTABLE CARDIOVERTER-DEFIBRILLATOR) IN PLACE: ICD-10-CM

## 2022-05-19 DIAGNOSIS — I34.0 NONRHEUMATIC MITRAL VALVE REGURGITATION: ICD-10-CM

## 2022-05-19 DIAGNOSIS — Z79.899 LONG TERM CURRENT USE OF ANTIARRHYTHMIC DRUG: ICD-10-CM

## 2022-05-19 DIAGNOSIS — I48.0 PAROXYSMAL ATRIAL FIBRILLATION (HCC): Primary | ICD-10-CM

## 2022-05-19 DIAGNOSIS — I25.10 CORONARY ARTERY DISEASE INVOLVING NATIVE CORONARY ARTERY OF NATIVE HEART WITHOUT ANGINA PECTORIS: ICD-10-CM

## 2022-05-19 LAB
ALBUMIN SERPL-MCNC: 4.2 G/DL (ref 3.5–5.2)
ALP BLD-CCNC: 77 U/L (ref 40–130)
ALT SERPL-CCNC: 14 U/L (ref 5–41)
ANION GAP SERPL CALCULATED.3IONS-SCNC: 14 MMOL/L (ref 7–19)
AST SERPL-CCNC: 25 U/L (ref 5–40)
BILIRUB SERPL-MCNC: <0.2 MG/DL (ref 0.2–1.2)
BUN BLDV-MCNC: 22 MG/DL (ref 8–23)
CALCIUM SERPL-MCNC: 9.1 MG/DL (ref 8.8–10.2)
CHLORIDE BLD-SCNC: 103 MMOL/L (ref 98–111)
CO2: 23 MMOL/L (ref 22–29)
CREAT SERPL-MCNC: 0.9 MG/DL (ref 0.5–1.2)
GFR AFRICAN AMERICAN: >59
GFR NON-AFRICAN AMERICAN: >60
GLUCOSE BLD-MCNC: 89 MG/DL (ref 74–109)
HCT VFR BLD CALC: 34.5 % (ref 42–52)
HEMOGLOBIN: 10.6 G/DL (ref 14–18)
MCH RBC QN AUTO: 31.5 PG (ref 27–31)
MCHC RBC AUTO-ENTMCNC: 30.7 G/DL (ref 33–37)
MCV RBC AUTO: 102.7 FL (ref 80–94)
PDW BLD-RTO: 15.1 % (ref 11.5–14.5)
PLATELET # BLD: 368 K/UL (ref 130–400)
PMV BLD AUTO: 10.4 FL (ref 9.4–12.4)
POTASSIUM SERPL-SCNC: 5.4 MMOL/L (ref 3.5–5)
PRO-BNP: 893 PG/ML (ref 0–900)
RBC # BLD: 3.36 M/UL (ref 4.7–6.1)
SODIUM BLD-SCNC: 140 MMOL/L (ref 136–145)
TOTAL PROTEIN: 7.5 G/DL (ref 6.6–8.7)
WBC # BLD: 14.5 K/UL (ref 4.8–10.8)

## 2022-05-19 PROCEDURE — 1036F TOBACCO NON-USER: CPT | Performed by: INTERNAL MEDICINE

## 2022-05-19 PROCEDURE — 99214 OFFICE O/P EST MOD 30 MIN: CPT | Performed by: INTERNAL MEDICINE

## 2022-05-19 PROCEDURE — 3017F COLORECTAL CA SCREEN DOC REV: CPT | Performed by: INTERNAL MEDICINE

## 2022-05-19 PROCEDURE — G8427 DOCREV CUR MEDS BY ELIG CLIN: HCPCS | Performed by: INTERNAL MEDICINE

## 2022-05-19 PROCEDURE — 1123F ACP DISCUSS/DSCN MKR DOCD: CPT | Performed by: INTERNAL MEDICINE

## 2022-05-19 PROCEDURE — G8420 CALC BMI NORM PARAMETERS: HCPCS | Performed by: INTERNAL MEDICINE

## 2022-05-19 PROCEDURE — 4040F PNEUMOC VAC/ADMIN/RCVD: CPT | Performed by: INTERNAL MEDICINE

## 2022-05-19 PROCEDURE — 93282 PRGRMG EVAL IMPLANTABLE DFB: CPT | Performed by: INTERNAL MEDICINE

## 2022-05-19 RX ORDER — ATORVASTATIN CALCIUM 80 MG/1
40 TABLET, FILM COATED ORAL NIGHTLY
Qty: 30 TABLET | Refills: 3 | Status: SHIPPED | OUTPATIENT
Start: 2022-05-19 | End: 2022-07-06 | Stop reason: SDUPTHER

## 2022-05-19 ASSESSMENT — ENCOUNTER SYMPTOMS
RESPIRATORY NEGATIVE: 1
GASTROINTESTINAL NEGATIVE: 1
VOMITING: 0
NAUSEA: 0
DIARRHEA: 0
SHORTNESS OF BREATH: 0
EYES NEGATIVE: 1

## 2022-05-19 NOTE — PROGRESS NOTES
ICD interrogated  Presenting rhythm: AS/VS, AP 65.5%,  <0.1%  Battery status: 10.4 years  Lead status: lead impedance within range and stable  Sensing: P waves 2.5 mV,  R waves 12.4 mV  Thresholds:  Atrial 0.500 V @ 0.4ms, ventricular 0.750 V @ 0.4ms  Observations:  Optivol elevated  Reprogramming for sensitivity and threshold testing  Next [facility] home check scheduled for 08/19/22

## 2022-05-19 NOTE — PROGRESS NOTES
[facility] Progress Note                            Date:  5/19/2022  Patient: Lis Rasheed  Age:  66 y. o., 1950      Reason for evaluation:         SUBJECTIVE:    Returns today follow-up assessment followed for cardiomyopathy ICD placement paroxysmal atrial arrhythmias antiarrhythmic therapy long-term anticoagulation coronary artery disease. Also previous CABG. Chronic congestive heart failure. Denies any ICD shocks. Denies episodes of atrial fibrillation recently. Denies any bleeding issues. Denies chest discomfort dyspnea stable no other complaints or issues reported. Blood pressure 140/82 heart 81. EKG tracing today sinus rhythm old inferior infarct nonspecific ST-T wave abnormalities. Cath report from 2/2/2022 reviewed underwent PCI mid body of the vein graft to the distal right coronary artery also drug-eluting stent placement mid LAD. Also intervention on the distal LAD. Echo revealed ejection fraction less than 20% severe central mitral regurgitation present. Review of Systems   Constitutional: Negative. Negative for chills, fever and unexpected weight change. HENT: Negative. Eyes: Negative. Respiratory: Negative. Negative for shortness of breath. Cardiovascular: Negative. Negative for chest pain. Gastrointestinal: Negative. Negative for diarrhea, nausea and vomiting. Endocrine: Negative. Genitourinary: Negative. Musculoskeletal: Negative. Skin: Negative. Neurological: Negative. All other systems reviewed and are negative. OBJECTIVE:     BP (!) 140/82   Pulse 81   Ht 6' (1.829 m)   Wt 151 lb (68.5 kg)   BMI 20.48 kg/m²     Labs:   CBC: No results for input(s): WBC, HGB, HCT, PLT in the last 72 hours. BMP:No results for input(s): NA, K, CO2, BUN, CREATININE, LABGLOM, GLUCOSE in the last 72 hours. BNP: No results for input(s): BNP in the last 72 hours.   PT/INR: No results for input(s): PROTIME, INR in the last 72 hours.  APTT:No results for input(s): APTT in the last 72 hours. CARDIAC ENZYMES:No results for input(s): CKTOTAL, CKMB, CKMBINDEX, TROPONINI in the last 72 hours. FASTING LIPID PANEL:  Lab Results   Component Value Date    HDL 54 02/03/2022    LDLDIRECT 141 04/17/2015    LDLCALC 81 02/03/2022    TRIG 49 02/03/2022     LIVER PROFILE:No results for input(s): AST, ALT, LABALBU in the last 72 hours. Past Medical History:   Diagnosis Date    Acute exacerbation of CHF (congestive heart failure) ([facility] [address] 02/02/2022    Atrial fibrillation (HCC)     Bilateral carotid artery stenosis 01/06/2020    CAD (coronary artery disease), native coronary artery     acute inferior MI on 5/15/09, s/p emeergent CABG x4 5/15/09    Chest pain     HTN (hypertension)     Hyperlipidemia     Dr. Sarita Ruiz manages    Palliative care patient 02/28/2022    Palpitations     Statin intolerance 02/14/2018    Tobacco abuse     Transient atrial fibrillation or flutter     post op     Past Surgical History:   Procedure Laterality Date    CARDIAC CATHETERIZATION  4/23/12    with stent to left circ, and LAD    CARDIAC CATHETERIZATION  01/07/2018    PTCA/BJ to VG to LAD;  atherectomy and 3 BMS to VG to 1st Circ    CORONARY ARTERY BYPASS GRAFT  05/15/09    x4    DIAGNOSTIC CARDIAC CATH LAB PROCEDURE  05/15/09    left heart cath, left ventr, selective coronary arteriography     FRACTURE SURGERY Left     Leg    VASCULAR SURGERY  4/21/2015 [facility]    Percutaneous cannulation right common femoral artery with 5-Yoruba and later 6-Yoruba pinnacle destination sheath. Suprarenal abdominal aortogram with bilateral iliofemoral arteriograms. Bilateral lower extremity arteriograms. Left popliteal/tibioperoneal trunk artery balloon angioplasty with 4 mm x 15 mm cutting balloon.  VASCULAR SURGERY  4/21/2015 [facility] cont    Arteriograms after balloon angioplasty. Balloon angioplasty left popliteal artery/tibioperoneal trunk with 5 mm x 40 mm russell balloon. Arteriograms after balloon angioplasty. Left superficial femoral artery balloon angioplasty with 7 mm x 100 mm  balloon. Left superficial femoral artery stent placement idev supera 6.5 x 100 mm self-expanding nitinol stent. Completion arteriograms left lower e    VASCULAR SURGERY  4/21/2015 [facility] cont    extremity. Mynx closure right common femoral artery puncture site.  VASCULAR SURGERY  03/04/2020    [facility]. Percutaneous cannulation left common femoral artery with 5 Czech glide sheath. Suprarenal abdominal aortogram with bilateral iliofemoral arteriogram.Bilateral lower extremity arteriogram.Minx closure left common femoral artery puncture site.      Family History   Problem Relation Age of Onset    Coronary Art Dis Father     High Blood Pressure Father     Coronary Art Dis Brother     High Blood Pressure Mother     Cancer Mother     High Blood Pressure Sister      Allergies   Allergen Reactions    Statins Itching     myalagias     Current Outpatient Medications   Medication Sig Dispense Refill    sacubitril-valsartan (ENTRESTO) 24-26 MG per tablet Take 1 tablet by mouth 2 times daily 180 tablet 3    atorvastatin (LIPITOR) 80 MG tablet Take 0.5 tablets by mouth nightly 30 tablet 3    apixaban (ELIQUIS) 5 MG TABS tablet Take 1 tablet by mouth 2 times daily 180 tablet 3    carvedilol (COREG) 6.25 MG tablet Take 1 tablet by mouth 2 times daily (with meals) (Patient taking differently: Take 3.125 mg by mouth 2 times daily (with meals) ) 60 tablet 3    amiodarone (CORDARONE) 200 MG tablet Take 1 tablet by mouth daily 30 tablet 3    dilTIAZem (CARDIZEM CD) 120 MG extended release capsule Take 1 capsule by mouth in the morning and at bedtime 30 capsule 3    furosemide (LASIX) 20 MG tablet Take 3 tablets by mouth 2 times daily 60 tablet 3    spironolactone (ALDACTONE) 25 MG tablet Take 1 tablet by mouth daily 30 tablet 0    clopidogrel (PLAVIX) 75 MG tablet Take 1 tablet by mouth daily 30 tablet 0  Vitamin D (CHOLECALCIFEROL) 25 MCG (1000 UT) TABS tablet Take 5 tablets by mouth daily 60 tablet 0    nitroGLYCERIN (NITROSTAT) 0.4 MG SL tablet Place 1 tablet under the tongue every 5 minutes as needed for Chest pain up to max of 3 total doses. If no relief after 1 dose, call 911. 25 tablet 3    nitroGLYCERIN (NITROSTAT) 0.4 MG SL tablet Place 1 tablet under the tongue every 5 minutes as needed for Chest pain 25 tablet 3    clonazePAM (KLONOPIN) 0.5 MG tablet Take 1 tablet by mouth 2 times daily as needed for Anxiety for up to 3 days. 6 tablet 0    isosorbide mononitrate (IMDUR) 30 MG extended release tablet Take 1 tablet by mouth daily (Patient not taking: Reported on 5/19/2022) 30 tablet 3     No current facility-administered medications for this visit. Social History     Socioeconomic History    Marital status: Single     Spouse name: Not on file    Number of children: Not on file    Years of education: Not on file    Highest education level: Not on file   Occupational History    Not on file   Tobacco Use    Smoking status: Former Smoker     Packs/day: 0.00     Years: 45.00     Pack years: 0.00     Types: Cigarettes     Start date: 1964    Smokeless tobacco: Never Used    Tobacco comment: STATES,\"I VAP\"   Vaping Use    Vaping Use: Every day   Substance and Sexual Activity    Alcohol use: No    Drug use: No    Sexual activity: Not on file   Other Topics Concern    Not on file   Social History Narrative    Not on file     Social Determinants of Health     Financial Resource Strain:     Difficulty of Paying Living Expenses: Not on file   Food Insecurity:     Worried About [address] in the Last Year: Not on file    [address] in the Last Year: Not on file   Transportation Needs:     Lack of Transportation (Medical): Not on file    Lack of Transportation (Non-Medical):  Not on file   Physical Activity:     Days of Exercise per Week: Not on file    Minutes of Exercise per Session: Not on file   Stress:     Feeling of Stress : Not on file   Social Connections:     Frequency of Communication with Friends and Family: Not on file    Frequency of Social Gatherings with Friends and Family: Not on file    Attends [employer] Services: Not on file    Active Member of Clubs or Organizations: Not on file    Attends Club or Organization Meetings: Not on file    Marital Status: Not on file   Intimate Partner Violence:     Fear of Current or Ex-Partner: Not on file    Emotionally Abused: Not on file    Physically Abused: Not on file    Sexually Abused: Not on file   Housing Stability:     Unable to Pay for Housing in the Last Year: Not on file    Number of [place] in the Last Year: Not on file    Unstable Housing in the Last Year: Not on file       Physical Examination:  BP (!) 140/82   Pulse 81   Ht 6' (1.829 m)   Wt 151 lb (68.5 kg)   BMI 20.48 kg/m²   Physical Exam        ASSESSMENT:     Diagnosis Orders   1. Paroxysmal atrial fibrillation (HCC)  EKG 12 lead    Comprehensive Metabolic Panel    CBC    BNP   2. Acute systolic (congestive) heart failure (HCC)  BNP   3. Nonrheumatic mitral valve regurgitation     4. Dilated cardiomyopathy ([address]     5. Combined systolic and diastolic ACC/AHA stage C congestive heart failure ([address]     6. Chronic anticoagulation     7. Long term current use of antiarrhythmic drug     8. ICD (implantable cardioverter-defibrillator) in place     9.  Coronary artery disease involving native coronary artery of native heart without angina pectoris         PLAN:  Orders Placed This Encounter   Procedures    Comprehensive Metabolic Panel    CBC    BNP    EKG 12 lead     Orders Placed This Encounter   Medications    sacubitril-valsartan (ENTRESTO) 24-26 MG per tablet     Sig: Take 1 tablet by mouth 2 times daily     Dispense:  180 tablet     Refill:  3    atorvastatin (LIPITOR) 80 MG tablet     Sig: Take 0.5 tablets by mouth nightly     Dispense: 30 tablet     Refill:  3    apixaban (ELIQUIS) 5 MG TABS tablet     Sig: Take 1 tablet by mouth 2 times daily     Dispense:  180 tablet     Refill:  3         1. Continue present medications  2. Laboratory studies as ordered  3. Recommend follow-up assessment in 3 months    Return in about 3 months (around 8/19/2022) for return to Dr. Kae Mckeon only. Saleem Card MD 5/19/2022 11:07 AM CDT    [facility]      Thisdictation was generated by voice recognition computer software. Although all attempts are made to edit the dictation for accuracy, there may be errors in the transcription that are not intended.

## 2022-07-06 ENCOUNTER — TELEPHONE (OUTPATIENT)
Dept: CARDIOLOGY CLINIC | Age: 72
End: 2022-07-06

## 2022-07-06 ENCOUNTER — OFFICE VISIT (OUTPATIENT)
Dept: CARDIOLOGY CLINIC | Age: 72
End: 2022-07-06
Payer: MEDICARE

## 2022-07-06 VITALS
HEART RATE: 88 BPM | SYSTOLIC BLOOD PRESSURE: 114 MMHG | BODY MASS INDEX: 19.77 KG/M2 | DIASTOLIC BLOOD PRESSURE: 72 MMHG | HEIGHT: 72 IN | WEIGHT: 146 LBS | OXYGEN SATURATION: 96 %

## 2022-07-06 DIAGNOSIS — I65.23 BILATERAL CAROTID ARTERY STENOSIS: ICD-10-CM

## 2022-07-06 DIAGNOSIS — I25.10 CORONARY ARTERY DISEASE INVOLVING NATIVE CORONARY ARTERY OF NATIVE HEART WITHOUT ANGINA PECTORIS: Primary | ICD-10-CM

## 2022-07-06 DIAGNOSIS — I48.0 PAROXYSMAL ATRIAL FIBRILLATION (HCC): ICD-10-CM

## 2022-07-06 DIAGNOSIS — E78.2 MIXED HYPERLIPIDEMIA: ICD-10-CM

## 2022-07-06 DIAGNOSIS — M79.10 MYALGIA DUE TO STATIN: ICD-10-CM

## 2022-07-06 DIAGNOSIS — I73.9 PVD (PERIPHERAL VASCULAR DISEASE) (HCC): ICD-10-CM

## 2022-07-06 DIAGNOSIS — I50.40 COMBINED SYSTOLIC AND DIASTOLIC ACC/AHA STAGE C CONGESTIVE HEART FAILURE (HCC): ICD-10-CM

## 2022-07-06 DIAGNOSIS — I10 PRIMARY HYPERTENSION: ICD-10-CM

## 2022-07-06 DIAGNOSIS — T46.6X5A MYALGIA DUE TO STATIN: ICD-10-CM

## 2022-07-06 DIAGNOSIS — Z95.810 ICD (IMPLANTABLE CARDIOVERTER-DEFIBRILLATOR) IN PLACE: ICD-10-CM

## 2022-07-06 PROCEDURE — 1123F ACP DISCUSS/DSCN MKR DOCD: CPT | Performed by: CLINICAL NURSE SPECIALIST

## 2022-07-06 PROCEDURE — 1036F TOBACCO NON-USER: CPT | Performed by: CLINICAL NURSE SPECIALIST

## 2022-07-06 PROCEDURE — G8420 CALC BMI NORM PARAMETERS: HCPCS | Performed by: CLINICAL NURSE SPECIALIST

## 2022-07-06 PROCEDURE — 93283 PRGRMG EVAL IMPLANTABLE DFB: CPT | Performed by: CLINICAL NURSE SPECIALIST

## 2022-07-06 PROCEDURE — 3017F COLORECTAL CA SCREEN DOC REV: CPT | Performed by: CLINICAL NURSE SPECIALIST

## 2022-07-06 PROCEDURE — G8427 DOCREV CUR MEDS BY ELIG CLIN: HCPCS | Performed by: CLINICAL NURSE SPECIALIST

## 2022-07-06 PROCEDURE — 99214 OFFICE O/P EST MOD 30 MIN: CPT | Performed by: CLINICAL NURSE SPECIALIST

## 2022-07-06 RX ORDER — FUROSEMIDE 20 MG/1
40 TABLET ORAL 2 TIMES DAILY
Qty: 270 TABLET | Refills: 3 | Status: SHIPPED | OUTPATIENT
Start: 2022-07-06

## 2022-07-06 RX ORDER — CARVEDILOL 3.12 MG/1
3.12 TABLET ORAL 2 TIMES DAILY WITH MEALS
Qty: 180 TABLET | Refills: 3 | Status: SHIPPED | OUTPATIENT
Start: 2022-07-06

## 2022-07-06 RX ORDER — ISOSORBIDE MONONITRATE 30 MG/1
30 TABLET, EXTENDED RELEASE ORAL DAILY
Qty: 90 TABLET | Refills: 3 | Status: SHIPPED | OUTPATIENT
Start: 2022-07-06

## 2022-07-06 RX ORDER — AMIODARONE HYDROCHLORIDE 200 MG/1
200 TABLET ORAL DAILY
Qty: 90 TABLET | Refills: 3 | Status: SHIPPED | OUTPATIENT
Start: 2022-07-06

## 2022-07-06 RX ORDER — CLOPIDOGREL BISULFATE 75 MG/1
75 TABLET ORAL DAILY
Qty: 90 TABLET | Refills: 3 | Status: SHIPPED | OUTPATIENT
Start: 2022-07-06

## 2022-07-06 RX ORDER — ATORVASTATIN CALCIUM 80 MG/1
40 TABLET, FILM COATED ORAL NIGHTLY
Qty: 90 TABLET | Refills: 3 | Status: SHIPPED | OUTPATIENT
Start: 2022-07-06

## 2022-07-06 RX ORDER — SPIRONOLACTONE 25 MG/1
25 TABLET ORAL DAILY
Qty: 90 TABLET | Refills: 3 | Status: SHIPPED | OUTPATIENT
Start: 2022-07-06

## 2022-07-06 ASSESSMENT — ENCOUNTER SYMPTOMS
EYE REDNESS: 0
VOMITING: 0
SHORTNESS OF BREATH: 1
CHEST TIGHTNESS: 0
ABDOMINAL PAIN: 0
FACIAL SWELLING: 0
NAUSEA: 0
COUGH: 0
WHEEZING: 0

## 2022-07-06 NOTE — PATIENT INSTRUCTIONS
Return for Dr. Ray Bar, as scheduled. Could consider Watchman Device to come off Eliquis long term    Call when you get home [phone] - ask for phone- clarify furosemide dosage    Restart Amiodarone 200mg daily  Stop Diltiazem due to lower BP    Stay smoke free. Call the [address] [place] [address] [phone]    Plug in home monitor    Patient Education        Heart Failure: Care Instructions  Your Care Instructions     Heart failure occurs when your heart does not pump as much blood as the body needs. Failure does not mean that the heart has stopped pumping but rather that it is not pumping as well as it should. Over time, this causes fluid buildup in your lungs and other parts of your body. Fluid buildup can cause shortness of breath, fatigue, swollen ankles, and other problems. By taking medicines regularly, reducing sodium (salt) in your diet, checking your weight every day,and making lifestyle changes, you can feel better and live longer. Follow-up care is a key part of your treatment and safety. Be sure to make and go to all appointments, and call your doctor if you are having problems. It's also a good idea to know your test results and keep alist of the medicines you take. How can you care for yourself at home? Medicines     Be safe with medicines. Take your medicines exactly as prescribed. Call your doctor if you think you are having a problem with your medicine.      Do not take any vitamins, over-the-counter medicine, or herbal products without talking to your doctor first. Jaiden Anita not take ibuprofen (Advil or Motrin) and naproxen (Aleve) without talking to your doctor first. They could make your heart failure worse.      You may take some of the following medicine. ? Angiotensin-converting enzyme inhibitors (ACEIs) or angiotensin II receptor blockers (ARBs) reduce the heart's workload, lower blood pressure, and reduce swelling.  Taking an ACEI or ARB may lower your chance of needing to be hospitalized. ? Beta-blockers can slow heart rate, decrease blood pressure, and improve your condition. Taking a beta-blocker may lower your chance of needing to be hospitalized. ? Diuretics, also called water pills, reduce swelling. You will get more details on the specific medicines your doctor prescribes. Diet     Your doctor may suggest that you limit sodium. Your doctor can tell you how much sodium is right for you. An example is less than 3,000 mg a day. This includes all the salt you eat in cooking or in packaged foods. People get most of their sodium from processed foods. Fast food and restaurant meals also tend to be very high in sodium.      Ask your doctor how much liquid you can drink each day. You may have to limit liquids. Weight     Weigh yourself without clothing at the same time each day. Record your weight. Call your doctor if you have a sudden weight gain, such as more than 2 to 3 pounds in a day or 5 pounds in a week. (Your doctor may suggest a different range of weight gain.) A sudden weight gain may mean that your heart failure is getting worse. Activity level     Start light exercise (if your doctor says it is okay). Even if you can only do a small amount, exercise will help you get stronger, have more energy, and manage your weight and your stress. Walking is an easy way to get exercise. Start out by walking a little more than you did before. Bit by bit, increase the amount you walk.      When you exercise, watch for signs that your heart is working too hard. You are pushing yourself too hard if you cannot talk while you are exercising. If you become short of breath or dizzy or have chest pain, stop, sit down, and rest.      If you feel \"wiped out\" the day after you exercise, walk slower or for a shorter distance until you can work up to a better pace.      Get enough rest at night. Sleeping with 1 or 2 pillows under your upper body and head may help you breathe easier. Lifestyle changes     Do not smoke. Smoking can make a heart condition worse. If you need help quitting, talk to your doctor about stop-smoking programs and medicines. These can increase your chances of quitting for good. Quitting smoking may be the most important step you can take to protect your heart.      Limit alcohol to 2 drinks a day for men and 1 drink a day for women. Too much alcohol can cause health problems.      Avoid getting sick from colds and the flu. Get a pneumococcal vaccine shot. If you have had one before, ask your doctor whether you need another dose. Get a flu shot each year. If you must be around people with colds or the flu, wash your hands often. When should you call for help? Call 911  if you have symptoms of sudden heart failure such as:     You have severe trouble breathing.      You cough up pink, foamy mucus.      You have a new irregular or rapid heartbeat. Call your doctor now or seek immediate medical care if:     You have new or increased shortness of breath.      You are dizzy or lightheaded, or you feel like you may faint.      You have sudden weight gain, such as more than 2 to 3 pounds in a day or 5 pounds in a week. (Your doctor may suggest a different range of weight gain.)      You have increased swelling in your legs, ankles, or feet.      You are suddenly so tired or weak that you cannot do your usual activities. Watch closely for changes in your health, and be sure to contact your doctor ifyou develop new symptoms. Where can you learn more? Go to https://#waywiresaida.SocialGuide. org and sign in to your [employer] account. Enter H361 in the [employer] box to learn more about \"Heart Failure: Care Instructions. \"     If you do not have an account, please click on the \"Sign Up Now\" link. Current as of: January 10, 2022               Content Version: 13.3  © 4383-5503 [employer]wise, Incorporated.    Care instructions adapted under license by [address] If you have questions about a medical condition or this instruction, always ask your healthcare professional. [place] [address] any warranty or liability for your use of this information.

## 2022-07-06 NOTE — TELEPHONE ENCOUNTER
Patient called to let you know that he takes lasix 40 mg BID. He takes it at [facility] and 9PM. I did ask him if he's up all night using the bathroom and he said yes. I advised that he back that night time dose down to about 2 or 3PM. He voiced understanding.

## 2022-07-06 NOTE — PROGRESS NOTES
[facility], [address] [address]  Phone: [phone]  Fax: [phone]    OFFICE VISIT:  7/6/2022    [address]: 1950    Reason For Visit:  Shi Sorensen is a 66 y.o. male who is here for follow-up for CAD    HPI  Patient is here for follow-up today for CAD with a history of  STEMI on 1/7/18 with 3 bare metal stents, and a previous history of CABG. There is also history of paroxysmal atrial fibrillation, hypertension, hyperlipidemia, myalgia due to statin, previoustobacco abuse. Hospitalized in February 2022 with heart failure with an EF of 20% and heart cath with additional PCI. Ultimately with an AICD    His wife is concerned that he is on both Plavix and Eliquis and bruises easily. Denies chest pain, unusual dyspnea, orthopnea, PND, edema, sudden weight gain. No complaints of palpitations or fast heart rates. He was in the nursing home after hospitalization and has been home for approximately a month or so he states. He has questions about whether he should be continuing to take amiodarone and diltiazem and was not given prescriptions for these leaving the nursing home. He has also had problems with hypotension      Sandra Allison MD is PCP.   Irma Brittany has the following history as recorded in [facility]:    Patient Active Problem List    Diagnosis Date Noted    Coronary artery disease involving native coronary artery of native heart without angina pectoris 04/14/2022    Mixed hyperlipidemia 04/14/2022    H/O heart artery stent 04/14/2022    Acute respiratory failure with hypoxia ([address]     Acute respiratory failure with hypoxia and hypercarbia ([address] 02/02/2022    Sepsis ([address] 02/02/2022    Acute exacerbation of CHF (congestive heart failure) ([address] 73/16/2327    Acute systolic (congestive) heart failure ([address] 02/02/2022    PVD (peripheral vascular disease) ([address]     Bilateral carotid artery stenosis 01/06/2020    Statin intolerance 02/14/2018    Inferior MI ([facility] 01/07/2018    ST elevation myocardial infarction (STEMI) ([facility]     Atherosclerosis of native artery of extremity with intermittent claudication ([facility]) 04/15/2015    Chest pain     Palpitations     HTN (hypertension)      Past Medical History:   Diagnosis Date    Acute exacerbation of CHF (congestive heart failure) ([facility]) 02/02/2022    Atrial fibrillation ([facility])     Bilateral carotid artery stenosis 01/06/2020    CAD (coronary artery disease), native coronary artery     acute inferior MI on 5/15/09, s/p emeergent CABG x4 5/15/09    Chest pain     HTN (hypertension)     Hyperlipidemia     Dr. Harleen Marcos manages    Palliative care patient 02/28/2022    Palpitations     Statin intolerance 02/14/2018    Tobacco abuse     Transient atrial fibrillation or flutter     post op     Past Surgical History:   Procedure Laterality Date    CARDIAC CATHETERIZATION  4/23/12    with stent to left circ, and LAD    CARDIAC CATHETERIZATION  01/07/2018    PTCA/BJ to VG to LAD;  atherectomy and 3 BMS to VG to 1st Circ    CORONARY ARTERY BYPASS GRAFT  05/15/09    x4    DIAGNOSTIC CARDIAC CATH LAB PROCEDURE  05/15/09    left heart cath, left ventr, selective coronary arteriography     FRACTURE SURGERY Left     Leg    VASCULAR SURGERY  4/21/2015 [facility]    Percutaneous cannulation right common femoral artery with 5-German and later 6-German pinnacle destination sheath. Suprarenal abdominal aortogram with bilateral iliofemoral arteriograms. Bilateral lower extremity arteriograms. Left popliteal/tibioperoneal trunk artery balloon angioplasty with 4 mm x 15 mm cutting balloon.  VASCULAR SURGERY  4/21/2015 [facility] cont    Arteriograms after balloon angioplasty. Balloon angioplasty left popliteal artery/tibioperoneal trunk with 5 mm x 40 mm russell balloon. Arteriograms after balloon angioplasty. Left superficial femoral artery balloon angioplasty with 7 mm x 100 mm  balloon. Left superficial femoral artery stent placement idev supera 6.5 x 100 mm self-expanding nitinol stent. Completion arteriograms left lower e    VASCULAR SURGERY  4/21/2015 [facility] cont    extremity. Mynx closure right common femoral artery puncture site.  VASCULAR SURGERY  03/04/2020    [facility]. Percutaneous cannulation left common femoral artery with 5 Slovak glide sheath. Suprarenal abdominal aortogram with bilateral iliofemoral arteriogram.Bilateral lower extremity arteriogram.Minx closure left common femoral artery puncture site.      Family History   Problem Relation Age of Onset    Coronary Art Dis Father     High Blood Pressure Father     Coronary Art Dis Brother     High Blood Pressure Mother     Cancer Mother     High Blood Pressure Sister      Social History     Tobacco Use    Smoking status: Former Smoker     Packs/day: 0.00     Years: 45.00     Pack years: 0.00     Types: Cigarettes     Start date: 1964    Smokeless tobacco: Never Used    Tobacco comment: STATES,\"I VAP\"   Substance Use Topics    Alcohol use: No      Current Outpatient Medications   Medication Sig Dispense Refill    amiodarone (CORDARONE) 200 MG tablet Take 1 tablet by mouth daily 90 tablet 3    apixaban (ELIQUIS) 5 MG TABS tablet Take 1 tablet by mouth 2 times daily 180 tablet 3    atorvastatin (LIPITOR) 80 MG tablet Take 0.5 tablets by mouth nightly 90 tablet 3    carvedilol (COREG) 3.125 MG tablet Take 1 tablet by mouth 2 times daily (with meals) 180 tablet 3    clopidogrel (PLAVIX) 75 MG tablet Take 1 tablet by mouth daily 90 tablet 3    isosorbide mononitrate (IMDUR) 30 MG extended release tablet Take 1 tablet by mouth daily 90 tablet 3    sacubitril-valsartan (ENTRESTO) 24-26 MG per tablet Take 1 tablet by mouth 2 times daily 180 tablet 3    spironolactone (ALDACTONE) 25 MG tablet Take 1 tablet by mouth daily 90 tablet 3    amiodarone (CORDARONE) 200 MG tablet Take 1 tablet by mouth daily 30 tablet 3    clonazePAM (KLONOPIN) Wt Readings from Last 3 Encounters:   07/06/22 146 lb (66.2 kg)   05/19/22 151 lb (68.5 kg)   03/08/22 129 lb 6.4 oz (58.7 kg)      Physical Exam  Vitals and nursing note reviewed. Constitutional:       General: He is not in acute distress. Appearance: He is well-developed. He is not diaphoretic. Comments: Deconditioned   HENT:      Head: Normocephalic and atraumatic. Right Ear: Hearing and external ear normal.      Left Ear: Hearing and external ear normal.      Nose: Nose normal.   Eyes:      General:         Right eye: No discharge. Left eye: No discharge. Pupils: Pupils are equal, round, and reactive to light. Neck:      Thyroid: No thyromegaly. Vascular: Carotid bruit (right) present. No JVD. Trachea: No tracheal deviation. Cardiovascular:      Rate and Rhythm: Normal rate and regular rhythm. Heart sounds: Normal heart sounds. No murmur heard. No friction rub. No gallop. Comments: No carotid bruit  Pulmonary:      Effort: Pulmonary effort is normal. No respiratory distress. Breath sounds: Normal breath sounds. No wheezing or rales. Abdominal:      Palpations: Abdomen is soft. Tenderness: There is no abdominal tenderness. Musculoskeletal:         General: No swelling or deformity. Cervical back: Neck supple. No muscular tenderness. Right lower leg: Edema (1+ compression stockings) present. Left lower leg: Edema (1+ compression stockings) present. Comments: Wearing compression hose   Skin:     General: Skin is warm and dry. Findings: No rash. Neurological:      General: No focal deficit present. Mental Status: He is alert and oriented to person, place, and time. Cranial Nerves: No cranial nerve deficit.    Psychiatric:         Mood and Affect: Mood normal.         Behavior: Behavior normal.         Judgment: Judgment normal.         Data:  Heart Cath 1/11/18    1.  Left ventricular dysfunction with mild left ventricular   enlargement and relatively mild anterolateral and inferior   hypokinesis with a reduced ejection fraction of 50% or greater. 2.  Extremely severe diffuse native triple-vessel coronary artery   disease as described above    3.  Nonfunctioning left internal mammary graft to the diagonal   branch of the LAD with this branch having been stented   previously. 4.  Acutely occluded saphenous vein graft to the 1st circumflex   marginal branch. 5.  Severe disease near the anastomotic site of the vein graft to   the left anterior descending coronary artery. 6. Widely patent saphenous vein graft to the right coronary   artery. 7. Successful percutaneous coronary intervention with balloon   angioplasty and placement of a single drug-eluting stent to the   distal vein graft to the LAD including the anastomotic site and   portions of the LAD just beyond the insertion of the graft. 8.  Successful percutaneous coronary intervention with direct   infarct extraction atherectomy and stent placement to the vein   graft to the 1st circumflex marginal branch using 3 bare-metal   stents. 9.  Intracoronary nitroprusside administration. Lab Results   Component Value Date    CHOL 145 (L) 02/03/2022    TRIG 49 02/03/2022    HDL 54 (L) 02/03/2022    LDLCALC 81 02/03/2022    LDLDIRECT 141 (H) 04/17/2015     Lab Results   Component Value Date    ALT 14 05/19/2022    AST 25 05/19/2022     Lab Results   Component Value Date    TSH 2.180 02/03/2022     Carotids 2/11/2020    Impression       Daphane Flies is mixed plaque visualized in the bilateral internal carotid    artery(ies).    There is less than 50% stenosis in the right internal carotid artery.   Daphane Flies is less than 50% stenosis of the left internal carotid artery.    There is normal antegrade flow in the bilateral vertebral artery(ies). Echo 2/27/22  Conclusions      Summary   Limited echocardiographic study.       LV is moderate to severely dilated with severely reduced LV systolic   function. LV ejection fraction estimated at less than 20%. Probable grade 3 diastolic dysfunction. RV appears mildly dilated with moderately reduced RV systolic function   (TAPSE equals 1.6 cm). Severe left atrial enlargement. Left atrial pressure is elevated. Severe right atrial enlargement. Aortic valve not well studied. No significant stenosis. Probable mild to   moderate aortic regurgitation. Mitral valve appears moderately thickened with preserved leaflet mobility. Severe central mitral regurgitation is noted. Mitral regurgitant jet   velocity suggests significantly elevated left atrial pressure. No significant pericardial effusion noted. Signature      ----------------------------------------------------------------   Electronically signed by Oleg Chacko MD(Interpreting physician)   on 02/28/2022 07:14 PM   ----------------------------------------------------------------  Chest Xray 3/2/22  1. New left chest wall pacemaker. No pneumothorax. 2. Bibasilar atelectasis. Signed by Dr Ryan Poole     Heart Cath 2/7/22  Conclusions      Successful PCI to mid body of SVG to distal RCA (4.0 x 22 mm resolute   integrity) utilizing drug-eluting stent. Successful PCI to mid LAD (2.5 x 38 mm resolute integrity taken to 18 alessia)   through stent struts in proximal LAD to diagonal utilizing drug-eluting   stent with final kissing balloons from proximal LAD into mid LAD and   proximal LAD into diagonal.   Successful PTCA of distal LAD through stent struts (from SVG to LAD into   distal LAD at distal anastomosis). Recommendations      Medical management. Plavix uninterrupted for minimum 6 months. Can reevaluate circumflex territory if ischemia demonstrated. Proceed with mitral valve management. Smoking cessation stressed again.       Signatures      ----------------------------------------------------------------   Electronically signed by Oleg Tony MD Ricco(Performing Physician) on   02/07/2022 18:47    EKG shows normal sinus rhythm rate 61 with some ST depression and nonspecific T wave abnormality, changed compared to previous EKG dated 6/8/2021    Assessment:     Diagnosis Orders   1. Coronary artery disease involving native coronary artery of native heart without angina pectoris     2. 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 Paroxysmal atrial fibrillation ([address]     4. Primary hypertension     5. Mixed hyperlipidemia     6. Myalgia due to statin     7. ICD (implantable cardioverter-defibrillator) in place     8. PVD (peripheral vascular disease) ([facility]     9. Bilateral carotid artery stenosis         CAD- recent PCI with BJ in February. Stable without angina. Needs to continue Plavix x1 year without interruption. No aspirin since patient is on Eliquis. Combined systolic and diastolic congestive heart failure NYHA class II-III, stage C-patient appears euvolemic on GDMT with carvedilol and Entresto as well as Lasix and spironolactone. Counseled on daily weights, reporting weight gain of 3lbs in 24hrs or 5lbs in a week, low sodium diet, and fluid restrictions 6 cupsor 48oz daily. Paroxysmal atrial fibrillation-he has not taken amiodarone since discharge from the nursing home about a month or so ago. No PAF per device check. We will restart amiodarone 200 mg daily. Patient has had a recent TSH and CMP. Wife would like patient to be able to come off Eliquis. She is concerned about bleeding. We discussed options such as the watchman device. He can discuss further with Dr. Kyleigh Milner at next visit    Hyperlipidemia/myalgia due to statin -he has tried several statins in the past and had myalgias. He has refused alternatives in the past.  Consider cath in the future    Hypertension-with a tendency toward hypotension more recently. Stable today.   He has not been taking diltiazem since discharge at the nursing home. We will go ahead and discontinue this medication due to issues with hypotension. Carotid stenosis/ PVD-refuses to return to vascular surgery for follow-up. Currently he is not having any leg pain. Last carotid studies in 2020 showed less than 50% stenosis. Continue aspirin, refuses cholesterol medications of any type. ICD  ICD interrogation showed adequate battery voltage and charge time. Mode: [id number]. Lead impedances stable. Normal diagnostics and safety margins noted. No ICD discharges. Sustained arrythmia:  none. Optivol stable. Please see the scanned interrogation report  Patient has received his home monitor, but has not yet plugged it in      Plan    Return for Dr. Jose Garcia, as scheduled. Could consider Watchman Device to come off Eliquis long term-discuss further with Dr. Jose Garcia next visit    Call when you get home [phone] - ask for phone nurse- clarify furosemide dosage and willl then send in refill    Restart Amiodarone 200mg daily  Stop Diltiazem due to lower BP    Stay smoke free. Call the [address] [place] [address] [phone]    Plug in home monitor for ICD    Call with any questionsor concerns  Follow up with Carolyn De La Cruz MD for non cardiac problems  Report any new problems  Cardiovascular Fitness-Exercise as tolerated. Strive for 15 minutes of exercise most days of the week. Cardiac / HealthyDiet  Continue current medications as directed  Continue plan of treatment  It is always recommended that you bring your medicationsbottles with you to each visit - this is for your safety!        LUIS Gibson

## 2022-07-06 NOTE — TELEPHONE ENCOUNTER
Thank you I wanted him to verify the actual dose he was taking before I sent in his refills.   I have taken care of the refill

## 2022-07-14 DIAGNOSIS — I25.5 ISCHEMIC CARDIOMYOPATHY: ICD-10-CM

## 2022-07-14 DIAGNOSIS — I50.40 COMBINED SYSTOLIC AND DIASTOLIC ACC/AHA STAGE C CONGESTIVE HEART FAILURE (HCC): ICD-10-CM

## 2022-07-14 DIAGNOSIS — Z95.810 ICD (IMPLANTABLE CARDIOVERTER-DEFIBRILLATOR) IN PLACE: Primary | ICD-10-CM

## 2022-08-20 PROCEDURE — 93295 DEV INTERROG REMOTE 1/2/MLT: CPT | Performed by: INTERNAL MEDICINE

## 2022-08-20 PROCEDURE — 93296 REM INTERROG EVL PM/IDS: CPT | Performed by: INTERNAL MEDICINE

## 2022-08-22 DIAGNOSIS — I50.40 COMBINED SYSTOLIC AND DIASTOLIC ACC/AHA STAGE C CONGESTIVE HEART FAILURE (HCC): ICD-10-CM

## 2022-08-22 DIAGNOSIS — Z95.810 ICD (IMPLANTABLE CARDIOVERTER-DEFIBRILLATOR) IN PLACE: Primary | ICD-10-CM

## 2022-08-22 DIAGNOSIS — I25.5 ISCHEMIC CARDIOMYOPATHY: ICD-10-CM

## 2022-08-24 ENCOUNTER — TELEPHONE (OUTPATIENT)
Dept: CARDIOLOGY CLINIC | Age: 72
End: 2022-08-24

## 2022-08-24 NOTE — TELEPHONE ENCOUNTER
Eriberto Mar called requesting to move 8/25 appointment to a month later on a Monday Tuesday or Wednesday. [facility] not able to accommodate.  Please return his call to discuss [phone]      Thank you

## 2022-10-10 ENCOUNTER — OFFICE VISIT (OUTPATIENT)
Dept: CARDIOLOGY CLINIC | Age: 72
End: 2022-10-10
Payer: MEDICARE

## 2022-10-10 VITALS
SYSTOLIC BLOOD PRESSURE: 120 MMHG | HEART RATE: 92 BPM | WEIGHT: 141 LBS | BODY MASS INDEX: 19.1 KG/M2 | HEIGHT: 72 IN | DIASTOLIC BLOOD PRESSURE: 74 MMHG

## 2022-10-10 DIAGNOSIS — I65.23 BILATERAL CAROTID ARTERY STENOSIS: ICD-10-CM

## 2022-10-10 DIAGNOSIS — I50.40 COMBINED SYSTOLIC AND DIASTOLIC ACC/AHA STAGE C CONGESTIVE HEART FAILURE (HCC): ICD-10-CM

## 2022-10-10 DIAGNOSIS — I25.10 CORONARY ARTERY DISEASE INVOLVING NATIVE CORONARY ARTERY OF NATIVE HEART WITHOUT ANGINA PECTORIS: ICD-10-CM

## 2022-10-10 DIAGNOSIS — Z79.899 LONG TERM CURRENT USE OF ANTIARRHYTHMIC DRUG: ICD-10-CM

## 2022-10-10 DIAGNOSIS — M79.10 MYALGIA DUE TO STATIN: ICD-10-CM

## 2022-10-10 DIAGNOSIS — E78.2 MIXED HYPERLIPIDEMIA: ICD-10-CM

## 2022-10-10 DIAGNOSIS — T46.6X5A MYALGIA DUE TO STATIN: ICD-10-CM

## 2022-10-10 DIAGNOSIS — I10 PRIMARY HYPERTENSION: ICD-10-CM

## 2022-10-10 DIAGNOSIS — I48.0 PAROXYSMAL ATRIAL FIBRILLATION (HCC): ICD-10-CM

## 2022-10-10 DIAGNOSIS — I25.5 ISCHEMIC CARDIOMYOPATHY: ICD-10-CM

## 2022-10-10 DIAGNOSIS — Z79.01 CHRONIC ANTICOAGULATION: ICD-10-CM

## 2022-10-10 DIAGNOSIS — Z95.810 ICD (IMPLANTABLE CARDIOVERTER-DEFIBRILLATOR) IN PLACE: ICD-10-CM

## 2022-10-10 DIAGNOSIS — Z95.810 ICD (IMPLANTABLE CARDIOVERTER-DEFIBRILLATOR) IN PLACE: Primary | ICD-10-CM

## 2022-10-10 LAB
ALBUMIN SERPL-MCNC: 4.3 G/DL (ref 3.5–5.2)
ALP BLD-CCNC: 69 U/L (ref 40–130)
ALT SERPL-CCNC: 13 U/L (ref 5–41)
ANION GAP SERPL CALCULATED.3IONS-SCNC: 13 MMOL/L (ref 7–19)
AST SERPL-CCNC: 19 U/L (ref 5–40)
BILIRUB SERPL-MCNC: <0.2 MG/DL (ref 0.2–1.2)
BUN BLDV-MCNC: 17 MG/DL (ref 8–23)
CALCIUM SERPL-MCNC: 8.3 MG/DL (ref 8.8–10.2)
CHLORIDE BLD-SCNC: 99 MMOL/L (ref 98–111)
CO2: 24 MMOL/L (ref 22–29)
CREAT SERPL-MCNC: 1.1 MG/DL (ref 0.5–1.2)
GFR AFRICAN AMERICAN: >59
GFR NON-AFRICAN AMERICAN: >60
GLUCOSE BLD-MCNC: 89 MG/DL (ref 74–109)
HCT VFR BLD CALC: 38.1 % (ref 42–52)
HEMOGLOBIN: 12.3 G/DL (ref 14–18)
MCH RBC QN AUTO: 30.6 PG (ref 27–31)
MCHC RBC AUTO-ENTMCNC: 32.3 G/DL (ref 33–37)
MCV RBC AUTO: 94.8 FL (ref 80–94)
PDW BLD-RTO: 14.7 % (ref 11.5–14.5)
PLATELET # BLD: 279 K/UL (ref 130–400)
PMV BLD AUTO: 11.5 FL (ref 9.4–12.4)
POTASSIUM SERPL-SCNC: 4.4 MMOL/L (ref 3.5–5)
RBC # BLD: 4.02 M/UL (ref 4.7–6.1)
SODIUM BLD-SCNC: 136 MMOL/L (ref 136–145)
T4 FREE: 1.39 NG/DL (ref 0.93–1.7)
TOTAL PROTEIN: 7.2 G/DL (ref 6.6–8.7)
TSH SERPL DL<=0.05 MIU/L-ACNC: 5.38 UIU/ML (ref 0.27–4.2)
WBC # BLD: 9.3 K/UL (ref 4.8–10.8)

## 2022-10-10 PROCEDURE — G8420 CALC BMI NORM PARAMETERS: HCPCS | Performed by: INTERNAL MEDICINE

## 2022-10-10 PROCEDURE — 3017F COLORECTAL CA SCREEN DOC REV: CPT | Performed by: INTERNAL MEDICINE

## 2022-10-10 PROCEDURE — G8484 FLU IMMUNIZE NO ADMIN: HCPCS | Performed by: INTERNAL MEDICINE

## 2022-10-10 PROCEDURE — 99214 OFFICE O/P EST MOD 30 MIN: CPT | Performed by: INTERNAL MEDICINE

## 2022-10-10 PROCEDURE — 1123F ACP DISCUSS/DSCN MKR DOCD: CPT | Performed by: INTERNAL MEDICINE

## 2022-10-10 PROCEDURE — G8427 DOCREV CUR MEDS BY ELIG CLIN: HCPCS | Performed by: INTERNAL MEDICINE

## 2022-10-10 PROCEDURE — 1036F TOBACCO NON-USER: CPT | Performed by: INTERNAL MEDICINE

## 2022-10-10 PROCEDURE — 93283 PRGRMG EVAL IMPLANTABLE DFB: CPT | Performed by: INTERNAL MEDICINE

## 2022-10-10 ASSESSMENT — ENCOUNTER SYMPTOMS
VOMITING: 0
EYES NEGATIVE: 1
RESPIRATORY NEGATIVE: 1
DIARRHEA: 0
SHORTNESS OF BREATH: 0
GASTROINTESTINAL NEGATIVE: 1
NAUSEA: 0

## 2022-10-10 NOTE — PROGRESS NOTES
ICD interrogated  Presenting rhythm: AS VS, AP 18.3%,  <0.1%  Battery status: 10.4 years  Lead status: lead impedance within range and stable  Sensing: P waves 2.3 mV,  R waves 10.9 mV  Thresholds:  Atrial 0.625 V @ 0.4ms, ventricular 0.75 @ 0.4ms  Observations:  none  Reprogramming for sensitivity and threshold testing  Next [facility] home check scheduled for 1/16/23

## 2022-10-10 NOTE — PROGRESS NOTES
[facility] Progress Note                            Date:  10/10/2022  Patient: Camille Rubio  Age:  74 y.o., 1950      Reason for evaluation:         SUBJECTIVE:    Returns today follow-up assessment for cardiomyopathy chronic systolic and diastolic congestive heart failure implanted ICD paroxysmal atrial fibrillation chronic anticoagulation long-term usage of an antiarrhythmic drug overall doing well. Denies anginal chest pain dyspnea or awareness of ICD shocks. No new complaints or issues reported. Blood pressure 120/74 heart 92. Review of Systems   Constitutional: Negative. Negative for chills, fever and unexpected weight change. HENT: Negative. Eyes: Negative. Respiratory: Negative. Negative for shortness of breath. Cardiovascular: Negative. Negative for chest pain. Gastrointestinal: Negative. Negative for diarrhea, nausea and vomiting. Endocrine: Negative. Genitourinary: Negative. Musculoskeletal: Negative. Skin: Negative. Neurological: Negative. All other systems reviewed and are negative. OBJECTIVE:     /74   Pulse 92   Ht 6' (1.829 m)   Wt 141 lb (64 kg)   BMI 19.12 kg/m²     Labs:   CBC: No results for input(s): WBC, HGB, HCT, PLT in the last 72 hours. BMP:No results for input(s): NA, K, CO2, BUN, CREATININE, LABGLOM, GLUCOSE in the last 72 hours. BNP: No results for input(s): BNP in the last 72 hours. PT/INR: No results for input(s): PROTIME, INR in the last 72 hours. APTT:No results for input(s): APTT in the last 72 hours. CARDIAC ENZYMES:No results for input(s): CKTOTAL, CKMB, CKMBINDEX, TROPONINI in the last 72 hours. FASTING LIPID PANEL:  Lab Results   Component Value Date/Time    HDL 54 02/03/2022 03:53 PM    LDLDIRECT 141 04/17/2015 07:35 AM    LDLCALC 81 02/03/2022 03:53 PM    TRIG 49 02/03/2022 03:53 PM     LIVER PROFILE:No results for input(s): AST, ALT, LABALBU in the last 72 hours.         Past Medical History: Diagnosis Date    Acute exacerbation of CHF (congestive heart failure) ([facility] [address] 02/02/2022    Atrial fibrillation (HCC)     Bilateral carotid artery stenosis 01/06/2020    CAD (coronary artery disease), native coronary artery     acute inferior MI on 5/15/09, s/p emeergent CABG x4 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 Mitchell Malhotra manages    Palliative care patient 02/28/2022    Palpitations     Statin intolerance 02/14/2018    Tobacco abuse     Transient atrial fibrillation or flutter     post op     Past Surgical History:   Procedure Laterality Date    CARDIAC CATHETERIZATION  4/23/12    with stent to left circ, and LAD    CARDIAC CATHETERIZATION  01/07/2018    PTCA/BJ to VG to LAD;  atherectomy and 3 BMS to VG to 1st Circ    CORONARY ARTERY BYPASS GRAFT  05/15/09    x4    DIAGNOSTIC CARDIAC CATH LAB PROCEDURE  05/15/09    left heart cath, left ventr, selective coronary arteriography     FRACTURE SURGERY Left     Leg    VASCULAR SURGERY  4/21/2015 [facility]    Percutaneous cannulation right common femoral artery with 5-Zimbabwean and later 6-Zimbabwean pinnacle destination sheath. Suprarenal abdominal aortogram with bilateral iliofemoral arteriograms. Bilateral lower extremity arteriograms. Left popliteal/tibioperoneal trunk artery balloon angioplasty with 4 mm x 15 mm cutting balloon. VASCULAR SURGERY  4/21/2015 [facility] cont    Arteriograms after balloon angioplasty. Balloon angioplasty left popliteal artery/tibioperoneal trunk with 5 mm x 40 mm russell balloon. Arteriograms after balloon angioplasty. Left superficial femoral artery balloon angioplasty with 7 mm x 100 mm  balloon. Left superficial femoral artery stent placement idev supera 6.5 x 100 mm self-expanding nitinol stent. Completion arteriograms left lower e    VASCULAR SURGERY  4/21/2015 [facility] cont    extremity. Mynx closure right common femoral artery puncture site. VASCULAR SURGERY  03/04/2020    [facility]. Percutaneous cannulation left common femoral artery with 5 french glide sheath. Suprarenal abdominal aortogram with bilateral iliofemoral arteriogram.Bilateral lower extremity arteriogram.Minx closure left common femoral artery puncture site. Family History   Problem Relation Age of Onset    Coronary Art Dis Father     High Blood Pressure Father     Coronary Art Dis Brother     High Blood Pressure Mother     Cancer Mother     High Blood Pressure Sister      Allergies   Allergen Reactions    Statins Itching     myalagias     Current Outpatient Medications   Medication Sig Dispense Refill    amiodarone (CORDARONE) 200 MG tablet Take 1 tablet by mouth daily 90 tablet 3    apixaban (ELIQUIS) 5 MG TABS tablet Take 1 tablet by mouth 2 times daily 180 tablet 3    atorvastatin (LIPITOR) 80 MG tablet Take 0.5 tablets by mouth nightly 90 tablet 3    carvedilol (COREG) 3.125 MG tablet Take 1 tablet by mouth 2 times daily (with meals) 180 tablet 3    clopidogrel (PLAVIX) 75 MG tablet Take 1 tablet by mouth daily 90 tablet 3    isosorbide mononitrate (IMDUR) 30 MG extended release tablet Take 1 tablet by mouth daily 90 tablet 3    sacubitril-valsartan (ENTRESTO) 24-26 MG per tablet Take 1 tablet by mouth 2 times daily 180 tablet 3    spironolactone (ALDACTONE) 25 MG tablet Take 1 tablet by mouth daily 90 tablet 3    furosemide (LASIX) 20 MG tablet Take 2 tablets by mouth 2 times daily 270 tablet 3    amiodarone (CORDARONE) 200 MG tablet Take 1 tablet by mouth daily 30 tablet 3    nitroGLYCERIN (NITROSTAT) 0.4 MG SL tablet Place 1 tablet under the tongue every 5 minutes as needed for Chest pain up to max of 3 total doses. If no relief after 1 dose, call 911. 25 tablet 3    nitroGLYCERIN (NITROSTAT) 0.4 MG SL tablet Place 1 tablet under the tongue every 5 minutes as needed for Chest pain 25 tablet 3    clonazePAM (KLONOPIN) 0.5 MG tablet Take 1 tablet by mouth 2 times daily as needed for Anxiety for up to 3 days.  6 tablet 0    Vitamin D (CHOLECALCIFEROL) 25 MCG (1000 UT) TABS tablet Take 5 tablets by mouth daily (Patient not taking: Reported on 10/10/2022) 60 tablet 0     No current facility-administered medications for this visit. Social History     Socioeconomic History    Marital status: Single     Spouse name: Not on file    Number of children: Not on file    Years of education: Not on file    Highest education level: Not on file   Occupational History    Not on file   Tobacco Use    Smoking status: Former     Packs/day: 0.00     Years: 45.00     Pack years: 0.00     Types: Cigarettes     Start date: 1964    Smokeless tobacco: Never    Tobacco comments:     STATES,\"I VAP\"   Vaping Use    Vaping Use: Every day   Substance and Sexual Activity    Alcohol use: No    Drug use: No    Sexual activity: Not on file   Other Topics Concern    Not on file   Social History Narrative    Not on file     Social Determinants of Health     Financial Resource Strain: Not on file   Food Insecurity: Not on file   Transportation Needs: Not on file   Physical Activity: Not on file   Stress: Not on file   Social Connections: Not on file   Intimate Partner Violence: Not on file   Housing Stability: Not on file       Physical Examination:  /74   Pulse 92   Ht 6' (1.829 m)   Wt 141 lb (64 kg)   BMI 19.12 kg/m²   Physical Exam  Vitals reviewed. ASSESSMENT:     Diagnosis Orders   1. ICD (implantable cardioverter-defibrillator) in place  CBC    BNP    Comprehensive Metabolic Panel    T4, Free    TSH      2. Combined systolic and diastolic ACC/AHA stage C congestive heart failure (HCC)  CBC    BNP    Comprehensive Metabolic Panel    T4, Free    TSH      3. Mixed hyperlipidemia  CBC    BNP    Comprehensive Metabolic Panel    T4, Free    TSH      4. Paroxysmal atrial fibrillation (HCC)  CBC    BNP    Comprehensive Metabolic Panel    T4, Free    TSH      5. Ischemic cardiomyopathy  CBC    BNP    Comprehensive Metabolic Panel    T4, Free    TSH      6.  Primary hypertension  CBC BNP    Comprehensive Metabolic Panel    T4, Free    TSH      7. Coronary artery disease involving native coronary artery of native heart without angina pectoris  CBC    BNP    Comprehensive Metabolic Panel    T4, Free    TSH      8. Bilateral carotid artery stenosis  CBC    BNP    Comprehensive Metabolic Panel    T4, Free    TSH      9. Myalgia due to statin  CBC    BNP    Comprehensive Metabolic Panel    T4, Free    TSH      10. Long term current use of antiarrhythmic drug  CBC    BNP    Comprehensive Metabolic Panel    T4, Free    TSH      11. Chronic anticoagulation  CBC    BNP    Comprehensive Metabolic Panel    T4, Free    TSH          PLAN:  Orders Placed This Encounter   Procedures    CBC    BNP    Comprehensive Metabolic Panel    T4, Free    TSH       No orders of the defined types were placed in this encounter. Continue present medications  Laboratory studies as ordered  Recommend follow-up assessment in 3 months    Return in about 3 months (around 1/10/2023) for return to Dr. Marti Zamora only. Abiel Lopez MD 10/10/2022 2:41 PM CDT    [id number] [facility]      Thisdictation was generated by voice recognition computer software. Although all attempts are made to edit the dictation for accuracy, there may be errors in the transcription that are not intended.

## 2023-01-18 ENCOUNTER — TELEPHONE (OUTPATIENT)
Dept: CARDIOLOGY CLINIC | Age: 73
End: 2023-01-18

## 2023-01-19 DIAGNOSIS — Z95.810 ICD (IMPLANTABLE CARDIOVERTER-DEFIBRILLATOR) IN PLACE: Primary | ICD-10-CM

## 2023-01-19 DIAGNOSIS — I50.40 COMBINED SYSTOLIC AND DIASTOLIC ACC/AHA STAGE C CONGESTIVE HEART FAILURE (HCC): ICD-10-CM

## 2023-04-03 ENCOUNTER — OFFICE VISIT (OUTPATIENT)
Dept: CARDIOLOGY CLINIC | Age: 73
End: 2023-04-03
Payer: MEDICARE

## 2023-04-03 VITALS
HEART RATE: 82 BPM | BODY MASS INDEX: 19.5 KG/M2 | HEIGHT: 72 IN | DIASTOLIC BLOOD PRESSURE: 84 MMHG | WEIGHT: 144 LBS | SYSTOLIC BLOOD PRESSURE: 128 MMHG

## 2023-04-03 DIAGNOSIS — I50.40 COMBINED SYSTOLIC AND DIASTOLIC ACC/AHA STAGE C CONGESTIVE HEART FAILURE (HCC): ICD-10-CM

## 2023-04-03 DIAGNOSIS — I25.10 CORONARY ARTERY DISEASE INVOLVING NATIVE CORONARY ARTERY OF NATIVE HEART WITHOUT ANGINA PECTORIS: ICD-10-CM

## 2023-04-03 DIAGNOSIS — I25.5 ISCHEMIC CARDIOMYOPATHY: ICD-10-CM

## 2023-04-03 DIAGNOSIS — Z79.01 CHRONIC ANTICOAGULATION: ICD-10-CM

## 2023-04-03 DIAGNOSIS — I10 PRIMARY HYPERTENSION: ICD-10-CM

## 2023-04-03 DIAGNOSIS — E78.2 MIXED HYPERLIPIDEMIA: ICD-10-CM

## 2023-04-03 DIAGNOSIS — Z95.5 H/O HEART ARTERY STENT: ICD-10-CM

## 2023-04-03 DIAGNOSIS — Z79.899 LONG TERM CURRENT USE OF ANTIARRHYTHMIC DRUG: ICD-10-CM

## 2023-04-03 DIAGNOSIS — I48.0 PAROXYSMAL ATRIAL FIBRILLATION (HCC): Primary | ICD-10-CM

## 2023-04-03 DIAGNOSIS — Z95.810 ICD (IMPLANTABLE CARDIOVERTER-DEFIBRILLATOR) IN PLACE: ICD-10-CM

## 2023-04-03 DIAGNOSIS — Z95.1 STATUS POST AORTO-CORONARY ARTERY BYPASS GRAFT: ICD-10-CM

## 2023-04-03 PROCEDURE — 1036F TOBACCO NON-USER: CPT | Performed by: INTERNAL MEDICINE

## 2023-04-03 PROCEDURE — 3074F SYST BP LT 130 MM HG: CPT | Performed by: INTERNAL MEDICINE

## 2023-04-03 PROCEDURE — 93283 PRGRMG EVAL IMPLANTABLE DFB: CPT | Performed by: INTERNAL MEDICINE

## 2023-04-03 PROCEDURE — G8420 CALC BMI NORM PARAMETERS: HCPCS | Performed by: INTERNAL MEDICINE

## 2023-04-03 PROCEDURE — G8427 DOCREV CUR MEDS BY ELIG CLIN: HCPCS | Performed by: INTERNAL MEDICINE

## 2023-04-03 PROCEDURE — 3079F DIAST BP 80-89 MM HG: CPT | Performed by: INTERNAL MEDICINE

## 2023-04-03 PROCEDURE — 3017F COLORECTAL CA SCREEN DOC REV: CPT | Performed by: INTERNAL MEDICINE

## 2023-04-03 PROCEDURE — 1123F ACP DISCUSS/DSCN MKR DOCD: CPT | Performed by: INTERNAL MEDICINE

## 2023-04-03 PROCEDURE — 99214 OFFICE O/P EST MOD 30 MIN: CPT | Performed by: INTERNAL MEDICINE

## 2023-04-03 ASSESSMENT — ENCOUNTER SYMPTOMS
VOMITING: 0
NAUSEA: 0
GASTROINTESTINAL NEGATIVE: 1
EYES NEGATIVE: 1
SHORTNESS OF BREATH: 0
DIARRHEA: 0
RESPIRATORY NEGATIVE: 1

## 2023-04-03 NOTE — PROGRESS NOTES
Pacemaker interrogated  Presenting rhythm:  sinus, AP 15.6%,  <0.1%  Battey voltage 10.1 years   Lead status:  Lead impedance within range and stable  Sensing:  P waves 2.1 mV,  R waves 10.1 mV  Thresholds:  Atrial 0.5V @ 0.4ms, ventricular 0.75 V @ 0.4ms  Observations:  No arrhythmias detected.   See scanned report for details  Reprogramming for sensitivity and threshold testing  [facility]

## 2023-05-23 ENCOUNTER — TELEPHONE (OUTPATIENT)
Dept: CARDIOLOGY CLINIC | Age: 73
End: 2023-05-23

## 2023-05-24 DIAGNOSIS — Z95.810 ICD (IMPLANTABLE CARDIOVERTER-DEFIBRILLATOR) IN PLACE: Primary | ICD-10-CM

## 2023-05-24 DIAGNOSIS — I25.5 ISCHEMIC CARDIOMYOPATHY: ICD-10-CM

## 2023-07-06 DIAGNOSIS — Z95.810 ICD (IMPLANTABLE CARDIOVERTER-DEFIBRILLATOR) IN PLACE: Primary | ICD-10-CM

## 2023-07-06 DIAGNOSIS — I50.40 COMBINED SYSTOLIC AND DIASTOLIC ACC/AHA STAGE C CONGESTIVE HEART FAILURE (HCC): ICD-10-CM

## 2023-07-06 DIAGNOSIS — I25.5 ISCHEMIC CARDIOMYOPATHY: ICD-10-CM

## 2023-07-06 PROCEDURE — 93296 REM INTERROG EVL PM/IDS: CPT | Performed by: CLINICAL NURSE SPECIALIST

## 2023-07-06 PROCEDURE — 93295 DEV INTERROG REMOTE 1/2/MLT: CPT | Performed by: CLINICAL NURSE SPECIALIST

## 2023-07-06 RX ORDER — ATORVASTATIN CALCIUM 80 MG/1
TABLET, FILM COATED ORAL
Qty: 30 TABLET | Refills: 0 | OUTPATIENT
Start: 2023-07-06

## 2023-07-31 RX ORDER — FUROSEMIDE 20 MG/1
TABLET ORAL
Qty: 270 TABLET | Refills: 2 | Status: SHIPPED | OUTPATIENT
Start: 2023-07-31

## 2023-10-09 ENCOUNTER — OFFICE VISIT (OUTPATIENT)
Dept: CARDIOLOGY CLINIC | Age: 73
End: 2023-10-09
Payer: MEDICARE

## 2023-10-09 VITALS
BODY MASS INDEX: 19.09 KG/M2 | SYSTOLIC BLOOD PRESSURE: 118 MMHG | WEIGHT: 144 LBS | HEART RATE: 74 BPM | DIASTOLIC BLOOD PRESSURE: 70 MMHG | HEIGHT: 73 IN

## 2023-10-09 DIAGNOSIS — Z79.01 CHRONIC ANTICOAGULATION: ICD-10-CM

## 2023-10-09 DIAGNOSIS — Z79.899 LONG TERM CURRENT USE OF ANTIARRHYTHMIC DRUG: ICD-10-CM

## 2023-10-09 DIAGNOSIS — I25.10 CORONARY ARTERY DISEASE INVOLVING NATIVE CORONARY ARTERY OF NATIVE HEART WITHOUT ANGINA PECTORIS: ICD-10-CM

## 2023-10-09 DIAGNOSIS — I25.5 ISCHEMIC CARDIOMYOPATHY: ICD-10-CM

## 2023-10-09 DIAGNOSIS — Z95.1 STATUS POST AORTO-CORONARY ARTERY BYPASS GRAFT: ICD-10-CM

## 2023-10-09 DIAGNOSIS — I48.0 PAROXYSMAL ATRIAL FIBRILLATION (HCC): Primary | ICD-10-CM

## 2023-10-09 DIAGNOSIS — E78.2 MIXED HYPERLIPIDEMIA: ICD-10-CM

## 2023-10-09 DIAGNOSIS — I48.0 PAROXYSMAL ATRIAL FIBRILLATION (HCC): ICD-10-CM

## 2023-10-09 DIAGNOSIS — Z95.5 H/O HEART ARTERY STENT: ICD-10-CM

## 2023-10-09 DIAGNOSIS — Z95.810 ICD (IMPLANTABLE CARDIOVERTER-DEFIBRILLATOR) IN PLACE: ICD-10-CM

## 2023-10-09 DIAGNOSIS — I50.40 COMBINED SYSTOLIC AND DIASTOLIC ACC/AHA STAGE C CONGESTIVE HEART FAILURE (HCC): ICD-10-CM

## 2023-10-09 DIAGNOSIS — I10 PRIMARY HYPERTENSION: ICD-10-CM

## 2023-10-09 LAB
ANION GAP SERPL CALCULATED.3IONS-SCNC: 12 MMOL/L (ref 7–19)
BUN SERPL-MCNC: 17 MG/DL (ref 8–23)
CALCIUM SERPL-MCNC: 8.6 MG/DL (ref 8.8–10.2)
CHLORIDE SERPL-SCNC: 101 MMOL/L (ref 98–111)
CO2 SERPL-SCNC: 26 MMOL/L (ref 22–29)
CREAT SERPL-MCNC: 1 MG/DL (ref 0.5–1.2)
GLUCOSE SERPL-MCNC: 86 MG/DL (ref 74–109)
POTASSIUM SERPL-SCNC: 5 MMOL/L (ref 3.5–5)
SODIUM SERPL-SCNC: 139 MMOL/L (ref 136–145)

## 2023-10-09 PROCEDURE — 3017F COLORECTAL CA SCREEN DOC REV: CPT | Performed by: INTERNAL MEDICINE

## 2023-10-09 PROCEDURE — 93282 PRGRMG EVAL IMPLANTABLE DFB: CPT | Performed by: INTERNAL MEDICINE

## 2023-10-09 PROCEDURE — 1123F ACP DISCUSS/DSCN MKR DOCD: CPT | Performed by: INTERNAL MEDICINE

## 2023-10-09 PROCEDURE — 3078F DIAST BP <80 MM HG: CPT | Performed by: INTERNAL MEDICINE

## 2023-10-09 PROCEDURE — 3074F SYST BP LT 130 MM HG: CPT | Performed by: INTERNAL MEDICINE

## 2023-10-09 PROCEDURE — G8420 CALC BMI NORM PARAMETERS: HCPCS | Performed by: INTERNAL MEDICINE

## 2023-10-09 PROCEDURE — G8484 FLU IMMUNIZE NO ADMIN: HCPCS | Performed by: INTERNAL MEDICINE

## 2023-10-09 PROCEDURE — 99214 OFFICE O/P EST MOD 30 MIN: CPT | Performed by: INTERNAL MEDICINE

## 2023-10-09 PROCEDURE — 1036F TOBACCO NON-USER: CPT | Performed by: INTERNAL MEDICINE

## 2023-10-09 PROCEDURE — G8428 CUR MEDS NOT DOCUMENT: HCPCS | Performed by: INTERNAL MEDICINE

## 2023-10-09 RX ORDER — ATORVASTATIN CALCIUM 80 MG/1
40 TABLET, FILM COATED ORAL NIGHTLY
Qty: 90 TABLET | Refills: 3 | Status: SHIPPED | OUTPATIENT
Start: 2023-10-09

## 2023-10-09 ASSESSMENT — ENCOUNTER SYMPTOMS
NAUSEA: 0
RESPIRATORY NEGATIVE: 1
GASTROINTESTINAL NEGATIVE: 1
DIARRHEA: 0
VOMITING: 0
EYES NEGATIVE: 1
SHORTNESS OF BREATH: 0

## 2023-10-09 NOTE — PROGRESS NOTES
ICD interrogated  Presenting rhythm: AP/VS, AP 38.2%,  <0.1%  Battery status: 9.5 years  Lead status: lead impedance within range and stable  Sensing: P waves 1.9 mV,  R waves 10.9 mV  Thresholds:  Atrial 0.500 V @ 0.4ms, ventricular 0.750 V @ 0.4ms  Observations:  None  See scanned report for details  Reprogramming for sensitivity and threshold testing  Next [facility] home check scheduled for 01/11/24
idev supera 6.5 x 100 mm self-expanding nitinol stent. Completion arteriograms left lower e    VASCULAR SURGERY  4/21/2015 [facility] cont    extremity. Mynx closure right common femoral artery puncture site. VASCULAR SURGERY  03/04/2020    [facility]. Percutaneous cannulation left common femoral artery with 5 french glide sheath. Suprarenal abdominal aortogram with bilateral iliofemoral arteriogram.Bilateral lower extremity arteriogram.Minx closure left common femoral artery puncture site. Family History   Problem Relation Age of Onset    Coronary Art Dis Father     High Blood Pressure Father     Coronary Art Dis Brother     High Blood Pressure Mother     Cancer Mother     High Blood Pressure Sister      Allergies   Allergen Reactions    Statins Itching     myalagias     Current Outpatient Medications   Medication Sig Dispense Refill    atorvastatin (LIPITOR) 80 MG tablet Take 0.5 tablets by mouth nightly 90 tablet 3    furosemide (LASIX) 20 MG tablet Take 2 tablets by mouth twice daily 270 tablet 2    apixaban (ELIQUIS) 5 MG TABS tablet Take 1 tablet by mouth 2 times daily 180 tablet 3    sacubitril-valsartan (ENTRESTO) 24-26 MG per tablet Take 1 tablet by mouth 2 times daily 180 tablet 3    amiodarone (CORDARONE) 200 MG tablet Take 1 tablet by mouth daily 90 tablet 3    carvedilol (COREG) 3.125 MG tablet Take 1 tablet by mouth 2 times daily (with meals) 180 tablet 3    clopidogrel (PLAVIX) 75 MG tablet Take 1 tablet by mouth daily 90 tablet 3    isosorbide mononitrate (IMDUR) 30 MG extended release tablet Take 1 tablet by mouth daily 90 tablet 3    spironolactone (ALDACTONE) 25 MG tablet Take 1 tablet by mouth daily 90 tablet 3    clonazePAM (KLONOPIN) 0.5 MG tablet Take 1 tablet by mouth 2 times daily as needed for Anxiety for up to 3 days.  6 tablet 0    Vitamin D (CHOLECALCIFEROL) 25 MCG (1000 UT) TABS tablet Take 5 tablets by mouth daily 60 tablet 0    nitroGLYCERIN (NITROSTAT) 0.4 MG SL tablet Place 1 tablet under the

## 2023-11-21 RX ORDER — CARVEDILOL 3.12 MG/1
3.12 TABLET ORAL 2 TIMES DAILY WITH MEALS
Qty: 180 TABLET | Refills: 3 | Status: SHIPPED | OUTPATIENT
Start: 2023-11-21

## 2023-11-21 RX ORDER — ISOSORBIDE MONONITRATE 30 MG/1
30 TABLET, EXTENDED RELEASE ORAL DAILY
Qty: 90 TABLET | Refills: 3 | Status: SHIPPED | OUTPATIENT
Start: 2023-11-21

## 2023-11-21 RX ORDER — AMIODARONE HYDROCHLORIDE 200 MG/1
200 TABLET ORAL DAILY
Qty: 90 TABLET | Refills: 3 | Status: SHIPPED | OUTPATIENT
Start: 2023-11-21

## 2023-11-21 RX ORDER — SPIRONOLACTONE 25 MG/1
25 TABLET ORAL DAILY
Qty: 90 TABLET | Refills: 3 | Status: SHIPPED | OUTPATIENT
Start: 2023-11-21

## 2024-01-11 DIAGNOSIS — Z95.810 ICD (IMPLANTABLE CARDIOVERTER-DEFIBRILLATOR) IN PLACE: Primary | ICD-10-CM

## 2024-01-11 DIAGNOSIS — I48.0 PAROXYSMAL ATRIAL FIBRILLATION (HCC): ICD-10-CM

## 2024-01-11 DIAGNOSIS — I50.40 COMBINED SYSTOLIC AND DIASTOLIC ACC/AHA STAGE C CONGESTIVE HEART FAILURE (HCC): ICD-10-CM

## 2024-01-11 PROCEDURE — 93296 REM INTERROG EVL PM/IDS: CPT | Performed by: NURSE PRACTITIONER

## 2024-01-11 PROCEDURE — 93295 DEV INTERROG REMOTE 1/2/MLT: CPT | Performed by: NURSE PRACTITIONER

## 2024-03-05 DIAGNOSIS — I48.0 PAROXYSMAL ATRIAL FIBRILLATION (HCC): ICD-10-CM

## 2024-03-05 DIAGNOSIS — Z95.810 ICD (IMPLANTABLE CARDIOVERTER-DEFIBRILLATOR) IN PLACE: Primary | ICD-10-CM

## 2024-03-05 DIAGNOSIS — I50.40 COMBINED SYSTOLIC AND DIASTOLIC ACC/AHA STAGE C CONGESTIVE HEART FAILURE (HCC): ICD-10-CM

## 2024-03-07 DIAGNOSIS — I50.40 COMBINED SYSTOLIC AND DIASTOLIC ACC/AHA STAGE C CONGESTIVE HEART FAILURE (HCC): ICD-10-CM

## 2024-03-07 DIAGNOSIS — I25.5 ISCHEMIC CARDIOMYOPATHY: ICD-10-CM

## 2024-03-07 DIAGNOSIS — I48.92 ATRIAL FLUTTER, PAROXYSMAL (HCC): ICD-10-CM

## 2024-03-07 DIAGNOSIS — Z95.810 ICD (IMPLANTABLE CARDIOVERTER-DEFIBRILLATOR) IN PLACE: Primary | ICD-10-CM

## 2024-03-08 DIAGNOSIS — I25.5 ISCHEMIC CARDIOMYOPATHY: ICD-10-CM

## 2024-03-08 DIAGNOSIS — Z95.810 ICD (IMPLANTABLE CARDIOVERTER-DEFIBRILLATOR) IN PLACE: Primary | ICD-10-CM

## 2024-03-08 DIAGNOSIS — I50.40 COMBINED SYSTOLIC AND DIASTOLIC ACC/AHA STAGE C CONGESTIVE HEART FAILURE (HCC): ICD-10-CM

## 2024-03-11 DIAGNOSIS — I48.0 PAROXYSMAL ATRIAL FIBRILLATION (HCC): ICD-10-CM

## 2024-03-11 DIAGNOSIS — I25.5 ISCHEMIC CARDIOMYOPATHY: ICD-10-CM

## 2024-03-11 DIAGNOSIS — Z95.810 ICD (IMPLANTABLE CARDIOVERTER-DEFIBRILLATOR) IN PLACE: Primary | ICD-10-CM

## 2024-03-19 DIAGNOSIS — I50.40 COMBINED SYSTOLIC AND DIASTOLIC ACC/AHA STAGE C CONGESTIVE HEART FAILURE (HCC): ICD-10-CM

## 2024-03-19 DIAGNOSIS — I25.5 ISCHEMIC CARDIOMYOPATHY: ICD-10-CM

## 2024-03-19 DIAGNOSIS — Z95.810 ICD (IMPLANTABLE CARDIOVERTER-DEFIBRILLATOR) IN PLACE: Primary | ICD-10-CM

## 2024-04-08 PROCEDURE — 93296 REM INTERROG EVL PM/IDS: CPT | Performed by: INTERNAL MEDICINE

## 2024-04-08 PROCEDURE — 93295 DEV INTERROG REMOTE 1/2/MLT: CPT | Performed by: INTERNAL MEDICINE

## 2024-04-09 DIAGNOSIS — I48.0 PAROXYSMAL ATRIAL FIBRILLATION (HCC): ICD-10-CM

## 2024-04-09 DIAGNOSIS — I50.40 COMBINED SYSTOLIC AND DIASTOLIC ACC/AHA STAGE C CONGESTIVE HEART FAILURE (HCC): ICD-10-CM

## 2024-04-09 DIAGNOSIS — I42.0 DILATED CARDIOMYOPATHY (HCC): ICD-10-CM

## 2024-04-09 DIAGNOSIS — Z95.810 ICD (IMPLANTABLE CARDIOVERTER-DEFIBRILLATOR) IN PLACE: Primary | ICD-10-CM

## 2024-04-25 RX ORDER — SACUBITRIL AND VALSARTAN 24; 26 MG/1; MG/1
1 TABLET, FILM COATED ORAL 2 TIMES DAILY
Qty: 180 TABLET | Refills: 1 | Status: SHIPPED | OUTPATIENT
Start: 2024-04-25

## 2024-04-25 RX ORDER — APIXABAN 5 MG/1
5 TABLET, FILM COATED ORAL 2 TIMES DAILY
Qty: 180 TABLET | Refills: 1 | Status: SHIPPED | OUTPATIENT
Start: 2024-04-25

## 2024-06-12 RX ORDER — FUROSEMIDE 20 MG/1
TABLET ORAL
Qty: 360 TABLET | Refills: 1 | Status: SHIPPED | OUTPATIENT
Start: 2024-06-12

## 2024-07-11 DIAGNOSIS — I50.40 COMBINED SYSTOLIC AND DIASTOLIC ACC/AHA STAGE C CONGESTIVE HEART FAILURE (HCC): ICD-10-CM

## 2024-07-11 DIAGNOSIS — I25.5 ISCHEMIC CARDIOMYOPATHY: ICD-10-CM

## 2024-07-11 DIAGNOSIS — Z95.810 ICD (IMPLANTABLE CARDIOVERTER-DEFIBRILLATOR) IN PLACE: Primary | ICD-10-CM

## 2024-07-11 PROCEDURE — 93295 DEV INTERROG REMOTE 1/2/MLT: CPT | Performed by: NURSE PRACTITIONER

## 2024-07-11 PROCEDURE — 93296 REM INTERROG EVL PM/IDS: CPT | Performed by: NURSE PRACTITIONER

## 2024-07-11 NOTE — PROGRESS NOTES
Carelink ICD report received.  Left message for patient to return call.  Will need to find out if he is having and worsening shortness of breath, swelling, or weight gain.

## 2024-08-29 ENCOUNTER — OFFICE VISIT (OUTPATIENT)
Dept: CARDIOLOGY CLINIC | Age: 74
End: 2024-08-29

## 2024-08-29 VITALS
HEIGHT: 73 IN | SYSTOLIC BLOOD PRESSURE: 146 MMHG | BODY MASS INDEX: 18.02 KG/M2 | HEART RATE: 90 BPM | DIASTOLIC BLOOD PRESSURE: 78 MMHG | WEIGHT: 136 LBS

## 2024-08-29 DIAGNOSIS — I50.40 COMBINED SYSTOLIC AND DIASTOLIC ACC/AHA STAGE C CONGESTIVE HEART FAILURE (HCC): ICD-10-CM

## 2024-08-29 DIAGNOSIS — Z95.810 ICD (IMPLANTABLE CARDIOVERTER-DEFIBRILLATOR) IN PLACE: ICD-10-CM

## 2024-08-29 DIAGNOSIS — I10 PRIMARY HYPERTENSION: ICD-10-CM

## 2024-08-29 DIAGNOSIS — E78.2 MIXED HYPERLIPIDEMIA: Primary | ICD-10-CM

## 2024-08-29 DIAGNOSIS — Z79.01 CHRONIC ANTICOAGULATION: ICD-10-CM

## 2024-08-29 DIAGNOSIS — Z95.5 H/O HEART ARTERY STENT: ICD-10-CM

## 2024-08-29 DIAGNOSIS — Z95.1 STATUS POST AORTO-CORONARY ARTERY BYPASS GRAFT: ICD-10-CM

## 2024-08-29 DIAGNOSIS — I25.5 ISCHEMIC CARDIOMYOPATHY: ICD-10-CM

## 2024-08-29 DIAGNOSIS — Z79.899 LONG TERM CURRENT USE OF ANTIARRHYTHMIC DRUG: ICD-10-CM

## 2024-08-29 DIAGNOSIS — I25.10 CORONARY ARTERY DISEASE INVOLVING NATIVE CORONARY ARTERY OF NATIVE HEART WITHOUT ANGINA PECTORIS: ICD-10-CM

## 2024-08-29 RX ORDER — CARVEDILOL 6.25 MG/1
6.25 TABLET ORAL 2 TIMES DAILY WITH MEALS
COMMUNITY
End: 2024-08-29 | Stop reason: SDUPTHER

## 2024-08-29 RX ORDER — CARVEDILOL 6.25 MG/1
6.25 TABLET ORAL 2 TIMES DAILY WITH MEALS
Qty: 60 TABLET | Refills: 5 | Status: SHIPPED | OUTPATIENT
Start: 2024-08-29

## 2024-08-29 ASSESSMENT — ENCOUNTER SYMPTOMS
RESPIRATORY NEGATIVE: 1
EYES NEGATIVE: 1
VOMITING: 0
DIARRHEA: 0
NAUSEA: 0
SHORTNESS OF BREATH: 0
GASTROINTESTINAL NEGATIVE: 1

## 2024-08-29 NOTE — PROGRESS NOTES
[facility]                            Date:  8/29/2024  Patient: Eddie Brito  Age:  73 y.o., 1950      Reason for evaluation:         SUBJECTIVE:    Returns today follow-up assessment coronary artery disease ischemic cardiomyopathy chronic combined congestive heart failure previous stent previous CABG hyperlipidemia hypertension long-term antiarrhythmic drug usage and chronic anticoagulation overall doing well all conditions at goal.  Blood pressure 146/78 heart rate 90.  Device interrogated functioning appropriately.  Atrial fibrillation burden 5.9% longevity on his device 8.5 years.  ECG tracing today sinus rhythm first-degree AV block low voltage nonspecific ST-T wave abnormalities poor R wave progression QTc 420 ms.  Remains moderately active denies anginal chest discomfort limiting dyspnea palpitations or other complaints no edema.      Review of Systems   Constitutional: Negative.  Negative for chills, fever and unexpected weight change.   HENT: Negative.     Eyes: Negative.    Respiratory: Negative.  Negative for shortness of breath.    Cardiovascular: Negative.  Negative for chest pain.   Gastrointestinal: Negative.  Negative for diarrhea, nausea and vomiting.   Endocrine: Negative.    Genitourinary: Negative.    Musculoskeletal: Negative.    Skin: Negative.    Neurological: Negative.    All other systems reviewed and are negative.        OBJECTIVE:    BP (!) 146/78   Pulse 90   Ht 1.854 m (6' 1\")   Wt 61.7 kg (136 lb)   BMI 17.94 kg/m²     Labs:   CBC: No results for input(s): \"WBC\", \"HGB\", \"HCT\", \"PLT\" in the last 72 hours.  BMP:No results for input(s): \"NA\", \"K\", \"CO2\", \"BUN\", \"CREATININE\", \"LABGLOM\", \"GLUCOSE\" in the last 72 hours.  BNP: No results for input(s): \"BNP\" in the last 72 hours.  PT/INR: No results for input(s): \"PROTIME\", \"INR\" in the last 72 hours.  APTT:No results for input(s): \"APTT\" in the last 72 hours.  CARDIAC ENZYMES:No results for input(s):

## 2024-12-02 DIAGNOSIS — I50.40 COMBINED SYSTOLIC AND DIASTOLIC ACC/AHA STAGE C CONGESTIVE HEART FAILURE (HCC): ICD-10-CM

## 2024-12-02 DIAGNOSIS — Z95.810 ICD (IMPLANTABLE CARDIOVERTER-DEFIBRILLATOR) IN PLACE: Primary | ICD-10-CM

## 2024-12-02 DIAGNOSIS — I25.5 ISCHEMIC CARDIOMYOPATHY: ICD-10-CM

## 2025-01-20 RX ORDER — SPIRONOLACTONE 25 MG/1
25 TABLET ORAL DAILY
Qty: 90 TABLET | Refills: 0 | Status: SHIPPED | OUTPATIENT
Start: 2025-01-20

## 2025-01-20 RX ORDER — AMIODARONE HYDROCHLORIDE 200 MG/1
200 TABLET ORAL DAILY
Qty: 90 TABLET | Refills: 0 | Status: SHIPPED | OUTPATIENT
Start: 2025-01-20

## 2025-02-25 ENCOUNTER — TELEPHONE (OUTPATIENT)
Dept: CARDIOLOGY CLINIC | Age: 75
End: 2025-02-25

## 2025-03-05 ENCOUNTER — TELEPHONE (OUTPATIENT)
Dept: CARDIOLOGY CLINIC | Age: 75
End: 2025-03-05

## 2025-03-06 DIAGNOSIS — I50.40 COMBINED SYSTOLIC AND DIASTOLIC ACC/AHA STAGE C CONGESTIVE HEART FAILURE (HCC): ICD-10-CM

## 2025-03-06 DIAGNOSIS — I25.5 ISCHEMIC CARDIOMYOPATHY: ICD-10-CM

## 2025-03-06 DIAGNOSIS — Z95.810 ICD (IMPLANTABLE CARDIOVERTER-DEFIBRILLATOR) IN PLACE: Primary | ICD-10-CM

## 2025-03-18 ENCOUNTER — OFFICE VISIT (OUTPATIENT)
Dept: CARDIOLOGY CLINIC | Age: 75
End: 2025-03-18

## 2025-03-18 VITALS
HEART RATE: 94 BPM | HEIGHT: 73 IN | WEIGHT: 140 LBS | SYSTOLIC BLOOD PRESSURE: 136 MMHG | BODY MASS INDEX: 18.55 KG/M2 | DIASTOLIC BLOOD PRESSURE: 88 MMHG

## 2025-03-18 DIAGNOSIS — I48.0 PAROXYSMAL ATRIAL FIBRILLATION (HCC): ICD-10-CM

## 2025-03-18 DIAGNOSIS — I10 PRIMARY HYPERTENSION: ICD-10-CM

## 2025-03-18 DIAGNOSIS — Z95.810 ICD (IMPLANTABLE CARDIOVERTER-DEFIBRILLATOR) IN PLACE: ICD-10-CM

## 2025-03-18 DIAGNOSIS — I25.5 ISCHEMIC CARDIOMYOPATHY: ICD-10-CM

## 2025-03-18 DIAGNOSIS — R94.31 ABNORMAL ELECTROCARDIOGRAPHY: ICD-10-CM

## 2025-03-18 DIAGNOSIS — I25.10 CORONARY ARTERY DISEASE INVOLVING NATIVE CORONARY ARTERY OF NATIVE HEART WITHOUT ANGINA PECTORIS: Primary | ICD-10-CM

## 2025-03-18 DIAGNOSIS — I50.40 COMBINED SYSTOLIC AND DIASTOLIC ACC/AHA STAGE C CONGESTIVE HEART FAILURE (HCC): ICD-10-CM

## 2025-03-18 DIAGNOSIS — Z51.81 ENCOUNTER FOR MONITORING AMIODARONE THERAPY: ICD-10-CM

## 2025-03-18 DIAGNOSIS — E78.2 MIXED HYPERLIPIDEMIA: ICD-10-CM

## 2025-03-18 DIAGNOSIS — Z79.899 ENCOUNTER FOR MONITORING AMIODARONE THERAPY: ICD-10-CM

## 2025-03-18 DIAGNOSIS — Z79.899 LONG TERM CURRENT USE OF ANTIARRHYTHMIC DRUG: ICD-10-CM

## 2025-03-18 ASSESSMENT — ENCOUNTER SYMPTOMS
SHORTNESS OF BREATH: 1
CHEST TIGHTNESS: 0
VOMITING: 0
NAUSEA: 0
WHEEZING: 0
FACIAL SWELLING: 0
COUGH: 0
ABDOMINAL PAIN: 0
EYE REDNESS: 0

## 2025-03-18 NOTE — PATIENT INSTRUCTIONS
Return in about 6 months (around 9/18/2025) for Dr. Flor.  Fasting lipids, CMP, TSH- can do in hometown if able  Lexiscan Nuclear Stress test due to EKG change    - Weigh daily every morning after urinating (this is your dry weight).   Report weight gain of 3lbs or more in 24hrs or 5lbs in one week.  - Call for increasing shortness of breath or increasing swelling in feet and legs.    (This could mean you are retaining too much fluid)  - 2000mg low sodium diet  - Fluid restriction of 1500ml per day (about 6-8 cups (48-64oz) of fluid per day)

## 2025-03-18 NOTE — PROGRESS NOTES
amiodarone monitoring    ICD  ICD interrogation showed adequate battery voltage and charge time.  Mode: [id number]  Lead impedances stable.  Normal diagnostics and safety margins noted.  No ICD discharges.  Sustained arrythmia:  none  Optivol stable.  Please see the scanned interrogation report     Stable cardiovascular status. No evidence of overt heart failure,angina or dysrhythmia.     Plan    Orders Placed This Encounter   Procedures    Comprehensive Metabolic Panel     Standing Status:   Future     Expected Date:   3/18/2025     Expiration Date:   3/18/2026    T4, Free     Standing Status:   Future     Expected Date:   3/18/2025     Expiration Date:   3/18/2026    T3, Free     Standing Status:   Future     Expected Date:   3/18/2025     Expiration Date:   3/18/2026    TSH     Standing Status:   Future     Expected Date:   3/18/2025     Expiration Date:   3/18/2026    Lipid Panel     Standing Status:   Future     Expected Date:   3/18/2025     Expiration Date:   3/18/2026     Is Patient Fasting?/# of Hours:   yes    EKG 12 lead     Reason for Exam?:   Coronary artery disease    Nuclear stress test with myocardial perfusion     Standing Status:   Future     Expected Date:   3/18/2025     Expiration Date:   3/18/2026     What stress agent should be used?:   Lexiscan     NM [employer]:   Per Facility Protocol     Return in about 6 months (around 9/18/2025) for Dr. Flor.  Fasting lipids, CMP, TSH- can do in hometown if able  Lexiscan Nuclear Stress test due to EKG change    - Weigh daily every morning after urinating (this is your dry weight).   Report weight gain of 3lbs or more in 24hrs or 5lbs in one week.  - Call for increasing shortness of breath or increasing swelling in feet and legs.    (This could mean you are retaining too much fluid)  - 2000mg low sodium diet  - Fluid restriction of 1500ml per day (about 6-8 cups (48-64oz) of fluid per day)     Call with any questionsor concerns  Follow up

## 2025-04-01 ENCOUNTER — TELEPHONE (OUTPATIENT)
Dept: CARDIOLOGY CLINIC | Age: 75
End: 2025-04-01

## 2025-04-01 NOTE — TELEPHONE ENCOUNTER
Can you f/u with pt regarding getting cholesterol labs done at hometown lab?  Would like to see results and we haven't received - thank you

## 2025-04-03 RX ORDER — APIXABAN 5 MG/1
5 TABLET, FILM COATED ORAL 2 TIMES DAILY
Qty: 180 TABLET | Refills: 3 | Status: SHIPPED | OUTPATIENT
Start: 2025-04-03

## 2025-04-03 RX ORDER — SACUBITRIL AND VALSARTAN 24; 26 MG/1; MG/1
1 TABLET, FILM COATED ORAL 2 TIMES DAILY
Qty: 180 TABLET | Refills: 3 | Status: SHIPPED | OUTPATIENT
Start: 2025-04-03

## 2025-04-04 ENCOUNTER — HOSPITAL ENCOUNTER (INPATIENT)
Age: 75
LOS: 1 days | Discharge: HOME | End: 2025-04-04
Attending: EMERGENCY MEDICINE | Admitting: INTERNAL MEDICINE
Payer: MEDICARE

## 2025-04-04 ENCOUNTER — APPOINTMENT (OUTPATIENT)
Dept: GENERAL RADIOLOGY | Age: 75
End: 2025-04-04
Payer: MEDICARE

## 2025-04-04 ENCOUNTER — APPOINTMENT (OUTPATIENT)
Age: 75
End: 2025-04-04
Attending: INTERNAL MEDICINE
Payer: MEDICARE

## 2025-04-04 VITALS
DIASTOLIC BLOOD PRESSURE: 64 MMHG | HEIGHT: 73 IN | TEMPERATURE: 97.4 F | WEIGHT: 140 LBS | BODY MASS INDEX: 18.55 KG/M2 | OXYGEN SATURATION: 97 % | SYSTOLIC BLOOD PRESSURE: 122 MMHG | HEART RATE: 84 BPM | RESPIRATION RATE: 16 BRPM

## 2025-04-04 DIAGNOSIS — R07.9 CHEST PAIN WITH HIGH RISK FOR CARDIAC ETIOLOGY: Primary | ICD-10-CM

## 2025-04-04 DIAGNOSIS — R07.9 CHEST PAIN, UNSPECIFIED TYPE: ICD-10-CM

## 2025-04-04 DIAGNOSIS — Z86.79 HISTORY OF CAD (CORONARY ARTERY DISEASE): ICD-10-CM

## 2025-04-04 DIAGNOSIS — R06.02 SHORTNESS OF BREATH: ICD-10-CM

## 2025-04-04 LAB
ALBUMIN SERPL-MCNC: 4.3 G/DL (ref 3.5–5.2)
ALP SERPL-CCNC: 57 U/L (ref 40–129)
ALT SERPL-CCNC: 23 U/L (ref 10–50)
ANION GAP SERPL CALCULATED.3IONS-SCNC: 13 MMOL/L (ref 8–16)
ANION GAP SERPL CALCULATED.3IONS-SCNC: 13 MMOL/L (ref 8–16)
AST SERPL-CCNC: 24 U/L (ref 10–50)
BASOPHILS # BLD: 0.1 K/UL (ref 0–0.2)
BASOPHILS NFR BLD: 0.5 % (ref 0–1)
BILIRUB SERPL-MCNC: 0.3 MG/DL (ref 0.2–1.2)
BNP BLD-MCNC: 1006 PG/ML (ref 0–449)
BUN SERPL-MCNC: 32 MG/DL (ref 8–23)
BUN SERPL-MCNC: 33 MG/DL (ref 8–23)
CALCIUM SERPL-MCNC: 8.6 MG/DL (ref 8.8–10.2)
CALCIUM SERPL-MCNC: 8.7 MG/DL (ref 8.8–10.2)
CHLORIDE SERPL-SCNC: 100 MMOL/L (ref 98–107)
CHLORIDE SERPL-SCNC: 102 MMOL/L (ref 98–107)
CO2 SERPL-SCNC: 23 MMOL/L (ref 22–29)
CO2 SERPL-SCNC: 23 MMOL/L (ref 22–29)
CREAT SERPL-MCNC: 1.1 MG/DL (ref 0.7–1.2)
CREAT SERPL-MCNC: 1.2 MG/DL (ref 0.7–1.2)
ECHO AO ASC DIAM: 3.3 CM
ECHO AO ASCENDING AORTA INDEX: 1.78 CM/M2
ECHO AO ROOT DIAM: 2.7 CM
ECHO AO ROOT INDEX: 1.46 CM/M2
ECHO AO SINUS VALSALVA DIAM: 2.9 CM
ECHO AO SINUS VALSALVA INDEX: 1.57 CM/M2
ECHO AO ST JNCT DIAM: 2.7 CM
ECHO AR MAX VEL PISA: 3.7 M/S
ECHO AV AREA PEAK VELOCITY: 2.9 CM2
ECHO AV AREA VTI: 2.8 CM2
ECHO AV AREA/BSA PEAK VELOCITY: 1.6 CM2/M2
ECHO AV AREA/BSA VTI: 1.5 CM2/M2
ECHO AV MEAN GRADIENT: 4 MMHG
ECHO AV MEAN VELOCITY: 0.9 M/S
ECHO AV PEAK GRADIENT: 8 MMHG
ECHO AV PEAK VELOCITY: 1.4 M/S
ECHO AV REGURGITANT FRACTION: -1282 %
ECHO AV REGURGITANT PHT: 437 MS
ECHO AV REGURGITANT VOLUME: -950.1 ML
ECHO AV VELOCITY RATIO: 0.79
ECHO AV VTI: 26.6 CM
ECHO BSA: 1.81 M2
ECHO EST RA PRESSURE: 8 MMHG
ECHO IVC PROX: 1.8 CM
ECHO LA AREA 2C: 29.1 CM2
ECHO LA AREA 4C: 28.2 CM2
ECHO LA DIAMETER INDEX: 2.32 CM/M2
ECHO LA DIAMETER: 4.3 CM
ECHO LA MAJOR AXIS: 6.2 CM
ECHO LA MINOR AXIS: 6.3 CM
ECHO LA TO AORTIC ROOT RATIO: 1.59
ECHO LA VOL BP: 106 ML (ref 18–58)
ECHO LA VOL MOD A2C: 108 ML (ref 18–58)
ECHO LA VOL MOD A4C: 104 ML (ref 18–58)
ECHO LA VOL/BSA BIPLANE: 57 ML/M2 (ref 16–34)
ECHO LA VOLUME INDEX MOD A2C: 58 ML/M2 (ref 16–34)
ECHO LA VOLUME INDEX MOD A4C: 56 ML/M2 (ref 16–34)
ECHO LV E' LATERAL VELOCITY: 14.3 CM/S
ECHO LV E' SEPTAL VELOCITY: 5.01 CM/S
ECHO LV EDV 3D: 221 ML
ECHO LV EDV A2C: 204 ML
ECHO LV EDV A4C: 190 ML
ECHO LV EDV INDEX 3D: 119 ML/M2
ECHO LV EDV INDEX A4C: 103 ML/M2
ECHO LV EDV NDEX A2C: 110 ML/M2
ECHO LV EF PHYSICIAN: 40 %
ECHO LV EJECTION FRACTION 3D: 40 %
ECHO LV EJECTION FRACTION A2C: 53 %
ECHO LV EJECTION FRACTION A4C: 41 %
ECHO LV EJECTION FRACTION BIPLANE: 46 % (ref 55–100)
ECHO LV ESV 3D: 133 ML
ECHO LV ESV A2C: 97 ML
ECHO LV ESV A4C: 113 ML
ECHO LV ESV INDEX 3D: 72 ML/M2
ECHO LV ESV INDEX A2C: 52 ML/M2
ECHO LV ESV INDEX A4C: 61 ML/M2
ECHO LV FRACTIONAL SHORTENING: 11 % (ref 28–44)
ECHO LV INTERNAL DIMENSION DIASTOLE INDEX: 3.03 CM/M2
ECHO LV INTERNAL DIMENSION DIASTOLIC: 5.6 CM (ref 4.2–5.9)
ECHO LV INTERNAL DIMENSION SYSTOLIC INDEX: 2.7 CM/M2
ECHO LV INTERNAL DIMENSION SYSTOLIC: 5 CM
ECHO LV IVSD: 1.1 CM (ref 0.6–1)
ECHO LV MASS 2D: 234.3 G (ref 88–224)
ECHO LV MASS 3D INDEX: 96.8 G/M2
ECHO LV MASS 3D: 179 G
ECHO LV MASS INDEX 2D: 126.7 G/M2 (ref 49–115)
ECHO LV POSTERIOR WALL DIASTOLIC: 1 CM (ref 0.6–1)
ECHO LV RELATIVE WALL THICKNESS RATIO: 0.36
ECHO LVOT AREA: 3.8 CM2
ECHO LVOT AV VTI INDEX: 0.73
ECHO LVOT DIAM: 2.2 CM
ECHO LVOT MEAN GRADIENT: 2 MMHG
ECHO LVOT PEAK GRADIENT: 4 MMHG
ECHO LVOT PEAK VELOCITY: 1.1 M/S
ECHO LVOT STROKE VOLUME INDEX: 40 ML/M2
ECHO LVOT SV: 74.1 ML
ECHO LVOT VTI: 19.5 CM
ECHO MV A VELOCITY: 0.72 M/S
ECHO MV ANNULUS DIAMETER: 2.6 CM
ECHO MV AREA VTI: 3.6 CM2
ECHO MV E DECELERATION TIME (DT): 137 MS
ECHO MV LVOT VTI INDEX: 1.05
ECHO MV MAX VELOCITY: 1 M/S
ECHO MV MEAN GRADIENT: 2 MMHG
ECHO MV MEAN VELOCITY: 0.6 M/S
ECHO MV PEAK GRADIENT: 4 MMHG
ECHO MV REGURGITANT ALIASING (NYQUIST) VELOCITY: 28 CM/S
ECHO MV REGURGITANT FRACTION CONT EQ: 93 %
ECHO MV REGURGITANT PEAK GRADIENT: 125 MMHG
ECHO MV REGURGITANT PEAK VELOCITY: 5.6 M/S
ECHO MV REGURGITANT RADIUS PISA: 0.6 CM
ECHO MV REGURGITANT VOLUME: 950.09 CM3
ECHO MV REGURGITANT VTIA: 193 CM
ECHO MV VTI: 20.5 CM
ECHO RA AREA 4C: 27.4 CM2
ECHO RA END SYSTOLIC VOLUME APICAL 4 CHAMBER INDEX BSA: 54 ML/M2
ECHO RA VOLUME: 99 ML
ECHO RIGHT VENTRICULAR SYSTOLIC PRESSURE (RVSP): 43 MMHG
ECHO RV BASAL DIMENSION: 4.1 CM
ECHO RV INTERNAL DIMENSION: 2.6 CM
ECHO RV LONGITUDINAL DIMENSION: 7.6 CM
ECHO RV MID DIMENSION: 3.5 CM
ECHO RV TAPSE: 2.3 CM (ref 1.7–?)
ECHO TV REGURGITANT MAX VELOCITY: 2.97 M/S
ECHO TV REGURGITANT PEAK GRADIENT: 35 MMHG
EKG P AXIS: 85 DEGREES
EKG P-R INTERVAL: 236 MS
EKG Q-T INTERVAL: 396 MS
EKG QRS DURATION: 154 MS
EKG QTC CALCULATION (BAZETT): 436 MS
EKG T AXIS: 85 DEGREES
EOSINOPHIL # BLD: 0 K/UL (ref 0–0.6)
EOSINOPHIL NFR BLD: 0.4 % (ref 0–5)
ERYTHROCYTE [DISTWIDTH] IN BLOOD BY AUTOMATED COUNT: 13.2 % (ref 11.5–14.5)
GLUCOSE SERPL-MCNC: 108 MG/DL (ref 70–99)
GLUCOSE SERPL-MCNC: 156 MG/DL (ref 70–99)
HBA1C MFR BLD: 5.3 % (ref 4–5.6)
HCT VFR BLD AUTO: 36.2 % (ref 42–52)
HGB BLD-MCNC: 12.4 G/DL (ref 14–18)
IMM GRANULOCYTES # BLD: 0 K/UL
INR PPP: 1.02 (ref 0.88–1.18)
LIPASE SERPL-CCNC: 20 U/L (ref 13–60)
LYMPHOCYTES # BLD: 1.4 K/UL (ref 1.1–4.5)
LYMPHOCYTES NFR BLD: 13.6 % (ref 20–40)
MAGNESIUM SERPL-MCNC: 1.7 MG/DL (ref 1.6–2.4)
MCH RBC QN AUTO: 32.5 PG (ref 27–31)
MCHC RBC AUTO-ENTMCNC: 34.3 G/DL (ref 33–37)
MCV RBC AUTO: 95 FL (ref 80–94)
MONOCYTES # BLD: 0.9 K/UL (ref 0–0.9)
MONOCYTES NFR BLD: 8.4 % (ref 0–10)
NEUTROPHILS # BLD: 8.1 K/UL (ref 1.5–7.5)
NEUTS SEG NFR BLD: 76.9 % (ref 50–65)
PLATELET # BLD AUTO: 281 K/UL (ref 130–400)
PMV BLD AUTO: 10.4 FL (ref 9.4–12.4)
POTASSIUM SERPL-SCNC: 4.3 MMOL/L (ref 3.5–5)
POTASSIUM SERPL-SCNC: 4.4 MMOL/L (ref 3.5–5.1)
PROT SERPL-MCNC: 6.7 G/DL (ref 6.4–8.3)
PROTHROMBIN TIME: 13.4 SEC (ref 12–14.6)
RBC # BLD AUTO: 3.81 M/UL (ref 4.7–6.1)
SODIUM SERPL-SCNC: 136 MMOL/L (ref 136–145)
SODIUM SERPL-SCNC: 138 MMOL/L (ref 136–145)
TROPONIN, HIGH SENSITIVITY: 15 NG/L (ref 0–22)
TROPONIN, HIGH SENSITIVITY: 15 NG/L (ref 0–22)
WBC # BLD AUTO: 10.5 K/UL (ref 4.8–10.8)

## 2025-04-04 PROCEDURE — 85610 PROTHROMBIN TIME: CPT

## 2025-04-04 PROCEDURE — 83036 HEMOGLOBIN GLYCOSYLATED A1C: CPT

## 2025-04-04 PROCEDURE — 93005 ELECTROCARDIOGRAM TRACING: CPT | Performed by: EMERGENCY MEDICINE

## 2025-04-04 PROCEDURE — 83880 ASSAY OF NATRIURETIC PEPTIDE: CPT

## 2025-04-04 PROCEDURE — 99222 1ST HOSP IP/OBS MODERATE 55: CPT | Performed by: INTERNAL MEDICINE

## 2025-04-04 PROCEDURE — 36415 COLL VENOUS BLD VENIPUNCTURE: CPT

## 2025-04-04 PROCEDURE — 6360000004 HC RX CONTRAST MEDICATION: Performed by: INTERNAL MEDICINE

## 2025-04-04 PROCEDURE — 94760 N-INVAS EAR/PLS OXIMETRY 1: CPT

## 2025-04-04 PROCEDURE — 1200000000 HC SEMI PRIVATE

## 2025-04-04 PROCEDURE — 80053 COMPREHEN METABOLIC PANEL: CPT

## 2025-04-04 PROCEDURE — 99285 EMERGENCY DEPT VISIT HI MDM: CPT

## 2025-04-04 PROCEDURE — 93306 TTE W/DOPPLER COMPLETE: CPT | Performed by: INTERNAL MEDICINE

## 2025-04-04 PROCEDURE — 84484 ASSAY OF TROPONIN QUANT: CPT

## 2025-04-04 PROCEDURE — 83690 ASSAY OF LIPASE: CPT

## 2025-04-04 PROCEDURE — 76376 3D RENDER W/INTRP POSTPROCES: CPT | Performed by: INTERNAL MEDICINE

## 2025-04-04 PROCEDURE — C8929 TTE W OR WO FOL WCON,DOPPLER: HCPCS

## 2025-04-04 PROCEDURE — 93010 ELECTROCARDIOGRAM REPORT: CPT | Performed by: INTERNAL MEDICINE

## 2025-04-04 PROCEDURE — 71045 X-RAY EXAM CHEST 1 VIEW: CPT

## 2025-04-04 PROCEDURE — 85025 COMPLETE CBC W/AUTO DIFF WBC: CPT

## 2025-04-04 PROCEDURE — 83735 ASSAY OF MAGNESIUM: CPT

## 2025-04-04 RX ORDER — SODIUM CHLORIDE 0.9 % (FLUSH) 0.9 %
5-40 SYRINGE (ML) INJECTION EVERY 12 HOURS SCHEDULED
Status: DISCONTINUED | OUTPATIENT
Start: 2025-04-04 | End: 2025-04-04 | Stop reason: HOSPADM

## 2025-04-04 RX ORDER — ENOXAPARIN SODIUM 100 MG/ML
40 INJECTION SUBCUTANEOUS DAILY
Status: DISCONTINUED | OUTPATIENT
Start: 2025-04-04 | End: 2025-04-04 | Stop reason: HOSPADM

## 2025-04-04 RX ORDER — SPIRONOLACTONE 25 MG/1
25 TABLET ORAL DAILY
Status: DISCONTINUED | OUTPATIENT
Start: 2025-04-04 | End: 2025-04-04 | Stop reason: HOSPADM

## 2025-04-04 RX ORDER — CARVEDILOL 6.25 MG/1
6.25 TABLET ORAL 2 TIMES DAILY WITH MEALS
Status: DISCONTINUED | OUTPATIENT
Start: 2025-04-04 | End: 2025-04-04 | Stop reason: HOSPADM

## 2025-04-04 RX ORDER — ONDANSETRON 2 MG/ML
4 INJECTION INTRAMUSCULAR; INTRAVENOUS EVERY 6 HOURS PRN
Status: DISCONTINUED | OUTPATIENT
Start: 2025-04-04 | End: 2025-04-04 | Stop reason: HOSPADM

## 2025-04-04 RX ORDER — SODIUM CHLORIDE 0.9 % (FLUSH) 0.9 %
5-40 SYRINGE (ML) INJECTION PRN
Status: DISCONTINUED | OUTPATIENT
Start: 2025-04-04 | End: 2025-04-04 | Stop reason: HOSPADM

## 2025-04-04 RX ORDER — ONDANSETRON 4 MG/1
4 TABLET, ORALLY DISINTEGRATING ORAL EVERY 8 HOURS PRN
Status: DISCONTINUED | OUTPATIENT
Start: 2025-04-04 | End: 2025-04-04 | Stop reason: HOSPADM

## 2025-04-04 RX ORDER — ISOSORBIDE MONONITRATE 30 MG/1
30 TABLET, EXTENDED RELEASE ORAL DAILY
Status: DISCONTINUED | OUTPATIENT
Start: 2025-04-04 | End: 2025-04-04 | Stop reason: HOSPADM

## 2025-04-04 RX ORDER — NITROGLYCERIN 0.4 MG/1
0.4 TABLET SUBLINGUAL EVERY 5 MIN PRN
Status: DISCONTINUED | OUTPATIENT
Start: 2025-04-04 | End: 2025-04-04 | Stop reason: HOSPADM

## 2025-04-04 RX ORDER — ACETAMINOPHEN 650 MG/1
650 SUPPOSITORY RECTAL EVERY 6 HOURS PRN
Status: DISCONTINUED | OUTPATIENT
Start: 2025-04-04 | End: 2025-04-04 | Stop reason: HOSPADM

## 2025-04-04 RX ORDER — ASPIRIN 81 MG/1
81 TABLET, CHEWABLE ORAL DAILY
Status: DISCONTINUED | OUTPATIENT
Start: 2025-04-04 | End: 2025-04-04 | Stop reason: HOSPADM

## 2025-04-04 RX ORDER — ACETAMINOPHEN 325 MG/1
650 TABLET ORAL EVERY 6 HOURS PRN
Status: DISCONTINUED | OUTPATIENT
Start: 2025-04-04 | End: 2025-04-04 | Stop reason: HOSPADM

## 2025-04-04 RX ORDER — POLYETHYLENE GLYCOL 3350 17 G/17G
17 POWDER, FOR SOLUTION ORAL DAILY PRN
Status: DISCONTINUED | OUTPATIENT
Start: 2025-04-04 | End: 2025-04-04 | Stop reason: HOSPADM

## 2025-04-04 RX ORDER — MORPHINE SULFATE 2 MG/ML
1 INJECTION, SOLUTION INTRAMUSCULAR; INTRAVENOUS EVERY 4 HOURS PRN
Status: DISCONTINUED | OUTPATIENT
Start: 2025-04-04 | End: 2025-04-04

## 2025-04-04 RX ORDER — SODIUM CHLORIDE 9 MG/ML
INJECTION, SOLUTION INTRAVENOUS PRN
Status: DISCONTINUED | OUTPATIENT
Start: 2025-04-04 | End: 2025-04-04 | Stop reason: HOSPADM

## 2025-04-04 RX ORDER — MECOBALAMIN 5000 MCG
5 TABLET,DISINTEGRATING ORAL NIGHTLY
Status: DISCONTINUED | OUTPATIENT
Start: 2025-04-04 | End: 2025-04-04 | Stop reason: HOSPADM

## 2025-04-04 RX ORDER — AMIODARONE HYDROCHLORIDE 200 MG/1
200 TABLET ORAL DAILY
Status: DISCONTINUED | OUTPATIENT
Start: 2025-04-04 | End: 2025-04-04 | Stop reason: HOSPADM

## 2025-04-04 RX ADMIN — SULFUR HEXAFLUORIDE 2 ML: 60.7; .19; .19 INJECTION, POWDER, LYOPHILIZED, FOR SUSPENSION INTRAVENOUS; INTRAVESICAL at 11:18

## 2025-04-04 SDOH — ECONOMIC STABILITY: INCOME INSECURITY: HOW HARD IS IT FOR YOU TO PAY FOR THE VERY BASICS LIKE FOOD, HOUSING, MEDICAL CARE, AND HEATING?: NOT HARD AT ALL

## 2025-04-04 SDOH — ECONOMIC STABILITY: FOOD INSECURITY: WITHIN THE PAST 12 MONTHS, YOU WORRIED THAT YOUR FOOD WOULD RUN OUT BEFORE YOU GOT MONEY TO BUY MORE.: NEVER TRUE

## 2025-04-04 SDOH — ECONOMIC STABILITY: INCOME INSECURITY: IN THE PAST 12 MONTHS, HAS THE ELECTRIC, GAS, OIL, OR WATER COMPANY THREATENED TO SHUT OFF SERVICE IN YOUR HOME?: NO

## 2025-04-04 ASSESSMENT — PATIENT HEALTH QUESTIONNAIRE - PHQ9
SUM OF ALL RESPONSES TO PHQ QUESTIONS 1-9: 0
SUM OF ALL RESPONSES TO PHQ QUESTIONS 1-9: 0
1. LITTLE INTEREST OR PLEASURE IN DOING THINGS: NOT AT ALL
SUM OF ALL RESPONSES TO PHQ QUESTIONS 1-9: 0
SUM OF ALL RESPONSES TO PHQ QUESTIONS 1-9: 0
2. FEELING DOWN, DEPRESSED OR HOPELESS: NOT AT ALL

## 2025-04-04 ASSESSMENT — ENCOUNTER SYMPTOMS
SHORTNESS OF BREATH: 0
CHEST TIGHTNESS: 1
RESPIRATORY NEGATIVE: 1
VOMITING: 0
EYES NEGATIVE: 1
NAUSEA: 0
DIARRHEA: 0
GASTROINTESTINAL NEGATIVE: 1

## 2025-04-04 ASSESSMENT — LIFESTYLE VARIABLES
HOW OFTEN DO YOU HAVE A DRINK CONTAINING ALCOHOL: NEVER
HOW MANY STANDARD DRINKS CONTAINING ALCOHOL DO YOU HAVE ON A TYPICAL DAY: PATIENT DOES NOT DRINK

## 2025-04-04 ASSESSMENT — PAIN DESCRIPTION - LOCATION: LOCATION: CHEST

## 2025-04-04 ASSESSMENT — PAIN SCALES - GENERAL: PAINLEVEL_OUTOF10: 4

## 2025-04-04 NOTE — ED PROVIDER NOTES
[facility] EMERGENCY DEPARTMENT  EMERGENCY DEPARTMENT ENCOUNTER      Pt Name: Eddie Brito  MRN: [id number]  Birthdate 1950  Date of evaluation: 2025  Provider: Pollo Carrero Jr, MD    CHIEF COMPLAINT       Chief Complaint   Patient presents with    Chest Pain     Pt having CP/ tightness for 2 days. Pt. has cardiac Hx, PM/defib. Denies SOB. Pt took 2  sublingual nitro at home and EMS gave 325 ASA.            HISTORY OF PRESENT ILLNESS   (Location/Symptom, Timing/Onset,Context/Setting, Quality, Duration, Modifying Factors, Severity)  Note limiting factors.   Eddie Brito is a 74 y.o. male who presents to the emergency department for evaluation after having tightness in his chest. Started a a couple days ago. Was better this morning then worsened as the day went on. Did not notice any specific exacerbating or alleviating factors. Took several nitro at home with maybe slight relief but not much. Was recently seen by cardiology and had stress test ordered after there was a change noted on his EKG.  Per medical records, was noted to have new t wave inversion on EKG in clinic.    Has h/o afib, on amiodarone and anticoagulated on eliquis.     Last heart cath was in . At that time had stenst placed to distal RCA and mid LAD    HPI    NursingNotes were reviewed.    REVIEW OF SYSTEMS    (2-9 systems for level 4, 10 or more for level 5)     Review of Systems   Constitutional: Negative.    HENT: Negative.     Eyes: Negative.    Respiratory:  Positive for chest tightness.    Cardiovascular:  Positive for chest pain.   Gastrointestinal: Negative.    Genitourinary: Negative.    Musculoskeletal: Negative.    Skin: Negative.    Neurological: Negative.    Hematological: Negative.    Psychiatric/Behavioral: Negative.         A complete review of systems was performed and is negative except as noted above in the HPI.       PAST MEDICAL HISTORY     Past Medical History:   Diagnosis Date    Acute exacerbation of

## 2025-04-04 NOTE — CONSULTS
[facility]  Cardiology Consult      Requesting MD:  Javed La MD   Admit Status:         History obtained from:   [] Patient  [] Other (specify):     Patient:  Eddie Brito  [id number]     Chief Complaint:   Chief Complaint   Patient presents with    Chest Pain     Pt having CP/ tightness for 2 days. Pt. has cardiac Hx, PM/defib. Denies SOB. Pt took 2  sublingual nitro at home and EMS gave 325 ASA.            HPI: Mr. Brito is a 74 y.o. male with a history of coronary artery disease 2 stents previously placed in  paroxysmal atrial fibrillation history of congestive heart failure has been having recurrent bouts of tightness in his mid chest intermittently over the past 2 days actually better with activities he has tried nitroglycerin which were 2 or 3 years old no improvement.  Troponins were 15 and 15 stable and pain-free at the present time.  Unfortunately he already drank coffee this morning in the emergency department.  He is on beta-blockers.  We could hold him over the weekend and do stress testing on Monday but if stable after ambulating could be potentially discharged home below and may arrange for outpatient evaluation next week.  He would prefer the latter if possible.    Review of Systems:  Review of Systems   Constitutional: Negative.  Negative for chills, fever and unexpected weight change.   HENT: Negative.     Eyes: Negative.    Respiratory: Negative.  Negative for shortness of breath.    Cardiovascular: Negative.  Negative for chest pain.   Gastrointestinal: Negative.  Negative for diarrhea, nausea and vomiting.   Endocrine: Negative.    Genitourinary: Negative.    Musculoskeletal: Negative.    Skin: Negative.    Neurological: Negative.    All other systems reviewed and are negative.      Cardiac Specific Data:  Specialty Problems          Cardiology Problems    Coronary artery disease involving native coronary artery of native heart without angina  pectoris        Mixed hyperlipidemia        HTN (hypertension)        * (Principal) Chest pain        Atherosclerosis of native artery of extremity with intermittent claudication        Inferior MI ([facility])        ST elevation myocardial infarction (STEMI) ([facility])        Bilateral carotid artery stenosis        PVD (peripheral vascular disease)        Acute exacerbation of CHF (congestive heart failure) ([facility])        Acute systolic (congestive) heart failure           Past Medical History:  Past Medical History:   Diagnosis Date    Acute exacerbation of CHF (congestive heart failure) ([facility]) 02/02/2022    Atrial fibrillation ([facility])     Bilateral carotid artery stenosis 01/06/2020    CAD (coronary artery disease), native coronary artery     acute inferior MI on 5/15/09, s/p emeergent CABG x4 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 Garcia manages    Palliative care patient 02/28/2022    Palpitations     Statin intolerance 02/14/2018    Tobacco abuse     Transient atrial fibrillation or flutter     post op        Past Surgical History:  Past Surgical History:   Procedure Laterality Date    CARDIAC CATHETERIZATION  4/23/12    with stent to left circ, and LAD    CARDIAC CATHETERIZATION  01/07/2018    PTCA/BJ to VG to LAD;  atherectomy and 3 BMS to VG to 1st Circ    CORONARY ARTERY BYPASS GRAFT  05/15/09    x4    DIAGNOSTIC CARDIAC CATH LAB PROCEDURE  05/15/09    left heart cath, left ventr, selective coronary arteriography     FRACTURE SURGERY Left     Leg    VASCULAR SURGERY  4/21/2015 S    Percutaneous cannulation right common femoral artery with 5-Hebrew and later 6-Hebrew pinnacle destination sheath.Suprarenal abdominal aortogram with bilateral iliofemoral arteriograms.Bilateral lower extremity arteriograms.Left popliteal/tibioperoneal trunk artery balloon angioplasty with 4 mm x 15 mm cutting balloon.    VASCULAR SURGERY  4/21/2015 S cont    Arteriograms after balloon angioplasty.Balloon

## 2025-04-04 NOTE — CARE COORDINATION
Case Management Assessment  Initial Evaluation    Date/Time of Evaluation: 4/4/2025 9:02 AM  Assessment Completed by: JIM Berg    If patient is discharged prior to next notation, then this note serves as note for discharge by case management.    Patient Name: Eddie Brito                   YOB: 1950  Diagnosis: Chest pain [R07.9]  Chest pain with high risk for cardiac etiology [R07.9]  History of CAD (coronary artery disease) [Z86.79]                   Date / Time: 4/4/2025  2:16 AM    Patient Admission Status: Inpatient   Readmission Risk (Low < 19, Mod (19-27), High > 27): Readmission Risk Score: 14.5    Current PCP: No, Pcp  PCP verified by CM? No (pt does not have a PCP)    Chart Reviewed: Yes      History Provided by: Patient  Patient Orientation: Alert and Oriented    Patient Cognition: Alert    Hospitalization in the last 30 days (Readmission):  No    If yes, Readmission Assessment in CM Navigator will be completed.    Advance Directives:      Code Status: Full Code   Patient's Primary Decision Maker is: Legal Next of Kin    Primary Decision Maker: Chyna Plasencia - Domestic Partner - [phone]    Secondary Decision Maker: Rosa Brito - Child - [phone]    Secondary Decision Maker: jerrell maldonado - Child - [phone]    Discharge Planning:    Patient lives with: Spouse/Significant Other Type of Home: House  Primary Care Giver: Self  Patient Support Systems include: Friends/Neighbors, Spouse/Significant Other   Current Financial resources: Medicare, Medicaid  Current community resources: None  Current services prior to admission: None            Current DME:              Type of Home Care services:  None    ADLS  Prior functional level: Independent in ADLs/IADLs  Current functional level: Independent in ADLs/IADLs    PT AM-PAC:   /24  OT AM-PAC:   /24    Family can provide assistance at DC: Yes  Would you like Case Management to discuss the discharge plan with any other  family members/significant others, and if so, who? Yes (girlfriend)  Plans to Return to Present Housing: Yes  Other Identified Issues/Barriers to RETURNING to current housing: na  Potential Assistance needed at discharge: N/A            Potential DME:    Patient expects to discharge to: House  Plan for transportation at discharge: Family    Financial    Payor: MEDICARE / Plan: MEDICARE PART A AND B / Product Type: *No Product type* /     Does insurance require precert for SNF: No    Potential assistance Purchasing Medications: No  Meds-to-Beds request:        [id number] [place] [place] [facility] [address] [facility] - P [phone] - F [phone]  [address] [facility] [place] [address]  Phone: [phone] Fax: [phone]      Notes:    Factors facilitating achievement of predicted outcomes: Family support, Motivated, and Cooperative    Barriers to discharge: Medical complications    Additional Case Management Notes: SW met with pt to discuss dc planning, pt does not have a PCP, will ask [facility] to set him up with one at discharge, pt states he is independent and denies any other needs at this time    The Plan for Transition of Care is related to the following treatment goals of Chest pain [R07.9]  Chest pain with high risk for cardiac etiology [R07.9]  History of CAD (coronary artery disease) [Z86.79]    IF APPLICABLE: The Patient and/or patient representative Eddie and his family were provided with a choice of provider and agrees with the discharge plan. Freedom of choice list with basic dialogue that supports the patient's individualized plan of care/goals and shares the quality data associated with the providers was provided to: Patient   Patient Representative Name:       The Patient and/or Patient Representative Agree with the Discharge Plan? Yes    JIM Berg  Case Management Department  Ph: [phone] Fax: [phone]

## 2025-04-04 NOTE — DISCHARGE SUMMARY
[facility] [place] [address]  DEPARTMENT OF HOSPITALIST MEDICINE    DISCHARGE SUMMARY:        Eddie Brito  :  1950  MRN:  [id number]    Admit date:  2025  Discharge date:   2025      Admitting Physician:  Javed La MD    Advance Directive: Full Code    Consults Made:   IP CONSULT TO CARDIOLOGY      Primary Care Physician:  No, Pcp    Discharge Diagnoses:  Principal Problem:    Chest pain  Resolved Problems:    * No resolved hospital problems. *        Pertinent Labs:  CBC with DIFF:  Recent Labs     25   WBC 10.5   RBC 3.81*   HGB 12.4*   HCT 36.2*   MCV 95.0*   MCH 32.5*   MCHC 34.3   RDW 13.2      MPV 10.4   NEUTOPHILPCT 76.9*   LYMPHOPCT 13.6*   MONOPCT 8.4   BASOPCT 0.5   NEUTROABS 8.1*   LYMPHSABS 1.4   MONOSABS 0.90   EOSABS 0.00   BASOSABS 0.10       CMP/BMP:  Recent Labs     25  0425    138   K 4.4 4.3    102   CO2 23 23   ANIONGAP 13 13   GLUCOSE 156* 108*   BUN 33* 32*   CREATININE 1.2 1.1   LABGLOM 62 69   CALCIUM 8.7* 8.6*   BILITOT 0.3  --    ALKPHOS 57  --    ALT 23  --    AST 24  --          CRP:  No results for input(s): \"CRP\" in the last 72 hours.  Sed Rate:  No results for input(s): \"SEDRATE\" in the last 72 hours.      HgBA1c:  No components found for: \"HGBA1C\"  FLP:    Lab Results   Component Value Date/Time    TRIG 49 2022 03:53 PM    HDL 54 2022 03:53 PM    LDLDIRECT 141 2015 07:35 AM     TSH:    Lab Results   Component Value Date/Time    TSH 5.380 10/10/2022 02:58 PM     Troponin T: No results for input(s): \"TROPONINI\" in the last 72 hours.  Pro-BNP: No results for input(s): \"BNP\" in the last 72 hours.  INR:   Recent Labs     04/04/25  0217   INR 1.02     ABGs:   Lab Results   Component Value Date/Time    PHART 7.420 2022 11:34 PM    PO2ART 204.0 2022 11:34 PM    CVW2YKT 31.0 2022 11:34 PM     UA:No results for input(s): \"NITRITE\", \"COLORU\", \"PHUR\", \"LABCAST\", \"WBCUA\",

## 2025-04-04 NOTE — ED NOTES
ED TO INPATIENT SBAR HANDOFF    Patient Name: Eddie Brito   : 1950  74 y.o.   Family/Caregiver Present: No  Code Status Order: Full Code    C-SSRS: Risk of Suicide: No Risk  Sitter No  Restraints:         Situation  Chief Complaint:   Chief Complaint   Patient presents with    Chest Pain     Pt having CP/ tightness for 2 days. Pt. has cardiac Hx, PM/defib. Denies SOB. Pt took 2  sublingual nitro at home and EMS gave 325 ASA.        Patient Diagnosis: Chest pain [R07.9]     Brief Description of Patient's Condition: Patient is a 74 y.o. male with  past medical history as documented below.. Patient presented to [facility] ED due to chest pain that has been increasingly getting worse for 2 days ago. He said chest pain has been on going for 1 week. He said that chest pain is located in the center of the chest. Chest pain is described as squeezing. Chest pain in non radiating. He was seen at out patient cardiology clinic 1 week ago where EKG performed showed lateral T waves inversion. Stress test was ordered. Patient denies fever, chills, visual problem, dysphagia,  shortness of breath, cough, bowel or bladder incontinence, abdominal pain, diarrhea or dysuria.   Troponin WNL. BMP and CBC unremarkable.   Mental Status: oriented, alert, coherent, and logical  Arrived from: home    Imaging:   XR CHEST PORTABLE   Final Result   Compared to the prior study, the pulmonary edema and pleural effusions have resolved           ______________________________________    Electronically signed by: ROVERTO AARON M.D.   Date:     2025   Time:    03:38         COVID-19 Results:   Internal Administration   First Dose      Second Dose           Last COVID Lab SARS-CoV-2, NAAT (no units)   Date Value   2022 DETECTED (AA)           Abnormal labs:   Abnormal Labs Reviewed   CBC WITH AUTO DIFFERENTIAL - Abnormal; Notable for the following components:       Result Value    RBC 3.81 (*)     Hemoglobin 12.4

## 2025-04-04 NOTE — ED NOTES
Pt requesting something for anxiety and pain, sent perfect serve message to hospitalist and spoke with Ana to make her aware of pt requests. Informed pt that pain medicine was not due for another 40 minutes.

## 2025-04-04 NOTE — H&P
Hospitalist: History and Physical    Date: 2025 Time: 3:23 AM    Name: Eddie Brito : 1950 MRN: [id number]    Code Status: Full Code No additional code details    PCP: No, Pcp    Patient's Chief Complaint Is: Chest pain  HPI: Patient is a 74 y.o. male with  past medical history as documented below.. Patient presented to [facility] ED due to chest pain that has been increasingly getting worse for 2 days ago. He said chest pain has been on going for 1 week. He said that chest pain is located in the center of the chest. Chest pain is described as squeezing. Chest pain in non radiating. He was seen at out patient cardiology clinic 1 week ago where EKG performed showed lateral T waves inversion. Stress test was ordered. Patient denies fever, chills, visual problem, dysphagia,  shortness of breath, cough, bowel or bladder incontinence, abdominal pain, diarrhea or dysuria.   Troponin WNL. BMP and CBC unremarkable.     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 Garcia manages    Palliative care patient 2022    Palpitations     Statin intolerance 2018    Tobacco abuse     Transient atrial fibrillation or flutter     post op     Past Surgical History:   Procedure Laterality Date    CARDIAC CATHETERIZATION  12    with stent to left circ, and LAD    CARDIAC CATHETERIZATION

## 2025-04-04 NOTE — PROGRESS NOTES
Spiritual Health History and Assessment/Progress Note  [facility]    (P) Spiritual/Emotional Needs, (P) Family Care, (P) Adjustment to illness,      Name: Eddie Brito MRN: [id number]    Age: 74 y.o.     Sex: male   Language: English   [employer]: [employer]   Chest pain     Date: 4/4/2025            Total Time Calculated: (P) 10 min              Spiritual Assessment began in [facility] ONCOLOGY UNIT        Referral/Consult From: (P) Rounding   Encounter Overview/Reason: (P) Spiritual/Emotional Needs  Service Provided For: (P) Patient and family together    Alla, Belief, Meaning:   Patient identifies as spiritual  Family/Friends identify as spiritual      Importance and Influence:  Patient has spiritual/personal beliefs that influence decisions regarding their health  Family/Friends have spiritual/personal beliefs that influence decisions regarding the patient's health    Community:  Patient is connected with a spiritual community  Family/Friends are connected with a spiritual community:    Assessment and Plan of Care:   The patient was visiting with his family member and mentioned that he belonged to the [employer] and actively participate in the fellowship.  His spiritual and emotional comfort he receives from his [employer] fellowship and family fellowship aswell.    Patient Interventions include: Facilitated expression of thoughts and feelings  Family/Friends Interventions include: Explored spiritual coping/struggle/distress    Patient Plan of Care: No spiritual needs identified for follow-up  Family/Friends Plan of Care: No future visits per patient/family request    Electronically signed by Medina Carson on 4/4/2025 at 5:17 PM

## 2025-04-05 NOTE — PROGRESS NOTES
Physician Progress Note      PATIENT:               LIVAN LANCE  CSN #:                  [id number]  :                       1950  ADMIT DATE:       2025 2:16 AM  DISCH DATE:        2025 5:26 PM  RESPONDING  PROVIDER #:        Javed La MD MPH          QUERY TEXT:    Based on your medical judgment, please clarify these findings and document if   any of the following are being evaluated and/or treated:    The clinical indicators include:  -74 year old male with hx of afib, CHF, htn, and old MI (H/P  Dr. Gonzalez)  -lovneox sq (orders )  Options provided:  -- Secondary hypercoagulable state in a patient with atrial fibrillation  -- Other - I will add my own diagnosis  -- Disagree - Not applicable / Not valid  -- Disagree - Clinically unable to determine / Unknown  -- Refer to Clinical Documentation Reviewer    PROVIDER RESPONSE TEXT:    This patient has secondary hypercoagulable state in a patient with atrial   fibrillation.    Query created by: Don Brown on 2025 11:26 AM      Electronically signed by:  Javed La MD MPH 2025 1:08 PM

## 2025-04-10 LAB
EKG P AXIS: 66 DEGREES
EKG P-R INTERVAL: 222 MS
EKG Q-T INTERVAL: 462 MS
EKG QRS DURATION: 146 MS
EKG QTC CALCULATION (BAZETT): 469 MS
EKG T AXIS: 85 DEGREES

## 2025-04-10 NOTE — PROGRESS NOTES
Physician Progress Note      PATIENT:               LIVAN LANCE  CSN #:                  [id number]  :                       1950  ADMIT DATE:       2025 2:16 AM  DISCH DATE:        2025 5:26 PM  RESPONDING  PROVIDER #:        Javed La MD MPH          QUERY TEXT:    Chest pain is documented in the medical record in DC summary  by Dr La.   Please specify the underlying cause:    The clinical indicators include:  -admitted with chest pain, squeezing and non radiating with a hx of CAD (H/P    Dr. Gonzalez)  -trop 15 and 15 (Lab )  -Card consult noted the pt with CP and a hx of CAD with dc to continue Imdur   30 mg po daily (Card consult  Dr. Flor)  Options provided:  -- Chest pain related to CAD with angina  -- Chest pain only  -- Other - I will add my own diagnosis  -- Disagree - Not applicable / Not valid  -- Disagree - Clinically unable to determine / Unknown  -- Refer to Clinical Documentation Reviewer    PROVIDER RESPONSE TEXT:    This patient has chest pain related to CAD with angina.    Query created by: Don Brown on 4/10/2025 7:29 AM      Electronically signed by:  Javed La MD MPH 4/10/2025 10:51 AM

## 2025-05-19 RX ORDER — ISOSORBIDE MONONITRATE 30 MG/1
30 TABLET, EXTENDED RELEASE ORAL DAILY
Qty: 90 TABLET | Refills: 3 | Status: SHIPPED | OUTPATIENT
Start: 2025-05-19

## 2025-05-19 RX ORDER — AMIODARONE HYDROCHLORIDE 200 MG/1
200 TABLET ORAL DAILY
Qty: 90 TABLET | Refills: 1 | Status: SHIPPED | OUTPATIENT
Start: 2025-05-19

## 2025-05-19 RX ORDER — SPIRONOLACTONE 25 MG/1
25 TABLET ORAL DAILY
Qty: 90 TABLET | Refills: 1 | Status: SHIPPED | OUTPATIENT
Start: 2025-05-19

## 2025-06-19 ENCOUNTER — RESULTS FOLLOW-UP (OUTPATIENT)
Dept: CARDIOLOGY CLINIC | Age: 75
End: 2025-06-19

## 2025-06-19 DIAGNOSIS — I50.40 COMBINED SYSTOLIC AND DIASTOLIC ACC/AHA STAGE C CONGESTIVE HEART FAILURE (HCC): ICD-10-CM

## 2025-06-19 DIAGNOSIS — I25.5 ISCHEMIC CARDIOMYOPATHY: ICD-10-CM

## 2025-06-19 DIAGNOSIS — Z95.810 ICD (IMPLANTABLE CARDIOVERTER-DEFIBRILLATOR) IN PLACE: Primary | ICD-10-CM

## 2025-06-23 RX ORDER — FUROSEMIDE 20 MG/1
40 TABLET ORAL 2 TIMES DAILY
Qty: 360 TABLET | Refills: 1 | Status: SHIPPED | OUTPATIENT
Start: 2025-06-23

## 2025-09-03 RX ORDER — CARVEDILOL 6.25 MG/1
6.25 TABLET ORAL 2 TIMES DAILY WITH MEALS
Qty: 60 TABLET | Refills: 5 | Status: SHIPPED | OUTPATIENT
Start: 2025-09-03